# Patient Record
Sex: FEMALE | Race: WHITE | Employment: OTHER | URBAN - METROPOLITAN AREA
[De-identification: names, ages, dates, MRNs, and addresses within clinical notes are randomized per-mention and may not be internally consistent; named-entity substitution may affect disease eponyms.]

---

## 2017-02-15 ENCOUNTER — HOSPITAL ENCOUNTER (OUTPATIENT)
Dept: LAB | Age: 82
Discharge: HOME | End: 2017-02-15
Payer: MEDICARE

## 2017-02-15 ENCOUNTER — OFFICE VISIT (OUTPATIENT)
Dept: FAMILY MEDICINE CLINIC | Age: 82
End: 2017-02-15

## 2017-02-15 VITALS
WEIGHT: 185 LBS | OXYGEN SATURATION: 97 % | HEIGHT: 65 IN | TEMPERATURE: 97.6 F | BODY MASS INDEX: 30.82 KG/M2 | DIASTOLIC BLOOD PRESSURE: 76 MMHG | HEART RATE: 58 BPM | SYSTOLIC BLOOD PRESSURE: 128 MMHG | RESPIRATION RATE: 16 BRPM

## 2017-02-15 DIAGNOSIS — E78.2 MIXED HYPERLIPIDEMIA: ICD-10-CM

## 2017-02-15 DIAGNOSIS — N18.30 CRI (CHRONIC RENAL INSUFFICIENCY), STAGE 3 (MODERATE) (HCC): ICD-10-CM

## 2017-02-15 DIAGNOSIS — Z71.89 ADVANCED CARE PLANNING/COUNSELING DISCUSSION: ICD-10-CM

## 2017-02-15 DIAGNOSIS — Z00.00 WELL ADULT EXAM: Primary | ICD-10-CM

## 2017-02-15 DIAGNOSIS — I10 ESSENTIAL HYPERTENSION, BENIGN: ICD-10-CM

## 2017-02-15 PROCEDURE — 80053 COMPREHEN METABOLIC PANEL: CPT

## 2017-02-15 PROCEDURE — 84443 ASSAY THYROID STIM HORMONE: CPT

## 2017-02-15 PROCEDURE — 80061 LIPID PANEL: CPT

## 2017-02-15 PROCEDURE — 85025 COMPLETE CBC W/AUTO DIFF WBC: CPT

## 2017-02-15 RX ORDER — FOLIC ACID 1 MG/1
TABLET ORAL
Qty: 90 TAB | Refills: 1 | Status: CANCELLED | OUTPATIENT
Start: 2017-02-15

## 2017-02-15 RX ORDER — METOPROLOL TARTRATE 100 MG/1
TABLET ORAL
Qty: 180 TAB | Refills: 3 | Status: SHIPPED | OUTPATIENT
Start: 2017-02-15 | End: 2018-03-27 | Stop reason: SDUPTHER

## 2017-02-15 RX ORDER — FOLIC ACID 1 MG/1
TABLET ORAL
Qty: 90 TAB | Refills: 3 | Status: SHIPPED | OUTPATIENT
Start: 2017-02-15 | End: 2018-03-17 | Stop reason: SDUPTHER

## 2017-02-15 RX ORDER — METOPROLOL TARTRATE 100 MG/1
TABLET ORAL
Qty: 180 TAB | Refills: 2 | Status: CANCELLED | OUTPATIENT
Start: 2017-02-15

## 2017-02-15 RX ORDER — LOSARTAN POTASSIUM AND HYDROCHLOROTHIAZIDE 25; 100 MG/1; MG/1
TABLET ORAL
Qty: 90 TAB | Refills: 3 | Status: SHIPPED | OUTPATIENT
Start: 2017-02-15 | End: 2018-03-26 | Stop reason: SDUPTHER

## 2017-02-15 NOTE — PROGRESS NOTES
1. Have you been to the ER, urgent care clinic since your last visit? Hospitalized since your last visit? No    2. Have you seen or consulted any other health care providers outside of the [address] [place] [address] since your last visit? Include any pap smears or colon screening.  No    Chief Complaint   Patient presents with    Complete Physical     wellness    Labs     fasting

## 2017-02-15 NOTE — PATIENT INSTRUCTIONS
Fax labs when ready to:  Dr. Jurline Shone Dr. Etha Holt Dr. Solmon Diesel,   Fax: [phone]  Dr. Marine Longoria [facility]

## 2017-02-15 NOTE — PROGRESS NOTES
This is a Subsequent Medicare Annual Wellness Visit providing Personalized Prevention Plan Services (PPPS) (Performed 12 months after initial AWV and PPPS )  I have reviewed the patient's medical history in detail and updated the computerized patient record. History     Past Medical History   Diagnosis Date    Allergies     Asymptomatic carotid artery stenosis without infarction 2/22/2011    Depression     Diverticulosis     DJD (degenerative joint disease)     GERD (gastroesophageal reflux disease)     HTN     Hypercholesterolemia     Mammography less than 12 months ago     NIDDM     Proteinuria       Past Surgical History   Procedure Laterality Date    Hx splenectomy      Hx hernia repair      Hx knee replacement      Endoscopy, colon, diagnostic  2008     normal    Hx appendectomy      Hx hysterectomy       Current Outpatient Prescriptions   Medication Sig Dispense Refill    losartan-hydroCHLOROthiazide (HYZAAR) 100-25 mg per tablet TAKE ONE TABLET BY MOUTH DAILY 90 Tab 3    metoprolol tartrate (LOPRESSOR) 100 mg IR tablet TAKE ONE TABLET BY MOUTH TWICE DAILY 217 Tab 3    folic acid (FOLVITE) 1 mg tablet TAKE ONE TABLET BY MOUTH ONCE DAILY 90 Tab 3    lovastatin (MEVACOR) 20 mg tablet Take 2 Tabs by mouth nightly. DUE FOR LABS 180 Tab 0    aspirin delayed-release 325 mg tablet Take  by mouth every six (6) hours as needed for Pain.  glimepiride (AMARYL) 2 mg tablet Take 1 Tab by mouth every morning. (Patient taking differently: Take 2 mg by mouth every morning. 1/2 tab daily) 90 Tab 2    cholecalciferol, vitamin D3, (VITAMIN D3) 2,000 unit Tab Take  by mouth.  linagliptin (TRADJENTA) 5 mg tablet Take 1 Tab by mouth daily. 30 Tab 5    methotrexate (RHEUMATREX) 2.5 mg tablet Take 2.5 mg by mouth Every Thursday.  Take 4 tablets      benzonatate (TESSALON) 200 mg capsule TAKE ONE CAPSULE BY MOUTH THREE TIMES DAILY AS NEEDED FOR COUGH 30 Cap 0    hydrocortisone (HYTONE) 2.5 % topical cream       levocetirizine (XYZAL) 5 mg tablet Take 1 Tab by mouth daily. 30 Tab 5    fluticasone (FLONASE) 50 mcg/actuation nasal spray 2 Sprays by Both Nostrils route daily. 1 Bottle 5    warfarin (COUMADIN) 5 mg tablet Take 5 mg by mouth daily.  Dexlansoprazole 60 mg CpDB Take  by mouth.  apixaban (ELIQUIS) 2.5 mg tablet Take 2.5 mg by mouth two (2) times a day.  cromolyn (OPTICROM) 4 % ophthalmic solution Administer 1 Drop to both eyes four (4) times daily. Use in affected eye(s) 10 mL 0    rivaroxaban (XARELTO) 15 mg tab tablet Take 1 Tab by mouth daily. 30 Tab 3    carbamide peroxide (DEBROX) 6.5 % otic solution Administer 5 drops into each ear as needed. 15 mL 0    diclofenac EC (VOLTAREN) 75 mg EC tablet Take 1 Tab by mouth two (2) times a day. As needed for pain with food 60 Tab 0    diphenoxylate-atropine (LOMOTIL) 2.5-0.025 mg per tablet Take  by mouth four (4) times daily as needed for Diarrhea.  clotrimazole-betamethasone (LOTRISONE) topical cream Apply  to affected area two (2) times a day. 45 g 1    mometasone (ELOCON) 0.1 % topical cream Apply  to affected area daily. 15 g 1    aspirin delayed-release 81 mg tablet Take 81 mg by mouth daily.  senna-docusate (SENNA PLUS) 8.6-50 mg per tablet Take 1 Tab by mouth daily.        Allergies   Allergen Reactions    Sulfa (Sulfonamide Antibiotics) Other (comments)     Family History   Problem Relation Age of Onset    Hypertension Father     Cancer Sister      breast    Diabetes Brother      Social History   Substance Use Topics    Smoking status: Never Smoker    Smokeless tobacco: Never Used    Alcohol use No     Patient Active Problem List   Diagnosis Code    Diverticulosis K57.90    IBS (irritable bowel syndrome) K58.9    Depressive disorder, not elsewhere classified F32.9    Mixed hyperlipidemia E78.2    DJD (degenerative joint disease) M19.90    PUD (peptic ulcer disease) K27.9    Allergic rhinitis due to other allergen J30.89    Proteinuria R80.9    RA (rheumatoid arthritis) ([facility] M06.9    Essential hypertension, benign I10    DM [facility]diabetes mellitus) [facility] E11.9    Asymptomatic carotid artery stenosis without infarction I65.29    CRI (chronic renal insufficiency) N18.9    DVT (deep venous thrombosis) ([facility]) I82.409       Depression Risk Factor Screening:   No flowsheet data found. Alcohol Risk Factor Screening: On any occasion during the past 3 months, have you had more than 3 drinks containing alcohol? No    Do you average more than 7 drinks per week? No      Functional Ability and Level of Safety:     Hearing Loss   mild    Activities of Daily Living   Self-care. Requires assistance with: no ADLs    Fall Risk     Fall Risk Assessment, last 12 mths 2/15/2017   Able to walk? Yes   Fall in past 12 months? No     Abuse Screen   Patient is not abused    Review of Systems   A comprehensive review of systems was negative except for that written in the HPI. Physical Examination     Evaluation of Cognitive Function:  Mood/affect:  happy  Appearance: age appropriate  Family member/caregiver input: none    Visit Vitals    /76    Pulse (!) 58    Temp 97.6 °F (36.4 °C) (Oral)    Resp 16    Ht 5' 5\" (1.651 m)    Wt 185 lb (83.9 kg)    SpO2 97%    BMI 30.79 kg/m2     General appearance: alert, cooperative, no distress, appears stated age  Lungs: clear to auscultation bilaterally  Heart: regular rate and rhythm, S1, S2 normal, no murmur, click, rub or gallop  Extremities: extremities normal, atraumatic, no cyanosis or edema    Patient Care Team:  Danny Oconnell MD as PCP - General    Advice/Referrals/Counseling   Education and counseling provided:  Are appropriate based on today's review and evaluation    Assessment/Plan     Encounter Diagnoses   Name Primary?     Well adult exam Yes    Essential hypertension, benign     Mixed hyperlipidemia     CRI (chronic renal insufficiency), stage 3 (moderate)      Orders Placed This Encounter    CBC WITH AUTOMATED DIFF    METABOLIC PANEL, COMPREHENSIVE    LIPID PANEL    TSH 3RD GENERATION    losartan-hydroCHLOROthiazide (HYZAAR) 100-25 mg per tablet    metoprolol tartrate (LOPRESSOR) 100 mg IR tablet    folic acid (FOLVITE) 1 mg tablet     I have discussed the diagnosis with the patient and the intended plan as seen in the above orders. The patient has received an after-visit summary and questions were answered concerning future plans. Patient conveyed understanding of the plan at the time of the visit.     Dread Barcenas, MSN, ANP  2/15/2017

## 2017-02-15 NOTE — MR AVS SNAPSHOT
Visit Information Date & Time Provider Department Dept. Phone Encounter #  
 2/15/2017 10:00 AM [phone] [id number] Upcoming Health Maintenance Date Due HEMOGLOBIN A1C Q6M 1/22/2016 MEDICARE YEARLY EXAM 7/22/2016 MICROALBUMIN Q1 7/22/2016 LIPID PANEL Q1 7/22/2016 EYE EXAM RETINAL OR DILATED Q1 9/27/2017 FOOT EXAM Q1 2/15/2018 GLAUCOMA SCREENING Q2Y 9/27/2018 DTaP/Tdap/Td series (2 - Td) 10/30/2025 Allergies as of 2/15/2017  Review Complete On: 2/15/2017 By: Jose Guadalupe Lombardo LPN Severity Noted Reaction Type Reactions Sulfa (Sulfonamide Antibiotics)  06/09/2010    Other (comments) Current Immunizations  Reviewed on 2/29/2016 Name Date Influenza High Dose Vaccine PF 9/14/2016 Influenza Vaccine Split 11/8/2012, 11/22/2010 Pneumococcal Conjugate (PCV-13) 7/22/2015 Pneumococcal Vaccine (Unspecified Type) 11/8/2010 Tdap 10/30/2015 Varicella Virus Vaccine Live 9/10/2011 Not reviewed this visit You Were Diagnosed With   
  
 Codes Comments Well adult exam    -  Primary ICD-10-CM: Z00.00 ICD-9-CM: V70.0 Essential hypertension, benign     ICD-10-CM: I10 
ICD-9-CM: 401.1 Mixed hyperlipidemia     ICD-10-CM: E78.2 ICD-9-CM: 272.2 CRI (chronic renal insufficiency), stage 3 (moderate)     ICD-10-CM: N18.3 ICD-9-CM: 793. 3 Vitals BP Pulse Temp Resp Height(growth percentile) Weight(growth percentile) 128/76 (!) 58 97.6 °F (36.4 °C) (Oral) 16 5' 5\" (1.651 m) 185 lb (83.9 kg) SpO2 BMI OB Status Smoking Status 97% 30.79 kg/m2 Postmenopausal Never Smoker Vitals History BMI and BSA Data Body Mass Index Body Surface Area 30.79 kg/m 2 1.96 m 2 Preferred Pharmacy Pharmacy Name Phone [facility], [address] Your Updated Medication List  
  
   
 This list is accurate as of: 2/15/17 10:56 AM.  Always use your most recent med list.  
  
  
  
  
 apixaban 2.5 mg tablet Commonly known as:  Chasity Spatz Take 2.5 mg by mouth two (2) times a day. * aspirin delayed-release 81 mg tablet Take 81 mg by mouth daily. * aspirin delayed-release 325 mg tablet Take  by mouth every six (6) hours as needed for Pain. benzonatate 200 mg capsule Commonly known as:  TESSALON  
TAKE ONE CAPSULE BY MOUTH THREE TIMES DAILY AS NEEDED FOR COUGH  
  
 carbamide peroxide 6.5 % otic solution Commonly known as:  Beat Patricia Administer 5 drops into each ear as needed. clotrimazole-betamethasone topical cream  
Commonly known as:  Tesha Mcneal Apply  to affected area two (2) times a day. cromolyn 4 % ophthalmic solution Commonly known as:  OPTICROM Administer 1 Drop to both eyes four (4) times daily. Use in affected eye(s) Dexlansoprazole 60 mg Cpdb Take  by mouth. diclofenac EC 75 mg EC tablet Commonly known as:  VOLTAREN Take 1 Tab by mouth two (2) times a day. As needed for pain with food diphenoxylate-atropine 2.5-0.025 mg per tablet Commonly known as:  LOMOTIL Take  by mouth four (4) times daily as needed for Diarrhea. fluticasone 50 mcg/actuation nasal spray Commonly known as:  Yolanda Jumper 2 Sprays by Both Nostrils route daily. folic acid 1 mg tablet Commonly known as:  FOLVITE  
TAKE ONE TABLET BY MOUTH ONCE DAILY  
  
 glimepiride 2 mg tablet Commonly known as:  AMARYL Take 1 Tab by mouth every morning. hydrocortisone 2.5 % topical cream  
Commonly known as:  HYTONE  
  
 levocetirizine 5 mg tablet Commonly known as:  Karrie Reagin Take 1 Tab by mouth daily. linagliptin 5 mg tablet Commonly known as:  Veronica Cushing Take 1 Tab by mouth daily. losartan-hydroCHLOROthiazide 100-25 mg per tablet Commonly known as:  HYZAAR  
TAKE ONE TABLET BY MOUTH DAILY lovastatin 20 mg tablet Commonly known as:  MEVACOR Take 2 Tabs by mouth nightly. DUE FOR LABS  
  
 methotrexate 2.5 mg tablet Commonly known as:  Melodyrocio Mitul Take 2.5 mg by mouth Every Thursday. Take 4 tablets  
  
 metoprolol tartrate 100 mg IR tablet Commonly known as:  LOPRESSOR  
TAKE ONE TABLET BY MOUTH TWICE DAILY  
  
 mometasone 0.1 % topical cream  
Commonly known as:  Tony Hof Apply  to affected area daily. rivaroxaban 15 mg Tab tablet Commonly known as:  Malika Clement Take 1 Tab by mouth daily. SENNA PLUS 8.6-50 mg per tablet Generic drug:  senna-docusate Take 1 Tab by mouth daily. VITAMIN D3 2,000 unit Tab Generic drug:  cholecalciferol (vitamin D3) Take  by mouth.  
  
 warfarin 5 mg tablet Commonly known as:  COUMADIN Take 5 mg by mouth daily. * Notice: This list has 2 medication(s) that are the same as other medications prescribed for you. Read the directions carefully, and ask your doctor or other care provider to review them with you. Prescriptions Sent to Pharmacy Refills  
 losartan-hydroCHLOROthiazide (HYZAAR) 100-25 mg per tablet 3 Sig: TAKE ONE TABLET BY MOUTH DAILY Class: Normal  
 [facility] [address] Ph #: [phone]  
 metoprolol tartrate (LOPRESSOR) 100 mg IR tablet 3 Sig: TAKE ONE TABLET BY MOUTH TWICE DAILY Class: Normal  
 Pharmacy: [facility] [address] Ph #: [phone]  
 folic acid (FOLVITE) 1 mg tablet 3 Sig: TAKE ONE TABLET BY MOUTH ONCE DAILY Class: Normal  
 Pharmacy: [facility] [address], [address] Ph #: [phone] We Performed the Following CBC WITH AUTOMATED DIFF [24596 CPT(R)] LIPID PANEL [54185 CPT(R)] METABOLIC PANEL, COMPREHENSIVE [34434 CPT(R)] TSH 3RD GENERATION [89096 CPT(R)] Patient Instructions Fax labs when ready to: 
Dr. Ronak Bowers Dr. Dwaine Padilla,   Fax: [phone] Dr. G[facility] SERVICES! Dear Aki Best: Thank you for requesting a [employer] account. Our records indicate that you have previously registered for a [employer] account but its currently inactive. Please call our [employer] support line at [phone]. Additional Information If you have questions, please visit the Frequently Asked Questions section of the [employer] website at [email or web address] Remember, [employer] is NOT to be used for urgent needs. For medical emergencies, dial 911. Now available from your iPhone and Android! Please provide this summary of care documentation to your next provider. Your primary care clinician is listed as MI PALMER. If you have any questions after today's visit, please call [phone].

## 2017-02-16 LAB
ALBUMIN SERPL-MCNC: 4.3 G/DL (ref 3.5–4.7)
ALBUMIN/GLOB SERPL: 1.9 {RATIO} (ref 1.1–2.5)
ALP SERPL-CCNC: 58 IU/L (ref 39–117)
ALT SERPL-CCNC: 21 IU/L (ref 0–32)
AST SERPL-CCNC: 19 IU/L (ref 0–40)
BASOPHILS # BLD AUTO: 0 X10E3/UL (ref 0–0.2)
BASOPHILS NFR BLD AUTO: 0 %
BILIRUB SERPL-MCNC: 0.4 MG/DL (ref 0–1.2)
BUN SERPL-MCNC: 27 MG/DL (ref 8–27)
BUN/CREAT SERPL: 16 (ref 11–26)
CALCIUM SERPL-MCNC: 10 MG/DL (ref 8.7–10.3)
CHLORIDE SERPL-SCNC: 102 MMOL/L (ref 96–106)
CHOLEST SERPL-MCNC: 183 MG/DL (ref 100–199)
CO2 SERPL-SCNC: 21 MMOL/L (ref 18–29)
CREAT SERPL-MCNC: 1.65 MG/DL (ref 0.57–1)
EOSINOPHIL # BLD AUTO: 0.3 X10E3/UL (ref 0–0.4)
EOSINOPHIL NFR BLD AUTO: 3 %
ERYTHROCYTE [DISTWIDTH] IN BLOOD BY AUTOMATED COUNT: 16.8 % (ref 12.3–15.4)
GLOBULIN SER CALC-MCNC: 2.3 G/DL (ref 1.5–4.5)
GLUCOSE SERPL-MCNC: 182 MG/DL (ref 65–99)
HCT VFR BLD AUTO: 41.1 % (ref 34–46.6)
HDLC SERPL-MCNC: 40 MG/DL
HGB BLD-MCNC: 13.6 G/DL (ref 11.1–15.9)
IMM GRANULOCYTES # BLD: 0 X10E3/UL (ref 0–0.1)
IMM GRANULOCYTES NFR BLD: 0 %
INTERPRETATION, 910389: NORMAL
INTERPRETATION: NORMAL
LDLC SERPL CALC-MCNC: 110 MG/DL (ref 0–99)
LYMPHOCYTES # BLD AUTO: 2.6 X10E3/UL (ref 0.7–3.1)
LYMPHOCYTES NFR BLD AUTO: 32 %
MCH RBC QN AUTO: 30.6 PG (ref 26.6–33)
MCHC RBC AUTO-ENTMCNC: 33.1 G/DL (ref 31.5–35.7)
MCV RBC AUTO: 92 FL (ref 79–97)
MONOCYTES # BLD AUTO: 0.8 X10E3/UL (ref 0.1–0.9)
MONOCYTES NFR BLD AUTO: 10 %
NEUTROPHILS # BLD AUTO: 4.3 X10E3/UL (ref 1.4–7)
NEUTROPHILS NFR BLD AUTO: 55 %
PDF IMAGE, 910387: NORMAL
PLATELET # BLD AUTO: 318 X10E3/UL (ref 150–379)
POTASSIUM SERPL-SCNC: 5 MMOL/L (ref 3.5–5.2)
PROT SERPL-MCNC: 6.6 G/DL (ref 6–8.5)
RBC # BLD AUTO: 4.45 X10E6/UL (ref 3.77–5.28)
SODIUM SERPL-SCNC: 142 MMOL/L (ref 134–144)
TRIGL SERPL-MCNC: 166 MG/DL (ref 0–149)
TSH SERPL DL<=0.005 MIU/L-ACNC: 1.8 UIU/ML (ref 0.45–4.5)
VLDLC SERPL CALC-MCNC: 33 MG/DL (ref 5–40)
WBC # BLD AUTO: 8 X10E3/UL (ref 3.4–10.8)

## 2017-03-22 ENCOUNTER — OFFICE VISIT (OUTPATIENT)
Dept: FAMILY MEDICINE CLINIC | Age: 82
End: 2017-03-22

## 2017-03-22 VITALS
RESPIRATION RATE: 18 BRPM | OXYGEN SATURATION: 94 % | BODY MASS INDEX: 30.57 KG/M2 | TEMPERATURE: 98.1 F | SYSTOLIC BLOOD PRESSURE: 142 MMHG | HEART RATE: 66 BPM | HEIGHT: 65 IN | DIASTOLIC BLOOD PRESSURE: 74 MMHG | WEIGHT: 183.5 LBS

## 2017-03-22 DIAGNOSIS — J01.00 ACUTE MAXILLARY SINUSITIS, RECURRENCE NOT SPECIFIED: Primary | ICD-10-CM

## 2017-03-22 RX ORDER — ONDANSETRON 4 MG/1
4 TABLET, ORALLY DISINTEGRATING ORAL
Qty: 20 TAB | Refills: 0 | Status: SHIPPED | OUTPATIENT
Start: 2017-03-22 | End: 2020-01-06

## 2017-03-22 RX ORDER — CEFDINIR 300 MG/1
300 CAPSULE ORAL 2 TIMES DAILY
Qty: 20 CAP | Refills: 0 | Status: SHIPPED | OUTPATIENT
Start: 2017-03-22 | End: 2017-04-01

## 2017-03-22 NOTE — MR AVS SNAPSHOT
Visit Information Date & Time Provider Department Dept. Phone Encounter #  
 3/22/2017  1:15 PM Yanelis Madden, Mikayla Colon [phone] [id number] Upcoming Health Maintenance Date Due HEMOGLOBIN A1C Q6M 1/22/2016 MICROALBUMIN Q1 7/22/2016 EYE EXAM RETINAL OR DILATED Q1 9/27/2017 FOOT EXAM Q1 2/15/2018 LIPID PANEL Q1 2/15/2018 MEDICARE YEARLY EXAM 2/16/2018 GLAUCOMA SCREENING Q2Y 9/27/2018 DTaP/Tdap/Td series (2 - Td) 10/30/2025 Allergies as of 3/22/2017  Review Complete On: 3/22/2017 By: Lilian England LPN Severity Noted Reaction Type Reactions Sulfa (Sulfonamide Antibiotics)  06/09/2010    Other (comments) Current Immunizations  Reviewed on 2/29/2016 Name Date Influenza High Dose Vaccine PF 9/14/2016 Influenza Vaccine Split 11/8/2012, 11/22/2010 Pneumococcal Conjugate (PCV-13) 7/22/2015 Pneumococcal Vaccine (Unspecified Type) 11/8/2010 Tdap 10/30/2015 Varicella Virus Vaccine Live 9/10/2011 Not reviewed this visit You Were Diagnosed With   
  
 Codes Comments Acute maxillary sinusitis, recurrence not specified    -  Primary ICD-10-CM: J01.00 ICD-9-CM: 461.0 Vitals BP Pulse Temp Resp Height(growth percentile) Weight(growth percentile) 142/74 66 98.1 °F (36.7 °C) (Oral) 18 5' 5\" (1.651 m) 183 lb 8 oz (83.2 kg) SpO2 BMI OB Status Smoking Status 94% 30.54 kg/m2 Postmenopausal Never Smoker Vitals History BMI and BSA Data Body Mass Index Body Surface Area 30.54 kg/m 2 1.95 m 2 Preferred Pharmacy Pharmacy Name Phone [address] Your Updated Medication List  
  
   
This list is accurate as of: 3/22/17  1:32 PM.  Always use your most recent med list.  
  
  
  
  
 apixaban 2.5 mg tablet Commonly known as:  Jodelle Patient Take 2.5 mg by mouth two (2) times a day. * aspirin delayed-release 81 mg tablet Take 81 mg by mouth daily. * aspirin delayed-release 325 mg tablet Take  by mouth every six (6) hours as needed for Pain. benzonatate 200 mg capsule Commonly known as:  TESSALON  
TAKE ONE CAPSULE BY MOUTH THREE TIMES DAILY AS NEEDED FOR COUGH  
  
 carbamide peroxide 6.5 % otic solution Commonly known as:  Hyacinthvalerie Radha Administer 5 drops into each ear as needed. cefdinir 300 mg capsule Commonly known as:  OMNICEF Take 1 Cap by mouth two (2) times a day for 10 days. clotrimazole-betamethasone topical cream  
Commonly known as:  Beverli Seldovia Apply  to affected area two (2) times a day. cromolyn 4 % ophthalmic solution Commonly known as:  OPTICROM Administer 1 Drop to both eyes four (4) times daily. Use in affected eye(s) Dexlansoprazole 60 mg Cpdb Take  by mouth. diclofenac EC 75 mg EC tablet Commonly known as:  VOLTAREN Take 1 Tab by mouth two (2) times a day. As needed for pain with food diphenoxylate-atropine 2.5-0.025 mg per tablet Commonly known as:  LOMOTIL Take  by mouth four (4) times daily as needed for Diarrhea. fluticasone 50 mcg/actuation nasal spray Commonly known as:  Cynthia Hillsdale 2 Sprays by Both Nostrils route daily. folic acid 1 mg tablet Commonly known as:  FOLVITE  
TAKE ONE TABLET BY MOUTH ONCE DAILY  
  
 glimepiride 2 mg tablet Commonly known as:  AMARYL Take 1 Tab by mouth every morning. hydrocortisone 2.5 % topical cream  
Commonly known as:  HYTONE  
  
 levocetirizine 5 mg tablet Commonly known as:  Jessica Grumbles Take 1 Tab by mouth daily. linagliptin 5 mg tablet Commonly known as:  Marisela Sprain Take 1 Tab by mouth daily. losartan-hydroCHLOROthiazide 100-25 mg per tablet Commonly known as:  HYZAAR  
TAKE ONE TABLET BY MOUTH DAILY lovastatin 20 mg tablet Commonly known as:  MEVACOR Take 2 Tabs by mouth nightly. DUE FOR LABS methotrexate 2.5 mg tablet Commonly known as:  Sallie Putt Take 2.5 mg by mouth Every Thursday. Take 4 tablets  
  
 metoprolol tartrate 100 mg IR tablet Commonly known as:  LOPRESSOR  
TAKE ONE TABLET BY MOUTH TWICE DAILY  
  
 mometasone 0.1 % topical cream  
Commonly known as:  Etha Im Apply  to affected area daily. ondansetron 4 mg disintegrating tablet Commonly known as:  ZOFRAN ODT Take 1 Tab by mouth every eight (8) hours as needed for Nausea. rivaroxaban 15 mg Tab tablet Commonly known as:  Warrick Antes Take 1 Tab by mouth daily. SENNA PLUS 8.6-50 mg per tablet Generic drug:  senna-docusate Take 1 Tab by mouth daily. VITAMIN D3 2,000 unit Tab Generic drug:  cholecalciferol (vitamin D3) Take  by mouth.  
  
 warfarin 5 mg tablet Commonly known as:  COUMADIN Take 5 mg by mouth daily. * Notice: This list has 2 medication(s) that are the same as other medications prescribed for you. Read the directions carefully, and ask your doctor or other care provider to review them with you. Prescriptions Sent to Pharmacy Refills  
 cefdinir (OMNICEF) 300 mg capsule 0 Sig: Take 1 Cap by mouth two (2) times a day for 10 days. Class: Normal  
 Pharmacy: [address] Ph #: [phone] Route: Oral  
 ondansetron (ZOFRAN ODT) 4 mg disintegrating tablet 0 Sig: Take 1 Tab by mouth every eight (8) hours as needed for Nausea. Class: Normal  
 Pharmacy: [address] Ph #: [phone] Route: Oral  
  
Introducing [facility] SERVICES! Dear Michi Knox: Thank you for requesting a [employer] account. Our records indicate that you have previously registered for a [employer] account but its currently inactive. Please call our [employer] support line at [phone]. Additional Information If you have questions, please visit the Frequently Asked Questions section of the [email or web address] website at [email or web address] Remember, [employer] is NOT to be used for urgent needs. For medical emergencies, dial 911. Now available from your iPhone and Android! Please provide this summary of care documentation to your next provider. Your primary care clinician is listed as Drake Valdez. If you have any questions after today's visit, please call [phone].

## 2017-03-22 NOTE — PROGRESS NOTES
1. Have you been to the ER, urgent care clinic since your last visit? Hospitalized since your last visit? No    2. Have you seen or consulted any other health care providers outside of the [address] [place] [address] since your last visit? Include any pap smears or colon screening. No    Chief Complaint   Patient presents with    Cold Symptoms     cough, sinus pressure, ear pressure & juicy & nauseated x 1 day     Pt states she has a cough, sinus pressure, ear juicy & pressure & nauseated x 1 day.

## 2017-04-03 ENCOUNTER — OFFICE VISIT (OUTPATIENT)
Dept: FAMILY MEDICINE CLINIC | Age: 82
End: 2017-04-03

## 2017-04-03 ENCOUNTER — HOSPITAL ENCOUNTER (OUTPATIENT)
Dept: GENERAL RADIOLOGY | Age: 82
Discharge: HOME | End: 2017-04-03
Payer: MEDICARE

## 2017-04-03 VITALS
RESPIRATION RATE: 18 BRPM | TEMPERATURE: 98.3 F | HEART RATE: 62 BPM | SYSTOLIC BLOOD PRESSURE: 114 MMHG | DIASTOLIC BLOOD PRESSURE: 64 MMHG | HEIGHT: 65 IN | BODY MASS INDEX: 30.32 KG/M2 | WEIGHT: 182 LBS | OXYGEN SATURATION: 96 %

## 2017-04-03 DIAGNOSIS — J18.9 CAP (COMMUNITY ACQUIRED PNEUMONIA): Primary | ICD-10-CM

## 2017-04-03 DIAGNOSIS — R06.2 WHEEZING: ICD-10-CM

## 2017-04-03 DIAGNOSIS — R05.9 COUGH: ICD-10-CM

## 2017-04-03 DIAGNOSIS — J18.9 CAP (COMMUNITY ACQUIRED PNEUMONIA): ICD-10-CM

## 2017-04-03 DIAGNOSIS — R09.89 CHEST CONGESTION: ICD-10-CM

## 2017-04-03 LAB
FLUAV+FLUBV AG NOSE QL IA.RAPID: NEGATIVE POS/NEG
FLUAV+FLUBV AG NOSE QL IA.RAPID: NEGATIVE POS/NEG
VALID INTERNAL CONTROL?: YES

## 2017-04-03 PROCEDURE — 71020 XR CHEST PA LAT: CPT

## 2017-04-03 RX ORDER — ALBUTEROL SULFATE 0.83 MG/ML
2.5 SOLUTION RESPIRATORY (INHALATION) ONCE
Qty: 1 EACH | Refills: 0
Start: 2017-04-03 | End: 2017-04-03

## 2017-04-03 RX ORDER — ALBUTEROL SULFATE 90 UG/1
1-2 AEROSOL, METERED RESPIRATORY (INHALATION)
Qty: 1 INHALER | Refills: 0 | Status: SHIPPED | OUTPATIENT
Start: 2017-04-03 | End: 2017-06-28 | Stop reason: ALTCHOICE

## 2017-04-03 RX ORDER — PREDNISONE 5 MG/1
TABLET ORAL
Qty: 21 TAB | Refills: 0 | Status: SHIPPED | OUTPATIENT
Start: 2017-04-03 | End: 2017-04-13

## 2017-04-03 RX ORDER — LEVOFLOXACIN 500 MG/1
500 TABLET, FILM COATED ORAL DAILY
Qty: 10 TAB | Refills: 0 | Status: SHIPPED | OUTPATIENT
Start: 2017-04-03 | End: 2017-04-13

## 2017-04-03 NOTE — PROGRESS NOTES
Chief Complaint   Patient presents with    Cough    Ear Pain    Headache     Patient presents during walk ins clinic with sx that have mot improved since lov after completing abx Saturday. 1. Have you been to the ER, urgent care clinic since your last visit? Hospitalized since your last visit? No    2. Have you seen or consulted any other health care providers outside of the [address] [place] [address] since your last visit? Include any pap smears or colon screening. No    Pt saw Niya Keita on 3/22 for sinus congestion and cough. Completed 10 days of omnicef on Saturday. Persistent cough, chest congestion, fatigue low energy. Remote history of pneumonia. Denies any fever or chills. Productive cough but congestion is stuck in chest.   Associated wheezing, sob, and dyspnea. Denies any other concerns at this time. Chief Complaint   Patient presents with    Cough    Ear Pain    Headache     she is a 81y.o. year old female who presents for evalution. Reviewed PmHx, RxHx, FmHx, SocHx, AllgHx and updated and dated in the chart.     Review of Systems - negative except as listed above in the HPI    Objective:     Vitals:    04/03/17 0712   BP: 114/64   Pulse: 62   Resp: 18   Temp: 98.3 °F (36.8 °C)   TempSrc: Oral   SpO2: 96%   Weight: 182 lb (82.6 kg)   Height: 5' 5\" (1.651 m)     Physical Examination: General appearance - alert, well appearing, and in no distress  Eyes - pupils equal and reactive, extraocular eye movements intact  Ears - bilateral TM's and external ear canals normal  Nose - mucosal congestion, mucosal erythema, clear rhinorrhea and sinuses normal and nontender  Mouth - mucous membranes moist, pharynx normal without lesions  Neck - supple, no significant adenopathy  Chest - no tachypnea, retractions or cyanosis, wheezing noted on expiration in bilateral lower lobes with fine crackles that cleared with a productive sounding cough  Heart - normal rate, regular rhythm, normal S1, S2, no murmurs    Assessment/ Plan:   Andrew Friday was seen today for cough, ear pain and headache. Diagnoses and all orders for this visit:    CAP (community acquired pneumonia)  -     XR CHEST PA LAT; Future  -     levoFLOXacin (LEVAQUIN) 500 mg tablet; Take 1 Tab by mouth daily for 10 days. Creatinine clearance 35. Enc to complete chest xray to eval for pneumonia. Start and complete full course of Levaquin. Dwp ADRs/SEs of medication. Push fluids. Rest. Saline nasal spray for nasal congestion. OTC motrin/apap for fevers. RTC if sx persist or worsen. Wheezing  -     albuterol (PROVENTIL HFA, VENTOLIN HFA, PROAIR HFA) 90 mcg/actuation inhaler; Take 1-2 Puffs by inhalation every four (4) hours as needed for Wheezing or Shortness of Breath. -     predniSONE (STERAPRED) 5 mg dose pack; See administration instruction per 5mg dose pack  -     albuterol (PROVENTIL VENTOLIN) 2.5 mg /3 mL (0.083 %) nebulizer solution; 3 mL by Nebulization route once for 1 dose. -     ALBUTEROL, INHAL. UPY.()  -     INHAL RX, AIRWAY OBST/DX SPUTUM INDUCT ([id number])  Breathing treatment given. Start pred taper and PRN albuterol to help open airway and relieve chest congestion. Follow-up Disposition:  Return if symptoms worsen or fail to improve. I have discussed the diagnosis with the patient and the intended plan as seen in the above orders. The patient has received an after-visit summary and questions were answered concerning future plans. Medication Side Effects and Warnings were discussed with patient: yes  Patient Labs were reviewed and or requested: yes  Patient Past Records were reviewed and or requested  yes  Patient / Caregiver Understanding of treatment plan was verbalized during office visit YES    TRE Figueredo    Patient Instructions   I have prescribed you the antibiotic Levaquin. Take this medication as directed and complete the entire course, even if you're feeling better.  If you miss a dose, take it as soon as possible. Make sure you drink stay hydrated while on this medication and drink 7471-7965 mL/day. Common side effects of this medication include nausea and abdominal pain. If you're going to be out in the sun make sure you wear sunscreen as much as 5 days after your last dose to avoid developing a photosensitivity reaction. Notify your provider immediately if tendon pain or swelling occurs or if symptoms do not improve one week after completing the course of this antibiotic. For your sore throat:  Zinc may reduce the severity of cold symptoms and could even shorten your illness. The newest zinc lozenges come formulated with herbs like peppermint and licorice to help soothe your throat and relieve chest congestion. Try Cold-Eeze Cold Remedy Plus Multi Symptom Relief Quick Melts ($12 for 24 lozenges). For your runny nose:  Try moistened with natural saline wipes. They are much more gentle than dry tissues. Try Puffs Fresh Faces nose wipes with Vicks ($5 for 45 wipes)    For your congestion:  Since the common cold is caused by a virus, the best medication is rest. But antihistamine-decongestant combo medications are a good bet for easing your symptoms such as post nasal drip and congestion. Try Claritin D 12 hour ($14 for 10 tablets).

## 2017-04-03 NOTE — PATIENT INSTRUCTIONS
I have prescribed you the antibiotic Levaquin. Take this medication as directed and complete the entire course, even if you're feeling better. If you miss a dose, take it as soon as possible. Make sure you drink stay hydrated while on this medication and drink 0567-2978 mL/day. Common side effects of this medication include nausea and abdominal pain. If you're going to be out in the sun make sure you wear sunscreen as much as 5 days after your last dose to avoid developing a photosensitivity reaction. Notify your provider immediately if tendon pain or swelling occurs or if symptoms do not improve one week after completing the course of this antibiotic. For your sore throat:  Zinc may reduce the severity of cold symptoms and could even shorten your illness. The newest zinc lozenges come formulated with herbs like peppermint and licorice to help soothe your throat and relieve chest congestion. Try Cold-Eeze Cold Remedy Plus Multi Symptom Relief Quick Melts ($12 for 24 lozenges). For your runny nose:  Try moistened with natural saline wipes. They are much more gentle than dry tissues. Try Puffs Fresh Faces nose wipes with Vicks ($5 for 45 wipes)    For your congestion:  Since the common cold is caused by a virus, the best medication is rest. But antihistamine-decongestant combo medications are a good bet for easing your symptoms such as post nasal drip and congestion. Try Claritin D 12 hour ($14 for 10 tablets).

## 2017-04-03 NOTE — PROGRESS NOTES
Chief Complaint   Patient presents with    Cough    Ear Pain    Headache     Patient present during walk in clinic with sx that have mot improved since lov and after completing abx Saturday. 1. Have you been to the ER, urgent care clinic since your last visit? Hospitalized since your last visit? No    2. Have you seen or consulted any other health care providers outside of the [address] [place] [address] since your last visit? Include any pap smears or colon screening.  No

## 2017-04-03 NOTE — MR AVS SNAPSHOT
Visit Information Date & Time Provider Department Dept. Phone Encounter #  
 4/3/2017  7:00 AM Madonna Corona NP [address] [facility] [phone] [id number] Follow-up Instructions Return if symptoms worsen or fail to improve. Upcoming Health Maintenance Date Due HEMOGLOBIN A1C Q6M 1/22/2016 MICROALBUMIN Q1 7/22/2016 EYE EXAM RETINAL OR DILATED Q1 9/27/2017 FOOT EXAM Q1 2/15/2018 LIPID PANEL Q1 2/15/2018 MEDICARE YEARLY EXAM 2/16/2018 GLAUCOMA SCREENING Q2Y 9/27/2018 DTaP/Tdap/Td series (2 - Td) 10/30/2025 Allergies as of 4/3/2017  Review Complete On: 4/3/2017 By: Zeus Kendall LPN Severity Noted Reaction Type Reactions Sulfa (Sulfonamide Antibiotics)  06/09/2010    Other (comments) Current Immunizations  Reviewed on 2/29/2016 Name Date Influenza High Dose Vaccine PF 9/14/2016 Influenza Vaccine Split 11/8/2012, 11/22/2010 Pneumococcal Conjugate (PCV-13) 7/22/2015 Pneumococcal Vaccine (Unspecified Type) 11/8/2010 Tdap 10/30/2015 Varicella Virus Vaccine Live 9/10/2011 Not reviewed this visit You Were Diagnosed With   
  
 Codes Comments CAP (community acquired pneumonia)    -  Primary ICD-10-CM: J18.9 ICD-9-CM: 887 Wheezing     ICD-10-CM: R06.2 ICD-9-CM: 786.07 Vitals BP Pulse Temp Resp Height(growth percentile) Weight(growth percentile) 114/64 (BP 1 Location: Right arm, BP Patient Position: Sitting) 62 98.3 °F (36.8 °C) (Oral) 18 5' 5\" (1.651 m) 182 lb (82.6 kg) SpO2 BMI OB Status Smoking Status 96% 30.29 kg/m2 Postmenopausal Never Smoker Vitals History BMI and BSA Data Body Mass Index Body Surface Area  
 30.29 kg/m 2 1.95 m 2 Preferred Pharmacy Pharmacy Name Phone [address] Your Updated Medication List  
  
   
 This list is accurate as of: 4/3/17  7:30 AM.  Always use your most recent med list.  
  
  
  
  
 * albuterol 90 mcg/actuation inhaler Commonly known as:  PROVENTIL HFA, VENTOLIN HFA, PROAIR HFA Take 1-2 Puffs by inhalation every four (4) hours as needed for Wheezing or Shortness of Breath. * albuterol 2.5 mg /3 mL (0.083 %) nebulizer solution Commonly known as:  PROVENTIL VENTOLIN  
3 mL by Nebulization route once for 1 dose. aspirin delayed-release 325 mg tablet Take  by mouth every six (6) hours as needed for Pain. benzonatate 200 mg capsule Commonly known as:  TESSALON  
TAKE ONE CAPSULE BY MOUTH THREE TIMES DAILY AS NEEDED FOR COUGH  
  
 carbamide peroxide 6.5 % otic solution Commonly known as:  Neo Floro Administer 5 drops into each ear as needed. Dexlansoprazole 60 mg Cpdb Take  by mouth. fluticasone 50 mcg/actuation nasal spray Commonly known as:  Rasheed Settles 2 Sprays by Both Nostrils route daily. folic acid 1 mg tablet Commonly known as:  FOLVITE  
TAKE ONE TABLET BY MOUTH ONCE DAILY  
  
 glimepiride 2 mg tablet Commonly known as:  AMARYL Take 1 Tab by mouth every morning. levocetirizine 5 mg tablet Commonly known as:  Joretta Lock Take 1 Tab by mouth daily. levoFLOXacin 500 mg tablet Commonly known as:  Mullens Duster Take 1 Tab by mouth daily for 10 days. linagliptin 5 mg tablet Commonly known as:  Maurita John Take 1 Tab by mouth daily. losartan-hydroCHLOROthiazide 100-25 mg per tablet Commonly known as:  HYZAAR  
TAKE ONE TABLET BY MOUTH DAILY lovastatin 20 mg tablet Commonly known as:  MEVACOR Take 2 Tabs by mouth nightly. DUE FOR LABS  
  
 methotrexate 2.5 mg tablet Commonly known as:  Jacques Trinchera Take 2.5 mg by mouth Every Thursday. Take 4 tablets  
  
 metoprolol tartrate 100 mg IR tablet Commonly known as:  LOPRESSOR  
TAKE ONE TABLET BY MOUTH TWICE DAILY  
  
 ondansetron 4 mg disintegrating tablet Commonly known as:  ZOFRAN ODT Take 1 Tab by mouth every eight (8) hours as needed for Nausea. predniSONE 5 mg dose pack Commonly known as:  STERAPRED See administration instruction per 5mg dose pack SENNA PLUS 8.6-50 mg per tablet Generic drug:  senna-docusate Take 1 Tab by mouth daily. VITAMIN D3 2,000 unit Tab Generic drug:  cholecalciferol (vitamin D3) Take  by mouth. * Notice: This list has 2 medication(s) that are the same as other medications prescribed for you. Read the directions carefully, and ask your doctor or other care provider to review them with you. Prescriptions Sent to Pharmacy Refills  
 levoFLOXacin (LEVAQUIN) 500 mg tablet 0 Sig: Take 1 Tab by mouth daily for 10 days. Class: Normal  
 Pharmacy: [address] Ph #: [phone] Route: Oral  
 albuterol (PROVENTIL HFA, VENTOLIN HFA, PROAIR HFA) 90 mcg/actuation inhaler 0 Sig: Take 1-2 Puffs by inhalation every four (4) hours as needed for Wheezing or Shortness of Breath. Class: Normal  
 Pharmacy: [address] Ph #: [phone] Route: Inhalation  
 predniSONE (STERAPRED) 5 mg dose pack 0 Sig: See administration instruction per 5mg dose pack Class: Normal  
 Pharmacy: [address] Ph #: [phone] We Performed the Following ALBUTEROL, INHAL. SOL., FDA-APPROVED FINAL, NON-COMPOUND UNIT DOSE, 1 MG [ [facility]] INHAL RX, AIRWAY OBST/DX SPUTUM INDUCT [id number] CPT(R)] Follow-up Instructions Return if symptoms worsen or fail to improve. To-Do List   
 04/03/2017 Imaging:  XR CHEST PA LAT Patient Instructions I have prescribed you the antibiotic Levaquin.  Take this medication as directed and complete the entire course, even if you're feeling better. If you miss a dose, take it as soon as possible. Make sure you drink stay hydrated while on this medication and drink 7193-3288 mL/day. Common side effects of this medication include nausea and abdominal pain. If you're going to be out in the sun make sure you wear sunscreen as much as 5 days after your last dose to avoid developing a photosensitivity reaction. Notify your provider immediately if tendon pain or swelling occurs or if symptoms do not improve one week after completing the course of this antibiotic. For your sore throat: 
Zinc may reduce the severity of cold symptoms and could even shorten your illness. The newest zinc lozenges come formulated with herbs like peppermint and licorice to help soothe your throat and relieve chest congestion. Try Cold-Eeze Cold Remedy Plus Multi Symptom Relief Quick Melts ($12 for 24 lozenges). For your runny nose: 
Try moistened with natural saline wipes. They are much more gentle than dry tissues. Try Puffs Fresh Faces nose wipes with Vicks ($5 for 45 wipes) For your congestion: 
Since the common cold is caused by a virus, the best medication is rest. But antihistamine-decongestant combo medications are a good bet for easing your symptoms such as post nasal drip and congestion. Try Claritin D 12 hour ($14 for 10 tablets). Introducing [facility]! Dear Margaret Vo: Thank you for requesting a [employer] account. Our records indicate that you have previously registered for a [employer] account but its currently inactive. Please call our [employer] support line at [phone]. Additional Information If you have questions, please visit the Frequently Asked Questions section of the [employer] website at [email or web address] Remember, [employer] is NOT to be used for urgent needs. For medical emergencies, dial 911. Now available from your iPhone and Android! Please provide this summary of care documentation to your next provider. Your primary care clinician is listed as Sal Aguayo. If you have any questions after today's visit, please call [phone].

## 2017-04-03 NOTE — PROGRESS NOTES
Please notify pt that xray shows lung changes that are possibly due to emphysema. If she continues to have difficulty breathing we may want to order pulmonary function testing and have her see a pulmonary specialist. There is no evidence of pneumonia. I still recommend she complete the medications as prescribed during OV and follow up if sx persist or worsen.

## 2017-04-13 ENCOUNTER — OFFICE VISIT (OUTPATIENT)
Dept: FAMILY MEDICINE CLINIC | Age: 82
End: 2017-04-13

## 2017-04-13 VITALS — SYSTOLIC BLOOD PRESSURE: 143 MMHG | DIASTOLIC BLOOD PRESSURE: 78 MMHG | HEART RATE: 80 BPM

## 2017-04-13 DIAGNOSIS — E11.9 TYPE 2 DIABETES MELLITUS WITHOUT COMPLICATION, WITHOUT LONG-TERM CURRENT USE OF INSULIN (HCC): ICD-10-CM

## 2017-04-13 DIAGNOSIS — J30.89 ALLERGIC RHINITIS DUE TO OTHER ALLERGEN: ICD-10-CM

## 2017-04-13 DIAGNOSIS — R05.9 COUGH: Primary | ICD-10-CM

## 2017-04-13 DIAGNOSIS — N18.30 CRI (CHRONIC RENAL INSUFFICIENCY), STAGE 3 (MODERATE) (HCC): ICD-10-CM

## 2017-04-13 DIAGNOSIS — I10 ESSENTIAL HYPERTENSION, BENIGN: ICD-10-CM

## 2017-04-13 RX ORDER — CARBINOXAMINE MALEATE 4 MG/1
4 TABLET ORAL 2 TIMES DAILY
Qty: 60 TAB | Refills: 2 | Status: SHIPPED | OUTPATIENT
Start: 2017-04-13 | End: 2017-05-08

## 2017-04-13 NOTE — PROGRESS NOTES
1. Have you been to the ER, urgent care clinic since your last visit? Hospitalized since your last visit? No    2. Have you seen or consulted any other health care providers outside of the [address] [place] [address] since your last visit? Include any pap smears or colon screening. No   Chief Complaint   Patient presents with    Cough    Shortness of Breath    Dizziness    Head Pain    Lethargy     Pt present to the office with cough,SOB, dizziness, headache, Lethargy - started March 21, CXR showed a block airway, been of ANT for 20 days    Treated twice with anibx and steroid and see cxr as well, has recent DVT and just came off coumadin last month    Inc MERRITT and mild BLE edema, no cp, inc fatigue      SOAP NOTE:    Chief Complaint   Patient presents with    Cough    Shortness of Breath    Dizziness    Head Pain    Lethargy     she is a 81y.o. year old female who presents for evalution. Reviewed PmHx, RxHx, FmHx, SocHx, AllgHx and updated and dated in the chart.     Patient Active Problem List    Diagnosis    Type 2 diabetes mellitus without complication, without long-term current use of insulin ([facility]    Advanced care planning/counseling discussion     Patient does have Will and POA, will brind documents for chart when she can find them      DVT (deep venous thrombosis) ([facility]    CRI (chronic renal insufficiency)    Asymptomatic carotid artery stenosis without infarction    Diverticulosis    IBS (irritable bowel syndrome)    Depressive disorder, not elsewhere classified    Mixed hyperlipidemia    DJD (degenerative joint disease)    PUD (peptic ulcer disease)    Allergic rhinitis due to other allergen    Proteinuria    RA (rheumatoid arthritis) ([facility]    Essential hypertension, benign       Review of Systems - negative except as listed above in the HPI    Objective:     Vitals:    04/13/17 1637   BP: 143/78   Pulse: 80     Physical Examination: General appearance - alert, well appearing, and in no distress  Neck - supple, no significant adenopathy, no JVD  Chest - wheezing noted none, rales noted bilat bases, decreased air entry noted bases, rhonchi noted none  Heart - normal rate, regular rhythm, normal S1, S2, no murmurs, rubs, clicks or gallops  Abdomen - soft, nontender, nondistended, no masses or organomegaly  Extremities - trace pitting ble edema    Assessment/ Plan:   Palak Ferguson was seen today for cough, shortness of breath, dizziness, head pain and lethargy. Diagnoses and all orders for this visit:    Cough  -     REFERRAL TO CARDIOLOGY-work up with echo  -     carbinoxamine maleate (PALGIC) 4 mg tab; Take 4 mg by mouth two (2) times a day-add for inc PND  -     CTA CHEST W OR W WO CONT; Future--work up PE  -dont believe this is infection related after two high powered antibx and steroids  -neg CXR for infx  -? If PE from most recent DVT    Essential hypertension, benign  -     REFERRAL TO CARDIOLOGY    Allergic rhinitis due to other allergen  -     carbinoxamine maleate (PALGIC) 4 mg tab; Take 4 mg by mouth two (2) times a day. Type 2 diabetes mellitus without complication, without long-term current use of insulin ([facility])  -  Lab Results   Component Value Date/Time    Hemoglobin A1c 7.1 07/22/2015 10:58 AM     -at goal and is seeing endo and controlled per pt hx    CRI (chronic renal insufficiency), stage 3 (moderate)  -stable  -  Lab Results   Component Value Date/Time    Creatinine (POC) 2.2 02/19/2016 08:35 PM    Creatinine 1.65 02/15/2017 10:55 AM          Follow-up Disposition:  Return if symptoms worsen or fail to improve. I have discussed the diagnosis with the patient and the intended plan as seen in the above orders. The patient understands and agrees with the plan. The patient has received an after-visit summary and questions were answered concerning future plans.      Medication Side Effects and Warnings were discussed with patient  Patient Labs were reviewed and or requested:  Patient Past Records were reviewed and or requested    Meir Morrison M.D. There are no Patient Instructions on file for this visit.

## 2017-04-13 NOTE — MR AVS SNAPSHOT
Visit Information Date & Time Provider Department Dept. Phone Encounter #  
 4/13/2017  1:45 PM Jessica Whiting MD [address] [facility] [phone] [id number] Follow-up Instructions Return if symptoms worsen or fail to improve. Upcoming Health Maintenance Date Due HEMOGLOBIN A1C Q6M 1/22/2016 MICROALBUMIN Q1 7/22/2016 EYE EXAM RETINAL OR DILATED Q1 9/27/2017 FOOT EXAM Q1 2/15/2018 LIPID PANEL Q1 2/15/2018 MEDICARE YEARLY EXAM 2/16/2018 GLAUCOMA SCREENING Q2Y 9/27/2018 DTaP/Tdap/Td series (2 - Td) 10/30/2025 Allergies as of 4/13/2017  Review Complete On: 4/13/2017 By: Jessica Whiting MD  
  
 Severity Noted Reaction Type Reactions Sulfa (Sulfonamide Antibiotics)  06/09/2010    Other (comments) Current Immunizations  Reviewed on 2/29/2016 Name Date Influenza High Dose Vaccine PF 9/14/2016 Influenza Vaccine Split 11/8/2012, 11/22/2010 Pneumococcal Conjugate (PCV-13) 7/22/2015 Pneumococcal Vaccine (Unspecified Type) 11/8/2010 Tdap 10/30/2015 Varicella Virus Vaccine Live 9/10/2011 Not reviewed this visit You Were Diagnosed With   
  
 Codes Comments Cough    -  Primary ICD-10-CM: Y40 ICD-9-CM: 786.2 Essential hypertension, benign     ICD-10-CM: I10 
ICD-9-CM: 401.1 Allergic rhinitis due to other allergen     ICD-10-CM: J30.89 ICD-9-CM: 477.8 Type 2 diabetes mellitus without complication, without long-term current use of insulin (HCC)     ICD-10-CM: E11.9 ICD-9-CM: 250.00   
 CRI (chronic renal insufficiency), stage 3 (moderate)     ICD-10-CM: N18.3 ICD-9-CM: 471. 3 Vitals OB Status Smoking Status Postmenopausal Never Smoker Preferred Pharmacy Pharmacy Name Phone [facility] 1451, [address] Your Updated Medication List  
  
   
 This list is accurate as of: 4/13/17  2:27 PM.  Always use your most recent med list.  
  
  
  
  
 albuterol 90 mcg/actuation inhaler Commonly known as:  PROVENTIL HFA, VENTOLIN HFA, PROAIR HFA Take 1-2 Puffs by inhalation every four (4) hours as needed for Wheezing or Shortness of Breath. aspirin delayed-release 325 mg tablet Take  by mouth every six (6) hours as needed for Pain. carbamide peroxide 6.5 % otic solution Commonly known as:  Mason Kelly Administer 5 drops into each ear as needed. carbinoxamine maleate 4 mg Tab Commonly known as:  Brandy Luiza 157 Take 4 mg by mouth two (2) times a day. Dexlansoprazole 60 mg Cpdb Take  by mouth. fluticasone 50 mcg/actuation nasal spray Commonly known as:  Milagros Jade 2 Sprays by Both Nostrils route daily. folic acid 1 mg tablet Commonly known as:  FOLVITE  
TAKE ONE TABLET BY MOUTH ONCE DAILY  
  
 glimepiride 2 mg tablet Commonly known as:  AMARYL Take 1 Tab by mouth every morning. levocetirizine 5 mg tablet Commonly known as:  Danii Tabares Take 1 Tab by mouth daily. linagliptin 5 mg tablet Commonly known as:  Monisha Greene Take 1 Tab by mouth daily. losartan-hydroCHLOROthiazide 100-25 mg per tablet Commonly known as:  HYZAAR  
TAKE ONE TABLET BY MOUTH DAILY lovastatin 20 mg tablet Commonly known as:  MEVACOR Take 2 Tabs by mouth nightly. DUE FOR LABS  
  
 methotrexate 2.5 mg tablet Commonly known as:  Bibi Ordoñez Take 2.5 mg by mouth Every Thursday. Take 4 tablets  
  
 metoprolol tartrate 100 mg IR tablet Commonly known as:  LOPRESSOR  
TAKE ONE TABLET BY MOUTH TWICE DAILY  
  
 ondansetron 4 mg disintegrating tablet Commonly known as:  ZOFRAN ODT Take 1 Tab by mouth every eight (8) hours as needed for Nausea. SENNA PLUS 8.6-50 mg per tablet Generic drug:  senna-docusate Take 1 Tab by mouth daily. VITAMIN D3 2,000 unit Tab Generic drug:  cholecalciferol (vitamin D3) Take  by mouth. Prescriptions Sent to Pharmacy Refills  
 carbinoxamine maleate (PALGIC) 4 mg tab 2 Sig: Take 4 mg by mouth two (2) times a day. Class: Normal  
 Pharmacy: [facility] [address], [address] #: [phone] Route: Oral  
  
We Performed the Following REFERRAL TO CARDIOLOGY [GVE28 Custom] Follow-up Instructions Return if symptoms worsen or fail to improve. To-Do List   
 04/13/2017 Imaging:  CTA CHEST W OR W WO CONT Referral Information Referral ID Referred By Referred To  
  
 [id number] Lucero PALMER MD   
   [facility] [address] [place] [address], [address] [place] [address] Phone: [phone] Fax: [phone] Visits Status Start Date End Date 1 New Request 4/13/17 4/13/18 If your referral has a status of pending review or denied, additional information will be sent to support the outcome of this decision. Referral ID Referred By Referred To  
 [id number] [place] PALMER Not Available Visits Status Start Date End Date 1 New Request 4/13/17 4/13/18 If your referral has a status of pending review or denied, additional information will be sent to support the outcome of this decision. Introducing [facility] [place] & HEALTH SERVICES! Dear Ml Simpson: Thank you for requesting a Oco account. Our records indicate that you have previously registered for a Oco account but its currently inactive. Please call our Oco support line at [phone]. Additional Information If you have questions, please visit the Frequently Asked Questions section of the Oco website at [email or web address] Remember, Matchpointt is NOT to be used for urgent needs. For medical emergencies, dial 911. Now available from your iPhone and Android! Please provide this summary of care documentation to your next provider. Your primary care clinician is listed as Lebron Fothergill. If you have any questions after today's visit, please call [phone].

## 2017-04-18 ENCOUNTER — HOSPITAL ENCOUNTER (OUTPATIENT)
Dept: CT IMAGING | Age: 82
Discharge: HOME | End: 2017-04-18
Attending: FAMILY MEDICINE
Payer: MEDICARE

## 2017-04-18 ENCOUNTER — DOCUMENTATION ONLY (OUTPATIENT)
Dept: FAMILY MEDICINE CLINIC | Age: 82
End: 2017-04-18

## 2017-04-18 DIAGNOSIS — R05.9 COUGH: ICD-10-CM

## 2017-04-18 DIAGNOSIS — I26.99 OTHER PULMONARY EMBOLISM WITHOUT ACUTE COR PULMONALE, UNSPECIFIED CHRONICITY (HCC): Primary | ICD-10-CM

## 2017-04-18 LAB — CREAT BLD-MCNC: 1.3 MG/DL (ref 0.6–1.3)

## 2017-04-18 PROCEDURE — 82565 ASSAY OF CREATININE: CPT

## 2017-04-18 PROCEDURE — 74011636320 HC RX REV CODE- 636/320: Performed by: RADIOLOGY

## 2017-04-18 PROCEDURE — 71275 CT ANGIOGRAPHY CHEST: CPT

## 2017-04-18 RX ADMIN — IOPAMIDOL 100 ML: 755 INJECTION, SOLUTION INTRAVENOUS at 15:02

## 2017-04-18 NOTE — PROGRESS NOTES
DWP dx of PE, called in [employer] starter kit, pt came is today and in no resp distress and sats were 93%--she would rather not be in the hospital. Refer to her heme onc as well. Will need longterm anticoag. Of note: Will need to pursure MRI of abd based on ? Area on pancreas--will hold for now while we treat PE.     Ronald Archibald

## 2017-04-18 NOTE — PROGRESS NOTES
LM on VM to call office- need to inform pt Selenacourtneycaron called into pharmacy & we have 1 month free coupon card to give her.

## 2017-05-08 ENCOUNTER — OFFICE VISIT (OUTPATIENT)
Dept: CARDIOLOGY CLINIC | Age: 82
End: 2017-05-08

## 2017-05-08 VITALS
WEIGHT: 183.4 LBS | HEIGHT: 65 IN | DIASTOLIC BLOOD PRESSURE: 84 MMHG | HEART RATE: 64 BPM | BODY MASS INDEX: 30.56 KG/M2 | SYSTOLIC BLOOD PRESSURE: 138 MMHG

## 2017-05-08 DIAGNOSIS — E78.2 MIXED HYPERLIPIDEMIA: Primary | ICD-10-CM

## 2017-05-08 DIAGNOSIS — R06.02 SOB (SHORTNESS OF BREATH): ICD-10-CM

## 2017-05-08 RX ORDER — GLIMEPIRIDE 4 MG/1
2 TABLET ORAL
COMMUNITY
End: 2020-09-29

## 2017-05-08 RX ORDER — CETIRIZINE HCL 10 MG
TABLET ORAL
COMMUNITY
End: 2018-05-15

## 2017-05-08 NOTE — PROGRESS NOTES
Visit Vitals    /84    Pulse 64    Ht 5' 5\" (1.651 m)    Wt 183 lb 6.4 oz (83.2 kg)    BMI 30.52 kg/m2     Medication changes for this OV VO per Dr Silvano Hodges

## 2017-05-08 NOTE — MR AVS SNAPSHOT
Visit Information Date & Time Provider Department Dept. Phone Encounter #  
 5/8/2017 10:40 AM Sangita Onela MD [facility] Angela Carrasco [phone] [id number] Follow-up Instructions Return in about 8 weeks (around 7/3/2017). Upcoming Health Maintenance Date Due HEMOGLOBIN A1C Q6M 1/22/2016 MICROALBUMIN Q1 7/22/2016 INFLUENZA AGE 9 TO ADULT 8/1/2017 EYE EXAM RETINAL OR DILATED Q1 9/27/2017 FOOT EXAM Q1 2/15/2018 LIPID PANEL Q1 2/15/2018 MEDICARE YEARLY EXAM 2/16/2018 GLAUCOMA SCREENING Q2Y 9/27/2018 DTaP/Tdap/Td series (2 - Td) 10/30/2025 Allergies as of 5/8/2017  Review Complete On: 5/8/2017 By: Sangita Oneal MD  
  
 Severity Noted Reaction Type Reactions Sulfa (Sulfonamide Antibiotics)  06/09/2010    Other (comments) Current Immunizations  Reviewed on 2/29/2016 Name Date Influenza High Dose Vaccine PF 9/14/2016 Influenza Vaccine Split 11/8/2012, 11/22/2010 Pneumococcal Conjugate (PCV-13) 7/22/2015 Pneumococcal Vaccine (Unspecified Type) 11/8/2010 Tdap 10/30/2015 Varicella Virus Vaccine Live 9/10/2011 Not reviewed this visit You Were Diagnosed With   
  
 Codes Comments Mixed hyperlipidemia    -  Primary ICD-10-CM: J85.3 ICD-9-CM: 272.2 SOB (shortness of breath)     ICD-10-CM: R06.02 
ICD-9-CM: 786.05 Vitals BP Pulse Height(growth percentile) Weight(growth percentile) BMI OB Status 138/84 64 5' 5\" (1.651 m) 183 lb 6.4 oz (83.2 kg) 30.52 kg/m2 Postmenopausal  
 Smoking Status Never Smoker Vitals History BMI and BSA Data Body Mass Index Body Surface Area 30.52 kg/m 2 1.95 m 2 Preferred Pharmacy Pharmacy Name Phone [facility] 0113, [address] Your Updated Medication List  
  
   
This list is accurate as of: 5/8/17 10:59 AM.  Always use your most recent med list.  
  
  
  
  
 albuterol 90 mcg/actuation inhaler Commonly known as:  PROVENTIL HFA, VENTOLIN HFA, PROAIR HFA Take 1-2 Puffs by inhalation every four (4) hours as needed for Wheezing or Shortness of Breath. carbamide peroxide 6.5 % otic solution Commonly known as:  Daniele Arcelia Administer 5 drops into each ear as needed. cetirizine 10 mg tablet Commonly known as:  ZYRTEC Take  by mouth. Dexlansoprazole 60 mg Cpdb Take  by mouth. fluticasone 50 mcg/actuation nasal spray Commonly known as:  Chey Mathew 2 Sprays by Both Nostrils route daily. folic acid 1 mg tablet Commonly known as:  FOLVITE  
TAKE ONE TABLET BY MOUTH ONCE DAILY  
  
 glimepiride 4 mg tablet Commonly known as:  AMARYL Take 2 mg by mouth every morning. linagliptin 5 mg tablet Commonly known as:  Janas Mess Take 1 Tab by mouth daily. losartan-hydroCHLOROthiazide 100-25 mg per tablet Commonly known as:  HYZAAR  
TAKE ONE TABLET BY MOUTH DAILY lovastatin 20 mg tablet Commonly known as:  MEVACOR Take 2 Tabs by mouth nightly. DUE FOR LABS  
  
 methotrexate 2.5 mg tablet Commonly known as:  Elida Alanna Take 2.5 mg by mouth Every Thursday. Take 5 tablets  
  
 metoprolol tartrate 100 mg IR tablet Commonly known as:  LOPRESSOR  
TAKE ONE TABLET BY MOUTH TWICE DAILY  
  
 ondansetron 4 mg disintegrating tablet Commonly known as:  ZOFRAN ODT Take 1 Tab by mouth every eight (8) hours as needed for Nausea. rivaroxaban 15 mg (42)- 20 mg (9) Dspk Commonly known as:  Clarisa Cool Take one 15 mg tablet twice a day with food for the first 21 days. Then, take one 20 mg tablet once a day with food for 9 days. SENNA PLUS 8.6-50 mg per tablet Generic drug:  senna-docusate Take 1 Tab by mouth daily. VITAMIN D3 2,000 unit Tab Generic drug:  cholecalciferol (vitamin D3) Take  by mouth. Follow-up Instructions Return in about 8 weeks (around 7/3/2017). To-Do List   
 05/08/2017 ECHO:  2D ECHO COMPLETE ADULT (TTE) W OR WO CONTR   
  
 05/08/2017 ECG:  STRESS TEST CARDIOLITE   
  
 05/11/2017 9:30 AM  
  Appointment with [facility] 2 at [facility] ([phone]) CONTRAST STUDY: 1. The patient should not eat solid food four hours before the appointment but should be encouraged to drink clear liquids. 2.  If you have to drink oral contrast, please pick it up any weekday prior to your appointment, if you cannot please check in 2 hrs before appt time. 3.  The patient will require IV access for contrast administration. 4.  The patient should not take Ibuprofen (Advil, Motrin, etc.) and Naproxen Sodium (Aleve, etc.)  on the day of the exam. Stopping non-steroidal anti-inflammatory agents (NSAIDs) like Ibuprofen decreases the risk of kidney damage from the x-ray contrast (dye). 5.  Bring any non [facility] facility films/images pertaining to the area of interest with you on the day of appointment. 6.  Bring current lab work if available (within last 90 days CMP) ***If scheduled at [facility], iSTAT is not available, labs will need to be done before appointment*** 7. Check in at registration at least 30 minutes before appt time unless you were instructed to do otherwise. Introducing \[facility] [place] [facility]\"" & HEALTH SERVICES! Dear Srini Dai: Thank you for requesting a [email or web address] account. Our records indicate that you already have an active [email or web address] account. You can access your account anytime at [email or web address] Media Did you know that you can access your hospital and ER discharge instructions at any time in [email or web address]? You can also review all of your test results from your hospital stay or ER visit. Additional Information If you have questions, please visit the Frequently Asked Questions section of the [email or web address] website at [email or web address] Remember, MyChart is NOT to be used for urgent needs. For medical emergencies, dial 911. Now available from your iPhone and Android! Please provide this summary of care documentation to your next provider. Your primary care clinician is listed as Sal Aguayo. If you have any questions after today's visit, please call [phone].

## 2017-05-08 NOTE — PROGRESS NOTES
LAST OFFICE VISIT : Visit date not found        ICD-10-CM ICD-9-CM   1. Mixed hyperlipidemia E78.2 272.2   2. SOB (shortness of breath) R06.02 786.05            Skye Woody is a 81 y.o. female new patient referred for by Dr Daniel Campbell for SOB. Cardiac risk factors: post menopausal.   I have personally obtained the history from the patient. HISTORY OF PRESENTING ILLNESS      She recently had CTA done which showed PE's. She also had DVT approximately 6 months ago in right leg. The patient denies chest pain, orthopnea, PND, LE edema, palpitations, syncope, presyncope or fatigue.        ACTIVE PROBLEM LIST     Patient Active Problem List    Diagnosis Date Noted    Type 2 diabetes mellitus without complication, without long-term current use of insulin ([facility] 04/13/2017    Advanced care planning/counseling discussion 02/15/2017    DVT (deep venous thrombosis) ([facility] 02/29/2016    CRI (chronic renal insufficiency) 10/04/2011    Asymptomatic carotid artery stenosis without infarction 02/22/2011    Diverticulosis 06/09/2010    IBS (irritable bowel syndrome) 06/09/2010    Depressive disorder, not elsewhere classified 06/09/2010    Mixed hyperlipidemia 06/09/2010    DJD (degenerative joint disease) 06/09/2010    PUD (peptic ulcer disease) 06/09/2010    Allergic rhinitis due to other allergen 06/09/2010    Proteinuria 06/09/2010    RA (rheumatoid arthritis) ([facility] 06/09/2010    Essential hypertension, benign 06/09/2010           PAST MEDICAL HISTORY     Past Medical History:   Diagnosis Date    Allergies     Asymptomatic carotid artery stenosis without infarction 2/22/2011    Depression     Diverticulosis     DJD (degenerative joint disease)     GERD (gastroesophageal reflux disease)     HTN     Hypercholesterolemia     Mammography less than 12 months ago     NIDDM     Proteinuria            PAST SURGICAL HISTORY     Past Surgical History:   Procedure Laterality Date    ENDOSCOPY, COLON, DIAGNOSTIC  2008    normal    HX APPENDECTOMY      HX HERNIA REPAIR      HX HYSTERECTOMY      HX KNEE REPLACEMENT      HX SPLENECTOMY            ALLERGIES     Allergies   Allergen Reactions    Sulfa (Sulfonamide Antibiotics) Other (comments)          FAMILY HISTORY     Family History   Problem Relation Age of Onset    Hypertension Father     Cancer Sister      breast    Diabetes Brother     negative for cardiac disease       SOCIAL HISTORY     Social History     Social History    Marital status:      Spouse name: N/A    Number of children: N/A    Years of education: N/A     Social History Main Topics    Smoking status: Never Smoker    Smokeless tobacco: Never Used    Alcohol use No    Drug use: No    Sexual activity: Yes     Partners: Male     Other Topics Concern    None     Social History Narrative         MEDICATIONS     Current Outpatient Prescriptions   Medication Sig    glimepiride (AMARYL) 4 mg tablet Take 2 mg by mouth every morning.  cetirizine (ZYRTEC) 10 mg tablet Take  by mouth.  rivaroxaban (XARELTO) 15 mg (42)- 20 mg (9) DsPk Take one 15 mg tablet twice a day with food for the first 21 days. Then, take one 20 mg tablet once a day with food for 9 days.  albuterol (PROVENTIL HFA, VENTOLIN HFA, PROAIR HFA) 90 mcg/actuation inhaler Take 1-2 Puffs by inhalation every four (4) hours as needed for Wheezing or Shortness of Breath.  ondansetron (ZOFRAN ODT) 4 mg disintegrating tablet Take 1 Tab by mouth every eight (8) hours as needed for Nausea.  losartan-hydroCHLOROthiazide (HYZAAR) 100-25 mg per tablet TAKE ONE TABLET BY MOUTH DAILY    metoprolol tartrate (LOPRESSOR) 100 mg IR tablet TAKE ONE TABLET BY MOUTH TWICE DAILY    folic acid (FOLVITE) 1 mg tablet TAKE ONE TABLET BY MOUTH ONCE DAILY    lovastatin (MEVACOR) 20 mg tablet Take 2 Tabs by mouth nightly. DUE FOR LABS    fluticasone (FLONASE) 50 mcg/actuation nasal spray 2 Sprays by Both Nostrils route daily.     Dexlansoprazole 60 mg CpDB Take  by mouth.  carbamide peroxide (DEBROX) 6.5 % otic solution Administer 5 drops into each ear as needed.  cholecalciferol, vitamin D3, (VITAMIN D3) 2,000 unit Tab Take  by mouth.  linagliptin (TRADJENTA) 5 mg tablet Take 1 Tab by mouth daily.  methotrexate (RHEUMATREX) 2.5 mg tablet Take 2.5 mg by mouth Every Thursday. Take 5 tablets    senna-docusate (SENNA PLUS) 8.6-50 mg per tablet Take 1 Tab by mouth daily. No current facility-administered medications for this visit. I have reviewed the nurses notes, vitals, problem list, allergy list, medical history, family, social history and medications. REVIEW OF SYMPTOMS      General: Pt denies excessive weight gain or loss. Pt is able to conduct ADL's  HEENT: Denies blurred vision, headaches, hearing loss, epistaxis and difficulty swallowing. Respiratory: Denies cough, congestion, shortness of breath, MERRITT, wheezing or stridor. Cardiovascular: Denies precordial pain, palpitations, edema or PND  Gastrointestinal: Denies poor appetite, indigestion, abdominal pain or blood in stool  Genitourinary: Denies hematuria, dysuria, increased urinary frequency  Musculoskeletal: Denies joint pain or swelling from muscles or joints  Neurologic: Denies tremor, paresthesias, headache, or sensory motor disturbance  Psychiatric: Denies confusion, insomnia, depression  Integumentray: Denies rash, itching or ulcers. Hematologic: Denies easy bruising, bleeding     PHYSICAL EXAMINATION      Vitals:    05/08/17 1033   BP: 138/84   Pulse: 64   Weight: 183 lb 6.4 oz (83.2 kg)   Height: 5' 5\" (1.651 m)     General: Well developed, in no acute distress. HEENT: No jaundice, oral mucosa moist, no oral ulcers  Neck: Supple, no stiffness, no lymphadenopathy, supple  Heart:  Normal S1/S2 negative S3 or S4.  Regular, no murmur, gallop or rub, no jugular venous distention  Respiratory: Clear bilaterally x 4, no wheezing or rales  Abdomen:   Soft, non-tender, bowel sounds are active.   Extremities:  No edema, normal cap refill, no cyanosis. Musculoskeletal: No clubbing, no deformities  Neuro: A&Ox3, speech clear, gait stable, cooperative, no focal neurologic deficits  Skin: Skin color is normal. No rashes or lesions. Non diaphoretic, moist.  Vascular: 2+ pulses symmetric in all extremities        EKG: NSR     DIAGNOSTIC DATA     1. Lipids  2/15/17 -, HDL 40, ,        LABORATORY DATA            Lab Results   Component Value Date/Time    WBC 8.0 02/15/2017 10:55 AM    Hemoglobin (POC) 13.3 02/19/2016 08:35 PM    HGB 13.6 02/15/2017 10:55 AM    Hematocrit (POC) 39 02/19/2016 08:35 PM    HCT 41.1 02/15/2017 10:55 AM    PLATELET 319 44/60/3718 10:55 AM    MCV 92 02/15/2017 10:55 AM      Lab Results   Component Value Date/Time    Sodium 142 02/15/2017 10:55 AM    Potassium 5.0 02/15/2017 10:55 AM    Chloride 102 02/15/2017 10:55 AM    CO2 21 02/15/2017 10:55 AM    Anion gap 12 10/14/2010 11:59 AM    Glucose 182 02/15/2017 10:55 AM    BUN 27 02/15/2017 10:55 AM    Creatinine 1.65 02/15/2017 10:55 AM    BUN/Creatinine ratio 16 02/15/2017 10:55 AM    GFR est AA 33 02/15/2017 10:55 AM    GFR est non-AA 29 02/15/2017 10:55 AM    Calcium 10.0 02/15/2017 10:55 AM    Bilirubin, total 0.4 02/15/2017 10:55 AM    AST (SGOT) 19 02/15/2017 10:55 AM    Alk. phosphatase 58 02/15/2017 10:55 AM    Protein, total 6.6 02/15/2017 10:55 AM    Albumin 4.3 02/15/2017 10:55 AM    Globulin 3.1 10/12/2010 07:54 AM    A-G Ratio 1.9 02/15/2017 10:55 AM    ALT (SGPT) 21 02/15/2017 10:55 AM           ASSESSMENT/RECOMMENDATIONS:.      1. 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 Dyslipidemia  -lipids are close to goal     3.  Follow up in 6 months or PRN    Orders Placed This Encounter    glimepiride (AMARYL) 4 mg tablet     Sig: Take 2 mg by mouth every morning.  cetirizine (ZYRTEC) 10 mg tablet     Sig: Take  by mouth. Follow-up Disposition: Not on File      I have discussed the diagnosis with  Marla Parks and the intended plan as seen in the above orders. Questions were answered concerning future plans. I have discussed medication side effects and warnings with the patient as well. Thank you,  Veronica Alston NP for involving me in the care of  [address] [place] [address]. Please do not hesitate to contact me for further questions/concerns. This note was written by clarissa Steiner, as dictated by Syeda Starr MD.      Erminio Letters. MD K[place]en, [address]      [facility], [address] [place] [address]     Dyana [address]      [phone] / [phone] Fax

## 2017-05-11 ENCOUNTER — HOSPITAL ENCOUNTER (OUTPATIENT)
Dept: CT IMAGING | Age: 82
Discharge: HOME | End: 2017-05-11
Attending: INTERNAL MEDICINE
Payer: MEDICARE

## 2017-05-11 DIAGNOSIS — K86.89 PANCREATIC MASS: ICD-10-CM

## 2017-05-11 DIAGNOSIS — D59.19 ANTIBODY-MEDIATED ANEMIA (HCC): ICD-10-CM

## 2017-05-11 DIAGNOSIS — I80.8 PHLEBITIS AND THROMBOPHLEBITIS OF SUPERFICIAL VEINS OF UPPER EXTREMITIES: ICD-10-CM

## 2017-05-11 DIAGNOSIS — I82.401 DEEP VEIN THROMBOSIS OF RIGHT LOWER LIMB (HCC): ICD-10-CM

## 2017-05-11 LAB — CREAT BLD-MCNC: 1.5 MG/DL (ref 0.6–1.3)

## 2017-05-11 PROCEDURE — 74011636320 HC RX REV CODE- 636/320

## 2017-05-11 PROCEDURE — 74178 CT ABD&PLV WO CNTR FLWD CNTR: CPT

## 2017-05-11 PROCEDURE — 82565 ASSAY OF CREATININE: CPT

## 2017-05-11 RX ADMIN — IOPAMIDOL 60 ML: 612 INJECTION, SOLUTION INTRAVENOUS at 08:59

## 2017-06-16 ENCOUNTER — TELEPHONE (OUTPATIENT)
Dept: CARDIOLOGY CLINIC | Age: 82
End: 2017-06-16

## 2017-06-16 NOTE — TELEPHONE ENCOUNTER
Two pt identifiers used. Confirmed apt for 10am on 6/19/17 Nuclear stress test. Informed pt no caffiene after 10pm on 6/18/17, NPO after 8am on 6/18/17. Encouraged pt to wear comfortable clothes. Tests last 4-5 hours. Pt verbalized understanding.

## 2017-06-19 ENCOUNTER — CLINICAL SUPPORT (OUTPATIENT)
Dept: CARDIOLOGY CLINIC | Age: 82
End: 2017-06-19

## 2017-06-19 DIAGNOSIS — R06.02 SOB (SHORTNESS OF BREATH): Primary | ICD-10-CM

## 2017-06-19 DIAGNOSIS — E78.2 MIXED HYPERLIPIDEMIA: ICD-10-CM

## 2017-06-19 NOTE — PROGRESS NOTES
See scanned report. Dr. Tara Martínez ordered study and Dr. Marlene Munguia read study. ID verified per protocol. Test and risks explained. Consent signed after all patient questions answered. Patient developed dyspnea during test. At 2 minutes in recovery, patient without symptoms or complaints voiced.

## 2017-06-23 ENCOUNTER — DOCUMENTATION ONLY (OUTPATIENT)
Dept: CARDIOLOGY CLINIC | Age: 82
End: 2017-06-23

## 2017-06-26 ENCOUNTER — DOCUMENTATION ONLY (OUTPATIENT)
Dept: CARDIOLOGY CLINIC | Age: 82
End: 2017-06-26

## 2017-06-28 ENCOUNTER — OFFICE VISIT (OUTPATIENT)
Dept: FAMILY MEDICINE CLINIC | Age: 82
End: 2017-06-28

## 2017-06-28 VITALS
BODY MASS INDEX: 30.86 KG/M2 | HEART RATE: 47 BPM | WEIGHT: 185.2 LBS | SYSTOLIC BLOOD PRESSURE: 116 MMHG | OXYGEN SATURATION: 89 % | HEIGHT: 65 IN | RESPIRATION RATE: 19 BRPM | TEMPERATURE: 98.7 F | DIASTOLIC BLOOD PRESSURE: 68 MMHG

## 2017-06-28 DIAGNOSIS — R06.09 DOE (DYSPNEA ON EXERTION): ICD-10-CM

## 2017-06-28 DIAGNOSIS — B37.2 YEAST INFECTION OF THE SKIN: Primary | ICD-10-CM

## 2017-06-28 RX ORDER — FLUCONAZOLE 150 MG/1
150 TABLET ORAL DAILY
Qty: 3 TAB | Refills: 0 | Status: SHIPPED | OUTPATIENT
Start: 2017-06-28 | End: 2017-06-30 | Stop reason: ALTCHOICE

## 2017-06-28 RX ORDER — NYSTATIN 100000 [USP'U]/G
POWDER TOPICAL 4 TIMES DAILY
Qty: 60 G | Refills: 2 | Status: SHIPPED | OUTPATIENT
Start: 2017-06-28 | End: 2018-05-15

## 2017-06-28 RX ORDER — CIPROFLOXACIN 500 MG/1
500 TABLET ORAL 2 TIMES DAILY
Qty: 14 TAB | Refills: 0 | Status: SHIPPED | OUTPATIENT
Start: 2017-06-28 | End: 2017-06-30 | Stop reason: ALTCHOICE

## 2017-06-28 NOTE — MR AVS SNAPSHOT
Visit Information Date & Time Provider Department Dept. Phone Encounter #  
 6/28/2017 11:20 AM Radha Gonzalez MD [address] [facility] [phone] [id number] Your Appointments 6/30/2017 10:00 AM  
ESTABLISHED PATIENT with Samson Valencia MD  
[facility] ([facility] SCHEDULING) Appt Note: 8 wk fu appt sll  
 [address] [address] [address] [address]  
[phone]  
  
   
 [address] [address] [address] [address] Upcoming Health Maintenance Date Due HEMOGLOBIN A1C Q6M 1/22/2016 MICROALBUMIN Q1 7/22/2016 INFLUENZA AGE 9 TO ADULT 8/1/2017 EYE EXAM RETINAL OR DILATED Q1 9/27/2017 FOOT EXAM Q1 2/15/2018 LIPID PANEL Q1 2/15/2018 MEDICARE YEARLY EXAM 2/16/2018 GLAUCOMA SCREENING Q2Y 9/27/2018 DTaP/Tdap/Td series (2 - Td) 10/30/2025 Allergies as of 6/28/2017  Review Complete On: 6/28/2017 By: Radha Gonzalez MD  
  
 Severity Noted Reaction Type Reactions Sulfa (Sulfonamide Antibiotics)  06/09/2010    Other (comments) Current Immunizations  Reviewed on 2/29/2016 Name Date Influenza High Dose Vaccine PF 9/14/2016 Influenza Vaccine Split 11/8/2012, 11/22/2010 Pneumococcal Conjugate (PCV-13) 7/22/2015 Pneumococcal Vaccine (Unspecified Type) 11/8/2010 Tdap 10/30/2015 Varicella Virus Vaccine Live 9/10/2011 Not reviewed this visit You Were Diagnosed With   
  
 Codes Comments Yeast infection of the skin    -  Primary ICD-10-CM: B37.2 ICD-9-CM: 112.3 MERRITT (dyspnea on exertion)     ICD-10-CM: R06.09 
ICD-9-CM: 786.09 Vitals BP Pulse Temp Resp Height(growth percentile) Weight(growth percentile) 116/68 (BP 1 Location: Left arm, BP Patient Position: Sitting) (!) 47 98.7 °F (37.1 °C) (Oral) 19 5' 5\" (1.651 m) 185 lb 3.2 oz (84 kg) SpO2 BMI OB Status Smoking Status (!) 89% 30.82 kg/m2 Postmenopausal Never Smoker Vitals History BMI and BSA Data Body Mass Index Body Surface Area  
 30.82 kg/m 2 1.96 m 2 Preferred Pharmacy Pharmacy Name [facility], [address] [place] [address] Your Updated Medication List  
  
   
This list is accurate as of: 6/28/17 11:40 AM.  Always use your most recent med list.  
  
  
  
  
 carbamide peroxide 6.5 % otic solution Commonly known as:  Mela Kipper Administer 5 drops into each ear as needed. cetirizine 10 mg tablet Commonly known as:  ZYRTEC Take  by mouth. ciprofloxacin HCl 500 mg tablet Commonly known as:  CIPRO Take 1 Tab by mouth two (2) times a day for 7 days. Dexlansoprazole 60 mg Cpdb Take  by mouth. fluconazole 150 mg tablet Commonly known as:  DIFLUCAN Take 1 Tab by mouth daily for 3 days. fluticasone 50 mcg/actuation nasal spray Commonly known as:  Rafaela Santos 2 Sprays by Both Nostrils route daily. folic acid 1 mg tablet Commonly known as:  FOLVITE  
TAKE ONE TABLET BY MOUTH ONCE DAILY  
  
 glimepiride 4 mg tablet Commonly known as:  AMARYL Take 2 mg by mouth every morning. linagliptin 5 mg tablet Commonly known as:  Orin Jeremias Take 1 Tab by mouth daily. losartan-hydroCHLOROthiazide 100-25 mg per tablet Commonly known as:  HYZAAR  
TAKE ONE TABLET BY MOUTH DAILY lovastatin 20 mg tablet Commonly known as:  MEVACOR  
TAKE TWO TABLETS BY MOUTH NIGHTLY  
  
 methotrexate 2.5 mg tablet Commonly known as:  Ora Gaona Take 2.5 mg by mouth Every Thursday. Take 5 tablets  
  
 metoprolol tartrate 100 mg IR tablet Commonly known as:  LOPRESSOR  
TAKE ONE TABLET BY MOUTH TWICE DAILY  
  
 nystatin powder Commonly known as:  MYCOSTATIN Apply  to affected area four (4) times daily. ondansetron 4 mg disintegrating tablet Commonly known as:  ZOFRAN ODT Take 1 Tab by mouth every eight (8) hours as needed for Nausea. rivaroxaban 15 mg (42)- 20 mg (9) Dspk Commonly known as:  Fransico Prather Take one 15 mg tablet twice a day with food for the first 21 days. Then, take one 20 mg tablet once a day with food for 9 days. SENNA PLUS 8.6-50 mg per tablet Generic drug:  senna-docusate Take 1 Tab by mouth daily. VITAMIN D3 2,000 unit Tab Generic drug:  cholecalciferol (vitamin D3) Take  by mouth. Prescriptions Sent to Pharmacy Refills  
 nystatin (MYCOSTATIN) powder 2 Sig: Apply  to affected area four (4) times daily. Class: Normal  
 Pharmacy: [address] Ph #: [phone] Route: Topical  
 fluconazole (DIFLUCAN) 150 mg tablet 0 Sig: Take 1 Tab by mouth daily for 3 days. Class: Normal  
 Pharmacy: [address] Ph #: [phone] Route: Oral  
 ciprofloxacin HCl (CIPRO) 500 mg tablet 0 Sig: Take 1 Tab by mouth two (2) times a day for 7 days. Class: Normal  
 Pharmacy: [address] Ph #: [phone] Route: Oral  
  
Introducing [facility]! Dear Raphael Ramirez: Thank you for requesting a [employer] account. Our records indicate that you already have an active [employer] account. You can access your account anytime at [email or web address] Did you know that you can access your hospital and ER discharge instructions at any time in [employer]? You can also review all of your test results from your hospital stay or ER visit. Additional Information If you have questions, please visit the Frequently Asked Questions section of the [employer] website at [email or web address] Remember, [employer] is NOT to be used for urgent needs. For medical emergencies, dial 911. Now available from your iPhone and Android! Please provide this summary of care documentation to your next provider. Your primary care clinician is listed as Indra Almonte. If you have any questions after today's visit, please call [phone].

## 2017-06-28 NOTE — PROGRESS NOTES
Chief Complaint   Patient presents with    Rash    Cough       Patient seen in the office today for a rash in her groin area between her legs and she has c/o of a persistent cough with nasal drainage x's 1 month. Denies pain or SOB.

## 2017-06-28 NOTE — PROGRESS NOTES
Chief Complaint   Patient presents with    Rash    Cough     Patient seen in the office today for a rash in her groin area between her legs and she has c/o of a persistent cough with nasal drainage x's 1 month. Denies pain or SOB. Pt has been using clotrimazole betamethasone cream without benefit. Pt reports some SOB, was recently diagnosed with a PE, is on Xarelto, reports taking medication faithfully. Subjective: (As above and below)     Chief Complaint   Patient presents with    Rash    Cough     she is a 81y.o. year old female who presents for evaluation. Reviewed PmHx, RxHx, FmHx, SocHx, AllgHx and updated in chart. Review of Systems - negative except as listed above    Objective:     Vitals:    06/28/17 1106   BP: 116/68   Pulse: (!) 47   Resp: 19   Temp: 98.7 °F (37.1 °C)   TempSrc: Oral   SpO2: (!) 89%   Weight: 185 lb 3.2 oz (84 kg)   Height: 5' 5\" (1.651 m)     Physical Examination: General appearance - alert, well appearing, and in no distress  Mental status - normal mood, behavior, speech, dress, motor activity, and thought processes  Eyes - pupils equal and reactive, extraocular eye movements intact  Mouth - mucous membranes moist, pharynx normal without lesions  Chest - dry hacking cough, clear chest  Heart - normal rate, regular rhythm, normal S1, S2, no murmurs, rubs, clicks or gallops  Skin - raw erythematous rash in crease of thigh and labia, some open areas, skin peeling, yeast odor    Assessment/ Plan:   1. Yeast infection of the skin  -diflucan and nystatin powder    2. MERRITT (dyspnea on exertion)  -oxygen normalized (99%) with exertion  -cipro for URI     Follow-up Disposition: As needed  I have discussed the diagnosis with the patient and the intended plan as seen in the above orders. The patient has received an after-visit summary and questions were answered concerning future plans.      Medication Side Effects and Warnings were discussed with patient: yes  Patient Labs were reviewed: yes  Patient Past Records were reviewed:  yes    Brenda Asher M.D.

## 2017-06-30 ENCOUNTER — OFFICE VISIT (OUTPATIENT)
Dept: CARDIOLOGY CLINIC | Age: 82
End: 2017-06-30

## 2017-06-30 VITALS
BODY MASS INDEX: 30.89 KG/M2 | WEIGHT: 185.4 LBS | HEIGHT: 65 IN | DIASTOLIC BLOOD PRESSURE: 80 MMHG | HEART RATE: 44 BPM | SYSTOLIC BLOOD PRESSURE: 135 MMHG

## 2017-06-30 DIAGNOSIS — E78.2 MIXED HYPERLIPIDEMIA: Primary | ICD-10-CM

## 2017-06-30 DIAGNOSIS — R00.1 BRADYCARDIA: ICD-10-CM

## 2017-06-30 NOTE — PROGRESS NOTES
LAST OFFICE VISIT : 5/8/2017      No diagnosis found. Karla Greenberg is a 81 y.o. female with dyslipidemia and hypertension referred for 8 week follow up. Cardiac risk factors: post menopausal, dyslipidemia, hypertension. I have personally obtained the history from the patient. HISTORY OF PRESENTING ILLNESS      She is doing well from a cardiac standpoint. She does note symptoms related to seasonal allergies. She denies any bleeding issues with Xarelto. The patient denies chest pain/ shortness of breath, orthopnea, PND, LE edema, palpitations, syncope, presyncope or fatigue.        ACTIVE PROBLEM LIST     Patient Active Problem List    Diagnosis Date Noted    Type 2 diabetes mellitus without complication, without long-term current use of insulin ([facility] 04/13/2017    Advanced care planning/counseling discussion 02/15/2017    DVT (deep venous thrombosis) ([facility] 02/29/2016    CRI (chronic renal insufficiency) 10/04/2011    Asymptomatic carotid artery stenosis without infarction 02/22/2011    Diverticulosis 06/09/2010    IBS (irritable bowel syndrome) 06/09/2010    Depressive disorder, not elsewhere classified 06/09/2010    Mixed hyperlipidemia 06/09/2010    DJD (degenerative joint disease) 06/09/2010    PUD (peptic ulcer disease) 06/09/2010    Allergic rhinitis due to other allergen 06/09/2010    Proteinuria 06/09/2010    RA (r[facility]umatoid arthritis) [facility] 06/09/2010    Essential hypertension, benign 06/09/2010           PAST MEDICAL HISTORY     Past Medical History:   Diagnosis Date    Allergies     Asymptomatic carotid artery stenosis without infarction 2/22/2011    Depression     Diverticulosis     DJD (degenerative joint disease)     GERD (gastroesophageal reflux disease)     HTN     Hypercholesterolemia     Mammography less than 12 months ago     NIDDM     Proteinuria            PAST SURGICAL HISTORY     Past Surgical History:   Procedure Laterality Date    ENDOSCOPY, COLON, DIAGNOSTIC  2008    normal    HX APPENDECTOMY      HX HERNIA REPAIR      HX HYSTERECTOMY      HX KNEE REPLACEMENT      HX SPLENECTOMY            ALLERGIES     Allergies   Allergen Reactions    Sulfa (Sulfonamide Antibiotics) Other (comments)          FAMILY HISTORY     Family History   Problem Relation Age of Onset    Hypertension Father     Cancer Sister      breast    Diabetes Brother     negative for cardiac disease       SOCIAL HISTORY     Social History     Social History    Marital status:      Spouse name: N/A    Number of children: N/A    Years of education: N/A     Social History Main Topics    Smoking status: Never Smoker    Smokeless tobacco: Never Used    Alcohol use No    Drug use: No    Sexual activity: Yes     Partners: Male     Other Topics Concern    None     Social History Narrative         MEDICATIONS     Current Outpatient Prescriptions   Medication Sig    nystatin (MYCOSTATIN) powder Apply  to affected area four (4) times daily.  lovastatin (MEVACOR) 20 mg tablet TAKE TWO TABLETS BY MOUTH NIGHTLY    glimepiride (AMARYL) 4 mg tablet Take 2 mg by mouth every morning.  cetirizine (ZYRTEC) 10 mg tablet Take  by mouth.  rivaroxaban (XARELTO) 15 mg (42)- 20 mg (9) DsPk Take one 15 mg tablet twice a day with food for the first 21 days. Then, take one 20 mg tablet once a day with food for 9 days.  ondansetron (ZOFRAN ODT) 4 mg disintegrating tablet Take 1 Tab by mouth every eight (8) hours as needed for Nausea.  losartan-hydroCHLOROthiazide (HYZAAR) 100-25 mg per tablet TAKE ONE TABLET BY MOUTH DAILY    metoprolol tartrate (LOPRESSOR) 100 mg IR tablet TAKE ONE TABLET BY MOUTH TWICE DAILY    folic acid (FOLVITE) 1 mg tablet TAKE ONE TABLET BY MOUTH ONCE DAILY    fluticasone (FLONASE) 50 mcg/actuation nasal spray 2 Sprays by Both Nostrils route daily.  Dexlansoprazole 60 mg CpDB Take  by mouth.     carbamide peroxide (DEBROX) 6.5 % otic solution Administer 5 drops into each ear as needed.  cholecalciferol, vitamin D3, (VITAMIN D3) 2,000 unit Tab Take  by mouth.  linagliptin (TRADJENTA) 5 mg tablet Take 1 Tab by mouth daily.  methotrexate (RHEUMATREX) 2.5 mg tablet Take 2.5 mg by mouth Every Thursday. Take 5 tablets    senna-docusate (SENNA PLUS) 8.6-50 mg per tablet Take 1 Tab by mouth daily. No current facility-administered medications for this visit. I have reviewed the nurses notes, vitals, problem list, allergy list, medical history, family, social history and medications. REVIEW OF SYMPTOMS      General: Pt denies excessive weight gain or loss. Pt is able to conduct ADL's  HEENT: Denies blurred vision, headaches, hearing loss, epistaxis and difficulty swallowing. Respiratory: Denies cough, congestion, shortness of breath, MERRITT, wheezing or stridor. Cardiovascular: Denies precordial pain, palpitations, edema or PND  Gastrointestinal: Denies poor appetite, indigestion, abdominal pain or blood in stool  Genitourinary: Denies hematuria, dysuria, increased urinary frequency  Musculoskeletal: Denies joint pain or swelling from muscles or joints  Neurologic: Denies tremor, paresthesias, headache, or sensory motor disturbance  Psychiatric: Denies confusion, insomnia, depression  Integumentray: Denies rash, itching or ulcers. Hematologic: Denies easy bruising, bleeding     PHYSICAL EXAMINATION      Vitals:    06/30/17 0956   BP: 135/80   Pulse: (!) 44   Weight: 185 lb 6.4 oz (84.1 kg)   Height: 5' 5\" (1.651 m)     General: Well developed, in no acute distress. HEENT: No jaundice, oral mucosa moist, no oral ulcers  Neck: Supple, no stiffness, no lymphadenopathy, supple  Heart: slow heart rate   Respiratory: Clear bilaterally x 4, no wheezing or rales     Extremities:  No edema, normal cap refill, no cyanosis.   Musculoskeletal: No clubbing, no deformities  Neuro: A&Ox3, speech clear, gait stable, cooperative, no focal neurologic deficits  Skin: Skin color is normal. No rashes or lesions. Non diaphoretic, moist.  Vascular: 2+ pulses symmetric in all extremities       DIAGNOSTIC DATA     1. Lipids  2/15/17 -, HDL 40, ,     2. Echo  6/19/17- EF 65%    3. Lexiscan  6/19/17- No ischemia         LABORATORY DATA            Lab Results   Component Value Date/Time    WBC 8.0 02/15/2017 10:55 AM    Hemoglobin (POC) 13.3 02/19/2016 08:35 PM    HGB 13.6 02/15/2017 10:55 AM    Hematocrit (POC) 39 02/19/2016 08:35 PM    HCT 41.1 02/15/2017 10:55 AM    PLATELET 984 53/63/3459 10:55 AM    MCV 92 02/15/2017 10:55 AM      Lab Results   Component Value Date/Time    Sodium 142 02/15/2017 10:55 AM    Potassium 5.0 02/15/2017 10:55 AM    Chloride 102 02/15/2017 10:55 AM    CO2 21 02/15/2017 10:55 AM    Anion gap 12 10/14/2010 11:59 AM    Glucose 182 02/15/2017 10:55 AM    BUN 27 02/15/2017 10:55 AM    Creatinine 1.65 02/15/2017 10:55 AM    BUN/Creatinine ratio 16 02/15/2017 10:55 AM    GFR est AA 33 02/15/2017 10:55 AM    GFR est non-AA 29 02/15/2017 10:55 AM    Calcium 10.0 02/15/2017 10:55 AM    Bilirubin, total 0.4 02/15/2017 10:55 AM    AST (SGOT) 19 02/15/2017 10:55 AM    Alk. phosphatase 58 02/15/2017 10:55 AM    Protein, total 6.6 02/15/2017 10:55 AM    Albumin 4.3 02/15/2017 10:55 AM    Globulin 3.1 10/12/2010 07:54 AM    A-G Ratio 1.9 02/15/2017 10:55 AM    ALT (SGPT) 21 02/15/2017 10:55 AM           ASSESSMENT/RECOMMENDATIONS:.      1. SOB  -she is not complaining of any SOB today  -heart rate is slightly slow   -I would think that if her pulse was contributing to her SOB she would be experiencing it and she is not  -her last TSH was 1.8 so most likely not realated to hypothyroidism     2. Bradycardia   -remains somewhat bradycardic but she states she is asymptomatic but on the other hand does note she is occasionally dizzy  -reduce metoprolol to 50 mg BID      3.  Dyslipidemia  -lipids followed by Adryan Rausch NP  -LDL close to goal at 110  -continue diet and exercise     4. Hypertension   -BP under good control today   -no adjustments in antihypertensives      5. Follow up in 6 months or PRN    No orders of the defined types were placed in this encounter. Follow-up Disposition: Not on File      I have discussed the diagnosis with  Marla Parks and the intended plan as seen in the above orders. Questions were answered concerning future plans. I have discussed medication side effects and warnings with the patient as well. Thank you,  Osiel Stiles NP for involving me in the care of  [address] [place] [address]. Please do not hesitate to contact me for further questions/concerns. This note was written by clarissa Goff, as dictated by Jose Armando Ferguson MD.      Russ Carrasquillo. MD [facility], [address] [place] [address] Rd[address]      [facility], [address] [place] [address]     [address]      [phone] / [phone] Fax

## 2017-06-30 NOTE — PROGRESS NOTES
Pt complains of lightheadedness     Pt complains of dizziness when standing up quickly    Visit Vitals    /80 (BP 1 Location: Right arm, BP Patient Position: Sitting)    Pulse (!) 44    Ht 5' 5\" (1.651 m)    Wt 185 lb 6.4 oz (84.1 kg)    BMI 30.85 kg/m2

## 2017-07-17 ENCOUNTER — CLINICAL SUPPORT (OUTPATIENT)
Dept: CARDIOLOGY CLINIC | Age: 82
End: 2017-07-17

## 2017-07-17 VITALS
BODY MASS INDEX: 31.12 KG/M2 | OXYGEN SATURATION: 97 % | DIASTOLIC BLOOD PRESSURE: 80 MMHG | RESPIRATION RATE: 16 BRPM | SYSTOLIC BLOOD PRESSURE: 120 MMHG | HEART RATE: 57 BPM | HEIGHT: 65 IN | WEIGHT: 186.8 LBS

## 2017-07-17 DIAGNOSIS — I10 ESSENTIAL HYPERTENSION, BENIGN: Primary | ICD-10-CM

## 2017-07-17 NOTE — PROGRESS NOTES
Visit Vitals    /80 (BP 1 Location: Left arm, BP Patient Position: Sitting)    Pulse 61    Resp 16    Ht 5' 5\" (1.651 m)    Wt 186 lb 12.8 oz (84.7 kg)    SpO2 97%    BMI 31.09 kg/m2     Patient was seen for BP check BP  was 120/80 patient taking Metoprolol 50 mg  BID

## 2017-07-28 ENCOUNTER — CLINICAL SUPPORT (OUTPATIENT)
Dept: CARDIOLOGY CLINIC | Age: 82
End: 2017-07-28

## 2017-07-28 VITALS — HEART RATE: 66 BPM | SYSTOLIC BLOOD PRESSURE: 156 MMHG | DIASTOLIC BLOOD PRESSURE: 86 MMHG

## 2017-07-28 DIAGNOSIS — R00.1 BRADYCARDIA: Primary | ICD-10-CM

## 2017-07-28 DIAGNOSIS — I10 ESSENTIAL HYPERTENSION: ICD-10-CM

## 2017-07-28 RX ORDER — AMLODIPINE BESYLATE 5 MG/1
5 TABLET ORAL DAILY
Qty: 30 TAB | Refills: 4 | Status: SHIPPED | OUTPATIENT
Start: 2017-07-28 | End: 2018-05-15

## 2017-07-28 NOTE — PROGRESS NOTES
BP was 120/80 HR 57 before 7/17 and pt was taking metoprolol 100 mg BID and was cut to 50 mg BID    Then on 7/17/17  BP was 120/80 HR 61 metoprolol cut to 25 mg BID. Pt here for eval of that dose change.     States she DID take her meds this AM.    Home readings:  7/18 123/68  66  7/19  127/67  64   PM    7/20  115/55  66  AM  7/21  118/59  66  PM  7/23  117/64   64  PM  7/25  145/79   66   AM  7/26   141/89  71   PM  7/27   126/73  63   PM  7/28   142/74   65   AM

## 2017-07-28 NOTE — PROGRESS NOTES
She is leaving 8/11/17 for [address] [place] [address] Anibalrocio would like to wait until her return then she will call to set up the BP check. I told her I would still send the script to the pharmacy.

## 2017-07-31 ENCOUNTER — OFFICE VISIT (OUTPATIENT)
Dept: FAMILY MEDICINE CLINIC | Age: 82
End: 2017-07-31

## 2017-07-31 VITALS
WEIGHT: 187.38 LBS | HEART RATE: 60 BPM | SYSTOLIC BLOOD PRESSURE: 120 MMHG | OXYGEN SATURATION: 99 % | TEMPERATURE: 97.6 F | BODY MASS INDEX: 31.22 KG/M2 | HEIGHT: 65 IN | RESPIRATION RATE: 16 BRPM | DIASTOLIC BLOOD PRESSURE: 68 MMHG

## 2017-07-31 DIAGNOSIS — J06.9 UPPER RESPIRATORY TRACT INFECTION, UNSPECIFIED TYPE: ICD-10-CM

## 2017-07-31 DIAGNOSIS — J30.9 ALLERGIC RHINITIS, UNSPECIFIED ALLERGIC RHINITIS TRIGGER, UNSPECIFIED RHINITIS SEASONALITY: Primary | ICD-10-CM

## 2017-07-31 RX ORDER — AZITHROMYCIN 250 MG/1
TABLET, FILM COATED ORAL
Qty: 6 TAB | Refills: 0 | Status: SHIPPED | OUTPATIENT
Start: 2017-07-31 | End: 2017-09-18 | Stop reason: ALTCHOICE

## 2017-07-31 NOTE — PROGRESS NOTES
1. Have you been to the ER, urgent care clinic since your last visit? Hospitalized since your last visit? No    2. Have you seen or consulted any other health care providers outside of the [address] [place] [address] since your last visit? Include any pap smears or colon screening. No    Chief Complaint   Patient presents with    Cough     x 3 wks, sinus drainage     Pt states she has a cough & sinus drainage x 3 wks.

## 2017-07-31 NOTE — PROGRESS NOTES
HPI/ROS  Patient complains of bilateral ear pressure/pain. Symptoms include congestion, headache described as frontal, lightheadedness, low grade fever, post nasal drip, productive cough with  yellow colored sputum, sinus pressure, tooth pain and vertigo. Onset of symptoms was 21 days ago, gradually worsening since that time. Patient is drinking plenty of fluids. .  Past history is significant for no history of pneumonia or bronchitis. Patient is non-smoker. She is on flonase and zyrtec daily as well without improvement. Visit Vitals    /68    Pulse 60    Temp 97.6 °F (36.4 °C) (Oral)    Resp 16    Ht 5' 5\" (1.651 m)    Wt 187 lb 6 oz (85 kg)    SpO2 99%    BMI 31.18 kg/m2     Physical Examination:   GENERAL ASSESSMENT: well developed and well nourished  SKIN: normal color, no lesions  HEAD: normocephalic  EYES: normal eyes  EARS: external auditory canal: clear and tympanic membrane: yellow, bulging  NOSE: normal external appearance and nares patent  MOUTH: yellow exudates of OP  NECK: normal  CHEST: normal air exchange, no rales, no rhonchi, no wheezes, respiratory effort normal with no retractions  HEART: regular rate and rhythm, normal S1/S2, no murmurs  ABDOMEN:  not examined  EXTREMITY: not examined  NEURO: not examined    Diagnoses and all orders for this visit:    1. Allergic rhinitis, unspecified allergic rhinitis trigger, unspecified rhinitis seasonality    2. Upper respiratory tract infection, unspecified type    Other orders  -     azithromycin (ZITHROMAX Z-BALAJI) 250 mg tablet; Take two tablets today then one tablet daily      Reveiwed adr/se of medication  Push fluids, rest, suggested mucinex for congestion and drainage  Recheck 5-7 days if sx not improved. I have discussed the diagnosis with the patient and the intended plan as seen in the above orders. The patient has received an after-visit summary and questions were answered concerning future plans.  Patient conveyed understanding of the plan at the time of the visit.     Jamin Bird, MSN, ANP  7/31/2017

## 2017-07-31 NOTE — MR AVS SNAPSHOT
Visit Information Date & Time Provider Department Dept. Phone Encounter #  
 7/31/2017  7:15 AM Zia Ocasio, [address] [phone] [id number] Upcoming Health Maintenance Date Due HEMOGLOBIN A1C Q6M 1/22/2016 MICROALBUMIN Q1 7/22/2016 INFLUENZA AGE 9 TO ADULT 8/1/2017 EYE EXAM RETINAL OR DILATED Q1 9/27/2017 FOOT EXAM Q1 2/15/2018 LIPID PANEL Q1 2/15/2018 MEDICARE YEARLY EXAM 2/16/2018 GLAUCOMA SCREENING Q2Y 9/27/2018 DTaP/Tdap/Td series (2 - Td) 10/30/2025 Allergies as of 7/31/2017  Review Complete On: 7/31/2017 By: Janice Opitz, LPN Severity Noted Reaction Type Reactions Sulfa (Sulfonamide Antibiotics)  06/09/2010    Other (comments) Current Immunizations  Reviewed on 2/29/2016 Name Date Influenza High Dose Vaccine PF 9/14/2016 Influenza Vaccine Split 11/8/2012, 11/22/2010 Pneumococcal Conjugate (PCV-13) 7/22/2015 Tdap 10/30/2015 Varicella Virus Vaccine Live 9/10/2011 ZZZ-RETIRED (DO NOT USE) Pneumococcal Vaccine (Unspecified Type) 11/8/2010 Not reviewed this visit You Were Diagnosed With   
  
 Codes Comments Allergic rhinitis, unspecified allergic rhinitis trigger, unspecified rhinitis seasonality    -  Primary ICD-10-CM: J30.9 ICD-9-CM: 477.9 Upper respiratory tract infection, unspecified type     ICD-10-CM: J06.9 ICD-9-CM: 465.9 Vitals BP Pulse Temp Resp Height(growth percentile) Weight(growth percentile) 120/68 60 97.6 °F (36.4 °C) (Oral) 16 5' 5\" (1.651 m) 187 lb 6 oz (85 kg) SpO2 BMI OB Status Smoking Status 99% 31.18 kg/m2 Postmenopausal Never Smoker Vitals History BMI and BSA Data Body Mass Index Body Surface Area  
 31.18 kg/m 2 1.97 m 2 Preferred Pharmacy Pharmacy Name Phone [address] Your Updated Medication List  
  
   
 This list is accurate as of: 7/31/17  7:47 AM.  Always use your most recent med list. amLODIPine 5 mg tablet Commonly known as:  Tyrell Lute Take 1 Tab by mouth daily. azithromycin 250 mg tablet Commonly known as:  Lyle Allan Take two tablets today then one tablet daily  
  
 carbamide peroxide 6.5 % otic solution Commonly known as:  Beni Bernard Administer 5 drops into each ear as needed. cetirizine 10 mg tablet Commonly known as:  ZYRTEC Take  by mouth. Dexlansoprazole 60 mg Cpdb Take  by mouth. fluticasone 50 mcg/actuation nasal spray Commonly known as:  Maritza Mealy 2 Sprays by Both Nostrils route daily. folic acid 1 mg tablet Commonly known as:  FOLVITE  
TAKE ONE TABLET BY MOUTH ONCE DAILY  
  
 glimepiride 4 mg tablet Commonly known as:  AMARYL Take 2 mg by mouth every morning. linagliptin 5 mg tablet Commonly known as:  Harrison Clink Take 1 Tab by mouth daily. losartan-hydroCHLOROthiazide 100-25 mg per tablet Commonly known as:  HYZAAR  
TAKE ONE TABLET BY MOUTH DAILY lovastatin 20 mg tablet Commonly known as:  MEVACOR  
TAKE TWO TABLETS BY MOUTH NIGHTLY  
  
 methotrexate 2.5 mg tablet Commonly known as:  Arleen Gallery Take 2.5 mg by mouth Every Thursday. Take 5 tablets  
  
 metoprolol tartrate 100 mg IR tablet Commonly known as:  LOPRESSOR  
TAKE ONE TABLET BY MOUTH TWICE DAILY  
  
 nystatin powder Commonly known as:  MYCOSTATIN Apply  to affected area four (4) times daily. ondansetron 4 mg disintegrating tablet Commonly known as:  ZOFRAN ODT Take 1 Tab by mouth every eight (8) hours as needed for Nausea. rivaroxaban 15 mg (42)- 20 mg (9) Dspk Commonly known as:  Rip Hollow Take one 15 mg tablet twice a day with food for the first 21 days. Then, take one 20 mg tablet once a day with food for 9 days. SENNA PLUS 8.6-50 mg per tablet Generic drug:  senna-docusate Take 1 Tab by mouth daily. VITAMIN D3 2,000 unit Tab Generic drug:  cholecalciferol (vitamin D3) Take  by mouth. Prescriptions Sent to Pharmacy Refills  
 azithromycin (ZITHROMAX Z-BALAJI) 250 mg tablet 0 Sig: Take two tablets today then one tablet daily Class: Normal  
 Pharmacy: [facility] [address], [address] #: [phone] [facility] [place] [facility] SERVICES! Dear Yane Bell: Thank you for requesting a Embrane account. Our records indicate that you already have an active Embrane account. You can access your account anytime at [email or web address] Did you know that you can access your hospital and ER discharge instructions at any time in Embrane? You can also review all of your test results from your hospital stay or ER visit. Additional Information If you have questions, please visit the Frequently Asked Questions section of the Embrane website at [email or web address] Remember, Embrane is NOT to be used for urgent needs. For medical emergencies, dial 911. Now available from your iPhone and Android! Please provide this summary of care documentation to your next provider. Your primary care clinician is listed as Marilia . If you have any questions after today's visit, please call [phone].

## 2017-09-18 ENCOUNTER — OFFICE VISIT (OUTPATIENT)
Dept: FAMILY MEDICINE CLINIC | Age: 82
End: 2017-09-18

## 2017-09-18 VITALS
HEIGHT: 65 IN | RESPIRATION RATE: 16 BRPM | BODY MASS INDEX: 30.99 KG/M2 | OXYGEN SATURATION: 96 % | DIASTOLIC BLOOD PRESSURE: 66 MMHG | SYSTOLIC BLOOD PRESSURE: 122 MMHG | HEART RATE: 56 BPM | TEMPERATURE: 99.7 F | WEIGHT: 186 LBS

## 2017-09-18 DIAGNOSIS — J45.21 RAD (REACTIVE AIRWAY DISEASE), MILD INTERMITTENT, WITH ACUTE EXACERBATION: ICD-10-CM

## 2017-09-18 DIAGNOSIS — J30.9 ALLERGIC RHINITIS, UNSPECIFIED ALLERGIC RHINITIS TRIGGER, UNSPECIFIED RHINITIS SEASONALITY: ICD-10-CM

## 2017-09-18 DIAGNOSIS — J40 BRONCHITIS: Primary | ICD-10-CM

## 2017-09-18 RX ORDER — WARFARIN SODIUM 5 MG/1
TABLET ORAL
COMMUNITY
Start: 2017-08-25 | End: 2018-05-15

## 2017-09-18 RX ORDER — PREDNISONE 10 MG/1
TABLET ORAL
Qty: 1 PACKAGE | Refills: 0 | Status: SHIPPED | OUTPATIENT
Start: 2017-09-18 | End: 2018-03-13 | Stop reason: ALTCHOICE

## 2017-09-18 RX ORDER — CEFDINIR 300 MG/1
300 CAPSULE ORAL 2 TIMES DAILY
Qty: 20 CAP | Refills: 0 | Status: SHIPPED | OUTPATIENT
Start: 2017-09-18 | End: 2017-09-28

## 2017-09-18 RX ORDER — FLUTICASONE PROPIONATE 50 MCG
2 SPRAY, SUSPENSION (ML) NASAL DAILY
Qty: 1 BOTTLE | Refills: 5 | Status: SHIPPED | OUTPATIENT
Start: 2017-09-18 | End: 2018-05-15

## 2017-09-18 NOTE — PROGRESS NOTES
1. Have you been to the ER, urgent care clinic since your last visit? Hospitalized since your last visit? No    2. Have you seen or consulted any other health care providers outside of the [address] [place] [address] since your last visit? Include any pap smears or colon screening.  No     Chief Complaint   Patient presents with    Cold Symptoms     Congestion, coughing, running nose, x 1week    Shortness of Breath     x1 week

## 2017-09-18 NOTE — PROGRESS NOTES
Chief Complaint   Patient presents with    Cold Symptoms     Congestion, coughing, running nose, x 1week    Shortness of Breath     x1 week     she is a 81y.o. year old female who presents for evalution. Pt states for past week has been having a lot of congestion, coughing. Lots of ear pain and pressures, slight dizziness. Slight wheezing and SOB. No fever or chills. Has been using Mucinex and rescue inhaler. Reviewed PmHx, RxHx, FmHx, SocHx, AllgHx and updated and dated in the chart. Review of Systems - negative except as listed above in the HPI    Objective:     Vitals:    09/18/17 0717   BP: 122/66   Pulse: (!) 56   Resp: 16   Temp: 99.7 °F (37.6 °C)   TempSrc: Oral   SpO2: 96%   Weight: 186 lb (84.4 kg)   Height: 5' 5\" (1.651 m)     Physical Examination: General appearance - alert, well appearing, and in no distress  Ears - bilateral TM's and external ear canals normal  Nose - normal nontender sinuses, mucosal congestion and mucosal erythema  Mouth - mucous membranes moist, pharynx normal without lesions and erythematous  Neck - supple, no significant adenopathy  Chest - wheezing noted mild to moderate bilateral bases   Heart - normal rate, regular rhythm, normal S1, S2, no murmurs, rubs, clicks or gallops    Assessment/ Plan:   Diagnoses and all orders for this visit:    1. Bronchitis  -     cefdinir (OMNICEF) 300 mg capsule; Take 1 Cap by mouth two (2) times a day for 10 days. New rx. Discussed and encouraged rest and clear fluids, if congestion is thick should avoid dairy. Recommended hot showers with steam and elevating head of bed. May use Vitamin C or Echinacea in addition to current therapy. 2. RAD (reactive airway disease), mild intermittent, with acute exacerbation  -     predniSONE (STERAPRED DS) 10 mg dose pack; Use as directed on packaging x 6 days  Wait 1-2 days, try antibiotic and rescue inhaler but if no improvement in 24-48 hrs then take steroid.      3. Allergic rhinitis, unspecified allergic rhinitis trigger, unspecified rhinitis seasonality  -     fluticasone (FLONASE) 50 mcg/actuation nasal spray; 2 Sprays by Both Nostrils route daily. Stable, refill provided. Pt voiced understanding regarding plan of care. Follow-up Disposition:  Return if symptoms worsen or fail to improve. I have discussed the diagnosis with the patient and the intended plan as seen in the above orders. The patient has received an after-visit summary and questions were answered concerning future plans.      Medication Side Effects and Warnings were discussed with patient    Jen Galdamez NP Yes, record another set of vital signs

## 2017-09-18 NOTE — MR AVS SNAPSHOT
Visit Information Date & Time Provider Department Dept. Phone Encounter #  
 9/18/2017  7:00 AM Fabiola Barajas NP [address] [facility] [phone] [id number] Follow-up Instructions Return if symptoms worsen or fail to improve. Upcoming Health Maintenance Date Due HEMOGLOBIN A1C Q6M 1/22/2016 MICROALBUMIN Q1 7/22/2016 INFLUENZA AGE 9 TO ADULT 8/1/2017 EYE EXAM RETINAL OR DILATED Q1 9/27/2017 FOOT EXAM Q1 2/15/2018 LIPID PANEL Q1 2/15/2018 MEDICARE YEARLY EXAM 2/16/2018 GLAUCOMA SCREENING Q2Y 9/27/2018 DTaP/Tdap/Td series (2 - Td) 10/30/2025 Allergies as of 9/18/2017  Review Complete On: 9/18/2017 By: Fabiola Barajas NP Severity Noted Reaction Type Reactions Sulfa (Sulfonamide Antibiotics)  06/09/2010    Other (comments) Current Immunizations  Reviewed on 2/29/2016 Name Date Influenza High Dose Vaccine PF 9/14/2016 Influenza Vaccine Split 11/8/2012, 11/22/2010 Pneumococcal Conjugate (PCV-13) 7/22/2015 Tdap 10/30/2015 Varicella Virus Vaccine Live 9/10/2011 ZZZ-RETIRED (DO NOT USE) Pneumococcal Vaccine (Unspecified Type) 11/8/2010 Not reviewed this visit You Were Diagnosed With   
  
 Codes Comments Bronchitis    -  Primary ICD-10-CM: P67 ICD-9-CM: 490   
 RAD (reactive airway disease), mild intermittent, with acute exacerbation     ICD-10-CM: J45.21 ICD-9-CM: 571.34 Allergic rhinitis, unspecified allergic rhinitis trigger, unspecified rhinitis seasonality     ICD-10-CM: J30.9 ICD-9-CM: 477.9 Vitals BP Pulse Temp Resp Height(growth percentile) Weight(growth percentile) 122/66 (!) 56 99.7 °F (37.6 °C) (Oral) 16 5' 5\" (1.651 m) 186 lb (84.4 kg) SpO2 BMI OB Status Smoking Status 96% 30.95 kg/m2 Postmenopausal Never Smoker Vitals History BMI and BSA Data Body Mass Index Body Surface Area 30.95 kg/m 2 1.97 m 2 Preferred Pharmacy Pharmacy Name Phone [facility], [address] [place] [address] Your Updated Medication List  
  
   
This list is accurate as of: 9/18/17  7:32 AM.  Always use your most recent med list. amLODIPine 5 mg tablet Commonly known as:  Hoa Milan Take 1 Tab by mouth daily. carbamide peroxide 6.5 % otic solution Commonly known as:  Malika Magic Administer 5 drops into each ear as needed. cefdinir 300 mg capsule Commonly known as:  OMNICEF Take 1 Cap by mouth two (2) times a day for 10 days. cetirizine 10 mg tablet Commonly known as:  ZYRTEC Take  by mouth. Dexlansoprazole 60 mg Cpdb Take  by mouth. fluticasone 50 mcg/actuation nasal spray Commonly known as:  Deetta Britain 2 Sprays by Both Nostrils route daily. folic acid 1 mg tablet Commonly known as:  FOLVITE  
TAKE ONE TABLET BY MOUTH ONCE DAILY  
  
 glimepiride 4 mg tablet Commonly known as:  AMARYL Take 2 mg by mouth every morning. linagliptin 5 mg tablet Commonly known as:  Liane Bur Take 1 Tab by mouth daily. losartan-hydroCHLOROthiazide 100-25 mg per tablet Commonly known as:  HYZAAR  
TAKE ONE TABLET BY MOUTH DAILY lovastatin 20 mg tablet Commonly known as:  MEVACOR  
TAKE TWO TABLETS BY MOUTH NIGHTLY  
  
 methotrexate 2.5 mg tablet Commonly known as:  Call Plaster Take 2.5 mg by mouth Every Thursday. Take 5 tablets  
  
 metoprolol tartrate 100 mg IR tablet Commonly known as:  LOPRESSOR  
TAKE ONE TABLET BY MOUTH TWICE DAILY  
  
 nystatin powder Commonly known as:  MYCOSTATIN Apply  to affected area four (4) times daily. ondansetron 4 mg disintegrating tablet Commonly known as:  ZOFRAN ODT Take 1 Tab by mouth every eight (8) hours as needed for Nausea. predniSONE 10 mg dose pack Commonly known as:  STERAPRED DS Use as directed on packaging x 6 days SENNA PLUS 8.6-50 mg per tablet Generic drug:  senna-docusate Take 1 Tab by mouth daily. VITAMIN D3 2,000 unit Tab Generic drug:  cholecalciferol (vitamin D3) Take  by mouth.  
  
 warfarin 5 mg tablet Commonly known as:  COUMADIN Prescriptions Sent to Pharmacy Refills  
 cefdinir (OMNICEF) 300 mg capsule 0 Sig: Take 1 Cap by mouth two (2) times a day for 10 days. Class: Normal  
 Pharmacy: [address] Ph #: [phone] Route: Oral  
 predniSONE (STERAPRED DS) 10 mg dose pack 0 Sig: Use as directed on packaging x 6 days Class: Normal  
 Pharmacy: [facility] [address]  Ph #: [phone]  
 fluticasone (FLONASE) 50 mcg/actuation nasal spray 5 Si Sprays by Both Nostrils route daily. Class: Normal  
 Pharmacy: [address] Ph #: [phone] Route: Both Nostrils Follow-up Instructions Return if symptoms worsen or fail to improve. Patient Instructions Bronchitis: Care Instructions Your Care Instructions Bronchitis is inflammation of the bronchial tubes, which carry air to the lungs. The tubes swell and produce mucus, or phlegm. The mucus and inflamed bronchial tubes make you cough. You may have trouble breathing. Most cases of bronchitis are caused by viruses like those that cause colds. Antibiotics usually do not help and they may be harmful. Bronchitis usually develops rapidly and lasts about 2 to 3 weeks in otherwise healthy people. Follow-up care is a key part of your treatment and safety. Be sure to make and go to all appointments, and call your doctor if you are having problems. It's also a good idea to know your test results and keep a list of the medicines you take. How can you care for yourself at home? · Take all medicines exactly as prescribed. Call your doctor if you think you are having a problem with your medicine. · Get some extra rest. 
· Take an over-the-counter pain medicine, such as acetaminophen (Tylenol), ibuprofen (Advil, Motrin), or naproxen (Aleve) to reduce fever and relieve body aches. Read and follow all instructions on the label. · Do not take two or more pain medicines at the same time unless the doctor told you to. Many pain medicines have acetaminophen, which is Tylenol. Too much acetaminophen (Tylenol) can be harmful. · Take an over-the-counter cough medicine that contains dextromethorphan to help quiet a dry, hacking cough so that you can sleep. Avoid cough medicines that have more than one active ingredient. Read and follow all instructions on the label. · Breathe moist air from a humidifier, hot shower, or sink filled with hot water. The heat and moisture will thin mucus so you can cough it out. · Do not smoke. Smoking can make bronchitis worse. If you need help quitting, talk to your doctor about stop-smoking programs and medicines. These can increase your chances of quitting for good. When should you call for help? Call 911 anytime you think you may need emergency care. For example, call if: 
· You have severe trouble breathing. Call your doctor now or seek immediate medical care if: 
· You have new or worse trouble breathing. · You cough up dark brown or bloody mucus (sputum). · You have a new or higher fever. · You have a new rash. Watch closely for changes in your health, and be sure to contact your doctor if: 
· You cough more deeply or more often, especially if you notice more mucus or a change in the color of your mucus. · You are not getting better as expected. Where can you learn more? Go to http://yakov-philip.info/. Enter H333 in the search box to learn more about \"Bronchitis: Care Instructions. \" Current as of: March 25, 2017 Content Version: 11.3 © 5513-6693 [employer]. Care instructions adapted under license by [employer] (which disclaims liability or warranty for this information). If you have questions about a medical condition or this instruction, always ask your healthcare professional. [address] any warranty or liability for your use of this information. Introducing [facility] HEALTH SERVICES! Dear Keyona Saha: Thank you for requesting a Intrinsic-ID account. Our records indicate that you already have an active Intrinsic-ID account. You can access your account anytime at [email or web address] Did you know that you can access your hospital and ER discharge instructions at any time in Intrinsic-ID? You can also review all of your test results from your hospital stay or ER visit. Additional Information If you have questions, please visit the Frequently Asked Questions section of the Intrinsic-ID website at [email or web address] Remember, Intrinsic-ID is NOT to be used for urgent needs. For medical emergencies, dial 911. Now available from your iPhone and Android! Please provide this summary of care documentation to your next provider. Your primary care clinician is listed as Abran Hashimoto. If you have any questions after today's visit, please call [phone].

## 2017-09-18 NOTE — PATIENT INSTRUCTIONS
Bronchitis: Care Instructions  Your Care Instructions    Bronchitis is inflammation of the bronchial tubes, which carry air to the lungs. The tubes swell and produce mucus, or phlegm. The mucus and inflamed bronchial tubes make you cough. You may have trouble breathing. Most cases of bronchitis are caused by viruses like those that cause colds. Antibiotics usually do not help and they may be harmful. Bronchitis usually develops rapidly and lasts about 2 to 3 weeks in otherwise healthy people. Follow-up care is a key part of your treatment and safety. Be sure to make and go to all appointments, and call your doctor if you are having problems. It's also a good idea to know your test results and keep a list of the medicines you take. How can you care for yourself at home? · Take all medicines exactly as prescribed. Call your doctor if you think you are having a problem with your medicine. · Get some extra rest.  · Take an over-the-counter pain medicine, such as acetaminophen (Tylenol), ibuprofen (Advil, Motrin), or naproxen (Aleve) to reduce fever and relieve body aches. Read and follow all instructions on the label. · Do not take two or more pain medicines at the same time unless the doctor told you to. Many pain medicines have acetaminophen, which is Tylenol. Too much acetaminophen (Tylenol) can be harmful. · Take an over-the-counter cough medicine that contains dextromethorphan to help quiet a dry, hacking cough so that you can sleep. Avoid cough medicines that have more than one active ingredient. Read and follow all instructions on the label. · Breathe moist air from a humidifier, hot shower, or sink filled with hot water. The heat and moisture will thin mucus so you can cough it out. · Do not smoke. Smoking can make bronchitis worse. If you need help quitting, talk to your doctor about stop-smoking programs and medicines. These can increase your chances of quitting for good.   When should you call for help? Call 911 anytime you think you may need emergency care. For example, call if:  · You have severe trouble breathing. Call your doctor now or seek immediate medical care if:  · You have new or worse trouble breathing. · You cough up dark brown or bloody mucus (sputum). · You have a new or higher fever. · You have a new rash. Watch closely for changes in your health, and be sure to contact your doctor if:  · You cough more deeply or more often, especially if you notice more mucus or a change in the color of your mucus. · You are not getting better as expected. Where can you learn more? Go to http://yakov-philip.info/. Enter H333 in the search box to learn more about \"Bronchitis: Care Instructions. \"  Current as of: March 25, 2017  Content Version: 11.3  © 5017-0502 [employer]. Care instructions adapted under license by [employer] (which disclaims liability or warranty for this information). If you have questions about a medical condition or this instruction, always ask your healthcare professional. [address] any warranty or liability for your use of this information.

## 2018-01-02 ENCOUNTER — OFFICE VISIT (OUTPATIENT)
Dept: FAMILY MEDICINE CLINIC | Age: 83
End: 2018-01-02

## 2018-01-02 VITALS
OXYGEN SATURATION: 100 % | BODY MASS INDEX: 31.89 KG/M2 | SYSTOLIC BLOOD PRESSURE: 122 MMHG | HEART RATE: 70 BPM | RESPIRATION RATE: 16 BRPM | WEIGHT: 191.38 LBS | TEMPERATURE: 98 F | HEIGHT: 65 IN | DIASTOLIC BLOOD PRESSURE: 68 MMHG

## 2018-01-02 DIAGNOSIS — M62.838 NECK MUSCLE SPASM: Primary | ICD-10-CM

## 2018-01-02 DIAGNOSIS — R19.09 LEFT GROIN MASS: ICD-10-CM

## 2018-01-02 PROBLEM — F33.9 RECURRENT DEPRESSION (HCC): Status: ACTIVE | Noted: 2018-01-02

## 2018-01-02 PROBLEM — E11.21 TYPE 2 DIABETES MELLITUS WITH NEPHROPATHY (HCC): Status: ACTIVE | Noted: 2018-01-02

## 2018-01-02 RX ORDER — HYDROCORTISONE 25 MG/G
CREAM TOPICAL
COMMUNITY
Start: 2017-11-27 | End: 2018-05-17

## 2018-01-02 RX ORDER — CEPHALEXIN 500 MG/1
500 CAPSULE ORAL 3 TIMES DAILY
Qty: 30 CAP | Refills: 0 | Status: SHIPPED | OUTPATIENT
Start: 2018-01-02 | End: 2018-01-12

## 2018-01-02 RX ORDER — BACLOFEN 10 MG/1
10 TABLET ORAL 3 TIMES DAILY
Qty: 30 TAB | Refills: 1 | Status: SHIPPED | OUTPATIENT
Start: 2018-01-02 | End: 2018-05-17

## 2018-01-02 NOTE — MR AVS SNAPSHOT
Visit Information Date & Time Provider Department Dept. Phone Encounter #  
 1/2/2018  2:00 PM Esme Gonzales, [place] [address] [facility] [phone] [id number] Follow-up Instructions Return for well exam after 2/15/18. Upcoming Health Maintenance Date Due HEMOGLOBIN A1C Q6M 1/22/2016 MICROALBUMIN Q1 7/22/2016 Influenza Age 5 to Adult 8/1/2017 FOOT EXAM Q1 2/15/2018 LIPID PANEL Q1 2/15/2018 MEDICARE YEARLY EXAM 2/16/2018 EYE EXAM RETINAL OR DILATED Q1 10/27/2018 GLAUCOMA SCREENING Q2Y 10/27/2019 DTaP/Tdap/Td series (2 - Td) 10/30/2025 Allergies as of 1/2/2018  Review Complete On: 1/2/2018 By: Peter Muñoz LPN Severity Noted Reaction Type Reactions Sulfa (Sulfonamide Antibiotics)  06/09/2010    Other (comments) Current Immunizations  Reviewed on 2/29/2016 Name Date Influenza High Dose Vaccine PF 9/14/2016 Influenza Vaccine Split 11/8/2012, 11/22/2010 Pneumococcal Conjugate (PCV-13) 7/22/2015 Tdap 10/30/2015 Varicella Virus Vaccine Live 9/10/2011 ZZZ-RETIRED (DO NOT USE) Pneumococcal Vaccine (Unspecified Type) 11/8/2010 Not reviewed this visit You Were Diagnosed With   
  
 Codes Comments Neck muscle spasm    -  Primary ICD-10-CM: S79.587 ICD-9-CM: 728.85 Left groin mass     ICD-10-CM: R19.09 
ICD-9-CM: 789.39 Vitals BP Pulse Temp Resp Height(growth percentile) Weight(growth percentile) 122/68 70 98 °F (36.7 °C) (Oral) 16 5' 5\" (1.651 m) 191 lb 6 oz (86.8 kg) SpO2 BMI OB Status Smoking Status 100% 31.85 kg/m2 Postmenopausal Never Smoker Vitals History BMI and BSA Data Body Mass Index Body Surface Area  
 31.85 kg/m 2 2 m 2 Preferred Pharmacy Pharmacy Name Phone [facility] [address] [place] [address], [address] [place] [address] [phone] Your Updated Medication List  
  
   
 This list is accurate as of: 1/2/18  2:23 PM.  Always use your most recent med list. amLODIPine 5 mg tablet Commonly known as:  Chacha Hensen Take 1 Tab by mouth daily. baclofen 10 mg tablet Commonly known as:  LIORESAL Take 1 Tab by mouth three (3) times daily. As needed for spasm  
  
 carbamide peroxide 6.5 % otic solution Commonly known as:  Brenda Newton Administer 5 drops into each ear as needed. cephALEXin 500 mg capsule Commonly known as:  Radha Burton Take 1 Cap by mouth three (3) times daily for 10 days. cetirizine 10 mg tablet Commonly known as:  ZYRTEC Take  by mouth. Dexlansoprazole 60 mg Cpdb Take  by mouth. fluticasone 50 mcg/actuation nasal spray Commonly known as:  Aubery Cables 2 Sprays by Both Nostrils route daily. folic acid 1 mg tablet Commonly known as:  FOLVITE  
TAKE ONE TABLET BY MOUTH ONCE DAILY  
  
 glimepiride 4 mg tablet Commonly known as:  AMARYL Take 2 mg by mouth every morning. hydrocortisone 2.5 % topical cream  
Commonly known as:  HYTONE  
  
 linagliptin 5 mg tablet Commonly known as:  Edilberto Pott Take 1 Tab by mouth daily. losartan-hydroCHLOROthiazide 100-25 mg per tablet Commonly known as:  HYZAAR  
TAKE ONE TABLET BY MOUTH DAILY lovastatin 20 mg tablet Commonly known as:  MEVACOR  
TAKE TWO TABLETS BY MOUTH ONCE DAILY AT NIGHT  
  
 methotrexate 2.5 mg tablet Commonly known as:  Geoffry Cashing Take 2.5 mg by mouth Every Thursday. Take 5 tablets  
  
 metoprolol tartrate 100 mg IR tablet Commonly known as:  LOPRESSOR  
TAKE ONE TABLET BY MOUTH TWICE DAILY MUCINEX PO Take  by mouth. nystatin powder Commonly known as:  MYCOSTATIN Apply  to affected area four (4) times daily. ondansetron 4 mg disintegrating tablet Commonly known as:  ZOFRAN ODT Take 1 Tab by mouth every eight (8) hours as needed for Nausea. predniSONE 10 mg dose pack Commonly known as:  STERAPRED DS Use as directed on packaging x 6 days SENNA PLUS 8.6-50 mg per tablet Generic drug:  senna-docusate Take 1 Tab by mouth daily. VITAMIN D3 2,000 unit Tab Generic drug:  cholecalciferol (vitamin D3) Take  by mouth.  
  
 warfarin 5 mg tablet Commonly known as:  COUMADIN Prescriptions Sent to Pharmacy Refills  
 baclofen (LIORESAL) 10 mg tablet 1 Sig: Take 1 Tab by mouth three (3) times daily. As needed for spasm Class: Normal  
 Pharmacy: [address], [address] [place] [address], [address] Ph #: [phone] Route: Oral  
 cephALEXin (KEFLEX) 500 mg capsule 0 Sig: Take 1 Cap by mouth three (3) times daily for 10 days. Class: Normal  
 Pharmacy: [facility] [id number] [facility], [address] [place] [address], [address] Ph #: [phone] Route: Oral  
  
Follow-up Instructions Return for well exam after 2/15/18. To-Do List   
 01/05/2018 Imaging:  US PELV NON OBS Introducing [facility]! Dear Cami Most: Thank you for requesting a [employer] account. Our records indicate that you already have an active [employer] account. You can access your account anytime at [email or web address] Did you know that you can access your hospital and ER discharge instructions at any time in Cell[employer]? You can also review all of your test results from your hospital stay or ER visit. Additional Information If you have questions, please visit the Frequently Asked Questions section of the [employer] website at [email or web address] Remember, [employer] is NOT to be used for urgent needs. For medical emergencies, dial 911. Now available from your iPhone and Android! Please provide this summary of care documentation to your next provider. Your primary care clinician is listed as Diana Du. If you have any questions after today's visit, please call [phone].

## 2018-01-03 NOTE — PROGRESS NOTES
HISTORY OF PRESENT ILLNESS  Sina Sheridan is a 81 y.o. female. HPI  Chief Complaint   Patient presents with    Neck Pain     off & on x 2 days, has been stressed, does not remember injury  Better today, still has the muscle tightness    Mass     lump in left groin area x 1 mo  No redness or heat, not tender, no recent uti sx  Hard and in the inguinal area     ROS  A comprehensive review of system was obtained and negative except findings in the HPI    Visit Vitals    /68    Pulse 70    Temp 98 °F (36.7 °C) (Oral)    Resp 16    Ht 5' 5\" (1.651 m)    Wt 191 lb 6 oz (86.8 kg)    SpO2 100%    BMI 31.85 kg/m2     Physical Exam   Constitutional: She is oriented to person, place, and time. She appears well-developed and well-nourished. Neck: No JVD present. Cardiovascular: Normal rate, regular rhythm and intact distal pulses. Exam reveals no gallop and no friction rub. No murmur heard. Pulmonary/Chest: Effort normal and breath sounds normal. No respiratory distress. She has no wheezes. Musculoskeletal: She exhibits no edema. Neurological: She is alert and oriented to person, place, and time. Skin: Skin is warm. Left inguinal area with 3cm x 1cm hard mass of the left inguinal area, no redness, heat or tenderness   Nursing note and vitals reviewed. ASSESSMENT and PLAN  Encounter Diagnoses   Name Primary?  Neck muscle spasm Yes    Left groin mass      Orders Placed This Encounter    US PELV NON OBS    hydrocortisone (HYTONE) 2.5 % topical cream    GUAIFENESIN (MUCINEX PO)    baclofen (LIORESAL) 10 mg tablet    cephALEXin (KEFLEX) 500 mg capsule     Will go ahead and do abx incase lymphadenitis  US pelvic ordered to eval area  Given baclofen for spasms of the neck  Dev plan with results    I have discussed the diagnosis with the patient and the intended plan as seen in the above orders.  The patient has received an after-visit summary and questions were answered concerning future plans. Patient conveyed understanding of the plan at the time of the visit.     Tray Lopez, MSN, ANP  1/2/2018

## 2018-01-08 ENCOUNTER — HOSPITAL ENCOUNTER (OUTPATIENT)
Dept: ULTRASOUND IMAGING | Age: 83
Discharge: HOME | End: 2018-01-08
Attending: NURSE PRACTITIONER
Payer: MEDICARE

## 2018-01-08 DIAGNOSIS — R19.09 LEFT GROIN MASS: ICD-10-CM

## 2018-01-08 PROCEDURE — 76882 US LMTD JT/FCL EVL NVASC XTR: CPT

## 2018-01-09 NOTE — PROGRESS NOTES
Hey there, your Ultrasound shows just a fluid pocket or a bruise that is just taking some time to resolve. It is not concerning for cancer or a hernia type thing. I want to re-scan this in 3 months to make sure it is gone.  Luciana [place]

## 2018-03-13 ENCOUNTER — OFFICE VISIT (OUTPATIENT)
Dept: FAMILY MEDICINE CLINIC | Age: 83
End: 2018-03-13

## 2018-03-13 ENCOUNTER — HOSPITAL ENCOUNTER (OUTPATIENT)
Dept: LAB | Age: 83
Discharge: HOME | End: 2018-03-13
Payer: MEDICARE

## 2018-03-13 VITALS
WEIGHT: 191 LBS | HEIGHT: 65 IN | TEMPERATURE: 97.9 F | DIASTOLIC BLOOD PRESSURE: 78 MMHG | OXYGEN SATURATION: 98 % | BODY MASS INDEX: 31.82 KG/M2 | RESPIRATION RATE: 18 BRPM | HEART RATE: 49 BPM | SYSTOLIC BLOOD PRESSURE: 134 MMHG

## 2018-03-13 DIAGNOSIS — Z00.00 WELL ADULT EXAM: Primary | ICD-10-CM

## 2018-03-13 DIAGNOSIS — I10 ESSENTIAL HYPERTENSION, BENIGN: ICD-10-CM

## 2018-03-13 DIAGNOSIS — E11.9 TYPE 2 DIABETES MELLITUS WITHOUT COMPLICATION, WITHOUT LONG-TERM CURRENT USE OF INSULIN (HCC): ICD-10-CM

## 2018-03-13 DIAGNOSIS — E11.21 TYPE 2 DIABETES MELLITUS WITH NEPHROPATHY (HCC): ICD-10-CM

## 2018-03-13 DIAGNOSIS — F33.9 RECURRENT DEPRESSION (HCC): ICD-10-CM

## 2018-03-13 DIAGNOSIS — N18.30 CHRONIC RENAL IMPAIRMENT, STAGE 3 (MODERATE) (HCC): ICD-10-CM

## 2018-03-13 DIAGNOSIS — E78.2 MIXED HYPERLIPIDEMIA: ICD-10-CM

## 2018-03-13 PROCEDURE — 80061 LIPID PANEL: CPT

## 2018-03-13 PROCEDURE — 80053 COMPREHEN METABOLIC PANEL: CPT

## 2018-03-13 PROCEDURE — 84443 ASSAY THYROID STIM HORMONE: CPT

## 2018-03-13 PROCEDURE — 82043 UR ALBUMIN QUANTITATIVE: CPT

## 2018-03-13 PROCEDURE — 85025 COMPLETE CBC W/AUTO DIFF WBC: CPT

## 2018-03-13 PROCEDURE — 83036 HEMOGLOBIN GLYCOSYLATED A1C: CPT

## 2018-03-13 RX ORDER — ATORVASTATIN CALCIUM 40 MG/1
40 TABLET, FILM COATED ORAL
COMMUNITY
End: 2020-11-23 | Stop reason: SDUPTHER

## 2018-03-13 NOTE — PROGRESS NOTES
This is the Subsequent Medicare Annual Wellness Exam, performed 12 months or more after the Initial AWV or the last Subsequent AWV  I have reviewed the patient's medical history in detail and updated the computerized patient record. History     Past Medical History:   Diagnosis Date    Allergies     Asymptomatic carotid artery stenosis without infarction 2/22/2011    Depression     Diverticulosis     DJD (degenerative joint disease)     GERD (gastroesophageal reflux disease)     HTN     Hypercholesterolemia     Mammography less than 12 months ago     NIDDM     Proteinuria       Past Surgical History:   Procedure Laterality Date    ENDOSCOPY, COLON, DIAGNOSTIC  2008    normal    HX APPENDECTOMY      HX HERNIA REPAIR      HX HYSTERECTOMY      HX KNEE REPLACEMENT      HX SPLENECTOMY       Current Outpatient Prescriptions   Medication Sig Dispense Refill    atorvastatin (LIPITOR) 40 mg tablet Take  by mouth daily.  hydrocortisone (HYTONE) 2.5 % topical cream       GUAIFENESIN (MUCINEX PO) Take  by mouth.  baclofen (LIORESAL) 10 mg tablet Take 1 Tab by mouth three (3) times daily. As needed for spasm 30 Tab 1    warfarin (COUMADIN) 5 mg tablet       fluticasone (FLONASE) 50 mcg/actuation nasal spray 2 Sprays by Both Nostrils route daily. 1 Bottle 5    amLODIPine (NORVASC) 5 mg tablet Take 1 Tab by mouth daily. 30 Tab 4    nystatin (MYCOSTATIN) powder Apply  to affected area four (4) times daily. 60 g 2    glimepiride (AMARYL) 4 mg tablet Take 2 mg by mouth every morning.  cetirizine (ZYRTEC) 10 mg tablet Take  by mouth.  ondansetron (ZOFRAN ODT) 4 mg disintegrating tablet Take 1 Tab by mouth every eight (8) hours as needed for Nausea.  20 Tab 0    losartan-hydroCHLOROthiazide (HYZAAR) 100-25 mg per tablet TAKE ONE TABLET BY MOUTH DAILY 90 Tab 3    metoprolol tartrate (LOPRESSOR) 100 mg IR tablet TAKE ONE TABLET BY MOUTH TWICE DAILY (Patient taking differently: Take 25 mg by mouth two (2) times a day.) 192 Tab 3    folic acid (FOLVITE) 1 mg tablet TAKE ONE TABLET BY MOUTH ONCE DAILY 90 Tab 3    Dexlansoprazole 60 mg CpDB Take  by mouth.  carbamide peroxide (DEBROX) 6.5 % otic solution Administer 5 drops into each ear as needed. 15 mL 0    cholecalciferol, vitamin D3, (VITAMIN D3) 2,000 unit Tab Take  by mouth.  linagliptin (TRADJENTA) 5 mg tablet Take 1 Tab by mouth daily. 30 Tab 5    methotrexate (RHEUMATREX) 2.5 mg tablet Take 2.5 mg by mouth Every Thursday. Take 3 tablets      senna-docusate (SENNA PLUS) 8.6-50 mg per tablet Take 1 Tab by mouth daily.       lovastatin (MEVACOR) 20 mg tablet TAKE TWO TABLETS BY MOUTH ONCE DAILY AT NIGHT 180 Tab 0     Allergies   Allergen Reactions    Sulfa (Sulfonamide Antibiotics) Other (comments)     Family History   Problem Relation Age of Onset    Hypertension Father     Cancer Sister      breast    Diabetes Brother      Social History   Substance Use Topics    Smoking status: Never Smoker    Smokeless tobacco: Never Used    Alcohol use No     Patient Active Problem List   Diagnosis Code    Diverticulosis K57.90    IBS (irritable bowel syndrome) K58.9    Depressive disorder, not elsewhere classified F32.9    Mixed hyperlipidemia E78.2    DJD (degenerative joint disease) M19.90    PUD (peptic ulcer disease) K27.9    Allergic rhinitis due to other allergen J30.89    Proteinuria R80.9    RA (rheumatoid arthritis) ([facility]) M06.9    Essential hypertension, benign I10    Asymptomatic carotid artery stenosis without infarction I65.29    CRI (chronic renal insufficiency) N18.9    DVT (deep venous thrombosis) ([facility]) I82.409    Advanced care planning/counseling discussion Z71.89    Type 2 diabetes mellitus without complication, without long-term current use of insulin ([facility]) E11.9    Type 2 diabetes mellitus with nephropathy ([facility]) E11.21    Recurrent depression ([facility]) F33.9       Depression Risk Factor Screening:   No flowsheet data found. Alcohol Risk Factor Screening: You do not drink alcohol or very rarely. Functional Ability and Level of Safety:   Hearing Loss  Hearing is good. Activities of Daily Living  The home contains: no safety equipment. Patient does total self care    Fall Risk  Fall Risk Assessment, last 12 mths 3/13/2018   Able to walk? Yes   Fall in past 12 months? No       Abuse Screen  Patient is not abused    Cognitive Screening   Evaluation of Cognitive Function:  Has your family/caregiver stated any concerns about your memory: no  Normal    Patient Care Team   Patient Care Team:  Mya Medina NP as PCP - General (Family Practice)  Maykel Bowers MD (Hematology and Oncology)    Assessment/Plan   Education and counseling provided:  Are appropriate based on today's review and evaluation    Diagnoses and all orders for this visit:    1. Well adult exam  -     CBC WITH AUTOMATED DIFF  -     LIPID PANEL  -     METABOLIC PANEL, COMPREHENSIVE  -     TSH 3RD GENERATION    2. Type 2 diabetes mellitus without complication, without long-term current use of insulin (HCC)  -     HEMOGLOBIN A1C WITH EAG  -     MICROALBUMIN, UR, RAND W/ MICROALB/CREAT RATIO    3. Type 2 diabetes mellitus with nephropathy (HCC)  -     HEMOGLOBIN A1C WITH EAG  -     MICROALBUMIN, UR, RAND W/ MICROALB/CREAT RATIO    4. Chronic renal impairment, stage 3 (moderate)  -     METABOLIC PANEL, COMPREHENSIVE    5. Essential hypertension, benign  -     CBC WITH AUTOMATED DIFF  -     METABOLIC PANEL, COMPREHENSIVE    6. Mixed hyperlipidemia  -     LIPID PANEL      I have discussed the diagnosis with the patient and the intended plan as seen in the above orders. The patient has received an after-visit summary and questions were answered concerning future plans. Patient conveyed understanding of the plan at the time of the visit.     Mya Medina, MSN, ANP  3/13/2018

## 2018-03-13 NOTE — PROGRESS NOTES
Chief Complaint   Patient presents with    Complete Physical    Labs      States that BG was 142 this am.

## 2018-03-13 NOTE — PATIENT INSTRUCTIONS

## 2018-03-13 NOTE — MR AVS SNAPSHOT
[address] [address] 
[phone] Patient: Bryant Jacques MRN:  [id number] Visit Information Date & Time Provider Department Dept. Phone Encounter #  
 3/13/2018 10:00 AM [phone] [id number] Upcoming Health Maintenance Date Due HEMOGLOBIN A1C Q6M 1/22/2016 MICROALBUMIN Q1 7/22/2016 LIPID PANEL Q1 2/15/2018 MEDICARE YEARLY EXAM 2/16/2018 EYE EXAM RETINAL OR DILATED Q1 10/27/2018 FOOT EXAM Q1 3/13/2019 GLAUCOMA SCREENING Q2Y 10/27/2019 DTaP/Tdap/Td series (2 - Td) 10/30/2025 Allergies as of 3/13/2018  Review Complete On: 3/13/2018 By: Britney Du LPN Severity Noted Reaction Type Reactions Sulfa (Sulfonamide Antibiotics)  06/09/2010    Other (comments) Current Immunizations  Reviewed on 2/29/2016 Name Date Influenza High Dose Vaccine PF 9/14/2016 Influenza Vaccine Split 11/8/2012, 11/22/2010 Pneumococcal Conjugate (PCV-13) 7/22/2015 Tdap 10/30/2015 Varicella Virus Vaccine Live 9/10/2011 ZZZ-RETIRED (DO NOT USE) Pneumococcal Vaccine (Unspecified Type) 11/8/2010 Not reviewed this visit You Were Diagnosed With   
  
 Codes Comments Well adult exam    -  Primary ICD-10-CM: Z00.00 ICD-9-CM: V70.0 Type 2 diabetes mellitus without complication, without long-term current use of insulin (HCC)     ICD-10-CM: E11.9 ICD-9-CM: 250.00 Type 2 diabetes mellitus with nephropathy (HCC)     ICD-10-CM: E11.21 
ICD-9-CM: 250.40, 583.81 Chronic renal impairment, stage 3 (moderate)     ICD-10-CM: N18.3 ICD-9-CM: 075. 3 Essential hypertension, benign     ICD-10-CM: I10 
ICD-9-CM: 401.1 Mixed hyperlipidemia     ICD-10-CM: E78.2 ICD-9-CM: 272.2 Vitals BP Pulse Temp Resp Height(growth percentile) Weight(growth percentile) 134/78 (!) 49 97.9 °F (36.6 °C) 18 5' 5\" (1.651 m) 191 lb (86.6 kg) SpO2 BMI OB Status Smoking Status 98% 31.78 kg/m2 Postmenopausal Never Smoker Vitals History BMI and BSA Data Body Mass Index Body Surface Area 31.78 kg/m 2 1.99 m 2 Preferred Pharmacy Pharmacy Name Phone [facility]ay[facility] Cast[facility], [address] [place] [address], [address] [phone] Your Updated Medication List  
  
   
This list is accurate as of 3/13/18 10:15 AM.  Always use your most recent med list. amLODIPine 5 mg tablet Commonly known as:  Arva Brazen Take 1 Tab by mouth daily. atorvastatin 40 mg tablet Commonly known as:  LIPITOR Take  by mouth daily. baclofen 10 mg tablet Commonly known as:  LIORESAL Take 1 Tab by mouth three (3) times daily. As needed for spasm  
  
 carbamide peroxide 6.5 % otic solution Commonly known as:  Pickens Ervin Administer 5 drops into each ear as needed. cetirizine 10 mg tablet Commonly known as:  ZYRTEC Take  by mouth. Dexlansoprazole 60 mg Cpdb Take  by mouth. fluticasone 50 mcg/actuation nasal spray Commonly known as:  Kevin Oats 2 Sprays by Both Nostrils route daily. folic acid 1 mg tablet Commonly known as:  FOLVITE  
TAKE ONE TABLET BY MOUTH ONCE DAILY  
  
 glimepiride 4 mg tablet Commonly known as:  AMARYL Take 2 mg by mouth every morning. hydrocortisone 2.5 % topical cream  
Commonly known as:  HYTONE  
  
 linagliptin 5 mg tablet Commonly known as:  Kathetrey Alecia Take 1 Tab by mouth daily. losartan-hydroCHLOROthiazide 100-25 mg per tablet Commonly known as:  HYZAAR  
TAKE ONE TABLET BY MOUTH DAILY lovastatin 20 mg tablet Commonly known as:  MEVACOR  
TAKE TWO TABLETS BY MOUTH ONCE DAILY AT NIGHT  
  
 methotrexate 2.5 mg tablet Commonly known as:  Mavis Putney Take 2.5 mg by mouth Every Thursday. Take 3 tablets  
  
 metoprolol tartrate 100 mg IR tablet Commonly known as:  LOPRESSOR  
TAKE ONE TABLET BY MOUTH TWICE DAILY MUCINEX PO Take  by mouth. nystatin powder Commonly known as:  MYCOSTATIN Apply  to affected area four (4) times daily. ondansetron 4 mg disintegrating tablet Commonly known as:  ZOFRAN ODT Take 1 Tab by mouth every eight (8) hours as needed for Nausea. SENNA PLUS 8.6-50 mg per tablet Generic drug:  senna-docusate Take 1 Tab by mouth daily. VITAMIN D3 2,000 unit Tab Generic drug:  cholecalciferol (vitamin D3) Take  by mouth.  
  
 warfarin 5 mg tablet Commonly known as:  COUMADIN We Performed the Following CBC WITH AUTOMATED DIFF [32103 CPT(R)] HEMOGLOBIN A1C WITH EAG [29714 CPT(R)] LIPID PANEL [10097 CPT(R)] METABOLIC PANEL, COMPREHENSIVE [20442 CPT(R)] MICROALBUMIN, UR, RAND W/ MICROALB/CREAT RATIO [id number] CPT(R)] TSH 3RD GENERATION [27376 CPT(R)] Introducing [address]! Dear Cami Titus: Thank you for requesting a [employer] account. Our records indicate that you already have an active [employer] account. You can access your account anytime at [email or web address] Did you know that you can access your hospital and ER discharge instructions at any time in [employer]? You can also review all of your test results from your hospital stay or ER visit. Additional Information If you have questions, please visit the Frequently Asked Questions section of the [employer] website at [email or web address] Remember, HEM[employer] is NOT to be used for urgent needs. For medical emergencies, dial 911. Now available from your iPhone and Android! Please provide this summary of care documentation to your next provider. Your primary care clinician is listed as Diana Du. If you have any questions after today's visit, please call [phone].

## 2018-03-14 LAB
ALBUMIN SERPL-MCNC: 3.9 G/DL (ref 3.5–4.7)
ALBUMIN/CREAT UR: 5.7 MG/G CREAT (ref 0–30)
ALBUMIN/GLOB SERPL: 1.9 {RATIO} (ref 1.2–2.2)
ALP SERPL-CCNC: 43 IU/L (ref 39–117)
ALT SERPL-CCNC: 20 IU/L (ref 0–32)
AST SERPL-CCNC: 23 IU/L (ref 0–40)
BASOPHILS # BLD AUTO: 0 X10E3/UL (ref 0–0.2)
BASOPHILS NFR BLD AUTO: 1 %
BILIRUB SERPL-MCNC: 0.4 MG/DL (ref 0–1.2)
BUN SERPL-MCNC: 19 MG/DL (ref 8–27)
BUN/CREAT SERPL: 14 (ref 12–28)
CALCIUM SERPL-MCNC: 9.6 MG/DL (ref 8.7–10.3)
CHLORIDE SERPL-SCNC: 100 MMOL/L (ref 96–106)
CHOLEST SERPL-MCNC: 150 MG/DL (ref 100–199)
CO2 SERPL-SCNC: 26 MMOL/L (ref 18–29)
CREAT SERPL-MCNC: 1.38 MG/DL (ref 0.57–1)
CREAT UR-MCNC: 178 MG/DL
EOSINOPHIL # BLD AUTO: 0.3 X10E3/UL (ref 0–0.4)
EOSINOPHIL NFR BLD AUTO: 4 %
ERYTHROCYTE [DISTWIDTH] IN BLOOD BY AUTOMATED COUNT: 18 % (ref 12.3–15.4)
EST. AVERAGE GLUCOSE BLD GHB EST-MCNC: 166 MG/DL
GFR SERPLBLD CREATININE-BSD FMLA CKD-EPI: 36 ML/MIN/1.73
GFR SERPLBLD CREATININE-BSD FMLA CKD-EPI: 41 ML/MIN/1.73
GLOBULIN SER CALC-MCNC: 2.1 G/DL (ref 1.5–4.5)
GLUCOSE SERPL-MCNC: 196 MG/DL (ref 65–99)
HBA1C MFR BLD: 7.4 % (ref 4.8–5.6)
HCT VFR BLD AUTO: 37.3 % (ref 34–46.6)
HDLC SERPL-MCNC: 41 MG/DL
HGB BLD-MCNC: 12.2 G/DL (ref 11.1–15.9)
IMM GRANULOCYTES # BLD: 0.1 X10E3/UL (ref 0–0.1)
IMM GRANULOCYTES NFR BLD: 1 %
INTERPRETATION, 910389: NORMAL
INTERPRETATION: NORMAL
LDLC SERPL CALC-MCNC: 76 MG/DL (ref 0–99)
LYMPHOCYTES # BLD AUTO: 2.2 X10E3/UL (ref 0.7–3.1)
LYMPHOCYTES NFR BLD AUTO: 25 %
Lab: NORMAL
MCH RBC QN AUTO: 30.5 PG (ref 26.6–33)
MCHC RBC AUTO-ENTMCNC: 32.7 G/DL (ref 31.5–35.7)
MCV RBC AUTO: 93 FL (ref 79–97)
MICROALBUMIN UR-MCNC: 10.1 UG/ML
MONOCYTES # BLD AUTO: 0.9 X10E3/UL (ref 0.1–0.9)
MONOCYTES NFR BLD AUTO: 11 %
NEUTROPHILS # BLD AUTO: 5 X10E3/UL (ref 1.4–7)
NEUTROPHILS NFR BLD AUTO: 58 %
PDF IMAGE, 910387: NORMAL
PLATELET # BLD AUTO: 279 X10E3/UL (ref 150–379)
POTASSIUM SERPL-SCNC: 5.1 MMOL/L (ref 3.5–5.2)
PROT SERPL-MCNC: 6 G/DL (ref 6–8.5)
RBC # BLD AUTO: 4 X10E6/UL (ref 3.77–5.28)
SODIUM SERPL-SCNC: 139 MMOL/L (ref 134–144)
TRIGL SERPL-MCNC: 165 MG/DL (ref 0–149)
TSH SERPL DL<=0.005 MIU/L-ACNC: 1.29 UIU/ML (ref 0.45–4.5)
VLDLC SERPL CALC-MCNC: 33 MG/DL (ref 5–40)
WBC # BLD AUTO: 8.5 X10E3/UL (ref 3.4–10.8)

## 2018-03-18 RX ORDER — FOLIC ACID 1 MG/1
TABLET ORAL
Qty: 90 TAB | Refills: 3 | Status: SHIPPED | OUTPATIENT
Start: 2018-03-18 | End: 2019-03-15 | Stop reason: SDUPTHER

## 2018-03-19 NOTE — PROGRESS NOTES
Hey there, your labs overall are stable. Your cholesterol is stable and even your kidney functions have improved. Your sugar is slightly higher so make sure you are watching your diet for carbs and sweets. Recheck fasting in 3 months.  Ina Jimenez

## 2018-03-26 RX ORDER — LOSARTAN POTASSIUM AND HYDROCHLOROTHIAZIDE 25; 100 MG/1; MG/1
TABLET ORAL
Qty: 90 TAB | Refills: 2 | Status: SHIPPED | OUTPATIENT
Start: 2018-03-26 | End: 2018-03-27 | Stop reason: SDUPTHER

## 2018-03-27 ENCOUNTER — OFFICE VISIT (OUTPATIENT)
Dept: FAMILY MEDICINE CLINIC | Age: 83
End: 2018-03-27

## 2018-03-27 VITALS
RESPIRATION RATE: 16 BRPM | HEIGHT: 65 IN | OXYGEN SATURATION: 99 % | DIASTOLIC BLOOD PRESSURE: 62 MMHG | SYSTOLIC BLOOD PRESSURE: 110 MMHG | TEMPERATURE: 98.6 F | WEIGHT: 192.6 LBS | BODY MASS INDEX: 32.09 KG/M2 | HEART RATE: 62 BPM

## 2018-03-27 DIAGNOSIS — I10 ESSENTIAL HYPERTENSION, BENIGN: ICD-10-CM

## 2018-03-27 DIAGNOSIS — D17.1 LIPOMA OF TORSO: Primary | ICD-10-CM

## 2018-03-27 RX ORDER — METOPROLOL TARTRATE 25 MG/1
25 TABLET, FILM COATED ORAL 2 TIMES DAILY
Qty: 180 TAB | Refills: 3 | Status: SHIPPED | OUTPATIENT
Start: 2018-03-27 | End: 2018-05-15

## 2018-03-27 RX ORDER — LOSARTAN POTASSIUM AND HYDROCHLOROTHIAZIDE 25; 100 MG/1; MG/1
TABLET ORAL
Qty: 90 TAB | Refills: 3 | Status: SHIPPED | OUTPATIENT
Start: 2018-03-27 | End: 2018-05-17

## 2018-03-27 NOTE — PROGRESS NOTES
HISTORY OF PRESENT ILLNESS  Henrietta Mohan is a 81 y.o. female. HPI  Was here for lipoma 3 mo ago  US imaging was negative  Area is palpable and getting bigger  Wants removed    Needs refills of her meds for htn, forgot to ask last visit  Has already seen Neph for follow up on abn labs    ROS  A comprehensive review of system was obtained and negative except findings in the HPI    Visit Vitals    /62 (BP 1 Location: Left arm, BP Patient Position: Sitting)    Pulse 62    Temp 98.6 °F (37 °C) (Oral)    Resp 16    Ht 5' 5\" (1.651 m)    Wt 192 lb 9.6 oz (87.4 kg)    SpO2 99%    BMI 32.05 kg/m2     Physical Exam   Constitutional: She is oriented to person, place, and time. She appears well-developed and well-nourished. Neck: No JVD present. Cardiovascular: Normal rate, regular rhythm and intact distal pulses. Exam reveals no gallop and no friction rub. No murmur heard. Pulmonary/Chest: Effort normal and breath sounds normal. No respiratory distress. She has no wheezes. Musculoskeletal: She exhibits no edema. Neurological: She is alert and oriented to person, place, and time. Skin: Skin is warm. Nursing note and vitals reviewed. ASSESSMENT and PLAN  Encounter Diagnoses   Name Primary?  Lipoma of torso Yes    Essential hypertension, benign      Orders Placed This Encounter   [facility] General Surgery ref [facility]    metoprolol tartrate (LOPRESSOR) 25 mg tablet    losartan-hydroCHLOROthiazide (HYZAAR) 100-25 mg per tablet     Referral given to Dr. Sean Lauren for eval and removal  Refills updated  Has appt in May for labs  I have discussed the diagnosis with the patient and the intended plan as seen in the above orders. The patient has received an after-visit summary and questions were answered concerning future plans. Patient conveyed understanding of the plan at the time of the visit.     Asa Thomas, MSN, ANP  3/27/2018

## 2018-03-27 NOTE — MR AVS SNAPSHOT
[address] [address] 
[phone] Patient: Sheree Goldman MRN:  [id number] Visit Information Date & Time Provider Department Dept. Phone Encounter #  
 3/27/2018  2:15 PM [facility], [address] [phone] [id number] Upcoming Health Maintenance Date Due HEMOGLOBIN A1C Q6M 9/13/2018 EYE EXAM RETINAL OR DILATED Q1 10/27/2018 FOOT EXAM Q1 3/13/2019 MICROALBUMIN Q1 3/13/2019 LIPID PANEL Q1 3/13/2019 MEDICARE YEARLY EXAM 3/14/2019 GLAUCOMA SCREENING Q2Y 10/27/2019 DTaP/Tdap/Td series (2 - Td) 10/30/2025 Allergies as of 3/27/2018  Review Complete On: 3/27/2018 By: Yovana Delcid Severity Noted Reaction Type Reactions Sulfa (Sulfonamide Antibiotics)  06/09/2010    Other (comments) Current Immunizations  Reviewed on 2/29/2016 Name Date Influenza High Dose Vaccine PF 9/14/2016 Influenza Vaccine Split 11/8/2012, 11/22/2010 Pneumococcal Conjugate (PCV-13) 7/22/2015 Tdap 10/30/2015 Varicella Virus Vaccine Live 9/10/2011 ZZZ-RETIRED (DO NOT USE) Pneumococcal Vaccine (Unspecified Type) 11/8/2010 Not reviewed this visit You Were Diagnosed With   
  
 Codes Comments Lipoma of torso    -  Primary ICD-10-CM: D17.1 ICD-9-CM: 214.1 Essential hypertension, benign     ICD-10-CM: I10 
ICD-9-CM: 401.1 Vitals BP Pulse Temp Resp Height(growth percentile) Weight(growth percentile) 110/62 (BP 1 Location: Left arm, BP Patient Position: Sitting) 62 98.6 °F (37 °C) (Oral) 16 5' 5\" (1.651 m) 192 lb 9.6 oz (87.4 kg) SpO2 BMI OB Status Smoking Status 99% 32.05 kg/m2 Postmenopausal Never Smoker Vitals History BMI and BSA Data Body Mass Index Body Surface Area 32.05 kg/m 2 2 m 2 Preferred Pharmacy Pharmacy Name Phone Gianni Barba [id number] [facility], [address] [place] [address], [address] [phone] Your Updated Medication List  
  
   
This list is accurate as of 3/27/18  2:40 PM.  Always use your most recent med list. amLODIPine 5 mg tablet Commonly known as:  Dilma Callander Take 1 Tab by mouth daily. atorvastatin 40 mg tablet Commonly known as:  LIPITOR Take  by mouth daily. baclofen 10 mg tablet Commonly known as:  LIORESAL Take 1 Tab by mouth three (3) times daily. As needed for spasm  
  
 carbamide peroxide 6.5 % otic solution Commonly known as:  Pixie Bride Administer 5 drops into each ear as needed. cetirizine 10 mg tablet Commonly known as:  ZYRTEC Take  by mouth. Dexlansoprazole 60 mg Cpdb Take  by mouth. fluticasone 50 mcg/actuation nasal spray Commonly known as:  Keyana Kan 2 Sprays by Both Nostrils route daily. folic acid 1 mg tablet Commonly known as:  FOLVITE  
TAKE ONE TABLET BY MOUTH ONCE DAILY. *GENERIC FOR FOLVITE  
  
 glimepiride 4 mg tablet Commonly known as:  AMARYL Take 2 mg by mouth every morning. hydrocortisone 2.5 % topical cream  
Commonly known as:  HYTONE  
  
 linagliptin 5 mg tablet Commonly known as:  Gleda Duffel Take 1 Tab by mouth daily. losartan-hydroCHLOROthiazide 100-25 mg per tablet Commonly known as:  HYZAAR  
TAKE ONE TABLET BY MOUTH DAILY lovastatin 20 mg tablet Commonly known as:  MEVACOR  
TAKE TWO TABLETS BY MOUTH ONCE DAILY AT NIGHT  
  
 methotrexate 2.5 mg tablet Commonly known as:  Young [place] Take 2.5 mg by mouth Every Thursday. Take 3 tablets  
  
 metoprolol tartrate 25 mg tablet Commonly known as:  LOPRESSOR Take 1 Tab by mouth two (2) times a day. MUCINEX PO Take  by mouth. nystatin powder Commonly known as:  MYCOSTATIN Apply  to affected area four (4) times daily. ondansetron 4 mg disintegrating tablet Commonly known as:  ZOFRAN ODT  
 Take 1 Tab by mouth every eight (8) hours as needed for Nausea. SENNA PLUS 8.6-50 mg per tablet Generic drug:  senna-docusate Take 1 Tab by mouth daily. VITAMIN D3 2,000 unit Tab Generic drug:  cholecalciferol (vitamin D3) Take  by mouth.  
  
 warfarin 5 mg tablet Commonly known as:  COUMADIN Prescriptions Sent to Pharmacy Refills  
 metoprolol tartrate (LOPRESSOR) 25 mg tablet 3 Sig: Take 1 Tab by mouth two (2) times a day. Class: Normal  
 Pharmacy: [address], [address] [place] [address] Ph #: [phone] Route: Oral  
 losartan-hydroCHLOROthiazide (HYZAAR) 100-25 mg per tablet 3 Sig: TAKE ONE TABLET BY MOUTH DAILY Class: Normal  
 Pharmacy: [address], [address] [place] [address] Ph #: [phone] We Performed the Following REFERRAL TO GENERAL SURGERY [REF27 Custom] Referral Information Referral ID Referred By Referred To  
  
 [id number] [address] Parisa Gray MD   
   [address] [place] [address] Phone: [phone] Fax: [phone] Visits Status Start Date End Date 1 New Request 3/27/18 3/27/19 If your referral has a status of pending review or denied, additional information will be sent to support the outcome of this decision. Introducing [facility]! Dear Eb Gomes: Thank you for requesting a [employer] account. Our records indicate that you have previously registered for a [employer] account but its currently inactive. Please call our [employer] support line at [phone]. Additional Information If you have questions, please visit the Frequently Asked Questions section of the [employer] website at [email or web address] Remember, [employer] is NOT to be used for urgent needs. For medical emergencies, dial 911. Now available from your iPhone and Android! Please provide this summary of care documentation to your next provider. Your primary care clinician is listed as Jazmine Alicea. If you have any questions after today's visit, please call [phone].

## 2018-03-27 NOTE — PROGRESS NOTES
Chief Complaint   Patient presents with    Diabetes     3 month follow up     1. Have you been to the ER, urgent care clinic since your last visit? Hospitalized since your last visit? No    2. Have you seen or consulted any other health care providers outside of the [address] [place] [address] since your last visit? Include any pap smears or colon screening.  No

## 2018-04-30 ENCOUNTER — OFFICE VISIT (OUTPATIENT)
Dept: SURGERY | Age: 83
End: 2018-04-30

## 2018-04-30 VITALS
DIASTOLIC BLOOD PRESSURE: 75 MMHG | HEART RATE: 63 BPM | RESPIRATION RATE: 14 BRPM | TEMPERATURE: 97.6 F | OXYGEN SATURATION: 96 % | BODY MASS INDEX: 31.65 KG/M2 | SYSTOLIC BLOOD PRESSURE: 147 MMHG | HEIGHT: 65 IN | WEIGHT: 190 LBS

## 2018-04-30 DIAGNOSIS — S30.1XXA HEMATOMA OF GROIN, INITIAL ENCOUNTER: Primary | ICD-10-CM

## 2018-04-30 NOTE — PROGRESS NOTES
Surgery History and Physical    Subjective:      Radha Wagner is a 81 y.o. white female who presents for evaluation of a lump in her groin. Mrs. Parks noticed a bulge in her left groin about 3 months ago after exercising. She was seen by her PCP and had an US which revealed an atypical hematoma. She did not get a f/u study. The lump has slowly increased in size and is causing a stinging sensation which is relieved by rubbing Vaseline on it. She denies any direct trauma or puncture. She denies any leg swelling or abdominal or back pain. She is on Coumadin for b/l PE. The Coumadin was recently held because it was too high. Past Medical History:   Diagnosis Date    Allergies     Asymptomatic carotid artery stenosis without infarction 2/22/2011    Depression     Diverticulosis     DJD (degenerative joint disease)     DVT (deep venous thrombosis) ([facility] 2016    DVT RLE.  GERD (gastroesophageal reflux disease)     HTN     Hypercholesterolemia     Mammography less than 12 months ago     NIDDM     Obesity (BMI 30.0-34. 9)     PE (pulmonary thromboembolism) ([facility] 2017    B/L.  Proteinuria      Past Surgical History:   Procedure Laterality Date    ENDOSCOPY, COLON, DIAGNOSTIC  2008    normal    HX APPENDECTOMY      HX HERNIA REPAIR      HX HYSTERECTOMY      HX KNEE REPLACEMENT      HX SPLENECTOMY        Family History   Problem Relation Age of Onset    Hypertension Father     Cancer Sister      breast    Diabetes Brother      Social History   Substance Use Topics    Smoking status: Never Smoker    Smokeless tobacco: Never Used    Alcohol use No      Prior to Admission medications    Medication Sig Start Date End Date Taking? Authorizing Provider   metoprolol tartrate (LOPRESSOR) 25 mg tablet Take 1 Tab by mouth two (2) times a day.  3/27/18  Yes Blondie Both, NP   losartan-hydroCHLOROthiazide (HYZAAR) 100-25 mg per tablet TAKE ONE TABLET BY MOUTH DAILY 3/27/18  Yes Camille SILVEIRA Reena Persaud NP   folic acid (FOLVITE) 1 mg tablet TAKE ONE TABLET BY MOUTH ONCE DAILY. *GENERIC FOR FOLVITE 3/18/18  Yes Josie Rodriguez NP   atorvastatin (LIPITOR) 40 mg tablet Take  by mouth daily. Yes Historical Provider   hydrocortisone (HYTONE) 2.5 % topical cream  11/27/17  Yes Historical Provider   GUAIFENESIN (MUCINEX PO) Take  by mouth. Yes Historical Provider   baclofen (LIORESAL) 10 mg tablet Take 1 Tab by mouth three (3) times daily. As needed for spasm 1/2/18  Yes Josie Rodriguez NP   lovastatin (MEVACOR) 20 mg tablet TAKE TWO TABLETS BY MOUTH ONCE DAILY AT NIGHT 11/20/17  Yes Lisa Koenig NP   fluticasone (FLONASE) 50 mcg/actuation nasal spray 2 Sprays by Both Nostrils route daily. 9/18/17  Yes Lisa Koenig NP   amLODIPine (NORVASC) 5 mg tablet Take 1 Tab by mouth daily. 7/28/17  Yes Gracie De La Rosa MD   nystatin (MYCOSTATIN) powder Apply  to affected area four (4) times daily. 6/28/17  Yes Severo Hopkins Risser, MD   glimepiride (AMARYL) 4 mg tablet Take 2 mg by mouth every morning. Yes Historical Provider   cetirizine (ZYRTEC) 10 mg tablet Take  by mouth. Yes Historical Provider   ondansetron (ZOFRAN ODT) 4 mg disintegrating tablet Take 1 Tab by mouth every eight (8) hours as needed for Nausea. 3/22/17  Yes Josie Rodriguez NP   Dexlansoprazole 60 mg CpDB Take  by mouth. Yes Historical Provider   carbamide peroxide (DEBROX) 6.5 % otic solution Administer 5 drops into each ear as needed. 12/29/14  Yes Lisa Koenig NP   cholecalciferol, vitamin D3, (VITAMIN D3) 2,000 unit Tab Take  by mouth. Yes Historical Provider   linagliptin (TRADJENTA) 5 mg tablet Take 1 Tab by mouth daily. 2/9/12  Yes Thomas Mckeon MD   methotrexate (RHEUMATREX) 2.5 mg tablet Take 2.5 mg by mouth Every Thursday. Take 3 tablets   Yes Historical Provider   senna-docusate (SENNA PLUS) 8.6-50 mg per tablet Take 1 Tab by mouth daily.    Yes Historical Provider   warfarin (COUMADIN) 5 mg tablet  8/25/17 Historical Provider      Allergies   Allergen Reactions    Sulfa (Sulfonamide Antibiotics) Other (comments)       Review of Systems:  A comprehensive review of systems was negative except for that written in the History of Present Illness. Objective:      Physical Exam:  GENERAL: alert, cooperative, no distress, appears stated age, EYE: negative findings: anicteric sclera, LYMPHATIC: Cervical, supraclavicular nodes normal. , THROAT & NECK: neck supple and symmetrical.  The thyroid is grossly normal., LUNG: clear to auscultation bilaterally, HEART: regular rate and rhythm, ABDOMEN: Soft, NT, ND., GROIN:  Along the left groin, there is an approximately 12 x 5 cm firm, non-pulsatile, NT, mass with ecchymosis. There is no fluctuance, erythema, or drainage., EXTREMITIES:  no edema, SKIN: Normal., NEUROLOGIC: negative, PSYCHIATRIC: non focal    Assessment:     Hematoma of the left groin. Plan:     Mrs. Parks appears to have enlargement of her hematoma. I will order a CT of her abdomen and pelvis. I will call her with the results. She will need to f/u with her PCP to determine what she needs to do if she will need continued anticoagulation. She does not need any immediate surgical intervention at this time. If she develops any severe symptoms, then she will need to go to the ER. She can f/u with me prn.     Signed By: Giuliano Elizabeth MD     April 30, 2018

## 2018-04-30 NOTE — PROGRESS NOTES
1. Have you been to the ER, urgent care clinic since your last visit? Hospitalized since your last visit?no    2. Have you seen or consulted any other health care providers outside of the [facility] since your last visit? Include any pap smears or colon screening.  no

## 2018-05-01 ENCOUNTER — HOSPITAL ENCOUNTER (OUTPATIENT)
Dept: CT IMAGING | Age: 83
Discharge: HOME | End: 2018-05-01
Attending: SURGERY
Payer: MEDICARE

## 2018-05-01 ENCOUNTER — DOCUMENTATION ONLY (OUTPATIENT)
Dept: SURGERY | Age: 83
End: 2018-05-01

## 2018-05-01 DIAGNOSIS — S30.1XXA HEMATOMA OF GROIN, INITIAL ENCOUNTER: ICD-10-CM

## 2018-05-01 PROCEDURE — 74176 CT ABD & PELVIS W/O CONTRAST: CPT

## 2018-05-01 NOTE — PROGRESS NOTES
5/1/18 - 238 PM    I spoke to Mrs. Parks and discussed the CT findings with her. She has been instructed to call her PCP immediately to be seen and re-evaluated this week regarding her continued anticoagulation. No surgical intervention is needed at this time. If she has any severe or worsening symptoms, then she will need to go to the ER and have her Coumadin held.

## 2018-05-14 ENCOUNTER — HOSPITAL ENCOUNTER (OUTPATIENT)
Dept: LAB | Age: 83
Discharge: HOME | End: 2018-05-14
Payer: MEDICARE

## 2018-05-14 ENCOUNTER — OFFICE VISIT (OUTPATIENT)
Dept: FAMILY MEDICINE CLINIC | Age: 83
End: 2018-05-14

## 2018-05-14 VITALS
TEMPERATURE: 97.8 F | DIASTOLIC BLOOD PRESSURE: 75 MMHG | OXYGEN SATURATION: 95 % | SYSTOLIC BLOOD PRESSURE: 127 MMHG | HEART RATE: 62 BPM | WEIGHT: 187 LBS | HEIGHT: 65 IN | RESPIRATION RATE: 20 BRPM | BODY MASS INDEX: 31.16 KG/M2

## 2018-05-14 DIAGNOSIS — S30.1XXA HEMATOMA OF GROIN, INITIAL ENCOUNTER: ICD-10-CM

## 2018-05-14 DIAGNOSIS — I10 ESSENTIAL HYPERTENSION, BENIGN: ICD-10-CM

## 2018-05-14 DIAGNOSIS — R53.83 FATIGUE, UNSPECIFIED TYPE: Primary | ICD-10-CM

## 2018-05-14 PROCEDURE — 80053 COMPREHEN METABOLIC PANEL: CPT

## 2018-05-14 PROCEDURE — 85025 COMPLETE CBC W/AUTO DIFF WBC: CPT

## 2018-05-14 PROCEDURE — 84443 ASSAY THYROID STIM HORMONE: CPT

## 2018-05-14 NOTE — PROGRESS NOTES
Patient here for weaknass and lethargic x 2 weeks.  states she sleeps 75% of time. She has generalized weakness. Patient has a left abd hematoma that Dr. Cinthya Bridges is following up with that. Dr. Cinthya Bridges wants her off coumadin to drain. Dr. Odalis Patino, follow up last week. Recommended she see PCP, Saw Dr. Ashok Butcher, rheumatologist, recommended pcp, Dr. Maria L Anderson, kidney , follow up , see pcp. Patient off coumadin since last Thursday , about 10 days. Patient had a oriana filter placed last Monday. 1. Have you been to the ER, urgent care clinic since your last visit? Hospitalized since your last visit? No    2. Have you seen or consulted any other health care providers outside of the [facility] since your last visit? Include any pap smears or colon screening. No       Chief Complaint   Patient presents with    Lethargy     weaknass and lethargic x 2 weeks.  states she sleeps 75% of time     She is a 81 y.o. female who presents for evalution. Reviewed PmHx, RxHx, FmHx, SocHx, AllgHx and updated and dated in the chart. Patient Active Problem List    Diagnosis    Hematoma of groin     Left.       Type 2 diabetes mellitus with nephropathy (HCC)    Recurrent depression ([address]    Type 2 diabetes mellitus without complication, without long-term current use of insulin ([address]    Advanced care planning/counseling discussion     Patient does have Will and POA, will brind documents for chart when she can find them      DVT (deep venous thrombosis) ([address]    CRI (chronic renal insufficiency)    Asymptomatic carotid artery stenosis without infarction    Diverticulosis    IBS (irritable bowel syndrome)    Depressive disorder, not elsewhere classified    Mixed hyperlipidemia    DJD (degenerative joint disease)    PUD (peptic ulcer disease)    Allergic rhinitis due to other allergen    Proteinuria    RA (rheumatoid arthritis) ([address]    Essential hypertension, benign       Review of Systems - negative except as listed above in the HPI    Objective:     Vitals:    05/14/18 1125   BP: 127/75   Pulse: 62   Resp: 20   Temp: 97.8 °F (36.6 °C)   SpO2: 95%   Weight: 187 lb (84.8 kg)   Height: 5' 5\" (1.651 m)     Physical Examination: General appearance - alert, well appearing, and in no distress  Chest - clear to auscultation, no wheezes, rales or rhonchi, symmetric air entry  Heart - normal rate, regular rhythm, normal S1, S2, no murmurs, rubs, clicks or gallops  Abdomen - mass llq  Skin - normal coloration and turgor, no rashes, no suspicious skin lesions noted    Assessment/ Plan:   Diagnoses and all orders for this visit:    1. Fatigue, unspecified type  -     CBC WITH AUTOMATED DIFF  -     TSH 3RD GENERATION  -     METABOLIC PANEL, COMPREHENSIVE  -hold beta blocker to see if better    2. Hematoma of groin, initial encounter  -     CBC WITH AUTOMATED DIFF  -see CT  Report  -off coumadin and has filter in place    3. Essential hypertension, benign  -     METABOLIC PANEL, COMPREHENSIVE  -=low bp with standing  -hold beta blocker     Follow-up Disposition:  Return in about 2 weeks (around 5/28/2018). I have discussed the diagnosis with the patient and the intended plan as seen in the above orders. The patient understands and agrees with the plan. The patient has received an after-visit summary and questions were answered concerning future plans. Medication Side Effects and Warnings were discussed with patient  Patient Labs were reviewed and or requested:  Patient Past Records were reviewed and or requested    Merlinda Means, M.D. There are no Patient Instructions on file for this visit.

## 2018-05-14 NOTE — MR AVS SNAPSHOT
[address] [address] 
[phone] Patient: Rubina Corona MRN:  [id number] Visit Information Date & Time Provider Department Dept. Phone Encounter #  
 5/14/2018 10:45 AM Frank Barclay MD [address] [facility] [phone] [id number] Follow-up Instructions Return in about 2 weeks (around 5/28/2018). Upcoming Health Maintenance Date Due Influenza Age 5 to Adult 8/1/2018 HEMOGLOBIN A1C Q6M 9/13/2018 EYE EXAM RETINAL OR DILATED Q1 10/27/2018 FOOT EXAM Q1 3/13/2019 MICROALBUMIN Q1 3/13/2019 LIPID PANEL Q1 3/13/2019 MEDICARE YEARLY EXAM 3/14/2019 GLAUCOMA SCREENING Q2Y 10/27/2019 DTaP/Tdap/Td series (2 - Td) 10/30/2025 Allergies as of 5/14/2018  Review Complete On: 5/14/2018 By: Frank Barclay MD  
  
 Severity Noted Reaction Type Reactions Sulfa (Sulfonamide Antibiotics)  06/09/2010    Other (comments) Current Immunizations  Reviewed on 2/29/2016 Name Date Influenza High Dose Vaccine PF 9/14/2016 Influenza Vaccine Split 11/8/2012, 11/22/2010 Pneumococcal Conjugate (PCV-13) 7/22/2015 Tdap 10/30/2015 Varicella Virus Vaccine Live 9/10/2011 ZZZ-RETIRED (DO NOT USE) Pneumococcal Vaccine (Unspecified Type) 11/8/2010 Not reviewed this visit You Were Diagnosed With   
  
 Codes Comments Fatigue, unspecified type    -  Primary ICD-10-CM: R53.83 ICD-9-CM: 780.79 Hematoma of groin, initial encounter     ICD-10-CM: S30. Luis Cushing ICD-9-CM: 922.2 Essential hypertension, benign     ICD-10-CM: I10 
ICD-9-CM: 401.1 Vitals BP Pulse Temp Resp Height(growth percentile) Weight(growth percentile) 127/75 62 97.8 °F (36.6 °C) 20 5' 5\" (1.651 m) 187 lb (84.8 kg) SpO2 BMI OB Status Smoking Status 95% 31.12 kg/m2 Postmenopausal Never Smoker Vitals History BMI and BSA Data Body Mass Index Body Surface Area 31.12 kg/m 2 1.97 m 2 Preferred Pharmacy Pharmacy Name Phone [facility] [address] , [address] [facility], [address] [phone] Your Updated Medication List  
  
   
This list is accurate as of 5/14/18 11:48 AM.  Always use your most recent med list. amLODIPine 5 mg tablet Commonly known as:  Wendy Modesta Take 1 Tab by mouth daily. atorvastatin 40 mg tablet Commonly known as:  LIPITOR Take  by mouth daily. baclofen 10 mg tablet Commonly known as:  LIORESAL Take 1 Tab by mouth three (3) times daily. As needed for spasm  
  
 carbamide peroxide 6.5 % otic solution Commonly known as:  Vincenzo Barakat Administer 5 drops into each ear as needed. cetirizine 10 mg tablet Commonly known as:  ZYRTEC Take  by mouth. Dexlansoprazole 60 mg Cpdb Take  by mouth. fluticasone 50 mcg/actuation nasal spray Commonly known as:  Marine Knack 2 Sprays by Both Nostrils route daily. folic acid 1 mg tablet Commonly known as:  FOLVITE  
TAKE ONE TABLET BY MOUTH ONCE DAILY. *GENERIC FOR FOLVITE  
  
 glimepiride 4 mg tablet Commonly known as:  AMARYL Take 2 mg by mouth every morning. hydrocortisone 2.5 % topical cream  
Commonly known as:  HYTONE  
  
 linagliptin 5 mg tablet Commonly known as:  Ettie Feil Take 1 Tab by mouth daily. losartan-hydroCHLOROthiazide 100-25 mg per tablet Commonly known as:  HYZAAR  
TAKE ONE TABLET BY MOUTH DAILY  
  
 methotrexate 2.5 mg tablet Commonly known as:  Thelbert Dux Take 2.5 mg by mouth Every Thursday. Take 3 tablets  
  
 metoprolol tartrate 25 mg tablet Commonly known as:  LOPRESSOR Take 1 Tab by mouth two (2) times a day. MUCINEX PO Take  by mouth. nystatin powder Commonly known as:  MYCOSTATIN Apply  to affected area four (4) times daily. ondansetron 4 mg disintegrating tablet Commonly known as:  ZOFRAN ODT  
 Take 1 Tab by mouth every eight (8) hours as needed for Nausea. SENNA PLUS 8.6-50 mg per tablet Generic drug:  senna-docusate Take 1 Tab by mouth daily. warfarin 5 mg tablet Commonly known as:  COUMADIN We Performed the Following CBC WITH AUTOMATED DIFF [74019 CPT(R)] METABOLIC PANEL, COMPREHENSIVE [60465 CPT(R)] TSH 3RD GENERATION [54087 CPT(R)] Follow-up Instructions Return in about 2 weeks (around 5/28/2018). Introducing [facility]! Dear Sis De La Torre: Thank you for requesting a [employer] account. Our records indicate that you already have an active [employer] account. You can access your account anytime at [email or web address] Did you know that you can access your hospital and ER discharge instructions at any time in [employer]? You can also review all of your test results from your hospital stay or ER visit. Additional Information If you have questions, please visit the Frequently Asked Questions section of the [employer] website at [email or web address] Remember, [employer] is NOT to be used for urgent needs. For medical emergencies, dial 911. Now available from your iPhone and Android! Please provide this summary of care documentation to your next provider. Your primary care clinician is listed as MI PALMER. If you have any questions after today's visit, please call [phone].

## 2018-05-15 ENCOUNTER — HOSPITAL ENCOUNTER (INPATIENT)
Age: 83
LOS: 2 days | Discharge: HOME HEALTH | DRG: 641 | End: 2018-05-17
Attending: EMERGENCY MEDICINE | Admitting: FAMILY MEDICINE
Payer: MEDICARE

## 2018-05-15 ENCOUNTER — TELEPHONE (OUTPATIENT)
Dept: FAMILY MEDICINE CLINIC | Age: 83
End: 2018-05-15

## 2018-05-15 ENCOUNTER — APPOINTMENT (OUTPATIENT)
Dept: ULTRASOUND IMAGING | Age: 83
DRG: 641 | End: 2018-05-15
Attending: NURSE PRACTITIONER
Payer: MEDICARE

## 2018-05-15 ENCOUNTER — DOCUMENTATION ONLY (OUTPATIENT)
Dept: INTERNAL MEDICINE CLINIC | Age: 83
End: 2018-05-15

## 2018-05-15 ENCOUNTER — APPOINTMENT (OUTPATIENT)
Dept: CT IMAGING | Age: 83
DRG: 641 | End: 2018-05-15
Attending: NURSE PRACTITIONER
Payer: MEDICARE

## 2018-05-15 DIAGNOSIS — R91.8 MASS OF LUNG PARENCHYMA: ICD-10-CM

## 2018-05-15 DIAGNOSIS — E83.52 HYPERCALCEMIA: Primary | ICD-10-CM

## 2018-05-15 DIAGNOSIS — R53.83 FATIGUE, UNSPECIFIED TYPE: ICD-10-CM

## 2018-05-15 DIAGNOSIS — N17.9 AKI (ACUTE KIDNEY INJURY) (HCC): ICD-10-CM

## 2018-05-15 PROBLEM — E78.00 PURE HYPERCHOLESTEROLEMIA: Status: ACTIVE | Noted: 2018-05-15

## 2018-05-15 PROBLEM — I51.89 DIASTOLIC DYSFUNCTION: Status: ACTIVE | Noted: 2018-05-15

## 2018-05-15 LAB
25(OH)D3 SERPL-MCNC: 51.4 NG/ML (ref 30–100)
ALBUMIN SERPL-MCNC: 4 G/DL (ref 3.5–4.7)
ALBUMIN/GLOB SERPL: 1.7 {RATIO} (ref 1.2–2.2)
ALP SERPL-CCNC: 68 IU/L (ref 39–117)
ALT SERPL-CCNC: 14 IU/L (ref 0–32)
ANION GAP SERPL CALC-SCNC: 11 MMOL/L (ref 5–15)
ANION GAP SERPL CALC-SCNC: 6 MMOL/L (ref 5–15)
ANION GAP SERPL CALC-SCNC: 9 MMOL/L (ref 5–15)
APPEARANCE UR: CLEAR
AST SERPL-CCNC: 19 IU/L (ref 0–40)
ATRIAL RATE: 69 BPM
BACTERIA URNS QL MICRO: NEGATIVE /HPF
BASOPHILS # BLD AUTO: 0.1 X10E3/UL (ref 0–0.2)
BASOPHILS # BLD: 0.1 K/UL (ref 0–0.1)
BASOPHILS NFR BLD AUTO: 0 %
BASOPHILS NFR BLD: 1 % (ref 0–1)
BILIRUB SERPL-MCNC: 0.5 MG/DL (ref 0–1.2)
BILIRUB UR QL: NEGATIVE
BUN SERPL-MCNC: 25 MG/DL (ref 6–20)
BUN SERPL-MCNC: 26 MG/DL (ref 8–27)
BUN SERPL-MCNC: 28 MG/DL (ref 6–20)
BUN SERPL-MCNC: 29 MG/DL (ref 6–20)
BUN/CREAT SERPL: 13 (ref 12–20)
BUN/CREAT SERPL: 14 (ref 12–20)
BUN/CREAT SERPL: 14 (ref 12–20)
BUN/CREAT SERPL: 17 (ref 12–28)
CA-I BLD-SCNC: 1.5 MMOL/L (ref 1.13–1.32)
CALCIUM SERPL-MCNC: 12.7 MG/DL (ref 8.5–10.1)
CALCIUM SERPL-MCNC: 13 MG/DL (ref 8.5–10.1)
CALCIUM SERPL-MCNC: 13.4 MG/DL (ref 8.5–10.1)
CALCIUM SERPL-MCNC: 14 MG/DL (ref 8.5–10.1)
CALCIUM SERPL-MCNC: 14.5 MG/DL (ref 8.7–10.3)
CALCULATED P AXIS, ECG09: 46 DEGREES
CALCULATED R AXIS, ECG10: -22 DEGREES
CALCULATED T AXIS, ECG11: 17 DEGREES
CHLORIDE SERPL-SCNC: 101 MMOL/L (ref 96–106)
CHLORIDE SERPL-SCNC: 101 MMOL/L (ref 97–108)
CHLORIDE SERPL-SCNC: 106 MMOL/L (ref 97–108)
CHLORIDE SERPL-SCNC: 108 MMOL/L (ref 97–108)
CO2 SERPL-SCNC: 24 MMOL/L (ref 18–29)
CO2 SERPL-SCNC: 25 MMOL/L (ref 21–32)
CO2 SERPL-SCNC: 25 MMOL/L (ref 21–32)
CO2 SERPL-SCNC: 26 MMOL/L (ref 21–32)
COLOR UR: ABNORMAL
CREAT SERPL-MCNC: 1.57 MG/DL (ref 0.57–1)
CREAT SERPL-MCNC: 1.92 MG/DL (ref 0.55–1.02)
CREAT SERPL-MCNC: 2.03 MG/DL (ref 0.55–1.02)
CREAT SERPL-MCNC: 2.1 MG/DL (ref 0.55–1.02)
DIAGNOSIS, 93000: NORMAL
DIFFERENTIAL METHOD BLD: ABNORMAL
EOSINOPHIL # BLD AUTO: 0.2 X10E3/UL (ref 0–0.4)
EOSINOPHIL # BLD: 0.2 K/UL (ref 0–0.4)
EOSINOPHIL NFR BLD AUTO: 2 %
EOSINOPHIL NFR BLD: 1 % (ref 0–7)
EPITH CASTS URNS QL MICRO: ABNORMAL /LPF
ERYTHROCYTE [DISTWIDTH] IN BLOOD BY AUTOMATED COUNT: 15.8 % (ref 11.5–14.5)
ERYTHROCYTE [DISTWIDTH] IN BLOOD BY AUTOMATED COUNT: 17 % (ref 12.3–15.4)
GFR SERPLBLD CREATININE-BSD FMLA CKD-EPI: 30 ML/MIN/1.73
GFR SERPLBLD CREATININE-BSD FMLA CKD-EPI: 35 ML/MIN/1.73
GLOBULIN SER CALC-MCNC: 2.4 G/DL (ref 1.5–4.5)
GLUCOSE BLD STRIP.AUTO-MCNC: 161 MG/DL (ref 65–100)
GLUCOSE BLD STRIP.AUTO-MCNC: 232 MG/DL (ref 65–100)
GLUCOSE SERPL-MCNC: 135 MG/DL (ref 65–99)
GLUCOSE SERPL-MCNC: 151 MG/DL (ref 65–100)
GLUCOSE SERPL-MCNC: 218 MG/DL (ref 65–100)
GLUCOSE SERPL-MCNC: 249 MG/DL (ref 65–100)
GLUCOSE UR STRIP.AUTO-MCNC: 500 MG/DL
HCT VFR BLD AUTO: 36.8 % (ref 35–47)
HCT VFR BLD AUTO: 38.4 % (ref 34–46.6)
HGB BLD-MCNC: 12.1 G/DL (ref 11.5–16)
HGB BLD-MCNC: 12.4 G/DL (ref 11.1–15.9)
HGB UR QL STRIP: NEGATIVE
HYALINE CASTS URNS QL MICRO: ABNORMAL /LPF (ref 0–5)
IMM GRANULOCYTES # BLD: 0.1 K/UL (ref 0–0.04)
IMM GRANULOCYTES # BLD: 0.1 X10E3/UL (ref 0–0.1)
IMM GRANULOCYTES NFR BLD AUTO: 1 % (ref 0–0.5)
IMM GRANULOCYTES NFR BLD: 1 %
INR PPP: 1.1 (ref 0.9–1.1)
INTERPRETATION: NORMAL
KETONES UR QL STRIP.AUTO: NEGATIVE MG/DL
LEUKOCYTE ESTERASE UR QL STRIP.AUTO: ABNORMAL
LYMPHOCYTES # BLD AUTO: 2.5 X10E3/UL (ref 0.7–3.1)
LYMPHOCYTES # BLD: 3 K/UL (ref 0.8–3.5)
LYMPHOCYTES NFR BLD AUTO: 19 %
LYMPHOCYTES NFR BLD: 20 % (ref 12–49)
MAGNESIUM SERPL-MCNC: 1 MG/DL (ref 1.6–2.4)
MAGNESIUM SERPL-MCNC: 1 MG/DL (ref 1.6–2.4)
MAGNESIUM SERPL-MCNC: 1.8 MG/DL (ref 1.6–2.4)
MCH RBC QN AUTO: 30.1 PG (ref 26.6–33)
MCH RBC QN AUTO: 30.8 PG (ref 26–34)
MCHC RBC AUTO-ENTMCNC: 32.3 G/DL (ref 31.5–35.7)
MCHC RBC AUTO-ENTMCNC: 32.9 G/DL (ref 30–36.5)
MCV RBC AUTO: 93 FL (ref 79–97)
MCV RBC AUTO: 93.6 FL (ref 80–99)
MONOCYTES # BLD AUTO: 1.5 X10E3/UL (ref 0.1–0.9)
MONOCYTES # BLD: 1.9 K/UL (ref 0–1)
MONOCYTES NFR BLD AUTO: 11 %
MONOCYTES NFR BLD: 12 % (ref 5–13)
NEUTROPHILS # BLD AUTO: 8.8 X10E3/UL (ref 1.4–7)
NEUTROPHILS NFR BLD AUTO: 67 %
NEUTS SEG # BLD: 9.9 K/UL (ref 1.8–8)
NEUTS SEG NFR BLD: 65 % (ref 32–75)
NITRITE UR QL STRIP.AUTO: NEGATIVE
NRBC # BLD: 0 K/UL (ref 0–0.01)
NRBC BLD-RTO: 0 PER 100 WBC
P-R INTERVAL, ECG05: 176 MS
PERIPHERAL SMEAR,PSM: NORMAL
PH UR STRIP: 6 [PH] (ref 5–8)
PHOSPHATE SERPL-MCNC: 3.6 MG/DL (ref 2.6–4.7)
PLATELET # BLD AUTO: 265 K/UL (ref 150–400)
PLATELET # BLD AUTO: 276 X10E3/UL (ref 150–379)
PMV BLD AUTO: 11.1 FL (ref 8.9–12.9)
POTASSIUM SERPL-SCNC: 3.4 MMOL/L (ref 3.5–5.1)
POTASSIUM SERPL-SCNC: 3.8 MMOL/L (ref 3.5–5.1)
POTASSIUM SERPL-SCNC: 4 MMOL/L (ref 3.5–5.1)
POTASSIUM SERPL-SCNC: 4.7 MMOL/L (ref 3.5–5.2)
PROT SERPL-MCNC: 6.4 G/DL (ref 6–8.5)
PROT UR STRIP-MCNC: NEGATIVE MG/DL
PROTHROMBIN TIME: 11.4 SEC (ref 9–11.1)
PTH-INTACT SERPL-MCNC: 13.7 PG/ML (ref 18.4–88)
Q-T INTERVAL, ECG07: 398 MS
QRS DURATION, ECG06: 100 MS
QTC CALCULATION (BEZET), ECG08: 426 MS
RBC # BLD AUTO: 3.93 M/UL (ref 3.8–5.2)
RBC # BLD AUTO: 4.12 X10E6/UL (ref 3.77–5.28)
RBC #/AREA URNS HPF: ABNORMAL /HPF (ref 0–5)
SERVICE CMNT-IMP: ABNORMAL
SERVICE CMNT-IMP: ABNORMAL
SODIUM SERPL-SCNC: 137 MMOL/L (ref 136–145)
SODIUM SERPL-SCNC: 140 MMOL/L (ref 136–145)
SODIUM SERPL-SCNC: 140 MMOL/L (ref 136–145)
SODIUM SERPL-SCNC: 143 MMOL/L (ref 134–144)
SP GR UR REFRACTOMETRY: 1.01 (ref 1–1.03)
TSH SERPL DL<=0.005 MIU/L-ACNC: 2.54 UIU/ML (ref 0.45–4.5)
UROBILINOGEN UR QL STRIP.AUTO: 0.2 EU/DL (ref 0.2–1)
VENTRICULAR RATE, ECG03: 69 BPM
WBC # BLD AUTO: 13.1 X10E3/UL (ref 3.4–10.8)
WBC # BLD AUTO: 15.2 K/UL (ref 3.6–11)
WBC URNS QL MICRO: ABNORMAL /HPF (ref 0–4)

## 2018-05-15 PROCEDURE — 83735 ASSAY OF MAGNESIUM: CPT | Performed by: FAMILY MEDICINE

## 2018-05-15 PROCEDURE — 74011250636 HC RX REV CODE- 250/636: Performed by: NURSE PRACTITIONER

## 2018-05-15 PROCEDURE — C9113 INJ PANTOPRAZOLE SODIUM, VIA: HCPCS | Performed by: FAMILY MEDICINE

## 2018-05-15 PROCEDURE — 86902 BLOOD TYPE ANTIGEN DONOR EA: CPT | Performed by: FAMILY MEDICINE

## 2018-05-15 PROCEDURE — 83970 ASSAY OF PARATHORMONE: CPT | Performed by: FAMILY MEDICINE

## 2018-05-15 PROCEDURE — 82397 CHEMILUMINESCENT ASSAY: CPT | Performed by: FAMILY MEDICINE

## 2018-05-15 PROCEDURE — 65660000000 HC RM CCU STEPDOWN

## 2018-05-15 PROCEDURE — 84155 ASSAY OF PROTEIN SERUM: CPT

## 2018-05-15 PROCEDURE — 86880 COOMBS TEST DIRECT: CPT | Performed by: FAMILY MEDICINE

## 2018-05-15 PROCEDURE — 82652 VIT D 1 25-DIHYDROXY: CPT | Performed by: FAMILY MEDICINE

## 2018-05-15 PROCEDURE — 74011250637 HC RX REV CODE- 250/637: Performed by: FAMILY MEDICINE

## 2018-05-15 PROCEDURE — 80048 BASIC METABOLIC PNL TOTAL CA: CPT | Performed by: FAMILY MEDICINE

## 2018-05-15 PROCEDURE — 86870 RBC ANTIBODY IDENTIFICATION: CPT | Performed by: FAMILY MEDICINE

## 2018-05-15 PROCEDURE — 86920 COMPATIBILITY TEST SPIN: CPT | Performed by: FAMILY MEDICINE

## 2018-05-15 PROCEDURE — 76882 US LMTD JT/FCL EVL NVASC XTR: CPT

## 2018-05-15 PROCEDURE — 82962 GLUCOSE BLOOD TEST: CPT

## 2018-05-15 PROCEDURE — 82164 ANGIOTENSIN I ENZYME TEST: CPT

## 2018-05-15 PROCEDURE — 36415 COLL VENOUS BLD VENIPUNCTURE: CPT | Performed by: FAMILY MEDICINE

## 2018-05-15 PROCEDURE — 74011250636 HC RX REV CODE- 250/636: Performed by: FAMILY MEDICINE

## 2018-05-15 PROCEDURE — 86905 BLOOD TYPING RBC ANTIGENS: CPT | Performed by: FAMILY MEDICINE

## 2018-05-15 PROCEDURE — 86860 RBC ANTIBODY ELUTION: CPT | Performed by: FAMILY MEDICINE

## 2018-05-15 PROCEDURE — 80048 BASIC METABOLIC PNL TOTAL CA: CPT | Performed by: NURSE PRACTITIONER

## 2018-05-15 PROCEDURE — 86922 COMPATIBILITY TEST ANTIGLOB: CPT | Performed by: FAMILY MEDICINE

## 2018-05-15 PROCEDURE — 85610 PROTHROMBIN TIME: CPT | Performed by: FAMILY MEDICINE

## 2018-05-15 PROCEDURE — 82330 ASSAY OF CALCIUM: CPT | Performed by: FAMILY MEDICINE

## 2018-05-15 PROCEDURE — 84100 ASSAY OF PHOSPHORUS: CPT

## 2018-05-15 PROCEDURE — 99284 EMERGENCY DEPT VISIT MOD MDM: CPT

## 2018-05-15 PROCEDURE — 85025 COMPLETE CBC W/AUTO DIFF WBC: CPT | Performed by: NURSE PRACTITIONER

## 2018-05-15 PROCEDURE — 81001 URINALYSIS AUTO W/SCOPE: CPT | Performed by: FAMILY MEDICINE

## 2018-05-15 PROCEDURE — 83735 ASSAY OF MAGNESIUM: CPT

## 2018-05-15 PROCEDURE — 82306 VITAMIN D 25 HYDROXY: CPT | Performed by: FAMILY MEDICINE

## 2018-05-15 PROCEDURE — 86921 COMPATIBILITY TEST INCUBATE: CPT | Performed by: FAMILY MEDICINE

## 2018-05-15 PROCEDURE — 96360 HYDRATION IV INFUSION INIT: CPT

## 2018-05-15 PROCEDURE — 86335 IMMUNFIX E-PHORSIS/URINE/CSF: CPT | Performed by: FAMILY MEDICINE

## 2018-05-15 PROCEDURE — 71250 CT THORAX DX C-: CPT

## 2018-05-15 PROCEDURE — 86901 BLOOD TYPING SEROLOGIC RH(D): CPT | Performed by: FAMILY MEDICINE

## 2018-05-15 PROCEDURE — 74011636637 HC RX REV CODE- 636/637: Performed by: FAMILY MEDICINE

## 2018-05-15 PROCEDURE — 94762 N-INVAS EAR/PLS OXIMTRY CONT: CPT

## 2018-05-15 PROCEDURE — 74011000250 HC RX REV CODE- 250: Performed by: FAMILY MEDICINE

## 2018-05-15 PROCEDURE — 93005 ELECTROCARDIOGRAM TRACING: CPT

## 2018-05-15 RX ORDER — HYDRALAZINE HYDROCHLORIDE 20 MG/ML
5 INJECTION INTRAMUSCULAR; INTRAVENOUS ONCE
Status: COMPLETED | OUTPATIENT
Start: 2018-05-15 | End: 2018-05-15

## 2018-05-15 RX ORDER — FLUTICASONE PROPIONATE 50 MCG
1 SPRAY, SUSPENSION (ML) NASAL
COMMUNITY
End: 2019-11-11 | Stop reason: SDUPTHER

## 2018-05-15 RX ORDER — SODIUM CHLORIDE 9 MG/ML
150 INJECTION, SOLUTION INTRAVENOUS CONTINUOUS
Status: DISCONTINUED | OUTPATIENT
Start: 2018-05-15 | End: 2018-05-17 | Stop reason: HOSPADM

## 2018-05-15 RX ORDER — AMLODIPINE BESYLATE 5 MG/1
5 TABLET ORAL DAILY
Status: DISCONTINUED | OUTPATIENT
Start: 2018-05-16 | End: 2018-05-15

## 2018-05-15 RX ORDER — DEXTROSE 50 % IN WATER (D50W) INTRAVENOUS SYRINGE
12.5-25 AS NEEDED
Status: DISCONTINUED | OUTPATIENT
Start: 2018-05-15 | End: 2018-05-17 | Stop reason: HOSPADM

## 2018-05-15 RX ORDER — SODIUM CHLORIDE 0.9 % (FLUSH) 0.9 %
5-10 SYRINGE (ML) INJECTION AS NEEDED
Status: DISCONTINUED | OUTPATIENT
Start: 2018-05-15 | End: 2018-05-17 | Stop reason: HOSPADM

## 2018-05-15 RX ORDER — SODIUM CHLORIDE 0.9 % (FLUSH) 0.9 %
5-10 SYRINGE (ML) INJECTION EVERY 8 HOURS
Status: DISCONTINUED | OUTPATIENT
Start: 2018-05-15 | End: 2018-05-17 | Stop reason: HOSPADM

## 2018-05-15 RX ORDER — LORATADINE 10 MG/1
10 TABLET ORAL
COMMUNITY
End: 2019-08-28

## 2018-05-15 RX ORDER — ESOMEPRAZOLE MAGNESIUM 20 MG
20 CAPSULE,DELAYED RELEASE (ENTERIC COATED) ORAL DAILY
COMMUNITY

## 2018-05-15 RX ORDER — MAGNESIUM SULFATE 1 G/100ML
1 INJECTION INTRAVENOUS ONCE
Status: COMPLETED | OUTPATIENT
Start: 2018-05-15 | End: 2018-05-15

## 2018-05-15 RX ORDER — MAGNESIUM SULFATE 100 %
4 CRYSTALS MISCELLANEOUS AS NEEDED
Status: DISCONTINUED | OUTPATIENT
Start: 2018-05-15 | End: 2018-05-17 | Stop reason: HOSPADM

## 2018-05-15 RX ORDER — HEPARIN SODIUM 5000 [USP'U]/ML
5000 INJECTION, SOLUTION INTRAVENOUS; SUBCUTANEOUS EVERY 8 HOURS
Status: DISCONTINUED | OUTPATIENT
Start: 2018-05-15 | End: 2018-05-17 | Stop reason: HOSPADM

## 2018-05-15 RX ORDER — NYSTATIN 100000 [USP'U]/G
POWDER TOPICAL
COMMUNITY
End: 2018-05-17

## 2018-05-15 RX ORDER — INSULIN LISPRO 100 [IU]/ML
INJECTION, SOLUTION INTRAVENOUS; SUBCUTANEOUS EVERY 6 HOURS
Status: DISCONTINUED | OUTPATIENT
Start: 2018-05-15 | End: 2018-05-17 | Stop reason: HOSPADM

## 2018-05-15 RX ORDER — ATORVASTATIN CALCIUM 20 MG/1
40 TABLET, FILM COATED ORAL DAILY
Status: DISCONTINUED | OUTPATIENT
Start: 2018-05-15 | End: 2018-05-17 | Stop reason: HOSPADM

## 2018-05-15 RX ADMIN — SODIUM CHLORIDE 1000 ML: 900 INJECTION, SOLUTION INTRAVENOUS at 11:55

## 2018-05-15 RX ADMIN — SODIUM CHLORIDE 40 MG: 9 INJECTION INTRAMUSCULAR; INTRAVENOUS; SUBCUTANEOUS at 11:51

## 2018-05-15 RX ADMIN — MAGNESIUM SULFATE HEPTAHYDRATE 1 G: 1 INJECTION, SOLUTION INTRAVENOUS at 14:03

## 2018-05-15 RX ADMIN — HEPARIN SODIUM 5000 UNITS: 5000 INJECTION, SOLUTION INTRAVENOUS; SUBCUTANEOUS at 14:49

## 2018-05-15 RX ADMIN — SODIUM CHLORIDE 1000 ML: 900 INJECTION, SOLUTION INTRAVENOUS at 10:06

## 2018-05-15 RX ADMIN — SODIUM CHLORIDE 150 ML/HR: 900 INJECTION, SOLUTION INTRAVENOUS at 15:27

## 2018-05-15 RX ADMIN — MAGNESIUM SULFATE HEPTAHYDRATE 1 G: 1 INJECTION, SOLUTION INTRAVENOUS at 15:18

## 2018-05-15 RX ADMIN — ATORVASTATIN CALCIUM 40 MG: 20 TABLET, FILM COATED ORAL at 14:49

## 2018-05-15 RX ADMIN — Medication 10 ML: at 21:41

## 2018-05-15 RX ADMIN — SODIUM CHLORIDE 150 ML/HR: 900 INJECTION, SOLUTION INTRAVENOUS at 11:23

## 2018-05-15 RX ADMIN — HYDRALAZINE HYDROCHLORIDE 5 MG: 20 INJECTION INTRAMUSCULAR; INTRAVENOUS at 14:03

## 2018-05-15 RX ADMIN — Medication 10 ML: at 14:03

## 2018-05-15 RX ADMIN — INSULIN LISPRO 2 UNITS: 100 INJECTION, SOLUTION INTRAVENOUS; SUBCUTANEOUS at 11:52

## 2018-05-15 RX ADMIN — HEPARIN SODIUM 5000 UNITS: 5000 INJECTION, SOLUTION INTRAVENOUS; SUBCUTANEOUS at 21:33

## 2018-05-15 NOTE — ED PROVIDER NOTES
HPI Comments: Sasha Orosco is a 81 y.o. female who presents ambulatory to the ED with  c/o \"my doctor told me to come to the emergency room because I had a high Calcium level. \" Patient states she feels \"perfectly fine, except very tired. \" Patient states she has been feeling very fatigued for the past month, even to the point of not wanting to eat anything. Patient states her  \"makes her eat\" but she is just so tired. Patient states she has a \"large lump in her left groin\" that has been worked up by her PCP and found to be a hematoma. Patient states she has also been on Coumadin in the past for a DVT but has recently had a oriana filter to \"get off the Coumadin. \" Patient denies any history of cancer, masses or myeloma, denies any chest pain, shortness of breath or dizziness. States she has chronic kidney problems and sees Dr. Montse Rizvi. Patient has no further complaints at this time. PCP: Suzanne Powell MD    PMHx significant for: Past Medical History:  No date: Allergies  2/22/2011: Asymptomatic carotid artery stenosis without i*  No date: Depression  No date: Diverticulosis  No date: DJD (degenerative joint disease)  2016: DVT (deep venous thrombosis) ([facility])      Comment: DVT RLE. No date: GERD (gastroesophageal reflux disease)  No date: HTN  No date: Hypercholesterolemia  No date: Mammography less than 12 months ago  No date: NIDDM  No date: Obesity (BMI 30.0-34.9)  2017: PE (pulmonary thromboembolism) ([facility])      Comment: B/L. No date: Proteinuria    PSHx significant for: Past Surgical History:  2008: ENDOSCOPY, COLON, DIAGNOSTIC      Comment: normal  No date: HX APPENDECTOMY      Comment: With JAYME. No date: HX HERNIA REPAIR      Comment: ? Umbilical hernia repair. No date: HX HYSTERECTOMY  No date: HX KNEE REPLACEMENT Left  No date: HX KNEE REPLACEMENT Right  No date: HX SPLENECTOMY      Comment: Lap splenectomy.     Social Hx: Tobacco: none EtOH: none Illicit drug use: none    There are no further complaints or symptoms at this time. The history is provided by the patient and the spouse. Past Medical History:   Diagnosis Date    Allergies     Asymptomatic carotid artery stenosis without infarction 2/22/2011    Depression     Diverticulosis     DJD (degenerative joint disease)     DVT (deep venous thrombosis) ([facility] [address] 2016    DVT RLE.  GERD (gastroesophageal reflux disease)     HTN     Hypercholesterolemia     Mammography less than 12 months ago     NIDDM     Obesity (BMI 30.0-34. 9)     PE (pulmonary thromboembolism) ([facility] [address] 2017    B/L.  Proteinuria        Past Surgical History:   Procedure Laterality Date    ENDOSCOPY, COLON, DIAGNOSTIC  2008    normal    HX APPENDECTOMY      With JAYME.  HX HERNIA REPAIR      ? Umbilical hernia repair.  HX HYSTERECTOMY      HX KNEE REPLACEMENT Left     HX KNEE REPLACEMENT Right     HX SPLENECTOMY      Lap splenectomy. Family History:   Problem Relation Age of Onset    Hypertension Father     Cancer Sister      breast    Diabetes Brother        Social History     Social History    Marital status:      Spouse name: N/A    Number of children: N/A    Years of education: N/A     Occupational History    Not on file. Social History Main Topics    Smoking status: Never Smoker    Smokeless tobacco: Never Used    Alcohol use No    Drug use: No    Sexual activity: Yes     Partners: Male     Other Topics Concern    Not on file     Social History Narrative         ALLERGIES: Sulfa (sulfonamide antibiotics)    Review of Systems   Constitutional: Positive for appetite change (decreased) and fatigue. Negative for chills, diaphoresis and fever. HENT: Negative for congestion, ear discharge, ear pain, sinus pain, sinus pressure, sore throat and trouble swallowing. Eyes: Negative for photophobia, pain, redness and visual disturbance. Respiratory: Negative for chest tightness, shortness of breath and wheezing. Cardiovascular: Negative for chest pain and palpitations. Gastrointestinal: Negative for abdominal distention, abdominal pain, nausea and vomiting. Endocrine: Negative. Genitourinary: Negative for difficulty urinating, flank pain, frequency and urgency. Musculoskeletal: Negative for back pain, neck pain and neck stiffness. Skin: Positive for color change (left groin with bruising and swelling noted. ). Negative for pallor, rash and wound. Allergic/Immunologic: Negative. Neurological: Negative for dizziness, speech difficulty, weakness and headaches. Hematological: Does not bruise/bleed easily. Psychiatric/Behavioral: Negative for behavioral problems. The patient is not nervous/anxious. Vitals:    05/15/18 0841   BP: (!) 195/97   Pulse: 93   Resp: 16   Temp: 97.9 °F (36.6 °C)   SpO2: 98%   Weight: 84.8 kg (187 lb)   Height: 5' 1\" (1.549 m)            Physical Exam   Constitutional: She is oriented to person, place, and time. She appears well-developed and well-nourished. No distress. HENT:   Head: Normocephalic and atraumatic. Right Ear: External ear normal.   Left Ear: External ear normal.   Nose: Nose normal.   Mouth/Throat: Oropharynx is clear and moist.   Eyes: Conjunctivae and EOM are normal. Pupils are equal, round, and reactive to light. Right eye exhibits no discharge. Left eye exhibits no discharge. Neck: Normal range of motion. Neck supple. No JVD present. No tracheal deviation present. Cardiovascular: Normal rate, regular rhythm, normal heart sounds and intact distal pulses. Exam reveals no gallop. No murmur heard. Pulmonary/Chest: Effort normal and breath sounds normal. No respiratory distress. She has no wheezes. She has no rales. She exhibits no tenderness. Abdominal: Soft. Bowel sounds are normal. She exhibits no distension. There is no tenderness. There is no rebound and no guarding.    Genitourinary:   Genitourinary Comments: Negative     Musculoskeletal: Normal range of motion. She exhibits no edema or tenderness. Neurological: She is alert and oriented to person, place, and time. Skin: Skin is warm and dry. No rash noted. No erythema. No pallor. Left groin with bruising and swelling. Positive palpable pulses   Psychiatric: She has a normal mood and affect. Her behavior is normal. Judgment and thought content normal.   Nursing note and vitals reviewed. MDM  Number of Diagnoses or Management Options  Fatigue, unspecified type: new and requires workup  Hypercalcemia: new and requires workup  Diagnosis management comments: Plan:  Admit to hospital for further evaluation and treatment. Amount and/or Complexity of Data Reviewed  Clinical lab tests: ordered and reviewed  Tests in the radiology section of CPT®: ordered and reviewed          ED Course   0904: Initial assessment of patient as documented. 80: Spoke with family practice who will admit for hypercalcemia and fatigue for further evaluation. Procedures    ED EKG interpretation:  Rhythm: normal sinus rhythm; and regular . Rate (approx.): 69; Axis: normal; ST/T wave: normal; No acute STEMI.   Note written by Oseas Puentes, as dictated by Vj Newton MD 10:15 AM

## 2018-05-15 NOTE — PROGRESS NOTES
[facility]: MED RECONCILIATION    Comments/Recommendations:   Patient is awake and alert and provides a medication list which is not fully up to date. Pharmacist reviewed prescription refill history with Rx Query. Verifies allergies  The patient obtains linagliptin as samples from her provider  Her blood glucose is monitored at home and was 95 this morning which is a normal value for her  Warfarin was stopped two weeks ago for a hematoma  Metoprolol tartrate was stopped some time ago by the nephrologist.    Medications added:     · Losartan  · esomeprazole    Medications removed:    · Amlodipine  · Guaifenesin  · Metoprolol  · Warfarin  · Cetirizine  · Deslansoprazole    Medications adjusted:     Fluticasone changed to as needed   Hydrocortisone change to as needed   Nystatin changed to as needed    Allergies: Sulfa (sulfonamide antibiotics)    Prior to Admission Medications:     Prior to Admission Medications   Prescriptions Last Dose Informant Patient Reported? Taking?   atorvastatin (LIPITOR) 40 mg tablet 2018 at Unknown time Self Yes Yes   Sig: Take 40 mg by mouth nightly. baclofen (LIORESAL) 10 mg tablet  Self No Yes   Sig: Take 1 Tab by mouth three (3) times daily. As needed for spasm   carbamide peroxide (DEBROX) 6.5 % otic solution  Self No Yes   Sig: Administer 5 drops into each ear as needed. esomeprazole (NEXIUM) 20 mg capsule 5/15/2018 at Unknown time Self Yes Yes   Sig: Take 20 mg by mouth daily. fluticasone (FLONASE ALLERGY RELIEF) 50 mcg/actuation nasal spray  Self Yes Yes   [place] by Both Nostrils route daily as needed for Rhinitis. folic acid (FOLVITE) 1 mg tablet 5/15/2018 at Unknown time Self No Yes   Sig: TAKE ONE TABLET BY MOUTH ONCE DAILY. *GENERIC FOR FOLVITE   glimepiride (AMARYL) 4 mg tablet 5/15/2018 at Unknown time Self Yes Yes   Sig: Take 2 mg by mouth Daily (before breakfast).    hydrocortisone (HYTONE) 2.5 % topical cream  Self Yes Yes   Sig: Apply  to affected area two (2) times daily as needed (skin irritation). linagliptin (TRADJENTA) 5 mg tablet 5/14/2018 at Unknown time  Yes Yes   Sig: Take 5 mg by mouth daily (with dinner). loratadine (CLARITIN) 10 mg tablet 5/14/2018 at Unknown time Self Yes Yes   Sig: Take 10 mg by mouth nightly. losartan-hydroCHLOROthiazide (HYZAAR) 100-25 mg per tablet 5/15/2018 at am Self No Yes   Sig: TAKE ONE TABLET BY MOUTH DAILY   methotrexate (RHEUMATREX) 2.5 mg tablet 5/10/2018 Self Yes Yes   Sig: Take 7.5 mg by mouth Every Thursday. Patient takes three tablets which is 7.5 mg every Thursday   nystatin (MYCOSTATIN) powder  Self Yes Yes   Sig: Apply  to affected area four (4) times daily as needed (skin irritation). ondansetron (ZOFRAN ODT) 4 mg disintegrating tablet  Self No Yes   Sig: Take 1 Tab by mouth every eight (8) hours as needed for Nausea. senna-docusate (SENNA PLUS) 8.6-50 mg per tablet 5/15/2018 at Unknown time Self Yes Yes   Sig: Take 1 Tab by mouth daily.       Facility-Administered Medications: None     Thank you,    Tali Deng, PharmD, BCPS

## 2018-05-15 NOTE — PROGRESS NOTES
1315: [facility] at [facility]. Notified of high bp. Orders to follow. Questions about diet. Verbal orders for patient to have renal diet low calcium. Orders placed. 1420: Patient Ca 13.4. Notified Dr. Cecilia Miller. Orders to recheck in 8 hours. TRANSFER - OUT REPORT:    Verbal report given to Cr RN(name) on Kimmy Vargas  being transferred to [facility](unit) for routine progression of care       Report consisted of patients Situation, Background, Assessment and   Recommendations(SBAR). Information from the following report(s) SBAR, Kardex, Intake/Output, MAR, Recent Results and Cardiac Rhythm NSR was reviewed with the receiving nurse. Lines:   Peripheral IV 05/15/18 Left Antecubital (Active)   Site Assessment Clean, dry, & intact 5/15/2018 12:42 PM   Phlebitis Assessment 0 5/15/2018 12:42 PM   Infiltration Assessment 0 5/15/2018 12:42 PM   Dressing Status Clean, dry, & intact 5/15/2018 12:42 PM   Dressing Type Transparent 5/15/2018 12:42 PM   Hub Color/Line Status Pink; Infusing 5/15/2018 12:42 PM   Alcohol Cap Used Yes 5/15/2018 12:42 PM        Opportunity for questions and clarification was provided.       Patient transported with:   Monitor  Registered Nurse  Tech

## 2018-05-15 NOTE — PROGRESS NOTES
3rd attempt to call patient. LVM to return call asap or go to ER for critical lab results. Upon looking through encounters,Patient adm to [facility] , telemetry, for hypercalcemia.

## 2018-05-15 NOTE — CONSULTS
[facility] [place] [address]  [address] [facility], [address] [facility] [address] [facility]  Drake Corazon: [phone]  F: [phone]      Reason for Visit:   Josefina Becerra is a 81 y.o. female who is seen in consultation at the request of Dr. Rowena Holloway  for evaluation of new lung mass in the setting of hypercalcemia and prior PE 1 yr ago. Eura Amador History of Present Illness:     Ms Jose Hanson presented to the ED on 5/15/2018 after being directed there for elevated calcium of 14.5. ED  Labs Ca 13.4 Mg 1.0  Therefore she was admitted for further eval and management. Ms Jose Hanson reports noticed area to left groin in Jan; had ultrasound and was told it was a fatty tissue or blood clot. In April area started growing; was referred to a surgeon/ Dr Darlyn Noel; recommended stopping coumadin and IVC filter was placed on 5/07/2018. Hx of previous clot 3-4 yrs ago; in left groin area and in both lungs. Was started on coumadin and remained on it until 2 weeks ago. 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 Unsure of cause. 1998 had clot in rt shoulder was hospitalized; started on heparin and then treated with coumadin for about 3 years. Denies any travel. Recent surgery or hormone replacement associated with dx of clots. Follows with Dr Diandra Treviño with [facility] on a regular basis; was seen last week.  at beside. Past Medical History:   Diagnosis Date    Allergies     Asymptomatic carotid artery stenosis without infarction 2/22/2011    Depression     Diverticulosis     DJD (degenerative joint disease)     DVT (deep venous thrombosis) ([facility] [address] 2016    DVT RLE.  GERD (gastroesophageal reflux disease)     HTN     Hypercholesterolemia     Mammography less than 12 months ago     NIDDM     Obesity (BMI 30.0-34. 9)     PE (pulmonary thromboembolism) ([facility] [address] 2017    B/L.     Proteinuria       Past Surgical History:   Procedure Laterality Date  ENDOSCOPY, COLON, DIAGNOSTIC  2008    normal    HX APPENDECTOMY      With JAYME.  HX HERNIA REPAIR      ? Umbilical hernia repair.  HX HYSTERECTOMY      HX KNEE REPLACEMENT Left     HX KNEE REPLACEMENT Right     HX SPLENECTOMY      Lap splenectomy. Social History   Substance Use Topics    Smoking status: Never Smoker    Smokeless tobacco: Never Used    Alcohol use No      Family History   Problem Relation Age of Onset    Hypertension Father     Cancer Sister      breast    Diabetes Brother      Current Facility-Administered Medications   Medication Dose Route Frequency    sodium chloride (NS) flush 5-10 mL  5-10 mL IntraVENous Q8H    sodium chloride (NS) flush 5-10 mL  5-10 mL IntraVENous PRN    heparin (porcine) injection 5,000 Units  5,000 Units SubCUTAneous Q8H    0.9% sodium chloride infusion  150 mL/hr IntraVENous CONTINUOUS    insulin lispro (HUMALOG) injection   SubCUTAneous Q6H    glucose chewable tablet 16 g  4 Tab Oral PRN    dextrose (D50W) injection syrg 12.5-25 g  12.5-25 g IntraVENous PRN    glucagon (GLUCAGEN) injection 1 mg  1 mg IntraMUSCular PRN    atorvastatin (LIPITOR) tablet 40 mg  40 mg Oral DAILY    pantoprazole (PROTONIX) 40 mg in sodium chloride 0.9% 10 mL injection  40 mg IntraVENous DAILY      Allergies   Allergen Reactions    Sulfa (Sulfonamide Antibiotics) Other (comments)        Review of Systems: A complete review of systems was obtained, negative except as described above. Physical Exam:     Visit Vitals    /72 (BP 1 Location: Right arm, BP Patient Position: At rest)    Pulse 61    Temp 97.7 °F (36.5 °C)    Resp 18    Ht 5' 1\" (1.549 m)    Wt 187 lb (84.8 kg)    SpO2 98%    BMI 35.33 kg/m2     ECOG PS: 1  General: No distress  Eyes: PERRLA, anicteric sclerae  HENT: Atraumatic with normal appearance of ears and nose; OP clear  Neck: Supple; no thyromegaly   Lymphatic: large left groin mass noted;  No cervical, supraclavicular, or axillary adenopathy  Respiratory: CTAB, normal respiratory effort  CV: Normal rate, regular rhythm, no murmurs, no peripheral edema  GI: Soft, nontender, nondistended, no masses, no hepatomegaly, no splenomegaly  MS:  Digits without clubbing or cyanosis. Skin: No rashes, ecchymoses, or petechiae. Normal temperature, turgor, and texture. Neuro/Psych: Alert, oriented, appropriate affect, normal judgment/insight      Results:     Lab Results   Component Value Date/Time    WBC 15.2 (H) 05/15/2018 09:03 AM    HGB 12.1 05/15/2018 09:03 AM    HCT 36.8 05/15/2018 09:03 AM    PLATELET 293 15/12/5683 09:03 AM    MCV 93.6 05/15/2018 09:03 AM    ABS. NEUTROPHILS 9.9 (H) 05/15/2018 09:03 AM    Hemoglobin (POC) 13.3 02/19/2016 08:35 PM    Hematocrit (POC) 39 02/19/2016 08:35 PM     Lab Results   Component Value Date/Time    Sodium 140 05/15/2018 03:58 PM    Potassium 3.4 (L) 05/15/2018 03:58 PM    Chloride 106 05/15/2018 03:58 PM    CO2 25 05/15/2018 03:58 PM    Glucose 218 (H) 05/15/2018 03:58 PM    BUN 28 (H) 05/15/2018 03:58 PM    Creatinine 2.03 (H) 05/15/2018 03:58 PM    GFR est AA 28 (L) 05/15/2018 03:58 PM    GFR est non-AA 23 (L) 05/15/2018 03:58 PM    Calcium 13.0 (H) 05/15/2018 03:58 PM    Sodium (POC) 138 02/19/2016 08:35 PM    Potassium (POC) 4.1 02/19/2016 08:35 PM    Chloride (POC) 101 02/19/2016 08:35 PM    Glucose (POC) 161 (H) 05/15/2018 05:50 PM    BUN (POC) 38 (H) 02/19/2016 08:35 PM    Creatinine (POC) 1.5 (H) 05/11/2017 08:53 AM    Calcium, ionized (POC) 1.39 (H) 02/19/2016 08:35 PM     Lab Results   Component Value Date/Time    Bilirubin, total 0.5 05/14/2018 11:53 AM    ALT (SGPT) 14 05/14/2018 11:53 AM    AST (SGOT) 19 05/14/2018 11:53 AM    Alk.  phosphatase 68 05/14/2018 11:53 AM    Protein, total 6.4 05/14/2018 11:53 AM    Albumin 4.0 05/14/2018 11:53 AM    Globulin 3.1 10/12/2010 07:54 AM     Lab Results   Component Value Date/Time    TSH 2.540 05/14/2018 11:53 AM     Lab Results   Component Value Date/Time    INR 1.1 05/15/2018 11:23 AM     5/15/2018 US EXT NONVAS  IMPRESSION: Heterogeneous soft tissue abnormality is noted in the left inguinal  region. As there was a similar appearing lesion on the prior ultrasound  1/8/2018, clinical correlation is recommended with regards to any resolution of  the previously noted abnormality as well as correlation with the acuteness of  the new palpable abnormality. Mass lesion cannot be excluded on the basis of  sonographic imaging.    5/01/2018 CT ABS PELV WO CONT  impression: Prior splenectomy. Large left groin lesion likely hematoma. No  intra-abdominal components. Incidentals as above. 5/15/2018 CT CHEST WO CONT  IMPRESSION:  Parenchymal abnormality in the left apex has increased in size, concerning for  neoplasm. This can be further evaluated with PET/CT. Otherwise no acute  abnormality is identified. Assessment and Recommendations:   1. Left Lung lesion  Noted on CT scan; concern for neoplasm:   Recommend outpatient PET/CT for further evaluate    2. Hypercalcemia  Nephrology following  PTH low; not hyperparathyroidism  PTHrP pending  IVFs  Consider bisphosphonate, pending nephrology workup      3. Left groin mass  Unclear etiology: likely hematoma per CT imaging on 5/01/2018  Evaluated  by surgery as out patient   Coumadin stopped and IVC filter placed 5/07/2018      4. Hypomagnesemia  Receiving repletion      5. EDER  Nephrology following    Will obtain records from [facility]; recommend follow up with Dr Tej Fontanez     Patient seen in conjunction with Stu Stanford NP.       Signed By: Karine Rivero MD

## 2018-05-15 NOTE — MED STUDENT NOTES
*ATTENTION:  This note has been created by a medical student for educational purposes only. Please do not refer to the content of this note for clinical decision-making, billing, or other purposes. Please see attending physicians note to obtain clinical information on this patient. *       [address] [facility] [place]    Office [phone]  Fax [phone]       Admission H&P     Name: Scott Corcoran MRN: [id number]  Sex: Female   YOB: 1935  Age: 81 y.o. PCP: Faiza Mcfadden MD     Source of Information: patient, medical records    Chief complaint: \"I was told my calcium was high and I needed to come in\"     History of Present Illness  Scott Corcoran is a 81 y.o. female with known chronic renal insufficiency, L groin hematoma, RA, T2DM, b/l PE (off Coumadin, IVC filter placed 5/7/18), and PUD who presents to the ER complaining of lethargy, confusion, and malaise for the past two weeks. Patient was seen by Dr. Verito Barry yesterday for fatigue and received a call this morning alerting her to a calcium level of 14.5 and was told to go to the emergency department. Patient reports that she has felt more confused which she describes as having difficulty remembering phone numbers. She denies disorientation, memory loss, or getting lost. She has been sleeping more than normal for the past 2 weeks. She denies any recent falls, but often feels \"off balance\" when walking around. In addition she has noticed weakness during this time. She has noticed some mild constipation recently and takes Senna at home as needed, however it is not much changed from her baseline. She denies any change in her chronic back pain. During the past 4 weeks she has had decreased appetite with associated 10 lb weight loss. Patient has a L. groin hematoma that she first noticed in January and increased in size in April.  She has been evaluated by surgery and per their recommendations stopped her Coumadin 11 days ago. She has no other concerns at this time. Past Medical History:   Diagnosis Date    Allergies     Asymptomatic carotid artery stenosis without infarction 2/22/2011    Depression     Diverticulosis     DJD (degenerative joint disease)     DVT (deep venous thrombosis) ([facility] [address] 2016    DVT RLE.  GERD (gastroesophageal reflux disease)     HTN     Hypercholesterolemia     Mammography less than 12 months ago     NIDDM     Obesity (BMI 30.0-34. 9)     PE (pulmonary thromboembolism) ([facility] [address] 2017    B/L.  Proteinuria       Patient Vitals for the past 12 hrs:   Temp Pulse Resp BP SpO2   05/15/18 0841 97.9 °F (36.6 °C) 93 16 (!) 195/97 98 %     In the ER, vital signs were remarkable for BP of 195/97. Labs were remarkable for WBC 15.2, Ca 14.0, Cr 2.10. Bedside ultrasound showed soft tissue mass consistent with hematoma. EKG showed NSR and no acute changes. Pt was treated with NS bolus. Home Medications   Prior to Admission medications    Medication Sig Start Date End Date Taking? Authorizing Provider   metoprolol tartrate (LOPRESSOR) 25 mg tablet Take 1 Tab by mouth two (2) times a day. 3/27/18   Elvin Paget, NP   losartan-hydroCHLOROthiazide (HYZAAR) 100-25 mg per tablet TAKE ONE TABLET BY MOUTH DAILY 3/27/18   Elvin Paget, NP   folic acid (FOLVITE) 1 mg tablet TAKE ONE TABLET BY MOUTH ONCE DAILY. [facility] FOR FOLVITE 3/18/18   Elvin Paget, NP   atorvastatin (LIPITOR) 40 mg tablet Take  by mouth daily. Historical Provider   hydrocortisone (HYTONE) 2.5 % topical cream  11/27/17   Historical Provider   GUAIFENESIN (MUCINEX PO) Take  by mouth. Historical Provider   baclofen (LIORESAL) 10 mg tablet Take 1 Tab by mouth three (3) times daily. As needed for spasm 1/2/18   Elvin Paget, NP   warfarin (COUMADIN) 5 mg tablet  8/25/17   Historical Provider   fluticasone (FLONASE) 50 mcg/actuation nasal spray 2 Sprays by Both Nostrils route daily.  9/18/17   Mya Zelaya NP amLODIPine (NORVASC) 5 mg tablet Take 1 Tab by mouth daily. 7/28/17   Betsy Dorsey MD   nystatin (MYCOSTATIN) powder Apply  to affected area four (4) times daily. 6/28/17   Azael Alexander MD   glimepiride (AMARYL) 4 mg tablet Take 2 mg by mouth every morning. Historical Provider   cetirizine (ZYRTEC) 10 mg tablet Take  by mouth. Historical Provider   ondansetron (ZOFRAN ODT) 4 mg disintegrating tablet Take 1 Tab by mouth every eight (8) hours as needed for Nausea. 3/22/17   Nikolai Grover NP   Dexlansoprazole 60 mg CpDB Take  by mouth. Historical Provider   carbamide peroxide (DEBROX) 6.5 % otic solution Administer 5 drops into each ear as needed. 12/29/14   Jannette Simmons NP   linagliptin (TRADJENTA) 5 mg tablet Take 1 Tab by mouth daily. 2/9/12   Anderson Tobias MD   methotrexate (RHEUMATREX) 2.5 mg tablet Take 2.5 mg by mouth Every Thursday. Take 3 tablets    Historical Provider   senna-docusate (SENNA PLUS) 8.6-50 mg per tablet Take 1 Tab by mouth daily. Historical Provider   - Patient stopped taking Coumadin and Metoprolol. Switched from Zyrtec to Claritin. Rarely takes her Baclofen, Mucinex, and Zofran. Allergies  Allergies   Allergen Reactions    Sulfa (Sulfonamide Antibiotics) Other (comments)       Past Medical History:   Diagnosis Date    Allergies     Asymptomatic carotid artery stenosis without infarction 2/22/2011    Depression     Diverticulosis     DJD (degenerative joint disease)     DVT (deep venous thrombosis) ([facility] [address] 2016    DVT RLE.  GERD (gastroesophageal reflux disease)     HTN     Hypercholesterolemia     Mammography less than 12 months ago     NIDDM     Obesity (BMI 30.0-34. 9)     PE (pulmonary thromboembolism) ([facility] [address] 2017    B/L.  Proteinuria      Previous Hospitalization(s)  None in past 2 years     Past Surgical History:   Procedure Laterality Date    ENDOSCOPY, COLON, DIAGNOSTIC  2008    normal    HX APPENDECTOMY      With JAYME.     HX HERNIA REPAIR      ? Umbilical hernia repair.  HX HYSTERECTOMY      HX KNEE REPLACEMENT Left     HX KNEE REPLACEMENT Right     HX SPLENECTOMY      Lap splenectomy. Family History   Problem Relation Age of Onset    Hypertension Father     Cancer Sister      breast    Diabetes Brother        Social History  Social History     Social History    Marital status:      Spouse name: N/A    Number of children: N/A    Years of education: N/A     Occupational History    Not on file. Social History Main Topics    Smoking status: Never Smoker    Smokeless tobacco: Never Used    Alcohol use No    Drug use: No    Sexual activity: Yes     Partners: Male     Other Topics Concern    Not on file     Social History Narrative       Alcohol history: Not at all  Smoking history: Non-smoker  Illicit drug history: Not at all    Living arrangement: patient lives with their spouse. Ambulates: Independently     Review of Systems  Review of Systems   Constitutional: Positive for appetite change, fatigue and unexpected weight change. Negative for chills and fever. HENT: Negative. Eyes: Negative. Respiratory: Positive for cough. Negative for chest tightness and shortness of breath. Cardiovascular: Negative for chest pain and palpitations. Gastrointestinal: Positive for constipation. Negative for abdominal pain, anal bleeding, blood in stool, nausea and vomiting. Genitourinary: Negative for difficulty urinating, dysuria and hematuria. Neurological: Positive for weakness. Negative for dizziness and headaches. Hematological: Does not bruise/bleed easily. Psychiatric/Behavioral: Positive for confusion. Negative for agitation and hallucinations. The patient is not nervous/anxious. Physical Exam  Objective:  General Appearance:  Comfortable, well-appearing and in no acute distress. Vital signs: (most recent): Blood pressure 157/58, pulse 93, temperature 97.9 °F (36.6 °C), resp.  rate 16, height 5' 1\" (1.549 m), weight 187 lb (84.8 kg), SpO2 100 %. No fever. Lungs:  Normal respiratory rate and normal effort. She is not in respiratory distress. Breath sounds clear to auscultation. No wheezes, rales or rhonchi. Heart: Normal rate. Regular rhythm. S1 normal and S2 normal.  Positive for murmur. No gallop or friction rub. Extremities: There is no local swelling or dependent edema. Neurological: Patient is alert and oriented to person, place and time. Skin:  Warm and dry. Abdomen: Abdomen is distended. Bowel sounds are normal.   There is no abdominal tenderness.   (L groin hematoma, with ecchymosis. Nontender, nonfluctuant). Pulses: Distal pulses are intact. O2 Device: Room air     Laboratory Data  Recent Results (from the past 8 hour(s))   METABOLIC PANEL, BASIC    Collection Time: 05/15/18  9:03 AM   Result Value Ref Range    Sodium 137 136 - 145 mmol/L    Potassium 4.0 3.5 - 5.1 mmol/L    Chloride 101 97 - 108 mmol/L    CO2 25 21 - 32 mmol/L    Anion gap 11 5 - 15 mmol/L    Glucose 249 (H) 65 - 100 mg/dL    BUN 29 (H) 6 - 20 MG/DL    Creatinine 2.10 (H) 0.55 - 1.02 MG/DL    BUN/Creatinine ratio 14 12 - 20      GFR est AA 27 (L) >60 ml/min/1.73m2    GFR est non-AA 23 (L) >60 ml/min/1.73m2    Calcium 14.0 (HH) 8.5 - 10.1 MG/DL   CBC WITH AUTOMATED DIFF    Collection Time: 05/15/18  9:03 AM   Result Value Ref Range    WBC 15.2 (H) 3.6 - 11.0 K/uL    RBC 3.93 3.80 - 5.20 M/uL    HGB 12.1 11.5 - 16.0 g/dL    HCT 36.8 35.0 - 47.0 %    MCV 93.6 80.0 - 99.0 FL    MCH 30.8 26.0 - 34.0 PG    MCHC 32.9 30.0 - 36.5 g/dL    RDW 15.8 (H) 11.5 - 14.5 %    PLATELET 531 573 - 695 K/uL    MPV 11.1 8.9 - 12.9 FL    NRBC 0.0 0  WBC    ABSOLUTE NRBC 0.00 0.00 - 0.01 K/uL    NEUTROPHILS 65 32 - 75 %    LYMPHOCYTES 20 12 - 49 %    MONOCYTES 12 5 - 13 %    EOSINOPHILS 1 0 - 7 %    BASOPHILS 1 0 - 1 %    IMMATURE GRANULOCYTES 1 (H) 0.0 - 0.5 %    ABS.  NEUTROPHILS 9.9 (H) 1.8 - 8.0 K/UL    ABS. LYMPHOCYTES 3.0 0.8 - 3.5 K/UL    ABS. MONOCYTES 1.9 (H) 0.0 - 1.0 K/UL    ABS. EOSINOPHILS 0.2 0.0 - 0.4 K/UL    ABS. BASOPHILS 0.1 0.0 - 0.1 K/UL    ABS. IMM. GRANS. 0.1 (H) 0.00 - 0.04 K/UL    DF AUTOMATED     EKG, 12 LEAD, INITIAL    Collection Time: 05/15/18 10:13 AM   Result Value Ref Range    Ventricular Rate 69 BPM    Atrial Rate 69 BPM    P-R Interval 176 ms    QRS Duration 100 ms    Q-T Interval 398 ms    QTC Calculation (Bezet) 426 ms    Calculated P Axis 46 degrees    Calculated R Axis -22 degrees    Calculated T Axis 17 degrees    Diagnosis       Normal sinus rhythm  Normal ECG  When compared with ECG of 16-JUL-2007 10:25,  QRS duration has increased  T wave amplitude has decreased in Anterolateral leads         Imaging  CXR Results  (Last 48 hours)    None        CT Results  (Last 48 hours)    None        Bedside ultrasound: soft tissue mass consistent with hematoma     EKG: normal EKG, normal sinus rhythm, QRS duration has increased   T wave amplitude has decreased in Anterolateral leads       Assessment and Plan     Ashley Duke is a 81 y.o. female  With Hx DM2, RA, HTN, HLD, GERD who is admitted for incidental hypercalcemia and EDER. Hypercalcemia - POA Ca 14.0 ( 14.5 on 5/14) unclear etiology at this time, patient has history of kidney disease and presents with EDER (POA Cr 2.10) which could be contributing to calcium retention. No history of malignancy, however parenchymal lung mass noted in CT chest. Takes Hydrochlorithiazide which could contribute to elevated serum calcium. Patient has history of AIHA and RA, multiple autoimmune diseases increases her risk of potential sarcoidosis.   - Trend Calcium with CMP q8h  - Checking PTH intact, PTHrP, ionized calcium, SPEP/UPEP, peripheral smear, urinalysis, PT/INR, ACE enzyme  - 1 L NS bolus, Start IVF with NS @ 150 mL/hr  - Consider starting calcitonin +/- bisphosphonate  - Nephrology consulted, appreciate the recs  - Heme/onc consulted, appreciate the recs    EDER - patient has history of chronic kidney insufficiency, prior Cr 1.38 (3/13/18); POA Cr 2.10. This could be due to hypercalcemia or could be contributing to hypercalcemia.   - Continue IVF   - Trend CR with daily labs  - Avoid nephrotoxic agents  - Nephrology consulted as mentioned above     Left Groin Mass - first noticed 1/18, US was consistent with hematoma. Increased in size and had CT Abd/Pelv c/w hematoma, surgery did not recommend any intervention (4/30/10)  - US in the ED consistent with hematoma  - Continue to monitor    Grade I Diastolic Dysfunction - EF 65%  - Monitor  - Mg/Phos    Hypertension - BP on admission 195/97.   - Held home medications at this time. - Will continue to monitor at this time and readjust as BP's trend. Diabetes Mellitus T2  - Last HgA1c 7.4 (3/13/18). - Discontinued home medications of glimepiride and linagliptin. - Insulin Sliding Scale normal sensitivity with AC&HS glucose checks, Hypoglycemia protocols ordered. Rheumatoid Arthritis - stable  - Hold home methotrexate     Hyperlipidemia - stable, , , HDL 41, LDL 76 ( 3/13/18)  - Continue home Lipitor    GERD/PUD   - hold home medication  - IV Protonix 40 mg daily     FEN/GI - NPO. NS at 150 mL/hr. Activity - Ambulate with assistance  DVT prophylaxis - Sub Q Heparin  GI prophylaxis - Protonix  Fall prophylaxis - Fall precautions ordered. Disposition - Admit to Telemetry. Plan to d/c to TBD. Consulting PT/OT  Code Status - Full, discussed with patient / caregivers. Next of [address] and [address] () [phone] (H), [phone] (C)    Patient Ellen Houston will be discussed Dr. Giancarlo Carr.     10:52 AM, 05/15/18  Florin Lo,   Family Medicine        For Billing    Chief Complaint   Patient presents with    Abnormal Lab Results       Hospital Problems  Date Reviewed: 5/14/2018          Codes Class Noted POA    Hypercalcemia ICD-10-CM: E83.52  ICD-9-CM: 275.42  5/15/2018 Unknown

## 2018-05-15 NOTE — ROUTINE PROCESS
Bedside and Verbal shift change report given to Sita Currie (oncoming nurse) by Saturnino Youngblood RN (offgoing nurse). Report included the following information SBAR, Kardex, Intake/Output, MAR, Accordion and Recent Results. 2146- Upon ambulating to the bathroom pt states she feels increased weakness in bilateral extremities and dizziness, denies pain at this time. Notified MD Zavala regarding patients status, no new order received at this time. Bedside and Verbal shift change report given to Saturnino Youngblood RN (oncoming nurse) by Montse Simons RN (offgoing nurse). Report included the following information SBAR, Kardex, Intake/Output, MAR, Accordion and Recent Results.

## 2018-05-15 NOTE — PROGRESS NOTES
[address] RESIDENCY PROGRAM  PROGRESS NOTE     5/16/2018  PCP: Ava Evans MD     Assessment/Plan:     Dulce Awad is a 81 y.o. female with hx of DM2, RA, Grade 1 Diastolic Dysfunction, HTN, HLD, GERD who is admitted for severe hypercalcemia, EDER.      24 Hour Events: NAEO    Severe Hypercalcemia in the setting of EDER and New Lung Mass, asx - At [facility] patient on HCTZ. No prior h of malignancy. Unknown etiology. POA Ca 14, CR 2.1 (BL 1.38). Received IVF in ED. CT Chest shows parenchymal abnormality in L apex has increased in size concerning for neoplasm. Ionized Ca 1.5 (H). PTH, intact 13.7 (L). Vitamin D, 25 51.4 (N). Labwork thus far concerning for hypercalcemia of malignancy. This morning Ca 12.3, Cr 1.94.   - Nephro following, state hypercalcemia non parathyroid mediated  - NS 150ml/hr, careful watch of fluid hydration with hx of G1DD  - Follow up Vit D125, PTH-rp, ACE, protein electrophoresis, UPEP  - Heme/ Onc consulted for new lung mass, hx of recurrent blood clots, hypercalcemia - recommend follow up outpt with PET/ CT  - Pulm consult for new lung mass, hypercalcemia likely 2/2 malignancy, appreciate reccs  - Renal diet with low Ca  - BMP q8h     L Groin Hematoma - CT Abd shows large L groin lesion likely hematoma with no intra abdominal components. 4/30 POA US shows heterogenous soft tissue abnormality in L inguinal region. Mass lesion cannot be excluded.     Grade I Diastolic Dysfunction - Echo 6/19/17 EF 65% with grade 1 diastolic dsfxn. - Continue to monitor lung exam with IV hydration  - Mg/Phos    Normocytic Normochromic Anemia - BL Hg 12-13. POA 12.1 dropped to 9.8 overnight likely 2/2 dilution.   - Iron profile, ferritin, retic count, LDH, B12, Folate  - Repeat H/H at 2PM     Hx of Autoimmune Hemolytic Anemia s/p Splenectomy     Hypertensive Urgency, resolved - At BL on Hyzaar.  Resolved with IV Hydralazine 5mg x1.  - Hold Hyzaar  - Norvasc 5mg daily  - Hydralazine PRN  - Continue to monitor BP and adjust regimen     Diabetes Mellitus - HgA1c 7.4 (3/13/18)  - SSI, high sensitivity     HLD - , , LDL 76, HDL 41 (3/13/18)  - Lipitor 40mg daily     Rheumatoid Arthritis  - Hold home Methotrexate     GERD/ hx of PUD  - Hold home Dexlansoprazole  - IV Protonix 40mg daily     Electrolyte Abnormalities  - Hypomagnesemia - Mg 1.3, IVIP Mag 2g     FEN/GI - Renal diet with low calcium, NS 150ml/hr  Activity - Ambulate with asssistance  DVT prophylaxis - Heparin  GI prophylaxis -  Protonix  Fall Precautions - Ordered  Disposition - Dispo pending  CODE STATUS:  FULL CODE     I appreciate the opportunity to participate in the care of this patient,  Josefa Kendall DO  Family Medicine Resident     Pt will be discussed with Dr. Ignacio Hanson ([facility] attending physician)    Subjective:   Pt was seen and examined at bedside. Has no complaints this morning. Denies abd pain. Alert and talkative. States she didn't sleep well overnight but contributes it to frequent blood draws, beeping. Objective:   Physical examination  Visit Vitals    /66 (BP 1 Location: Right arm, BP Patient Position: At rest)    Pulse 67    Temp 98.6 °F (37 °C)    Resp 18    Ht 5' 1\" (1.549 m)    Wt 187 lb (84.8 kg)    SpO2 96%    BMI 35.33 kg/m2      Temp (24hrs), Av.9 °F (36.6 °C), Min:97.4 °F (36.3 °C), Max:98.6 °F (37 °C)         O2 Device: Room air      Intake/Output Summary (Last 24 hours) at 18 0644  Last data filed at 18 0400   Gross per 24 hour   Intake             2440 ml   Output             1700 ml   Net              740 ml     Last shift:    05/15 1901 -  0700  In: -   Out: 800 [Urine:800]  Last 3 shifts:    701 - 05/15 1900  In: 4807 [P.O.:240;  I.V.:2200]  Out: 900 [Urine:900]    General:   Alert, cooperative, no acute distress   Lungs:   Clear to auscultation bilaterally    Heart:   Regular rhythm, systolic murmur    Abdomen:    Soft, non-tender, large L inguinal mass with ecchymosis   No masses or organomegaly    Extremities:  No edema or DVT signs   Pulses:  Symmetric all extremities   Skin:  Warm and dry    No rashes or lesions   Neurologic:  Oriented   No focal deficits     Data Review:   Reviewed WBC 12.2, Hg 9.8, 3.2, Cr 1.94, Mg 1.3, Phos 3  Telemetry     Recent Labs      05/16/18   0215  05/15/18   0903  05/14/18   1153   WBC  12.2*  15.2*  13.1*   HGB  9.8*  12.1  12.4   HCT  30.1*  36.8  38.4   PLT  220  265  276     Recent Labs      05/16/18   0215  05/15/18   2135  05/15/18   1723  05/15/18   1558  05/15/18   1343  05/15/18   1123   05/14/18   1153   NA  139  140   --   140   --    --    < >  143   K  3.2*  3.8   --   3.4*   --    --    < >  4.7   CL  106  108   --   106   --    --    < >  101   CO2  26  26   --   25   --    --    < >  24   GLU  111*  151*   --   218*   --    --    < >  135*   BUN  26*  25*   --   28*   --    --    < >  26   CREA  1.94*  1.92*   --   2.03*   --    --    < >  1.57*   CA  12.3*  12.7*   --   13.0*   --   13.4*   < >  14.5*   MG  1.3*   --   1.8   --   1.0*  1.0*   < >   --    PHOS  3.0   --    --    --    --   3.6   --    --    ALB   --    --    --    --    --    --    --   4.0   TBILI   --    --    --    --    --    --    --   0.5   SGOT   --    --    --    --    --    --    --   19   ALT   --    --    --    --    --    --    --   14   INR   --    --    --    --    --   1.1   --    --     < > = values in this interval not displayed.      Medications reviewed  Current Facility-Administered Medications   Medication Dose Route Frequency    potassium chloride (KLOR-CON) packet 40 mEq  40 mEq Oral Q4H    magnesium sulfate 2 g/50 ml IVPB (premix or compounded)  2 g IntraVENous ONCE    sodium chloride (NS) flush 5-10 mL  5-10 mL IntraVENous Q8H    sodium chloride (NS) flush 5-10 mL  5-10 mL IntraVENous PRN    heparin (porcine) injection 5,000 Units  5,000 Units SubCUTAneous Q8H    0.9% sodium chloride infusion  150 mL/hr IntraVENous CONTINUOUS    insulin lispro (HUMALOG) injection   SubCUTAneous Q6H    glucose chewable tablet 16 g  4 Tab Oral PRN    dextrose (D50W) injection syrg 12.5-25 g  12.5-25 g IntraVENous PRN    glucagon (GLUCAGEN) injection 1 mg  1 mg IntraMUSCular PRN    atorvastatin (LIPITOR) tablet 40 mg  40 mg Oral DAILY    pantoprazole (PROTONIX) 40 mg in sodium chloride 0.9% 10 mL injection  40 mg IntraVENous DAILY     Signed:   Theresa Montenegro DO   Resident, Family Medicine      Attending note: Attending note to follow. ..

## 2018-05-15 NOTE — CONSULTS
[address] [facility]  YOB: 1935     Assessment & Plan:   1. EDER  · Cr up from 1.5 to 2.1 mg  · DD: Hypercalcemia causing vasocontriction, HTN ATN  · IVF  · Treat Hypercalcemia  · TSH is ok  2. CKD 3  · : cr 1.6 May 10,2018  · DM,HTN nephrosclerosis  3. Hypercalcemia : non parathyroid mediated   · PTH supressed  · Dc  HCTZ  · Await PTHRP,Vit D and Immunofixation  · IVF   · Pamidronate and or Calcitonin if not better  4. HypoMg  · Replete,wahtch Ca  · Mg interfere with PTH secretion   · Mg might be low due to Urine loss:? PPI vs thiazide. 5. HTN  · Urgency  · Resume home meds except ARB/Thiazide  6. DM  7. S/P IVC                   Subjective:   CHIEF COMPLAIN:arf  HPI:    Juju West is a 81 y.o. female with PMH of CAD, divertulosis, depression, HLD, PUD, RA, HTN, CKD, DVT, and DM2 who presents to the ER with Hypercalcemia. Ca was normal in past, was high 1 week ago at 12 mg. Now > 14 mg%. She is on HCTZ. Shanda Tums. CT showed spot on lung. Non smoker. PTH was suppressed out pt. WT loss+  No NSAIDs  NO new meds  No abx. No edema  Patient denies abd pain but of note she has a L groin mass that has been enlarging since April. Was diagnosed with blood clot in L groin in Jan and placed on Warfarin however mass continued to enlarge in April. Had US that showed likely hematoma but recommended repeat eval. Was taken off Warfarin 2 weeks ago and had IVC filter placed 5/7. She has chronic constipation,  Denies hx of malignancy except skin ca.      In the ER, vital signs were remarkable for htn 199/97. Labs were remarkable for WBC 15.2, glucose 249, Cr 2.1 (1.38 BL), GFR 23, Ca 14. Pt was treated with 1L NS bolus. US/ CT Abd pending and EKG shows NSR. Review of Systems  A comprehensive review of systems was negative except for that written in the HPI.     Past Medical History:   Diagnosis Date    Allergies     Asymptomatic carotid artery stenosis without infarction 2/22/2011    Depression     Diverticulosis     DJD (degenerative joint disease)     [facility]    DVT RLE.  GERD (gastroesophageal reflux disease)     HTN     Hypercholesterolemia     Mammography less than 12 months ago     NIDDM     Obesity (BMI 30.0-34. 9)     PE (pulmonary thromboembolism) ([facility] 2017    B/L.  Proteinuria       Past Surgical History:   Procedure Laterality Date    ENDOSCOPY, COLON, DIAGNOSTIC  2008    normal    HX APPENDECTOMY      With JAYME.  HX HERNIA REPAIR      ? Umbilical hernia repair.  HX HYSTERECTOMY      HX KNEE REPLACEMENT Left     HX KNEE REPLACEMENT Right     HX SPLENECTOMY      Lap splenectomy. Social History     Social History    Marital status:      Spouse name: N/A    Number of children: N/A    Years of education: N/A     Occupational History    Not on file. Social History Main Topics    Smoking status: Never Smoker    Smokeless tobacco: Never Used    Alcohol use No    Drug use: No    Sexual activity: Yes     Partners: Male     Other Topics Concern    Not on file     Social History Narrative      Family History   Problem Relation Age of Onset    Hypertension Father     Cancer Sister      breast    Diabetes Brother       Prior to Admission medications    Medication Sig Start Date End Date Taking? Authorizing Provider   metoprolol tartrate (LOPRESSOR) 25 mg tablet Take 1 Tab by mouth two (2) times a day. 3/27/18   Julieta Polk NP   losartan-hydroCHLOROthiazide (HYZAAR) 100-25 mg per tablet TAKE ONE TABLET BY MOUTH DAILY 3/27/18   Julieta Polk NP   folic acid (FOLVITE) 1 mg tablet TAKE ONE TABLET BY MOUTH ONCE DAILY. [facility] FOR FOLVITE 3/18/18   Julieta Polk NP   atorvastatin (LIPITOR) 40 mg tablet Take  by mouth daily.     Historical Provider   hydrocortisone (HYTONE) 2.5 % topical cream  11/27/17   Historical Provider   GUAIFENESIN (MUCINEX PO) Take  by mouth. Historical Provider   baclofen (LIORESAL) 10 mg tablet Take 1 Tab by mouth three (3) times daily. As needed for spasm 18   Nikolai Grover NP   warfarin (COUMADIN) 5 mg tablet  17   Historical Provider   fluticasone (FLONASE) 50 mcg/actuation nasal spray 2 Sprays by Both Nostrils route daily. 17   Jannette Simmons NP   amLODIPine (NORVASC) 5 mg tablet Take 1 Tab by mouth daily. 17   Betsy Dorsey MD   nystatin (MYCOSTATIN) powder Apply  to affected area four (4) times daily. 17   Azael Alexander MD   glimepiride (AMARYL) 4 mg tablet Take 2 mg by mouth every morning. Historical Provider   cetirizine (ZYRTEC) 10 mg tablet Take  by mouth. Historical Provider   ondansetron (ZOFRAN ODT) 4 mg disintegrating tablet Take 1 Tab by mouth every eight (8) hours as needed for Nausea. 3/22/17   Nikolai Grover NP   Dexlansoprazole 60 mg CpDB Take  by mouth. Historical Provider   carbamide peroxide (DEBROX) 6.5 % otic solution Administer 5 drops into each ear as needed. 14   Jannette Simmons NP   linagliptin (TRADJENTA) 5 mg tablet Take 1 Tab by mouth daily. 12   Anderson Tobias MD   methotrexate (RHEUMATREX) 2.5 mg tablet Take 2.5 mg by mouth Every Thursday. Take 3 tablets    Historical Provider   senna-docusate (SENNA PLUS) 8.6-50 mg per tablet Take 1 Tab by mouth daily. Historical Provider     Allergies   Allergen Reactions    Sulfa (Sulfonamide Antibiotics) Other (comments)       Objective:     Vitals:  Blood pressure 192/72, pulse 64, temperature 97.4 °F (36.3 °C), resp. rate 16, height 5' 1\" (1.549 m), weight 84.8 kg (187 lb), SpO2 100 %.   Temp (24hrs), Av.7 °F (36.5 °C), Min:97.4 °F (36.3 °C), Max:97.9 °F (36.6 °C)      Intake and Output:  05/15 0701 - 05/15 1900  In: 1000 [I.V.:1000]  Out: 200 [Urine:200]       Physical Exam:                Patient is intubated:  no    Physical Examination:   GENERAL ASSESSMENT: anxious  SKIN: dry skin  HEAD: normocephalic  EYES: normal eyes  NECK: normal  CHEST: normal air exchange, no rales, no rhonchi, no wheezes, respiratory effort normal with no retractions  HEART: tachycardia  ABDOMEN: soft, non-distended, no masses  :Ram: no  EXTREMITY: no joint swelling  NEURO: gross motor exam normal by observation      ECG/rhythm[de-identified] Rev:yes  Xray/CT/US/MRI REV:yes  Data Review   Recent Results (from the past 72 hour(s))   CBC WITH AUTOMATED DIFF    Collection Time: 05/14/18 11:53 AM   Result Value Ref Range    WBC 13.1 (H) 3.4 - 10.8 x10E3/uL    RBC 4.12 3.77 - 5.28 x10E6/uL    HGB 12.4 11.1 - 15.9 g/dL    HCT 38.4 34.0 - 46.6 %    MCV 93 79 - 97 fL    MCH 30.1 26.6 - 33.0 pg    MCHC 32.3 31.5 - 35.7 g/dL    RDW 17.0 (H) 12.3 - 15.4 %    PLATELET 555 144 - 233 x10E3/uL    NEUTROPHILS 67 Not Estab. %    Lymphocytes 19 Not Estab. %    MONOCYTES 11 Not Estab. %    EOSINOPHILS 2 Not Estab. %    BASOPHILS 0 Not Estab. %    ABS. NEUTROPHILS 8.8 (H) 1.4 - 7.0 x10E3/uL    Abs Lymphocytes 2.5 0.7 - 3.1 x10E3/uL    ABS. MONOCYTES 1.5 (H) 0.1 - 0.9 x10E3/uL    ABS. EOSINOPHILS 0.2 0.0 - 0.4 x10E3/uL    ABS. BASOPHILS 0.1 0.0 - 0.2 x10E3/uL    IMMATURE GRANULOCYTES 1 Not Estab. %    ABS. IMM.  GRANS. 0.1 0.0 - 0.1 x10E3/uL   TSH 3RD GENERATION    Collection Time: 05/14/18 11:53 AM   Result Value Ref Range    TSH 2.540 0.450 - 5.269 uIU/mL   METABOLIC PANEL, COMPREHENSIVE    Collection Time: 05/14/18 11:53 AM   Result Value Ref Range    Glucose 135 (H) 65 - 99 mg/dL    BUN 26 8 - 27 mg/dL    Creatinine 1.57 (H) 0.57 - 1.00 mg/dL    GFR est non-AA 30 (L) >59 mL/min/1.73    GFR est AA 35 (L) >59 mL/min/1.73    BUN/Creatinine ratio 17 12 - 28    Sodium 143 134 - 144 mmol/L    Potassium 4.7 3.5 - 5.2 mmol/L    Chloride 101 96 - 106 mmol/L    CO2 24 18 - 29 mmol/L    Calcium 14.5 (>) 8.7 - 10.3 mg/dL    Protein, total 6.4 6.0 - 8.5 g/dL    Albumin 4.0 3.5 - 4.7 g/dL    GLOBULIN, TOTAL 2.4 1.5 - 4.5 g/dL    A-G Ratio 1.7 1.2 - 2.2 Bilirubin, total 0.5 0.0 - 1.2 mg/dL    Alk. phosphatase 68 39 - 117 IU/L    AST (SGOT) 19 0 - 40 IU/L    ALT (SGPT) 14 0 - 32 IU/L   CKD REPORT    Collection Time: 05/14/18 11:53 AM   Result Value Ref Range    Interpretation Note    METABOLIC PANEL, BASIC    Collection Time: 05/15/18  9:03 AM   Result Value Ref Range    Sodium 137 136 - 145 mmol/L    Potassium 4.0 3.5 - 5.1 mmol/L    Chloride 101 97 - 108 mmol/L    CO2 25 21 - 32 mmol/L    Anion gap 11 5 - 15 mmol/L    Glucose 249 (H) 65 - 100 mg/dL    BUN 29 (H) 6 - 20 MG/DL    Creatinine 2.10 (H) 0.55 - 1.02 MG/DL    BUN/Creatinine ratio 14 12 - 20      GFR est AA 27 (L) >60 ml/min/1.73m2    GFR est non-AA 23 (L) >60 ml/min/1.73m2    Calcium 14.0 (HH) 8.5 - 10.1 MG/DL   CBC WITH AUTOMATED DIFF    Collection Time: 05/15/18  9:03 AM   Result Value Ref Range    WBC 15.2 (H) 3.6 - 11.0 K/uL    RBC 3.93 3.80 - 5.20 M/uL    HGB 12.1 11.5 - 16.0 g/dL    HCT 36.8 35.0 - 47.0 %    MCV 93.6 80.0 - 99.0 FL    MCH 30.8 26.0 - 34.0 PG    MCHC 32.9 30.0 - 36.5 g/dL    RDW 15.8 (H) 11.5 - 14.5 %    PLATELET 426 679 - 601 K/uL    MPV 11.1 8.9 - 12.9 FL    NRBC 0.0 0  WBC    ABSOLUTE NRBC 0.00 0.00 - 0.01 K/uL    NEUTROPHILS 65 32 - 75 %    LYMPHOCYTES 20 12 - 49 %    MONOCYTES 12 5 - 13 %    EOSINOPHILS 1 0 - 7 %    BASOPHILS 1 0 - 1 %    IMMATURE GRANULOCYTES 1 (H) 0.0 - 0.5 %    ABS. NEUTROPHILS 9.9 (H) 1.8 - 8.0 K/UL    ABS. LYMPHOCYTES 3.0 0.8 - 3.5 K/UL    ABS. MONOCYTES 1.9 (H) 0.0 - 1.0 K/UL    ABS. EOSINOPHILS 0.2 0.0 - 0.4 K/UL    ABS. BASOPHILS 0.1 0.0 - 0.1 K/UL    ABS. IMM.  GRANS. 0.1 (H) 0.00 - 0.04 K/UL    DF AUTOMATED     EKG, 12 LEAD, INITIAL    Collection Time: 05/15/18 10:13 AM   Result Value Ref Range    Ventricular Rate 69 BPM    Atrial Rate 69 BPM    P-R Interval 176 ms    QRS Duration 100 ms    Q-T Interval 398 ms    QTC Calculation (Bezet) 426 ms    Calculated P Axis 46 degrees    Calculated R Axis -22 degrees    Calculated T Axis 17 degrees Diagnosis       Normal sinus rhythm  Normal ECG  When compared with ECG of 16-JUL-2007 10:25,  QRS duration has increased  T wave amplitude has decreased in Anterolateral leads     MAGNESIUM    Collection Time: 05/15/18 11:23 AM   Result Value Ref Range    Magnesium 1.0 (L) 1.6 - 2.4 mg/dL   PHOSPHORUS    Collection Time: 05/15/18 11:23 AM   Result Value Ref Range    Phosphorus 3.6 2.6 - 4.7 MG/DL   PROTHROMBIN TIME + INR    Collection Time: 05/15/18 11:23 AM   Result Value Ref Range    INR 1.1 0.9 - 1.1      Prothrombin time 11.4 (H) 9.0 - 11.1 sec   GLUCOSE, POC    Collection Time: 05/15/18 11:38 AM   Result Value Ref Range    Glucose (POC) 232 (H) 65 - 100 mg/dL    Performed by Nilton Caldera W/MICROSCOPIC    Collection Time: 05/15/18 12:11 PM   Result Value Ref Range    Color YELLOW/STRAW      Appearance CLEAR CLEAR      Specific gravity 1.013 1.003 - 1.030      pH (UA) 6.0 5.0 - 8.0      Protein NEGATIVE  NEG mg/dL    Glucose 500 (A) NEG mg/dL    Ketone NEGATIVE  NEG mg/dL    Bilirubin NEGATIVE  NEG      Blood NEGATIVE  NEG      Urobilinogen 0.2 0.2 - 1.0 EU/dL    Nitrites NEGATIVE  NEG      Leukocyte Esterase TRACE (A) NEG      WBC 5-10 0 - 4 /hpf    RBC 0-5 0 - 5 /hpf    Epithelial cells FEW FEW /lpf    Bacteria NEGATIVE  NEG /hpf    Hyaline cast 0-2 0 - 5 /lpf       Discussed with:    Patient  Thank you so much to allow us to participate in this patient's care. We will follow.  : Tushar Dunn MD  5/15/2018      [facility]:  [email or web address]  [facility] office:  [address] [place] [address], [address] [facility] [address]  [place], [address] [facility]  Phone: [phone]  Fax :     [phone]    [facility] [place] office:  [address] [facility], [address]  Phone - [phone]  Fax - [phone]

## 2018-05-15 NOTE — PROGRESS NOTES
Bedside and Verbal shift change report given to Nithya Dallas (oncoming nurse) by Brandon Bo RN (offgoing nurse). Report included the following information SBAR, Kardex, ED Summary, Procedure Summary, Intake/Output, MAR, Recent Results and Med Rec Status.

## 2018-05-15 NOTE — TELEPHONE ENCOUNTER
----- Message from Fer Lopez MD sent at 5/15/2018  8:01 AM EDT -----  Call pt--have her seen today for very high Calcium level  ----- Message -----     From: Faina Adams Lab Results In     Sent: 5/15/2018   7:40 AM       To: Fer Lopez MD

## 2018-05-15 NOTE — H&P
[address] [facility]   Admission H&P    Date of admission: 5/15/2018    Patient name: Ruddy Mackay  MRN: [id number]  YOB: 1935  Age: 81 y.o. Primary care provider:  Samara Davis MD     Source of Information: patient, medical records    Chief complaint:  Abnormal Lab Result    History of Present Illness  Ruddy Mackay is a 81 y.o. female with PMH of CAD, divertulosis, depression, HLD, PUD, RA, HTN, CKD, DVT, and DM2 who presents to the ER complaining of abnormal lab results. Patient states she went to see her primary care provider yesterday and they called her back and told her to go to ED because Ca was high. Patient denies abd pain but of note she has a L groin mass that has been enlarging since April. Was diagnosed with blood clot in L groin in Jan and placed on Warfarin however mass continued to enlarge in April. Had US that showed likely hematoma but recommended repeat eval. Was taken off Warfarin 2 weeks ago and had IVC filter placed 5/7. Endorses some confusion the last two weeks remembering where things are but no AMS or hallucinations. Endorses constipation, although this is at baseline. States she has been extremely fatigued the last two weeks and sleeping most of the day. Endorses 10 lb weight loss in last month that she contributes to decreased appetite. Denies hx of malignancy. In the ER, vital signs were remarkable for htn 199/97. Labs were remarkable for WBC 15.2, glucose 249, Cr 2.1 (1.38 BL), GFR 23, Ca 14. Pt was treated with 1L NS bolus. US/ CT Abd pending and EKG shows NSR. Home Medications   Prior to Admission medications    Medication Sig Start Date End Date Taking? Authorizing Provider   metoprolol tartrate (LOPRESSOR) 25 mg tablet Take 1 Tab by mouth two (2) times a day.  3/27/18   Kirsten Simmonds, NP   losartan-hydroCHLOROthiazide (HYZAAR) 100-25 mg per tablet TAKE ONE TABLET BY MOUTH DAILY 3/27/18   Kirsten Simmonds, NP folic acid (FOLVITE) 1 mg tablet TAKE ONE TABLET BY MOUTH ONCE DAILY. [facility] FOR FOLVITE 3/18/18   Adalberto Mcknight NP   atorvastatin (LIPITOR) 40 mg tablet Take  by mouth daily. Historical Provider   hydrocortisone (HYTONE) 2.5 % topical cream  11/27/17   Historical Provider   GUAIFENESIN (MUCINEX PO) Take  by mouth. Historical Provider   baclofen (LIORESAL) 10 mg tablet Take 1 Tab by mouth three (3) times daily. As needed for spasm 1/2/18   Adalberto Mcknight NP   warfarin (COUMADIN) 5 mg tablet  8/25/17   Historical Provider   fluticasone (FLONASE) 50 mcg/actuation nasal spray 2 Sprays by Both Nostrils route daily. 9/18/17   Selina Eldridge NP   amLODIPine (NORVASC) 5 mg tablet Take 1 Tab by mouth daily. 7/28/17   Jacinta Welch MD   nystatin (MYCOSTATIN) powder Apply  to affected area four (4) times daily. 6/28/17   Carly Alexander MD   glimepiride (AMARYL) 4 mg tablet Take 2 mg by mouth every morning. Historical Provider   cetirizine (ZYRTEC) 10 mg tablet Take  by mouth. Historical Provider   ondansetron (ZOFRAN ODT) 4 mg disintegrating tablet Take 1 Tab by mouth every eight (8) hours as needed for Nausea. 3/22/17   Adalberto Mcknight NP   Dexlansoprazole 60 mg CpDB Take  by mouth. Historical Provider   carbamide peroxide (DEBROX) 6.5 % otic solution Administer 5 drops into each ear as needed. 12/29/14   Selina Eldridge NP   linagliptin (TRADJENTA) 5 mg tablet Take 1 Tab by mouth daily. 2/9/12   Ava Evans MD   methotrexate (RHEUMATREX) 2.5 mg tablet Take 2.5 mg by mouth Every Thursday. Take 3 tablets    Historical Provider   senna-docusate (SENNA PLUS) 8.6-50 mg per tablet Take 1 Tab by mouth daily.     Historical Provider     Allergies   Allergies   Allergen Reactions    Sulfa (Sulfonamide Antibiotics) Other (comments)     Past Medical History:   Diagnosis Date    Allergies     Asymptomatic carotid artery stenosis without infarction 2/22/2011    Depression     Diverticulosis     DJD (degenerative joint disease)     [facility]    DVT RLE.  GERD (gastroesophageal reflux disease)     HTN     Hypercholesterolemia     Mammography less than 12 months ago     NIDDM     Obesity (BMI 30.0-34. 9)     PE (pulmonary thromboembolism) ([facility] 2017    B/L.  Proteinuria      Past Surgical History:   Procedure Laterality Date    ENDOSCOPY, COLON, DIAGNOSTIC  2008    normal    HX APPENDECTOMY      With JAYME.  HX HERNIA REPAIR      ? Umbilical hernia repair.  HX HYSTERECTOMY      HX KNEE REPLACEMENT Left     HX KNEE REPLACEMENT Right     HX SPLENECTOMY      Lap splenectomy. Family History   Problem Relation Age of Onset    Hypertension Father     Cancer Sister      breast    Diabetes Brother      Social History   Patient resides with . Ambulates independently. Alcohol history - social drinker but has not been drinking recently, no hex of heavy alcohol use  Smoking history - never  Drug history - never    History   Smoking Status    Never Smoker   Smokeless Tobacco    Never Used     Code status  X  Full code     DNR/DNI     Partial    Code status discussed with the patient/caregivers. , Carola Barba [phone], C [phone]    Review of Systems  POSITIVE IN BOLD  Gen: Denies fever, chills, sick contacts, anorexia, somnolence  Heme: Denies easy bleeding, bruising  Endocrine: Denies significant weight loss, gain  Cardio: Denies chest pain, palpitations  Lungs: Denies shortness of breath, cough  GI: Denies abdominal pain, melena, n/v, d/c  : Denies dysuria, hematuria  MSK: Denies cramping, weakness  Neuro: Denies vision changes, numbness  Psych: Denies depressive sx, anxiety or trouble sleeping    Physical Exam  Visit Vitals    /68    Pulse 93    Temp 97.9 °F (36.6 °C)    Resp 16    Ht 5' 1\" (1.549 m)    Wt 187 lb (84.8 kg)    SpO2 100%    BMI 35.33 kg/m2        General: No acute distress. Alert. Cooperative. Head: Normocephalic. Atraumatic. Eyes:  Conjunctiva pink. Sclera white. Throat: Mucosa pink. Moist mucous membranes. Neck: Supple. Normal ROM. No stiffness. Respiratory: CTAB. No w/r/r/c.   Cardiovascular: RRR. Normal S1,S2. Systolic murmur present on L sternal border at 2nd ICS. Pulses 2+ throughout. GI: + bowel sounds. Nontender. No rebound tenderness or guarding. Distended abdomen. Large L groin mass with ecchymosis, firm, tender to palpation. Extremities: No edema. No palpable cord. No tenderness. Musculoskeletal: Full ROM in all extremities. Neuro: No focal deficits, alert and oriented x4. Skin: Clear. No rashes. No ulcers. Laboratory Data  Recent Results (from the past 24 hour(s))   METABOLIC PANEL, BASIC    Collection Time: 05/15/18  9:03 AM   Result Value Ref Range    Sodium 137 136 - 145 mmol/L    Potassium 4.0 3.5 - 5.1 mmol/L    Chloride 101 97 - 108 mmol/L    CO2 25 21 - 32 mmol/L    Anion gap 11 5 - 15 mmol/L    Glucose 249 (H) 65 - 100 mg/dL    BUN 29 (H) 6 - 20 MG/DL    Creatinine 2.10 (H) 0.55 - 1.02 MG/DL    BUN/Creatinine ratio 14 12 - 20      GFR est AA 27 (L) >60 ml/min/1.73m2    GFR est non-AA 23 (L) >60 ml/min/1.73m2    Calcium 14.0 (HH) 8.5 - 10.1 MG/DL   CBC WITH AUTOMATED DIFF    Collection Time: 05/15/18  9:03 AM   Result Value Ref Range    WBC 15.2 (H) 3.6 - 11.0 K/uL    RBC 3.93 3.80 - 5.20 M/uL    HGB 12.1 11.5 - 16.0 g/dL    HCT 36.8 35.0 - 47.0 %    MCV 93.6 80.0 - 99.0 FL    MCH 30.8 26.0 - 34.0 PG    MCHC 32.9 30.0 - 36.5 g/dL    RDW 15.8 (H) 11.5 - 14.5 %    PLATELET 994 080 - 415 K/uL    MPV 11.1 8.9 - 12.9 FL    NRBC 0.0 0  WBC    ABSOLUTE NRBC 0.00 0.00 - 0.01 K/uL    NEUTROPHILS 65 32 - 75 %    LYMPHOCYTES 20 12 - 49 %    MONOCYTES 12 5 - 13 %    EOSINOPHILS 1 0 - 7 %    BASOPHILS 1 0 - 1 %    IMMATURE GRANULOCYTES 1 (H) 0.0 - 0.5 %    ABS. NEUTROPHILS 9.9 (H) 1.8 - 8.0 K/UL    ABS. LYMPHOCYTES 3.0 0.8 - 3.5 K/UL    ABS. MONOCYTES 1.9 (H) 0.0 - 1.0 K/UL    ABS. EOSINOPHILS 0.2 0.0 - 0.4 K/UL    ABS. BASOPHILS 0.1 0.0 - 0.1 K/UL    ABS. IMM. GRANS. 0.1 (H) 0.00 - 0.04 K/UL    DF AUTOMATED     EKG, 12 LEAD, INITIAL    Collection Time: 05/15/18 10:13 AM   Result Value Ref Range    Ventricular Rate 69 BPM    Atrial Rate 69 BPM    P-R Interval 176 ms    QRS Duration 100 ms    Q-T Interval 398 ms    QTC Calculation (Bezet) 426 ms    Calculated P Axis 46 degrees    Calculated R Axis -22 degrees    Calculated T Axis 17 degrees    Diagnosis       Normal sinus rhythm  Normal ECG  When compared with ECG of 16-JUL-2007 10:25,  QRS duration has increased  T wave amplitude has decreased in Anterolateral leads     MAGNESIUM    Collection Time: 05/15/18 11:23 AM   Result Value Ref Range    Magnesium 1.0 (L) 1.6 - 2.4 mg/dL   PHOSPHORUS    Collection Time: 05/15/18 11:23 AM   Result Value Ref Range    Phosphorus 3.6 2.6 - 4.7 MG/DL   GLUCOSE, POC    Collection Time: 05/15/18 11:38 AM   Result Value Ref Range    Glucose (POC) 232 (H) 65 - 100 mg/dL    Performed by Lang Khan      EKG: NSR, normal EKG    Assessment and Plan   Vicenta Castle is a 81 y.o. female with hx of DM2, RA, Grade 1 Diastolic Dysfunction, HTN, HLD, GERD who is admitted for severe hypercalcemia. Severe Hypercalcemia in the setting of EDER and New Lung Mass, asx - At baseline on HCTZ and CCB. Patient has no hx of malignancy. Unknown etiology. POA Cr 2.1 (BL 1.38). Some constipation and confusion that does not seem far from baseline. Received 1L NS bolus in ED.  CT Chest shows parenchymal abnormality in L apex has increased in size concerning for neoplasm.   - Consult Nephro stat for recommendations on if patient needs Calcitonin  - 1L NS bolus, NS 150ml/hr  - Ionized Ca, , PTrP, Vit D 1,25 and Vit D 25, UA, SPEP, UPEP, peripheral smear  - Consult Heme/ Onc, appreciate reccs  - CMP q8h    L Groin Hematoma - CT Abd shows large L groin lesion likely hematoma with no intra abdominal components. 4/30 POA US shows heterogenous soft tissue abnormality in L inguinal region. Mass lesion cannot be excluded. Grade I Diastolic Dysfunction - Echo 6/19/17 EF 65% with grade 1 diastolic dsfxn. - Watch   - Mg/Phos    Hx of Autoimmune Hemolytic Anemia s/p Splenectomy    HTN - BP currently 157/58 in ED.   - Hold Norvasc  - Hold Hyzaar  - Continue to monitor BP and adjust regimen    Diabetes Mellitus - HgA1c 7.4 (3/13/18)  - SSI, high sensitivity    HLD - , , LDL 76, HDL 41 (3/13/18)  - Lipitor 40mg daily    Rheumatoid Arthritis  - Hold home Methotrexate    GERD/ hx of PUD  - Hold home Dexlansoprazole  - IV Protonix 40mg daily     FEN/GI - NPO, NS 150ml/hr  Activity - Ambulate with asssistance  DVT prophylaxis - Heparin  GI prophylaxis -  Protonix  Fall Precautions - Ordered  Disposition - Dispo pending  CODE STATUS:  FULL CODE       Patient discussed with Dr. Rosita Osler, [address] Problems  Date Reviewed: 5/14/2018          Codes Class Noted POA    Hypercalcemia ICD-10-CM: E83.52  ICD-9-CM: 275.42  5/15/2018 Unknown        Mass of lung parenchyma ICD-10-CM: R91.8  ICD-9-CM: 793.19  5/15/2018 Unknown        Pure hypercholesterolemia ICD-10-CM: E78.00  ICD-9-CM: 272.0  5/15/2018 Unknown        Diastolic dysfunction DLI-00-FP: I51.9  ICD-9-CM: 429.9  5/15/2018 Unknown        EDER (acute kidney injury) ([facility] ICD-10-CM: N17.9  ICD-9-CM: 584.9  5/15/2018 Unknown        Hematoma of groin ICD-10-CM: S30. 1XXA  ICD-9-CM: 922.2  4/30/2018 Yes    Overview Signed 4/30/2018 10:20 AM by Jono Mayo MD     Left.              Type 2 diabetes mellitus without complication, without long-term current use of insulin ([facility] ICD-10-CM: E11.9  ICD-9-CM: 250.00  4/13/2017 Yes        RA (rheumatoid arthritis) ([facility] ICD-10-CM: M06.9  ICD-9-CM: 714.0  6/9/2010 Yes        Essential hypertension ICD-10-CM: I10  ICD-9-CM: 401.9 6/9/2010 Yes

## 2018-05-15 NOTE — IP AVS SNAPSHOT
Donnell Cole 
 
 
 [address] 70 [facility] 
[phone] Patient: Ashley Duke MRN: [id number] NZP:2/5/1898 About your hospitalization You were admitted on:  May 15, 2018 You last received care in the:  [facility] 3 [address] [facility] [address] You were discharged on:  May 17, 2018 Why you were hospitalized Your primary diagnosis was:  Not on File Your diagnoses also included:  Hypercalcemia, Essential Hypertension, Ra (Rheumatoid Arthritis) (Hcc), Hematoma Of Groin, Mass Of Lung Parenchyma, Type 2 Diabetes Mellitus Without Complication, Without Long-Term Current Use Of Insulin (Hcc), Pure Hypercholesterolemia, Diastolic Dysfunction, Attila (Acute Kidney Injury) (Hcc) Follow-up Information Follow up With Details Comments Contact Info Kaushal Awad MD On 5/21/2018 Hospital Follow Up 2:10PM N [address] [place] [address] [address] [id number] [phone] Ely Gardiner MD Schedule an appointment as soon as possible for a visit in 1 week Nurse Coordinator will call to schedule appt in next week. [address] [facility] [address] 350 [facility] 
[phone] [address] [facility] [address] [facility] [address] 
[phone] [employer]  Rolling Walker  [address] [facility] [address] [facility] [address] 
[phone] Ely Gardiner MD Schedule an appointment as soon as possible for a visit in 1 week will need PET scan as out patient. 411 [facility] 100 350 [facility] 
[phone] Your Scheduled Appointments Monday May 21, 2018  2:10 PM EDT  
ESTABLISHED PATIENT with Kaushal Awad MD  
[address] [place] [facility] [address] [place] [address]) N [address] [place] [address] [address] [address]  
[phone] Wednesday May 30, 2018 10:20 AM EDT  
ESTABLISHED PATIENT with Kaushal Awad MD  
[address] [facility] [address] [place] [address]) N [address] [place] [address] [address] [address]  
[phone] Discharge Orders None A check dennis indicates which time of day the medication should be taken. My Medications START taking these medications Instructions Each Dose to Equal  
 Morning Noon Evening Bedtime  
 amLODIPine 10 mg tablet Commonly known as:  Christina Lake Start taking on:  5/18/2018 Your last dose was:  9 am 5/27 Notes to Patient:  New BP medication Take 1 Tab by mouth daily. 10 mg CONTINUE taking these medications Instructions Each Dose to Equal  
 Morning Noon Evening Bedtime  
 atorvastatin 40 mg tablet Commonly known as:  LIPITOR Your last dose was:  9 am 5/27 Your next dose is:  Resume at home 5/18 Take 40 mg by mouth nightly. 40 mg  
    
   
   
   
  
  
 FLONASE ALLERGY RELIEF 50 mcg/actuation nasal spray Generic drug:  fluticasone Your last dose was:  Not given while in hospital - resume at discharge 1 [place] by Both Nostrils route daily as needed for Rhinitis. 1 Spray  
    
   
   
   
  
 folic acid 1 mg tablet Commonly known as:  Google Your last dose was:  Not given while in hospital - resume at discharge TAKE ONE TABLET BY MOUTH ONCE DAILY. *GENERIC FOR FOLVITE  
     
  
   
   
   
  
 glimepiride 4 mg tablet Commonly known as:  AMARYL Your last dose was:  Not given while in hospital - resume at discharge Take 2 mg by mouth Daily (before breakfast). 2 mg  
    
  
   
   
   
  
 linagliptin 5 mg tablet Commonly known as:  Julieta Helm Your last dose was:  Not given while in hospital - resume at discharge Take 5 mg by mouth daily (with dinner). 5 mg  
    
   
   
  
   
  
 loratadine 10 mg tablet Commonly known as:  Shana Ch Your last dose was:  Not given while in hospital - resume at discharge Take 10 mg by mouth nightly. 10 mg  
    
   
   
   
  
  
 methotrexate 2.5 mg tablet Commonly known as:  Amrit Garcia Your last dose was:  Not given while in hospital - resume at discharge Take 7.5 mg by mouth Every Thursday. Patient takes three tablets which is 7.5 mg every Thursday 7.5 mg NexIUM 20 mg capsule Generic drug:  esomeprazole Your last dose was:  9 am  (substituted with Protonix) Take 20 mg by mouth daily. 20 mg  
    
  
   
   
   
  
 ondansetron 4 mg disintegrating tablet Commonly known as:  ZOFRAN ODT Your last dose was:  Not given while in hospital - resume at discharge Take 1 Tab by mouth every eight (8) hours as needed for Nausea. 4 mg SENNA PLUS 8.6-50 mg per tablet Generic drug:  senna-docusate Your last dose was:  Not given while in hospital - resume at discharge Take 1 Tab by mouth daily. 1 Tab STOP taking these medications   
 baclofen 10 mg tablet Commonly known as:  LIORESAL  
   
  
 carbamide peroxide 6.5 % otic solution Commonly known as:  DEBROX  
   
  
 hydrocortisone 2.5 % topical cream  
Commonly known as:  HYTONE  
   
  
 losartan-hydroCHLOROthiazide 100-25 mg per tablet Commonly known as:  HYZAAR  
   
  
 nystatin powder Commonly known as:  MYCOSTATIN Where to Get Your Medications Information on where to get these meds will be given to you by the nurse or doctor. ! Ask your nurse or doctor about these medications  
  amLODIPine 10 mg tablet Discharge Instructions HOME DISCHARGE INSTRUCTIONS Kailey Freeman / [id number] : 1935 Admission date: 5/15/2018 Discharge date: 2018 Please bring this form with you to show your care provider at your follow-up appointment. Primary care provider:  Anibal Rojas MD 
 
Discharging provider:  Elio Workman DO  - Family Medicine Resident Dr. Xi Rondon MD - Attending, Family Medicine You have been admitted to the hospital with the following diagnoses: 
 
ACUTE DIAGNOSES: 
Hypercalcemia Angeles Cerda . . . . . . . . . . . . . . . . . . . . . . . . . . . . . . . . . . . . . . . . . . . . . . . . . . . . . . . . . . . . . . . . . . . . . . Angeles Cerda FOLLOW-UP CARE RECOMMENDATIONS: 
 
Appointments Follow-up Information Follow up With Details Comments Contact Info Fer Lopez MD On 5/21/2018 Hospital Follow Up 2:10PM [address] [address] [phone] Jordan Mena MD Schedule an appointment as soon as possible for a visit in 1 week Nurse Coordinator will call to schedule appt in next week. [address] [facility] 57 
[phone] Med Changes - Stop taking your Hyzaar - it could cause your calcium to be higher 
- Start taking Norvasc 10mg, 1 tab daily - You can continue taking your Methotrexate but please let your Oncologist know you are still taking this medicine so they can decide if you should be on it. - It is very important for you to follow up closely with your blood doctor (hematology) and Dr. Arianna Kaiser. Keep these appointments. Follow-up tests needed: Repeat BMP, PET/CT outpatient Pending test results: At the time of your discharge the following test results are still pending: PTHrP, ACE, SPEP, UPEP. Please make sure you review these results with your outpatient follow-up provider(s). Specific symptoms to watch for: chest pain, shortness of breath, fever, chills, nausea, vomiting, diarrhea, change in mentation, falling, weakness, bleeding. DIET/what to eat:  Renal Diet with diabetic consistent carb and low Ca ACTIVITY:  Per [facility] PT/OT Wound care: None Equipment needed:  Si Mehdi What to do if new or unexpected symptoms occur? If you experience any of the above symptoms (or should other concerns or questions arise after discharge) please call your primary care physician. Return to the emergency room if you cannot get hold of your doctor. · It is very important that you keep your follow-up appointment(s). · Please bring discharge papers, medication list (and/or medication bottles) to your follow-up appointments for review by your outpatient provider(s). · Please check the list of medications and be sure it includes every medication (even non-prescription medications) that your provider wants you to take. · It is important that you take the medication exactly as they are prescribed. · Keep your medication in the bottles provided by the pharmacist and keep a list of the medication names, dosages, and times to be taken in your wallet. · Do not take other medications without consulting your doctor. · If you have any questions about your medications or other instructions, please talk to your nurse or care provider before you leave the hospital.  
 
Information obtained by:  
 
I understand that if any problems occur once I am at home I am to contact my physician. These instructions were explained to me and I had the opportunity to ask questions. I understand and acknowledge receipt of the instructions indicated above. Physician's or R.N.'s Signature                                                                  Date/Time Patient or Representative Signature                                                          Date/Time 
 
 
 
 
 
 
 
 
 
 
 
ACO Transitions of Care Introducing [employer] [address] Jessica Krishnamurthy offers a voluntary care coordination program to provide high quality service and care to [facility] fee-for-service beneficiaries. Cash Dennis was designed to help you enhance your health and well-being through the following services: ? Transitions of Care  support for individuals who are transitioning from one care setting to another (example: Hospital to home). ? Chronic and Complex Care Coordination  support for individuals and caregivers of those with serious or chronic illnesses or with more than one chronic (ongoing) condition and those who take a number of different medications. If you meet specific medical criteria, a [address] [place] [address] Rd may call you directly to coordinate your care with your primary care physician and your other care providers. For questions about the [facility] programs, please, contact your physicians office. For general questions or additional information about Accountable Care Organizations: 
Please visit www.medicare.gov/acos. html or call 1-800-MEDICARE (6-788.223.6178) TTY users should call 2-995.800.9808. Introducing [facility]! Dear Lauralyn Bamberger: Thank you for requesting a [employer] account. Our records indicate that you already have an active [employer] account. You can access your account anytime at [email or web address] Did you know that you can access your hospital and ER discharge instructions at any time in [employer]? You can also review all of your test results from your hospital stay or ER visit. Additional Information If you have questions, please visit the Frequently Asked Questions section of the [employer] website at [email or web address] Remember, [employer] is NOT to be used for urgent needs. For medical emergencies, dial 911. Now available from your iPhone and Android! Introducing Jamar Jaeger As a [employer] patient, I wanted to make you aware of our electronic visit tool called Jamar Jaeger. [employer] 24/7 allows you to connect within minutes with a medical provider 24 hours a day, seven days a week via a mobile device or tablet or logging into a secure website from your computer. You can access [employer] from anywhere in the [place]. A virtual visit might be right for you when you have a simple condition and feel like you just dont want to get out of bed, or cant get away from work for an appointment, when your regular [employer] provider is not available (evenings, weekends or holidays), or when youre out of town and need minor care. Electronic visits cost only $49 and if the [employer] 24/7 provider determines a prescription is needed to treat your condition, one can be electronically transmitted to a nearby pharmacy*. Please take a moment to enroll today if you have not already done so. The enrollment process is free and takes just a few minutes. To enroll, please download the [employer] 24/7 darin to your tablet or phone, or visit [email or web address] to enroll on your computer. And, as an [address] [place] [address] patient with a [employer] account, the results of your visits will be scanned into your electronic medical record and your primary care provider will be able to view the scanned results. We urge you to continue to see your regular [employer] provider for your ongoing medical care. And while your primary care provider may not be the one available when you seek a i-dispo.combabsfin virtual visit, the peace of mind you get from getting a real diagnosis real time can be priceless. For more information on [employer], view our Frequently Asked Questions (FAQs) at [email or web address]. Sincerely, 
 
Flores Gatica MD 
Chief Medical Officer Sonia Krishnamurthy *:  certain medications cannot be prescribed via [employer] Unresulted Labs-Please follow up with your PCP about these lab tests Order Current Status ANGIOTENSIN CONVERTING ENZYME In process PROTEIN ELECTROPHORESIS In process PROTEIN ELECTROPHORESIS + JUANITO, UR, 24HR In process PTH-RELATED PEPTIDE In process Providers Seen During Your Hospitalization Provider Specialty Primary office phone Sharon Nolen MD Emergency Medicine [phone] Matt Robles MD Family Practice [phone] Your Primary Care Physician (PCP) Primary Care Physician Office Phone Office Fax [address] [place] [phone] You are allergic to the following Allergen Reactions Sulfa (Sulfonamide Antibiotics) Other (comments) Recent Documentation Height Weight BMI OB Status Smoking Status 1.549 m 87.5 kg 36.45 kg/m2 Postmenopausal Never Smoker Emergency Contacts Name Discharge Info Relation Home Work Mobile Alberto Parks DISCHARGE CAREGIVER [3] Spouse [3] [phone] Patient Belongings The following personal items are in your possession at time of discharge: 
  Dental Appliances: None  Visual Aid: Glasses, At bedside      Home Medications: None   Jewelry: None  Clothing: Undergarments, Pants, Shirt    Other Valuables: None  Personal Items Sent to Safe: n 
 
  
  
Discharge Instructions Attachments/References MEFS - AMLODIPINE (HYPERTENIPINE-2.5, NORVASC) - (BY MOUTH) (ENGLISH) Patient Handouts Amlodipine (Hypertenipine-2.5, Norvasc) - (By mouth) Why this medicine is used:  
Treats high blood pressure and angina (chest pain). Contact a nurse or doctor right away if you have: · Blistering, peeling, red skin rash · Chest pain that may spread to your arms, jaw, back, or neck · Trouble breathing · Swelling in your hands, ankles, legs · Unusual sweating, faintness Common side effects: · Tiredness, drowsiness · Headache © 2017 [address] Information is for End User's use only and may not be sold, redistributed or otherwise used for commercial purposes. Please provide this summary of care documentation to your next provider. Signatures-by signing, you are acknowledging that this After Visit Summary has been reviewed with you and you have received a copy. Patient Signature:  ____________________________________________________________ Date:  ____________________________________________________________  
  
Nita Faes Provider Signature:  ____________________________________________________________ Date:  ____________________________________________________________

## 2018-05-15 NOTE — PROGRESS NOTES
Received call from lab [facility]  Patients calcium level critical level of 14.5  Patient instructed to go to ER for recheck

## 2018-05-15 NOTE — ED TRIAGE NOTES
Pt states her kidney provider noted her Ca was elevated and advised to come to ED. Pt does not need dialysis states she just has to drink plenty of fluids.

## 2018-05-15 NOTE — PROGRESS NOTES
LVM to patient that she needs to call me back today and schedule an appointment with Dr. Rodri Bell for today to discuss lab

## 2018-05-15 NOTE — IP AVS SNAPSHOT
[address] [facility] 
 
 
 [address] [facility] 70 [facility] 
[phone] Patient: Vicenta Castle MRN: [id number] YRS:[birth year] A check dennis indicates which time of day the medication should be taken. My Medications START taking these medications Instructions Each Dose to Equal  
 Morning Noon Evening Bedtime  
 amLODIPine 10 mg tablet Commonly known as:  Gerardo Castro Start taking on:  5/18/2018 Your last dose was:  9 am 5/27 Notes to Patient:  New BP medication Take 1 Tab by mouth daily. 10 mg CONTINUE taking these medications Instructions Each Dose to Equal  
 Morning Noon Evening Bedtime  
 atorvastatin 40 mg tablet Commonly known as:  LIPITOR Your last dose was:  9 am 5/27 Your next dose is:  Resume at home 5/18 Take 40 mg by mouth nightly. 40 mg  
    
   
   
   
  
  
 FLONASE ALLERGY RELIEF 50 mcg/actuation nasal spray Generic drug:  fluticasone Your last dose was:  Not given while in hospital - resume at discharge 1 [place] by Both Nostrils route daily as needed for Rhinitis. 1 Spray  
    
   
   
   
  
 folic acid 1 mg tablet Commonly known as:  Google Your last dose was:  Not given while in hospital - resume at discharge TAKE ONE TABLET BY MOUTH ONCE DAILY. *GENERIC FOR FOLVITE  
     
  
   
   
   
  
 glimepiride 4 mg tablet Commonly known as:  AMARYL Your last dose was:  Not given while in hospital - resume at discharge Take 2 mg by mouth Daily (before breakfast). 2 mg  
    
  
   
   
   
  
 linagliptin 5 mg tablet Commonly known as:  Alfred Henry Your last dose was:  Not given while in hospital - resume at discharge Take 5 mg by mouth daily (with dinner). 5 mg  
    
   
   
  
   
  
 loratadine 10 mg tablet Commonly known as:  Dorita Fleischer Your last dose was:  Not given while in hospital - resume at discharge Take 10 mg by mouth nightly. 10 mg  
    
   
   
   
  
  
 methotrexate 2.5 mg tablet Commonly known as:  [facility] Your last dose was:  Not given while in hospital - resume at discharge Take 7.5 mg by mouth Every Thursday. Patient takes three tablets which is 7.5 mg every Thursday 7.5 mg NexIUM 20 mg capsule Generic drug:  esomeprazole Your last dose was:  9 am 5/17 (substituted with Protonix) Take 20 mg by mouth daily. 20 mg  
    
  
   
   
   
  
 ondansetron 4 mg disintegrating tablet Commonly known as:  ZOFRAN ODT Your last dose was:  Not given while in hospital - resume at discharge Take 1 Tab by mouth every eight (8) hours as needed for Nausea. 4 mg SENNA PLUS 8.6-50 mg per tablet Generic drug:  senna-docusate Your last dose was:  Not given while in hospital - resume at discharge Take 1 Tab by mouth daily. 1 Tab STOP taking these medications   
 baclofen 10 mg tablet Commonly known as:  LIORESAL  
   
  
 carbamide peroxide 6.5 % otic solution Commonly known as:  DEBROX  
   
  
 hydrocortisone 2.5 % topical cream  
Commonly known as:  HYTONE  
   
  
 losartan-hydroCHLOROthiazide 100-25 mg per tablet Commonly known as:  HYZAAR  
   
  
 nystatin powder Commonly known as:  MYCOSTATIN Where to Get Your Medications Information on where to get these meds will be given to you by the nurse or doctor. ! Ask your nurse or doctor about these medications  
  amLODIPine 10 mg tablet

## 2018-05-16 ENCOUNTER — PATIENT OUTREACH (OUTPATIENT)
Dept: FAMILY MEDICINE CLINIC | Age: 83
End: 2018-05-16

## 2018-05-16 LAB
1,25(OH)2D3 SERPL-MCNC: 75.5 PG/ML (ref 19.9–79.3)
ANION GAP SERPL CALC-SCNC: 7 MMOL/L (ref 5–15)
ANION GAP SERPL CALC-SCNC: 7 MMOL/L (ref 5–15)
ANION GAP SERPL CALC-SCNC: 9 MMOL/L (ref 5–15)
BASOPHILS # BLD: 0.1 K/UL (ref 0–0.1)
BASOPHILS NFR BLD: 0 % (ref 0–1)
BUN SERPL-MCNC: 23 MG/DL (ref 6–20)
BUN SERPL-MCNC: 23 MG/DL (ref 6–20)
BUN SERPL-MCNC: 26 MG/DL (ref 6–20)
BUN/CREAT SERPL: 13 (ref 12–20)
CALCIUM SERPL-MCNC: 12.3 MG/DL (ref 8.5–10.1)
CALCIUM SERPL-MCNC: 12.4 MG/DL (ref 8.5–10.1)
CALCIUM SERPL-MCNC: 12.5 MG/DL (ref 8.5–10.1)
CHLORIDE SERPL-SCNC: 106 MMOL/L (ref 97–108)
CHLORIDE SERPL-SCNC: 109 MMOL/L (ref 97–108)
CHLORIDE SERPL-SCNC: 109 MMOL/L (ref 97–108)
CO2 SERPL-SCNC: 23 MMOL/L (ref 21–32)
CO2 SERPL-SCNC: 24 MMOL/L (ref 21–32)
CO2 SERPL-SCNC: 26 MMOL/L (ref 21–32)
CREAT SERPL-MCNC: 1.82 MG/DL (ref 0.55–1.02)
CREAT SERPL-MCNC: 1.84 MG/DL (ref 0.55–1.02)
CREAT SERPL-MCNC: 1.94 MG/DL (ref 0.55–1.02)
DIFFERENTIAL METHOD BLD: ABNORMAL
EOSINOPHIL # BLD: 0.2 K/UL (ref 0–0.4)
EOSINOPHIL NFR BLD: 2 % (ref 0–7)
ERYTHROCYTE [DISTWIDTH] IN BLOOD BY AUTOMATED COUNT: 15.7 % (ref 11.5–14.5)
FERRITIN SERPL-MCNC: 62 NG/ML (ref 8–252)
FOLATE SERPL-MCNC: 30.7 NG/ML (ref 5–21)
GLUCOSE BLD STRIP.AUTO-MCNC: 113 MG/DL (ref 65–100)
GLUCOSE BLD STRIP.AUTO-MCNC: 133 MG/DL (ref 65–100)
GLUCOSE BLD STRIP.AUTO-MCNC: 138 MG/DL (ref 65–100)
GLUCOSE BLD STRIP.AUTO-MCNC: 96 MG/DL (ref 65–100)
GLUCOSE SERPL-MCNC: 106 MG/DL (ref 65–100)
GLUCOSE SERPL-MCNC: 111 MG/DL (ref 65–100)
GLUCOSE SERPL-MCNC: 114 MG/DL (ref 65–100)
HCT VFR BLD AUTO: 30.1 % (ref 35–47)
HCT VFR BLD AUTO: 32.4 % (ref 35–47)
HGB BLD-MCNC: 10.7 G/DL (ref 11.5–16)
HGB BLD-MCNC: 9.8 G/DL (ref 11.5–16)
IMM GRANULOCYTES # BLD: 0.1 K/UL (ref 0–0.04)
IMM GRANULOCYTES NFR BLD AUTO: 1 % (ref 0–0.5)
IRON SATN MFR SERPL: 18 % (ref 20–50)
IRON SERPL-MCNC: 54 UG/DL (ref 35–150)
LDH SERPL L TO P-CCNC: 234 U/L (ref 81–246)
LYMPHOCYTES # BLD: 2.8 K/UL (ref 0.8–3.5)
LYMPHOCYTES NFR BLD: 23 % (ref 12–49)
MAGNESIUM SERPL-MCNC: 1.3 MG/DL (ref 1.6–2.4)
MCH RBC QN AUTO: 30.2 PG (ref 26–34)
MCHC RBC AUTO-ENTMCNC: 32.6 G/DL (ref 30–36.5)
MCV RBC AUTO: 92.6 FL (ref 80–99)
MONOCYTES # BLD: 2 K/UL (ref 0–1)
MONOCYTES NFR BLD: 16 % (ref 5–13)
NEUTS SEG # BLD: 7.1 K/UL (ref 1.8–8)
NEUTS SEG NFR BLD: 58 % (ref 32–75)
NRBC # BLD: 0 K/UL (ref 0–0.01)
NRBC BLD-RTO: 0 PER 100 WBC
PHOSPHATE SERPL-MCNC: 3 MG/DL (ref 2.6–4.7)
PLATELET # BLD AUTO: 220 K/UL (ref 150–400)
PMV BLD AUTO: 11.1 FL (ref 8.9–12.9)
POTASSIUM SERPL-SCNC: 3.2 MMOL/L (ref 3.5–5.1)
POTASSIUM SERPL-SCNC: 4 MMOL/L (ref 3.5–5.1)
POTASSIUM SERPL-SCNC: 4.2 MMOL/L (ref 3.5–5.1)
RBC # BLD AUTO: 3.25 M/UL (ref 3.8–5.2)
RETICS # AUTO: 0.06 M/UL (ref 0.02–0.08)
RETICS/RBC NFR AUTO: 1.6 % (ref 0.7–2.1)
SERVICE CMNT-IMP: ABNORMAL
SERVICE CMNT-IMP: NORMAL
SODIUM SERPL-SCNC: 139 MMOL/L (ref 136–145)
SODIUM SERPL-SCNC: 140 MMOL/L (ref 136–145)
SODIUM SERPL-SCNC: 141 MMOL/L (ref 136–145)
TIBC SERPL-MCNC: 292 UG/DL (ref 250–450)
VIT B12 SERPL-MCNC: 370 PG/ML (ref 193–986)
WBC # BLD AUTO: 12.2 K/UL (ref 3.6–11)

## 2018-05-16 PROCEDURE — 86870 RBC ANTIBODY IDENTIFICATION: CPT

## 2018-05-16 PROCEDURE — 65660000000 HC RM CCU STEPDOWN

## 2018-05-16 PROCEDURE — 86978 RBC PRETREATMENT SERUM: CPT

## 2018-05-16 PROCEDURE — 85025 COMPLETE CBC W/AUTO DIFF WBC: CPT | Performed by: FAMILY MEDICINE

## 2018-05-16 PROCEDURE — 82962 GLUCOSE BLOOD TEST: CPT

## 2018-05-16 PROCEDURE — 84100 ASSAY OF PHOSPHORUS: CPT | Performed by: FAMILY MEDICINE

## 2018-05-16 PROCEDURE — 86860 RBC ANTIBODY ELUTION: CPT

## 2018-05-16 PROCEDURE — 74011250636 HC RX REV CODE- 250/636: Performed by: FAMILY MEDICINE

## 2018-05-16 PROCEDURE — 86970 RBC PRETX INCUBATJ W/CHEMICL: CPT

## 2018-05-16 PROCEDURE — 86905 BLOOD TYPING RBC ANTIGENS: CPT

## 2018-05-16 PROCEDURE — 82746 ASSAY OF FOLIC ACID SERUM: CPT | Performed by: FAMILY MEDICINE

## 2018-05-16 PROCEDURE — 82728 ASSAY OF FERRITIN: CPT | Performed by: FAMILY MEDICINE

## 2018-05-16 PROCEDURE — 83615 LACTATE (LD) (LDH) ENZYME: CPT | Performed by: FAMILY MEDICINE

## 2018-05-16 PROCEDURE — 83550 IRON BINDING TEST: CPT | Performed by: FAMILY MEDICINE

## 2018-05-16 PROCEDURE — 36415 COLL VENOUS BLD VENIPUNCTURE: CPT | Performed by: FAMILY MEDICINE

## 2018-05-16 PROCEDURE — 97161 PT EVAL LOW COMPLEX 20 MIN: CPT

## 2018-05-16 PROCEDURE — 86904 BLOOD TYPING PATIENT SERUM: CPT

## 2018-05-16 PROCEDURE — 85045 AUTOMATED RETICULOCYTE COUNT: CPT | Performed by: FAMILY MEDICINE

## 2018-05-16 PROCEDURE — 74011000250 HC RX REV CODE- 250: Performed by: FAMILY MEDICINE

## 2018-05-16 PROCEDURE — 86880 COOMBS TEST DIRECT: CPT

## 2018-05-16 PROCEDURE — 82607 VITAMIN B-12: CPT | Performed by: FAMILY MEDICINE

## 2018-05-16 PROCEDURE — 86850 RBC ANTIBODY SCREEN: CPT

## 2018-05-16 PROCEDURE — 97116 GAIT TRAINING THERAPY: CPT

## 2018-05-16 PROCEDURE — 74011250637 HC RX REV CODE- 250/637: Performed by: FAMILY MEDICINE

## 2018-05-16 PROCEDURE — C9113 INJ PANTOPRAZOLE SODIUM, VIA: HCPCS | Performed by: FAMILY MEDICINE

## 2018-05-16 PROCEDURE — 83735 ASSAY OF MAGNESIUM: CPT | Performed by: FAMILY MEDICINE

## 2018-05-16 PROCEDURE — 80048 BASIC METABOLIC PNL TOTAL CA: CPT | Performed by: FAMILY MEDICINE

## 2018-05-16 PROCEDURE — 85018 HEMOGLOBIN: CPT | Performed by: FAMILY MEDICINE

## 2018-05-16 PROCEDURE — 86906 BLD TYPING SEROLOGIC RH PHNT: CPT

## 2018-05-16 RX ORDER — HYDRALAZINE HYDROCHLORIDE 20 MG/ML
10 INJECTION INTRAMUSCULAR; INTRAVENOUS ONCE
Status: COMPLETED | OUTPATIENT
Start: 2018-05-16 | End: 2018-05-16

## 2018-05-16 RX ORDER — MAGNESIUM SULFATE HEPTAHYDRATE 40 MG/ML
2 INJECTION, SOLUTION INTRAVENOUS ONCE
Status: COMPLETED | OUTPATIENT
Start: 2018-05-16 | End: 2018-05-16

## 2018-05-16 RX ORDER — HYDRALAZINE HYDROCHLORIDE 20 MG/ML
20 INJECTION INTRAMUSCULAR; INTRAVENOUS ONCE
Status: DISCONTINUED | OUTPATIENT
Start: 2018-05-16 | End: 2018-05-16

## 2018-05-16 RX ORDER — POTASSIUM CHLORIDE 1.5 G/1.77G
40 POWDER, FOR SOLUTION ORAL EVERY 4 HOURS
Status: COMPLETED | OUTPATIENT
Start: 2018-05-16 | End: 2018-05-16

## 2018-05-16 RX ORDER — AMLODIPINE BESYLATE 5 MG/1
10 TABLET ORAL DAILY
Status: DISCONTINUED | OUTPATIENT
Start: 2018-05-17 | End: 2018-05-17 | Stop reason: HOSPADM

## 2018-05-16 RX ORDER — AMLODIPINE BESYLATE 5 MG/1
5 TABLET ORAL DAILY
Status: DISCONTINUED | OUTPATIENT
Start: 2018-05-16 | End: 2018-05-16

## 2018-05-16 RX ORDER — ACETAMINOPHEN 500 MG
500 TABLET ORAL
Status: DISCONTINUED | OUTPATIENT
Start: 2018-05-16 | End: 2018-05-17 | Stop reason: HOSPADM

## 2018-05-16 RX ORDER — DOCUSATE SODIUM 100 MG/1
100 CAPSULE, LIQUID FILLED ORAL 2 TIMES DAILY
Status: DISCONTINUED | OUTPATIENT
Start: 2018-05-16 | End: 2018-05-17 | Stop reason: HOSPADM

## 2018-05-16 RX ADMIN — SODIUM CHLORIDE 150 ML/HR: 900 INJECTION, SOLUTION INTRAVENOUS at 00:54

## 2018-05-16 RX ADMIN — HEPARIN SODIUM 5000 UNITS: 5000 INJECTION, SOLUTION INTRAVENOUS; SUBCUTANEOUS at 23:23

## 2018-05-16 RX ADMIN — SODIUM CHLORIDE 40 MG: 9 INJECTION INTRAMUSCULAR; INTRAVENOUS; SUBCUTANEOUS at 08:23

## 2018-05-16 RX ADMIN — Medication 10 ML: at 06:27

## 2018-05-16 RX ADMIN — AMLODIPINE BESYLATE 5 MG: 5 TABLET ORAL at 09:07

## 2018-05-16 RX ADMIN — ACETAMINOPHEN 500 MG: 500 TABLET ORAL at 14:26

## 2018-05-16 RX ADMIN — SODIUM CHLORIDE 150 ML/HR: 900 INJECTION, SOLUTION INTRAVENOUS at 16:57

## 2018-05-16 RX ADMIN — DOCUSATE SODIUM 100 MG: 100 CAPSULE, LIQUID FILLED ORAL at 18:04

## 2018-05-16 RX ADMIN — ATORVASTATIN CALCIUM 40 MG: 20 TABLET, FILM COATED ORAL at 08:23

## 2018-05-16 RX ADMIN — MAGNESIUM SULFATE HEPTAHYDRATE 2 G: 40 INJECTION, SOLUTION INTRAVENOUS at 08:23

## 2018-05-16 RX ADMIN — POTASSIUM CHLORIDE 40 MEQ: 1.5 POWDER, FOR SOLUTION ORAL at 08:23

## 2018-05-16 RX ADMIN — HYDRALAZINE HYDROCHLORIDE 10 MG: 20 INJECTION INTRAMUSCULAR; INTRAVENOUS at 09:08

## 2018-05-16 RX ADMIN — HEPARIN SODIUM 5000 UNITS: 5000 INJECTION, SOLUTION INTRAVENOUS; SUBCUTANEOUS at 06:28

## 2018-05-16 RX ADMIN — Medication 10 ML: at 21:39

## 2018-05-16 RX ADMIN — HEPARIN SODIUM 5000 UNITS: 5000 INJECTION, SOLUTION INTRAVENOUS; SUBCUTANEOUS at 14:26

## 2018-05-16 RX ADMIN — POTASSIUM CHLORIDE 40 MEQ: 1.5 POWDER, FOR SOLUTION ORAL at 11:24

## 2018-05-16 NOTE — PROGRESS NOTES
Problem: Mobility Impaired (Adult and Pediatric)  Goal: *Acute Goals and Plan of Care (Insert Text)  Physical Therapy Goals  Initiated 5/16/2018  1. Patient will move from supine to sit and sit to supine , scoot up and down and roll side to side in bed with modified independence within 7 day(s). 2.  Patient will transfer from bed to chair and chair to bed with modified independence using the least restrictive device within 7 day(s). 3.  Patient will perform sit <> stand with modified independence within 7 day(s). 4.  Patient will ambulate with modified independence for 150 feet with the least restrictive device within 7 day(s). 5.  Patient will ascend/descend 1 curb with modified independence within 7 day(s). physical Therapy EVALUATION  Patient: Nancy Krishnamurthy (63 y.o. female)  Date: 5/16/2018  Primary Diagnosis: Hypercalcemia        Precautions:  Bed Alarm, Fall    ASSESSMENT :  Based on the objective data described below, the patient presents with general debility and poor tolerance for activity. At baseline she ambulates without assistive device and completes all ADLs independently. Patient with decreased dynamic balance today and required RW for safe amb with CG. Patient fatigued quickly and dyspnea with activity on room air. Patient should benefit from daily PT to build tolerance for activity. Patient believes she has a rollator that she loaned out to a friend. She states she will call the friend to see if she can have her rollator returned. Patient should benefit from PT daily to improve endurance and independence. Post activity RA 90% dyspnea 120 bpm    Patient will benefit from skilled intervention to address the above impairments.   Patients rehabilitation potential is considered to be Good  Factors which may influence rehabilitation potential include:   [x]         None noted  []         Mental ability/status  []         Medical condition  []         Home/family situation and support systems  []         Safety awareness  []         Pain tolerance/management  []         Other:      PLAN :  Recommendations and Planned Interventions:  [x]           Bed Mobility Training             []    Neuromuscular Re-Education  [x]           Transfer Training                   []    Orthotic/Prosthetic Training  [x]           Gait Training                         []    Modalities  [x]           Therapeutic Exercises           []    Edema Management/Control  [x]           Therapeutic Activities            [x]    Patient and Family Training/Education  []           Other (comment):    Frequency/Duration: Patient will be followed by physical therapy  5 times a week to address goals. Discharge Recommendations: Possibly Home Health but  To Be Determined based on progress  Further Equipment Recommendations for Discharge: TBD -rollator or rolling walker     SUBJECTIVE:   Patient stated I just feel so tired.     OBJECTIVE DATA SUMMARY:   HISTORY:    Past Medical History:   Diagnosis Date    Allergies     Asymptomatic carotid artery stenosis without infarction 2/22/2011    Depression     Diverticulosis     DJD (degenerative joint disease)     [facility] 2016    DVT RLE.  GERD (gastroesophageal reflux disease)     HTN     Hypercholesterolemia     Mammography less than 12 months ago     NIDDM     Obesity (BMI 30.0-34. 9)     PE (pulmonary thromboembolism) ([facility] 2017    B/L.  Proteinuria      Past Surgical History:   Procedure Laterality Date    ENDOSCOPY, COLON, DIAGNOSTIC  2008    normal    HX APPENDECTOMY      With JAYME.  HX HERNIA REPAIR      ? Umbilical hernia repair.  HX HYSTERECTOMY      HX KNEE REPLACEMENT Left     HX KNEE REPLACEMENT Right     HX SPLENECTOMY      Lap splenectomy.      Prior Level of Function/Home Situation: completely independent without a device  Personal factors and/or comorbidities impacting plan of care:     Home Situation  Home Environment: Private residence  # Steps to Enter: 1  One/Two Story Residence: One story  Living Alone: No  Support Systems: Family member(s)  Patient Expects to be Discharged to[de-identified] Private residence  Current DME Used/Available at Home:  (suspects she has a rollator but needs to check)    EXAMINATION/PRESENTATION/DECISION MAKING:   Critical Behavior: alert and oriented x 3, follows commands  Hearing: Auditory  Auditory Impairment: None    Range Of Motion:  AROM: Generally decreased, functional  PROM: Generally decreased, functional  Strength:    Strength: Generally decreased, functional  Tone & Sensation:   Tone: Normal  Sensation:  (NT)    Coordination:  Coordination: Generally decreased, functional       Functional Mobility:  Bed Mobility:  Rolling: Modified independent  Supine to Sit: Minimum assistance  Sit to Supine: Minimum assistance  Scooting: Additional time  Transfers:  Sit to Stand: Contact guard assistance  Stand to Sit: Contact guard assistance  Stand Pivot Transfers: Contact guard assistance     Bed to Chair: Contact guard assistance              Balance:   Sitting: Intact; Without support  Standing: Impaired  Standing - Static: Constant support;Good  Standing - Dynamic : Fair  Ambulation/Gait Training:  Distance (ft): 100 Feet (ft)  Assistive Device: Gait belt;Walker, rolling  Ambulation - Level of Assistance: Minimal assistance  Gait Abnormalities: Decreased step clearance  Base of Support: Narrowed  Speed/Kayla: Slow  Stairs - Level of Assistance:  (NT)    Functional Measure:  Barthel Index:    Bathin  Bladder: 10  Bowels: 10  Groomin  Dressin  Feedin  Mobility: 10  Stairs: 5  Toilet Use: 5  Transfer (Bed to Chair and Back): 10  Total: 65       Barthel and G-code impairment scale:  Percentage of impairment CH  0% CI  1-19% CJ  20-39% CK  40-59% CL  60-79% CM  80-99% CN  100%   Barthel Score 0-100 100 99-80 79-60 59-40 20-39 1-19   0   Barthel Score 0-20 20 17-19 13-16 9-12 5-8 1-4 0      The Barthel ADL Index: Guidelines  1. The index should be used as a record of what a patient does, not as a record of what a patient could do. 2. The main aim is to establish degree of independence from any help, physical or verbal, however minor and for whatever reason. 3. The need for supervision renders the patient not independent. 4. A patient's performance should be established using the best available evidence. Asking the patient, friends/relatives and nurses are the usual sources, but direct observation and common sense are also important. However direct testing is not needed. 5. Usually the patient's performance over the preceding 24-48 hours is important, but occasionally longer periods will be relevant. 6. Middle categories imply that the patient supplies over 50 per cent of the effort. 7. Use of aids to be independent is allowed. Tamy Martinez., Barthel, D.W. (6182). Functional evaluation: the Barthel Index. [facility] (14)2. EUGENIE Santana, Tangela Moreno., Simi Santa., [place], [address] [place] [address] (1999). Measuring the change indisability after inpatient rehabilitation; comparison of the responsiveness of the Barthel Index and Functional Carter Measure. Journal of Neurology, Neurosurgery, and Psychiatry, 66(4), 529-841. Everardo Ritter, N.J.A, JOSEE Lobato, & Mynor Pedro MMoiseA. (2004.) Assessment of post-stroke quality of life in cost-effectiveness studies: The usefulness of the Barthel Index and the EuroQoL-5D. Quality of Life Research, 13, 239-57       G codes: In compliance with CMSs Claims Based Outcome Reporting, the following G-code set was chosen for this patient based on their primary functional limitation being treated: The outcome measure chosen to determine the severity of the functional limitation was the Barthel Index with a score of 65/100 which was correlated with the impairment scale.     ? Mobility - Walking and Moving Around:     - CURRENT STATUS: CJ - 20%-39% impaired, limited or restricted    - GOAL STATUS: CI - 1%-19% impaired, limited or restricted    - D/C STATUS:  ---------------To be determined---------------      Physical Therapy Evaluation Charge Determination   History Examination Presentation Decision-Making   MEDIUM  Complexity : 1-2 comorbidities / personal factors will impact the outcome/ POC  LOW Complexity : 1-2 Standardized tests and measures addressing body structure, function, activity limitation and / or participation in recreation  LOW Complexity : Stable, uncomplicated  Other outcome measures Barthel Index  LOW       Based on the above components, the patient evaluation is determined to be of the following complexity level: LOW     Pain:  Pain Scale 1: Numeric (0 - 10)  Pain Intensity 1: 0              Activity Tolerance:   Limited   Please refer to the flowsheet for vital signs taken during this treatment. After treatment:   []         Patient left in no apparent distress sitting up in chair  []         Patient left in no apparent distress in bed  []         Call bell left within reach  []         Nursing notified  []         Caregiver present  []         Bed alarm activated    COMMUNICATION/EDUCATION:   The patients plan of care was discussed with: Registered Nurse. [x]         Fall prevention education was provided and the patient/caregiver indicated understanding. [x]         Patient/family have participated as able in goal setting and plan of care. []         Patient/family agree to work toward stated goals and plan of care. []         Patient understands intent and goals of therapy, but is neutral about his/her participation. []         Patient is unable to participate in goal setting and plan of care.     Thank you for this referral.  Sangita Zuluaga, PT   Time Calculation: 20 mins

## 2018-05-16 NOTE — PROGRESS NOTES
Physical Therapy - Orders received and acknowledged and patient having IV line placed, ultrasound guided placement.  PT will follow this pm.

## 2018-05-16 NOTE — CONSULTS
Name: Bienvenido Castellon: [address]   : 1935 Admit Date: 5/15/2018   Phone: [phone]  Room: [facility]/01   PCP: Chana Simmons MD  MRN: [id number]   Date: 2018  Code: Full Code          Chart and notes reviewed. Data reviewed. I review the patient's current medications in the medical record at each encounter. I have evaluated and examined the patient. HPI:    9:17 AM       History was obtained from patient. I was asked by Cookie Wade DO to see Chaka Mittal in consultation for a chief complaint of abnormal CT and hypercalcemia. Mrs. Riley is a very pleasant 81year year old female sent to the ED by per PCP due to elevated Ca on recent lab work. Patient reports for the last 2 weeks, she has felt fatigued, weak, and has poor appetite. Thinks she has lost about 10 lbs. Denies fever, chills, cough, sweats, or CP. Denies abd pain, N/V/D. Denies LE pain/swelling. She is a never smoker or drinker and denies second hand smoke exposure or known occupational exposure. States she has lived on the same street in [place] her entire life. Traveled to [facility] August last year on a river cruise. Diagnosed with REL DVT in 2016 and bilateral LE in 2017. Was placed on Coumadin, but that has been held for the past two weeks by Dr. Manuel Grace of surgery due to left abd/groin hematoma that he was planning to drain. Has a oriana filter placed a week and a half ago. Reports up tap date health maintenance. Was told her last mammogram and colonoscopy were normal and she did not need to have additional screenings due to her age. Chest CTA personally visualized and compared to prior images from 2017. There is parenchymal abnormality in the left apex which has increased in size, now measuring 12 mm, previously measuring 9 mm. Calcified granuloma in the right middle lobe is noted. No hilar or mediastinal adenopathy noted.       WBC 12.2   Hgb 9.8  K 3.2  Mag 1.3  Ca 12.3 (was 14.5 on 5/14)  PTH 13.7  TSH 2.540    Past Medical History:   Diagnosis Date    Allergies     Asymptomatic carotid artery stenosis without infarction 2/22/2011    Depression     Diverticulosis     DJD (degenerative joint disease)     DVT (deep venous thrombosis) ([facility] 2016    DVT RLE.  GERD (gastroesophageal reflux disease)     HTN     Hypercholesterolemia     Mammography less than 12 months ago     NIDDM     Obesity (BMI 30.0-34. 9)     PE (pulmonary thromboembolism) ([facility] 2017    B/L.  Proteinuria        Past Surgical History:   Procedure Laterality Date    ENDOSCOPY, COLON, DIAGNOSTIC  2008    normal    HX APPENDECTOMY      With JAYME.  HX HERNIA REPAIR      ? Umbilical hernia repair.  HX HYSTERECTOMY      HX KNEE REPLACEMENT Left     HX KNEE REPLACEMENT Right     HX SPLENECTOMY      Lap splenectomy.        Family History   Problem Relation Age of Onset    Hypertension Father     Cancer Sister      breast    Diabetes Brother        Social History   Substance Use Topics    Smoking status: Never Smoker    Smokeless tobacco: Never Used    Alcohol use No       Allergies   Allergen Reactions    Sulfa (Sulfonamide Antibiotics) Other (comments)       Current Facility-Administered Medications   Medication Dose Route Frequency    potassium chloride (KLOR-CON) packet 40 mEq  40 mEq Oral Q4H    magnesium sulfate 2 g/50 ml IVPB (premix or compounded)  2 g IntraVENous ONCE    amLODIPine (NORVASC) tablet 5 mg  5 mg Oral DAILY    sodium chloride (NS) flush 5-10 mL  5-10 mL IntraVENous Q8H    sodium chloride (NS) flush 5-10 mL  5-10 mL IntraVENous PRN    heparin (porcine) injection 5,000 Units  5,000 Units SubCUTAneous Q8H    0.9% sodium chloride infusion  150 mL/hr IntraVENous CONTINUOUS    insulin lispro (HUMALOG) injection   SubCUTAneous Q6H    glucose chewable tablet 16 g  4 Tab Oral PRN    dextrose (D50W) injection syrg 12.5-25 g  12.5-25 g IntraVENous PRN  glucagon (GLUCAGEN) injection 1 mg  1 mg IntraMUSCular PRN    atorvastatin (LIPITOR) tablet 40 mg  40 mg Oral DAILY    pantoprazole (PROTONIX) 40 mg in sodium chloride 0.9% 10 mL injection  40 mg IntraVENous DAILY         REVIEW OF SYSTEMS   12 point ROS negative except as stated in the HPI. Physical Exam:   Visit Vitals    /78    Pulse 80    Temp 98 °F (36.7 °C)    Resp 20    Ht 5' 1\" (1.549 m)    Wt 87.5 kg (192 lb 14.4 oz)    SpO2 98%    BMI 36.45 kg/m2       General:  Alert, cooperative, no distress, appears stated age. Head:  Normocephalic, without obvious abnormality, atraumatic. Eyes:  Conjunctivae/corneas clear. Nose: Nares normal. Septum midline. Mucosa normal.    Throat: Lips, mucosa, and tongue normal.    Neck: Supple, symmetrical, trachea midline, no adenopathy. Lungs:   Clear to auscultation bilaterally. Chest wall:  No tenderness or deformity. Heart:  Regular rate and rhythm, S1, S2 normal, no murmur, click, rub or gallop. Abdomen:   Soft, non-tender. Bowel sounds normal. No masses,  No organomegaly. Extremities: Extremities normal, atraumatic, no cyanosis or edema. Pulses: 2+ and symmetric all extremities. Skin: Skin color, texture, turgor normal. No rashes or lesions   Lymph nodes: Cervical, supraclavicular nodes normal.  Large left groin mass noted.    Neurologic: Grossly nonfocal       Lab Results   Component Value Date/Time    Sodium 139 05/16/2018 02:15 AM    Potassium 3.2 (L) 05/16/2018 02:15 AM    Chloride 106 05/16/2018 02:15 AM    CO2 26 05/16/2018 02:15 AM    BUN 26 (H) 05/16/2018 02:15 AM    Creatinine 1.94 (H) 05/16/2018 02:15 AM    Glucose 111 (H) 05/16/2018 02:15 AM    Calcium 12.3 (H) 05/16/2018 02:15 AM    Magnesium 1.3 (L) 05/16/2018 02:15 AM    Phosphorus 3.0 05/16/2018 02:15 AM       Lab Results   Component Value Date/Time    WBC 12.2 (H) 05/16/2018 02:15 AM    HGB 9.8 (L) 05/16/2018 02:15 AM    PLATELET 297 87/99/5516 02:15 AM    MCV 92.6 05/16/2018 02:15 AM       Lab Results   Component Value Date/Time    INR 1.1 05/15/2018 11:23 AM    AST (SGOT) 19 05/14/2018 11:53 AM    Alk. phosphatase 68 05/14/2018 11:53 AM    Protein, total 6.4 05/14/2018 11:53 AM    Albumin 4.0 05/14/2018 11:53 AM    Globulin 3.1 10/12/2010 07:54 AM       No results found for: IRON, FE, TIBC, IBCT, PSAT, FERR    Lab Results   Component Value Date/Time    TSH 2.540 05/14/2018 11:53 AM        No results found for: PH, PHI, PCO2, PCO2I, PO2, PO2I, HCO3, HCO3I, FIO2, FIO2I    No results found for: CPK, RCK1, RCK2, RCK3, RCK4, CKNDX, CKND1, TROPT, TROIQ, BNPP, BNP     No results found for: CULT    No results found for: TOXA1, RPR, HBCM, HBSAG, HAAB, HCAB1, HAAT, G6PD, CRYAC, HIVGT, HIVR, HIV1, HIV12, HIVPC, HIVRPI    No results found for: VANCT, CPK    Lab Results   Component Value Date/Time    Color YELLOW/STRAW 05/15/2018 12:11 PM    Appearance CLEAR 05/15/2018 12:11 PM    pH (UA) 6.0 05/15/2018 12:11 PM    Protein NEGATIVE  05/15/2018 12:11 PM    Glucose 500 (A) 05/15/2018 12:11 PM    Ketone NEGATIVE  05/15/2018 12:11 PM    Bilirubin NEGATIVE  05/15/2018 12:11 PM    Blood NEGATIVE  05/15/2018 12:11 PM    Urobilinogen 0.2 05/15/2018 12:11 PM    Nitrites NEGATIVE  05/15/2018 12:11 PM    Leukocyte Esterase TRACE (A) 05/15/2018 12:11 PM    WBC 5-10 05/15/2018 12:11 PM    RBC 0-5 05/15/2018 12:11 PM    Bacteria NEGATIVE  05/15/2018 12:11 PM       IMPRESSION  · Abnormal chest CT: parenchymal abnormality in the left apex which has increased in size, concerning for neoplasm.   · Hypercalcemia  · HTN  · Left groin mass: presumed hematoma on CT imaging and being evaluated outpatient by Surgery - Dr. Nikunj Miranda  · EDER/CKD  · Hx of DVT/PE: coumadin held two weeks ago per surgery in preparation for drainage of hematoma; s/p IVC filter on 5/7/18  · Hx of autoimmune hemolytic anemia s/p splenectomy    PLAN  · Agree with Hematology recs for outpatient PET/CT  · Renal following  · Continue IVFs  · Mag and K repleted  · BP control per primary team/renal with Norvasc  · DVT prophylaxis: sub q heparin/IVC filter  · GI prophylaxis: Protonix (Nexium at baseline)      Thank you for allowing us to participate in the care of this patient.      Pamela Lozano

## 2018-05-16 NOTE — PROGRESS NOTES
[address]  [phone]  Fax [phone]    Patient Name: Ashley Duke  YOB: 1935    Notified by [address] [place] [address] of patient's admission for hyperkalemia. Will continue to monitor for discharge plans.

## 2018-05-16 NOTE — PROGRESS NOTES
Bedside and Verbal shift change report given to Shakira (oncoming nurse) by Nona Eid (offgoing nurse). Report included the following information SBAR, Kardex, Intake/Output, MAR, Recent Results and Cardiac Rhythm nsr.    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  Dr Mary Frank notified, order placed, phlebotomy notified

## 2018-05-16 NOTE — MED STUDENT NOTES
*ATTENTION:  This note has been created by a medical student for educational purposes only. Please do not refer to the content of this note for clinical decision-making, billing, or other purposes. Please see attending physicians note to obtain clinical information on this patient. *           [facility]   Daily Progress Note    Date: 5/16/2018    Assessment/Plan:   Sasha Orosco is a 81 y.o. female  With Hx DM2, RA, HTN, HLD, GERD who is admitted for incidental hypercalcemia and EDER.     Hypercalcemia in the setting of growing pulmonary lesion - POA Ca 14.0 ( 14.5 on 5/14) unclear etiology at this time, patient has history of kidney disease and presents with EDER (POA Cr 2.10) which could be contributing to calcium retention. No history of malignancy, however parenchymal lung mass noted in CT chest. Takes Hydrochlorithiazide which could contribute to elevated serum calcium. Patient has history of AIHA and RA, multiple autoimmune diseases increases her risk of potential sarcoidosis. - Trend Calcium with CMP q8h; 14--> 12.3  - PTHrP,  SPEP/UPEP,  ACE enzyme pending  - PTH suppressed, not parathyroid in origin  - Continue  IVF with NS @ 150 mL/hr  - Holding HCTZ at this time  - Outpatient PET/CT for further eval per heme/onc  - Will consult Pulm, appreciate the recs     EDER - patient has history of chronic kidney insufficiency, prior Cr 1.38 (3/13/18); POA Cr 2.10. This could be due to hypercalcemia or could be contributing to hypercalcemia.   - 2.10--> 1.94  - Continue IVF   - Trend Cr with daily labs  - Avoid nephrotoxic agents  - Nephrology consulted, appreciate the recs     Left Groin Mass - first noticed 1/18, US was consistent with hematoma.  Increased in size and had CT Abd/Pelv c/w hematoma, surgery did not recommend any intervention (4/30/10)  - US in the ED consistent with hematoma  - Continue to monitor    Normocytic, Normochromic anemia   - Iron studies pending  - Hgb 12.2--> 9.8, likely some component of dilution     Grade I Diastolic Dysfunction - EF 65%  - Monitor  - Mg/Phos, replete prn     Hypertension - BP on admission 195/97. This morning 146/66  - Held home medications   - Consider adding Norvasc 5mg for BP control   - Continue to monitor.     Diabetes Mellitus T2  - Last HgA1c 7.4 (3/13/18). - Discontinued home medications of glimepiride and linagliptin. - Insulin Sliding Scale normal sensitivity with AC&HS glucose checks,  - Hypoglycemia protocols ordered.     Rheumatoid Arthritis - stable  - Hold home methotrexate      Hyperlipidemia - stable, , , HDL 41, LDL 76 ( 3/13/18)  - Continue home Lipitor     GERD/PUD   - hold home medication  - IV Protonix 40 mg daily      FEN/GI - Renal diet. NS at 150 mL/hr. Activity - Ambulate with assistance  DVT prophylaxis - Sub Q Heparin  GI prophylaxis - Protonix  Fall prophylaxis - Fall precautions ordered. Disposition - Admit to Telemetry. Plan to d/c to TBD. Consulting PT/OT  Code Status - Full, discussed with patient / caregivers. Next of Kin Name and [address] () [phone] (H), [phone] (C)    CODE STATUS:  FULL    Patient discussed with Dr. Lisa Danielson,   Family [facility]         CC: \"I feel okay this morning\"    Subjective  One episode of dizziness and weakness overnight when attempting to use the bathroom overnight. Patient reports feeling well this morning, has no complaints. Denies chills, headaches, chest pain, shortness of breath, palpitations, abdominal pain, nausea and vomiting, and LE edema. Tolerating renal diet. 0 bowel movements in the last 3 days.        Inpatient Medications  Current Facility-Administered Medications   Medication Dose Route Frequency    sodium chloride (NS) flush 5-10 mL  5-10 mL IntraVENous Q8H    sodium chloride (NS) flush 5-10 mL  5-10 mL IntraVENous PRN    heparin (porcine) injection 5,000 Units  5,000 Units SubCUTAneous Q8H    0.9% sodium chloride infusion  150 mL/hr IntraVENous CONTINUOUS    insulin lispro (HUMALOG) injection   SubCUTAneous Q6H    glucose chewable tablet 16 g  4 Tab Oral PRN    dextrose (D50W) injection syrg 12.5-25 g  12.5-25 g IntraVENous PRN    glucagon (GLUCAGEN) injection 1 mg  1 mg IntraMUSCular PRN    atorvastatin (LIPITOR) tablet 40 mg  40 mg Oral DAILY    pantoprazole (PROTONIX) 40 mg in sodium chloride 0.9% 10 mL injection  40 mg IntraVENous DAILY         Allergies  Allergies   Allergen Reactions    Sulfa (Sulfonamide Antibiotics) Other (comments)         Objective  Vitals:  Patient Vitals for the past 8 hrs:   Temp Pulse Resp BP SpO2   05/16/18 0009 98.6 °F (37 °C) 67 18 146/66 96 %   05/15/18 2304 - 67 - - -         I/O:    Intake/Output Summary (Last 24 hours) at 05/16/18 0532  Last data filed at 05/16/18 0400   Gross per 24 hour   Intake             2440 ml   Output             1700 ml   Net              740 ml     Last shift:    05/15 1901 - 05/16 0700  In: -   Out: 800 [Urine:800]  Last 3 shifts:    05/14 0701 - 05/15 1900  In: 3439 [P.O.:240; I.V.:2200]  Out: 900 [Urine:900]    Physical Exam:  General: No acute distress. Alert. Cooperative. Head: Normocephalic. Atraumatic. Respiratory: Clear to auscultation bilaterally. No crackles, wheezes, or rhonchi. No increased work of breathing. Cardiovascular: RRR. Normal S1,S2. Holosystolic murmur best appreciated at 2nd left intercostal space. No gallops or rubs. Pulses 2+ throughout. GI: + bowel sounds. Nontender. No rebound tenderness or guarding. Nondistended   Extremities: No edema. No tenderness.      Laboratory Data  Recent Results (from the past 12 hour(s))   GLUCOSE, POC    Collection Time: 05/15/18  5:50 PM   Result Value Ref Range    Glucose (POC) 161 (H) 65 - 100 mg/dL    Performed by Paulo Jovel, BASIC    Collection Time: 05/15/18  9:35 PM   Result Value Ref Range    Sodium 140 136 - 145 mmol/L    Potassium 3.8 3.5 - 5.1 mmol/L    Chloride 108 97 - 108 mmol/L    CO2 26 21 - 32 mmol/L    Anion gap 6 5 - 15 mmol/L    Glucose 151 (H) 65 - 100 mg/dL    BUN 25 (H) 6 - 20 MG/DL    Creatinine 1.92 (H) 0.55 - 1.02 MG/DL    BUN/Creatinine ratio 13 12 - 20      GFR est AA 30 (L) >60 ml/min/1.73m2    GFR est non-AA 25 (L) >60 ml/min/1.73m2    Calcium 12.7 (H) 8.5 - 10.1 MG/DL   GLUCOSE, POC    Collection Time: 05/16/18 12:08 AM   Result Value Ref Range    Glucose (POC) 133 (H) 65 - 100 mg/dL    Performed by Homa Arango (PCT)    CBC WITH AUTOMATED DIFF    Collection Time: 05/16/18  2:15 AM   Result Value Ref Range    WBC 12.2 (H) 3.6 - 11.0 K/uL    RBC 3.25 (L) 3.80 - 5.20 M/uL    HGB 9.8 (L) 11.5 - 16.0 g/dL    HCT 30.1 (L) 35.0 - 47.0 %    MCV 92.6 80.0 - 99.0 FL    MCH 30.2 26.0 - 34.0 PG    MCHC 32.6 30.0 - 36.5 g/dL    RDW 15.7 (H) 11.5 - 14.5 %    PLATELET 717 479 - 494 K/uL    MPV 11.1 8.9 - 12.9 FL    NRBC 0.0 0  WBC    ABSOLUTE NRBC 0.00 0.00 - 0.01 K/uL    NEUTROPHILS 58 32 - 75 %    LYMPHOCYTES 23 12 - 49 %    MONOCYTES 16 (H) 5 - 13 %    EOSINOPHILS 2 0 - 7 %    BASOPHILS 0 0 - 1 %    IMMATURE GRANULOCYTES 1 (H) 0.0 - 0.5 %    ABS. NEUTROPHILS 7.1 1.8 - 8.0 K/UL    ABS. LYMPHOCYTES 2.8 0.8 - 3.5 K/UL    ABS. MONOCYTES 2.0 (H) 0.0 - 1.0 K/UL    ABS. EOSINOPHILS 0.2 0.0 - 0.4 K/UL    ABS. BASOPHILS 0.1 0.0 - 0.1 K/UL    ABS. IMM.  GRANS. 0.1 (H) 0.00 - 0.04 K/UL    DF AUTOMATED     METABOLIC PANEL, BASIC    Collection Time: 05/16/18  2:15 AM   Result Value Ref Range    Sodium 139 136 - 145 mmol/L    Potassium 3.2 (L) 3.5 - 5.1 mmol/L    Chloride 106 97 - 108 mmol/L    CO2 26 21 - 32 mmol/L    Anion gap 7 5 - 15 mmol/L    Glucose 111 (H) 65 - 100 mg/dL    BUN 26 (H) 6 - 20 MG/DL    Creatinine 1.94 (H) 0.55 - 1.02 MG/DL    BUN/Creatinine ratio 13 12 - 20      GFR est AA 30 (L) >60 ml/min/1.73m2    GFR est non-AA 25 (L) >60 ml/min/1.73m2    Calcium 12.3 (H) 8.5 - 10.1 MG/DL       Imaging  CT Chest: parenchymal abnormality in Left apex that has increased in size, concerning for neoplasm      Hospital Problems:  Hospital Problems  Date Reviewed: 5/14/2018          Codes Class Noted POA    Hypercalcemia ICD-10-CM: E83.52  ICD-9-CM: 275.42  5/15/2018 Unknown        Mass of lung parenchyma ICD-10-CM: R91.8  ICD-9-CM: 793.19  5/15/2018 Unknown        Pure hypercholesterolemia ICD-10-CM: E78.00  ICD-9-CM: 272.0  5/15/2018 Unknown        Diastolic dysfunction FWK-00-QR: I51.9  ICD-9-CM: 429.9  5/15/2018 Unknown        EDER (acute kidney injury) ([facility] ICD-10-CM: N17.9  ICD-9-CM: 584.9  5/15/2018 Unknown        Hematoma of groin ICD-10-CM: S30. 1XXA  ICD-9-CM: 922.2  4/30/2018 Yes    Overview Signed 4/30/2018 10:20 AM by Mahi Salomon MD     Left.              Type 2 diabetes mellitus without complication, without long-term current use of insulin ([facility] ICD-10-CM: E11.9  ICD-9-CM: 250.00  4/13/2017 Yes        RA (rheumatoid arthritis) ([facility] ICD-10-CM: M06.9  ICD-9-CM: 714.0  6/9/2010 Yes        Essential hypertension ICD-10-CM: I10  ICD-9-CM: 401.9  6/9/2010 Yes

## 2018-05-16 NOTE — PROGRESS NOTES
[facility] RESIDENCY PROGRAM  PROGRESS NOTE     5/17/2018  PCP: Anibal Rojas MD     Assessment/Plan:     Alfonso Burkitt a 81 y. o. female with hx of DM2, RA, Grade 1 Diastolic Dysfunction, HTN, HLD, GERD who is admitted for severe hypercalcemia, EDER.      24 Hour Events: NAEO     Severe Hypercalcemia in the setting of EDER and New Lung Mass, asx - At [facility] patient on HCTZ. No prior h of malignancy. Unknown etiology. POA Ca 14, CR 2.1 (BL 1.38). Received IVF in ED. CT Chest shows parenchymal abnormality in L apex has increased in size concerning for neoplasm. Ionized Ca 1.5 (H). PTH, intact 13.7 (L). Vitamin D, 25 51.4 (N), Vitamin D125 75.5 (N), . Labwork thus far concerning for hypercalcemia of malignancy. This morning Ca 11.7, Cr 1.76.  - Nephro following, state hypercalcemia non parathyroid mediated  - NS 150ml/hr, careful watch of fluid hydration with hx of G1DD  - Follow up PTH-rp, ACE, protein electrophoresis, UPEP  - Heme/ Onc consulted for new lung mass, hx of recurrent blood clots, hypercalcemia - recommend follow up outpt with PET/ CT  - Pulm consulted - recommend outpatient follow up with Heme/Onc  - Renal diet with low Ca  - BMP q8h      L Groin Hematoma - CT Abd shows large L groin lesion likely hematoma with no intra abdominal components. 4/30 POA US shows heterogenous soft tissue abnormality in L inguinal region. Mass lesion cannot be excluded.      Grade I Diastolic Dysfunction - Echo 6/19/17 EF 65% with grade 1 diastolic dsfxn. - Continue to monitor lung exam with IV hydration  - Mg/Phos     Normocytic Normochromic Anemia - BL Hg 12-13. POA 12.1 dropped to 9.8 then up to 10.7. Iron 54 (N), TIBC 292 (N), Iron sat 18 (L), B12 370 (N), Folate 30.7 (H),  (N), Ferritin 62 (N), Retic 1.6 (N).     Hx of Autoimmune Hemolytic Anemia s/p Splenectomy      Hypertensive Urgency, resolved - At BL on Hyzaar.  Resolved with IV Hydralazine 5mg x1.  - Hold Hyzaar, can continue ARB once Cr back to BL  - Norvasc 10mg daily  - Hydralazine PRN  - Continue to monitor BP and adjust regimen      Diabetes Mellitus - HgA1c 7.4 (3/13/18)  - SSI, high sensitivity      HLD - , , LDL 76, HDL 41 (3/13/18)  - Lipitor 40mg daily      Rheumatoid Arthritis  - Hold home Methotrexate      GERD/ hx of PUD  - Hold home Dexlansoprazole  - IV Protonix 40mg daily      Electrolyte Abnormalities  - Hypokalemia - K 3.7, Klor COn 40mEq x1  - Hypomagnesemia - Mg 1.6, Mag IVIP 1g  - Hypophosphatemia - Phos 2.4, K Neutra Phos 1 packet      FEN/GI - Renal diet with consistent carb with low calcium, NS 150ml/hr  Activity - Ambulate with asssistance  DVT prophylaxis - Heparin  GI prophylaxis -  Protonix  Fall Precautions - Ordered  Disposition - Dispo pending  CODE STATUS:  FULL CODE     I appreciate the opportunity to participate in the care of this patient,  Josefa Kendall DO  Family Medicine Resident     Pt will be discussed with Dr. Ignacio Hanson MD(FMS attending physician)      Subjective:   Pt was seen and examined at bedside. No complaints this morning. Denies abd pain. States she feels like she will have a BM soon.      Objective:   Physical examination  Visit Vitals    /68 (BP 1 Location: Left arm, BP Patient Position: At rest)    Pulse 86    Temp 98.3 °F (36.8 °C)    Resp 18    Ht 5' 1\" (1.549 m)    Wt 192 lb 14.4 oz (87.5 kg)    SpO2 97%    BMI 36.45 kg/m2      Temp (24hrs), Av.3 °F (36.8 °C), Min:98 °F (36.7 °C), Max:98.6 °F (37 °C)         O2 Device: Room air      Intake/Output Summary (Last 24 hours) at 18 0736  Last data filed at 18 2328   Gross per 24 hour   Intake           8472.5 ml   Output             2080 ml   Net           6392.5 ml     Last shift:       Last 3 shifts:    05/15 1901 -  0700  In: 8472.5 [P.O.:2160; I.V.:6312.5]  Out: 2880 [Urine:2880]    General:   Alert, cooperative, no acute distress   Lungs:   Clear to auscultation bilaterally    Heart:   Regular rhythm, no murmur   Abdomen:    Soft, non-tender, large L inguinal mass   No masses or organomegaly    Extremities:  No edema or DVT signs   Pulses:  Symmetric all extremities   Skin:  Warm and dry    No rashes or lesions   Neurologic:  Oriented   No focal deficits     Data Review:   Reviewed Hg 9.8, K 3.7, Cr 1.6, Ca 11.7, Mg 1.6, Phos 2.4  Telemetry Sinus, 76    Recent Labs      05/17/18   0112 05/16/18   0947  05/16/18   0215  05/15/18   0903   WBC  12.2*   --   12.2*  15.2*   HGB  9.8*  10.7*  9.8*  12.1   HCT  30.0*  32.4*  30.1*  36.8   PLT  234   --   220  265     Recent Labs      05/17/18   0112 05/16/18   1719  05/16/18   0947  05/16/18   0215   05/15/18   1723   05/15/18   1123   05/14/18   1153   NA  139  140  141  139   < >   --    < >   --    < >  143   K  3.7  4.2  4.0  3.2*   < >   --    < >   --    < >  4.7   CL  109*  109*  109*  106   < >   --    < >   --    < >  101   CO2  20*  24  23  26   < >   --    < >   --    < >  24   GLU  80  106*  114*  111*   < >   --    < >   --    < >  135*   BUN  24*  23*  23*  26*   < >   --    < >   --    < >  26   CREA  1.76*  1.84*  1.82*  1.94*   < >   --    < >   --    < >  1.57*   CA  11.7*  12.4*  12.5*  12.3*   < >   --    < >  13.4*   < >  14.5*   MG  1.6   --    --   1.3*   --   1.8   < >  1.0*   --    --    PHOS  2.4*   --    --   3.0   --    --    --   3.6   --    --    ALB   --    --    --    --    --    --    --    --    --   4.0   TBILI   --    --    --    --    --    --    --    --    --   0.5   SGOT   --    --    --    --    --    --    --    --    --   19   ALT   --    --    --    --    --    --    --    --    --   14   INR   --    --    --    --    --    --    --   1.1   --    --     < > = values in this interval not displayed.      Medications reviewed  Current Facility-Administered Medications   Medication Dose Route Frequency    potassium, sodium phosphates (NEUTRA-PHOS) packet 1 Packet  1 Packet Oral QID    magnesium sulfate 1 g/100 ml IVPB (premix or compounded)  1 g IntraVENous ONCE    docusate sodium (COLACE) capsule 100 mg  100 mg Oral BID    acetaminophen (TYLENOL) tablet 500 mg  500 mg Oral Q6H PRN    amLODIPine (NORVASC) tablet 10 mg  10 mg Oral DAILY    sodium chloride (NS) flush 5-10 mL  5-10 mL IntraVENous Q8H    sodium chloride (NS) flush 5-10 mL  5-10 mL IntraVENous PRN    heparin (porcine) injection 5,000 Units  5,000 Units SubCUTAneous Q8H    0.9% sodium chloride infusion  150 mL/hr IntraVENous CONTINUOUS    insulin lispro (HUMALOG) injection   SubCUTAneous Q6H    glucose chewable tablet 16 g  4 Tab Oral PRN    dextrose (D50W) injection syrg 12.5-25 g  12.5-25 g IntraVENous PRN    glucagon (GLUCAGEN) injection 1 mg  1 mg IntraMUSCular PRN    atorvastatin (LIPITOR) tablet 40 mg  40 mg Oral DAILY    pantoprazole (PROTONIX) 40 mg in sodium chloride 0.9% 10 mL injection  40 mg IntraVENous DAILY     Signed:   Burgess Mary DO   Resident, Family Medicine      Attending note: Attending note to follow. ..

## 2018-05-16 NOTE — PROGRESS NOTES
I have tried a few times to speak with the patient and do her assessment. I will follow up with her at a later time.  Thanks DOLORES Smith

## 2018-05-16 NOTE — PROGRESS NOTES
Bedside and Verbal shift change report given to [facility] RN (oncoming nurse) by Ana Lerma (offgoing nurse). Report included the following information SBAR, Kardex, ED Summary, Intake/Output, MAR, Recent Results and Med Rec Status. 0845: Dr Fer Tavarez notified of blood pressure. No orders received, he states they will put something in for her. 1219: This RN attempts x2 to establish IV access, unsuccessful. Shift resource RN goes to attempt and is unsucessful. ICU nurse called and states will come to bedside to attempt 2 large bore IV sites. Zhen Zuñiga, DO aware. 1242: ICU RN comes to bedside and gains access of 20g IV Right Antecubital, she is unable to gain 2 points of access despite having the ultrasound guidance at bedside. 1249: Dr Naomy Og notified of IV status. 1327: Returned to room to dispense requested tylenol for a headache, pt is found to be sleeping with  at bedside. Not dispensed at this time. 1435: Dr Ana Olivas notified of pt complaints of eye soreness. She has been placing cold washcloths on it with no comfort. Patient states she usually takes an allergy pill nightly, she thinks it is Allavert. No new orders received.

## 2018-05-16 NOTE — PROGRESS NOTES
Reason for Admission:   Hypercalcemia               RRAT Score:     41             Resources/supports as identified by patient/family:   is supportive and pt has extended family that are close by. Top Challenges facing patient (as identified by patient/family and CM): Finances/Medication cost?   Has prescription coverage under her insurance plan she gets her prescriptions filed at [facility] on [address] or [address] [facility]?                Support system or lack thereof? Living arrangements? Lives withe her  Yane Reyna cell is [phone]           Self-care/ADLs/Cognition? Independent with her ADLs          Current Advanced Directive/Advance Care Plan:                            Plan for utilizing home health:  Has had home health before in the past.                         Likelihood of readmission: low/green                  Transition of Care Plan:    Pt's PCP is Dr. Ayaz Rainey. [facility] notified of hospital admission. I will follow up with therapy to see what recommendations are for the patient. No other issues or concerns at this time. Thanks Ahsan Majano MSW   Care Management Interventions  PCP Verified by CM:  Yes  [place] Signup: No  Discharge Durable Medical Equipment: No  Physical Therapy Consult: Yes  Occupational Therapy Consult: Yes  Speech Therapy Consult: No  Current Support Network: Lives with Spouse  Confirm Follow Up Transport: Family  Plan discussed with Pt/Family/Caregiver: Yes  Freedom of Choice Offered: Yes  Discharge Location  Discharge Placement: Home

## 2018-05-16 NOTE — PROGRESS NOTES
5/16/2018 12:09 PM    Ca bumped to 12.5 from 12.3. Went down to evaluate patient. Currently has no IV access and no IVF running. Spoke with RN who said line blew. Patient was receiving IVF with Mg earlier at 50ml/hr. Denies abdominal pain. Family at bedside.      A/P 80 YO F admitted for hypercalcemia, asx with bump in Ca from this morning to 12.5.  - Spoke with RN, currently working on gaining IV access, discussed getting 2 lines so we always have access for IVF  - Will continue previous NS rate 150ml/hr once IV access obtained  - Recheck BMP at [address] [facility]  - Discussed current plan with family at bedside including  and grandson, concerns/ questions addressed    Jennifer Green, DO  Family Med Resident, PGY1

## 2018-05-16 NOTE — PROGRESS NOTES
Occupational Therapy Note:    Orders acknowledged and chart reviewed. Attempted to see pt for therapy session, but pt having ultrasound guided IV place and available. Will continue to follow and attempt today as able. Thank you.     Jagdish Nicole OTR/L

## 2018-05-16 NOTE — PROGRESS NOTES
[facility] [place] [facility] [place] [address]  [address] [facility], [facility] [facility] Bench: [phone]  F: [phone]      Reason for Visit:   Ashley Duke is a 81 y.o. female who is seen in consultation at the request of Dr. Carolina Norwood  for evaluation of new lung mass in the setting of hypercalcemia and prior PE 1 yr ago. Delmis Granados History of Present Illness:     Ms Rohit Tidwell presented to the ED on 5/15/2018 after being directed there for elevated calcium of 14.5. ED  Labs Ca 13.4 Mg 1.0  Therefore she was admitted for further eval and management. Ms Rohit Tidwell reports noticed area to left groin in Jan; had ultrasound and was told it was a fatty tissue or blood clot. In April area started growing; was referred to a surgeon/ Dr Catrachita Hardy; recommended stopping coumadin and IVC filter was placed on 5/07/2018. Hx of previous clot 3-4 yrs ago; in left groin area and in both lungs. Was started on coumadin and remained on it until 2 weeks ago. 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 Unsure of cause. 1998 had clot in rt shoulder was hospitalized; started on heparin and then treated with coumadin for about 3 years. Denies any travel. Recent surgery or hormone replacement associated with dx of clots. Follows with Dr Jhony Shannon with [facility] on a regular basis; was seen last week.  at beside. Interval History:   Denies pain. No complaints at present. Would like to continue following with Dr Jhony Shannon.  and grandson at bedside. Past Medical History:   Diagnosis Date    Allergies     Asymptomatic carotid artery stenosis without infarction 2/22/2011    Depression     Diverticulosis     DJD (degenerative joint disease)     DVT (deep venous thrombosis) ([facility] 2016    DVT RLE.     GERD (gastroesophageal reflux disease)     HTN     Hypercholesterolemia     Mammography less than 12 months ago     NIDDM     Obesity (BMI 30.0-34. 9)     PE (pulmonary thromboembolism) ([address] 2017    B/L.  Proteinuria       Past Surgical History:   Procedure Laterality Date    ENDOSCOPY, COLON, DIAGNOSTIC  2008    normal    HX APPENDECTOMY      With JAYME.  HX HERNIA REPAIR      ? Umbilical hernia repair.  HX HYSTERECTOMY      HX KNEE REPLACEMENT Left     HX KNEE REPLACEMENT Right     HX SPLENECTOMY      Lap splenectomy. Social History   Substance Use Topics    Smoking status: Never Smoker    Smokeless tobacco: Never Used    Alcohol use No      Family History   Problem Relation Age of Onset    Hypertension Father     Cancer Sister      breast    Diabetes Brother      Current Facility-Administered Medications   Medication Dose Route Frequency    amLODIPine (NORVASC) tablet 5 mg  5 mg Oral DAILY    docusate sodium (COLACE) capsule 100 mg  100 mg Oral BID    sodium chloride (NS) flush 5-10 mL  5-10 mL IntraVENous Q8H    sodium chloride (NS) flush 5-10 mL  5-10 mL IntraVENous PRN    heparin (porcine) injection 5,000 Units  5,000 Units SubCUTAneous Q8H    0.9% sodium chloride infusion  150 mL/hr IntraVENous CONTINUOUS    insulin lispro (HUMALOG) injection   SubCUTAneous Q6H    glucose chewable tablet 16 g  4 Tab Oral PRN    dextrose (D50W) injection syrg 12.5-25 g  12.5-25 g IntraVENous PRN    glucagon (GLUCAGEN) injection 1 mg  1 mg IntraMUSCular PRN    atorvastatin (LIPITOR) tablet 40 mg  40 mg Oral DAILY    pantoprazole (PROTONIX) 40 mg in sodium chloride 0.9% 10 mL injection  40 mg IntraVENous DAILY      Allergies   Allergen Reactions    Sulfa (Sulfonamide Antibiotics) Other (comments)        Review of Systems: A complete review of systems was obtained, negative except as described above.       Physical Exam:     Visit Vitals    /62 (BP 1 Location: Right arm)    Pulse 84    Temp 98 °F (36.7 °C)    Resp 20    Ht 5' 1\" (1.549 m)    Wt 87.5 kg (192 lb 14.4 oz)    SpO2 98%    BMI 36.45 kg/m2 ECOG PS: 1  General: No distress  Eyes: PERRLA, anicteric sclerae  HENT: Atraumatic with normal appearance of ears and nose; OP clear  Neck: Supple; no thyromegaly   Lymphatic: large left groin mass noted; Respiratory: CTAB, normal respiratory effort  CV: Normal rate, regular rhythm, no murmurs, no peripheral edema  GI: Soft, nontender, nondistended, no masses, no hepatomegaly, no splenomegaly  MS:  Digits without clubbing or cyanosis. Skin: No rashes, ecchymoses, or petechiae. Normal temperature, turgor, and texture. Neuro/Psych: Alert, oriented, appropriate affect, normal judgment/insight      Results:     Lab Results   Component Value Date/Time    WBC 12.2 (H) 05/16/2018 02:15 AM    HGB 10.7 (L) 05/16/2018 09:47 AM    HCT 32.4 (L) 05/16/2018 09:47 AM    PLATELET 251 33/69/3943 02:15 AM    MCV 92.6 05/16/2018 02:15 AM    ABS. NEUTROPHILS 7.1 05/16/2018 02:15 AM    Hemoglobin (POC) 13.3 02/19/2016 08:35 PM    Hematocrit (POC) 39 02/19/2016 08:35 PM     Lab Results   Component Value Date/Time    Sodium 141 05/16/2018 09:47 AM    Potassium 4.0 05/16/2018 09:47 AM    Chloride 109 (H) 05/16/2018 09:47 AM    CO2 23 05/16/2018 09:47 AM    Glucose 114 (H) 05/16/2018 09:47 AM    BUN 23 (H) 05/16/2018 09:47 AM    Creatinine 1.82 (H) 05/16/2018 09:47 AM    GFR est AA 32 (L) 05/16/2018 09:47 AM    GFR est non-AA 27 (L) 05/16/2018 09:47 AM    Calcium 12.5 (H) 05/16/2018 09:47 AM    Sodium (POC) 138 02/19/2016 08:35 PM    Potassium (POC) 4.1 02/19/2016 08:35 PM    Chloride (POC) 101 02/19/2016 08:35 PM    Glucose (POC) 113 (H) 05/16/2018 12:15 PM    BUN (POC) 38 (H) 02/19/2016 08:35 PM    Creatinine (POC) 1.5 (H) 05/11/2017 08:53 AM    Calcium, ionized (POC) 1.39 (H) 02/19/2016 08:35 PM     Lab Results   Component Value Date/Time    Bilirubin, total 0.5 05/14/2018 11:53 AM    ALT (SGPT) 14 05/14/2018 11:53 AM    AST (SGOT) 19 05/14/2018 11:53 AM    Alk.  phosphatase 68 05/14/2018 11:53 AM    Protein, total 6.4 05/14/2018 11:53 AM    Albumin 4.0 05/14/2018 11:53 AM    Globulin 3.1 10/12/2010 07:54 AM     Lab Results   Component Value Date/Time    Reticulocyte count 1.6 05/16/2018 09:47 AM     05/16/2018 09:47 AM    TSH 2.540 05/14/2018 11:53 AM     Lab Results   Component Value Date/Time    INR 1.1 05/15/2018 11:23 AM     5/15/2018 US EXT NONVAS  IMPRESSION: Heterogeneous soft tissue abnormality is noted in the left inguinal  region. As there was a similar appearing lesion on the prior ultrasound  1/8/2018, clinical correlation is recommended with regards to any resolution of  the previously noted abnormality as well as correlation with the acuteness of  the new palpable abnormality. Mass lesion cannot be excluded on the basis of  sonographic imaging.    5/01/2018 CT ABS PELV WO CONT  impression: Prior splenectomy. Large left groin lesion likely hematoma. No  intra-abdominal components. Incidentals as above. 5/15/2018 CT CHEST WO CONT  IMPRESSION:  Parenchymal abnormality in the left apex has increased in size, concerning for  neoplasm. This can be further evaluated with PET/CT. Otherwise no acute  abnormality is identified. Assessment and Recommendations:   1. Left Lung lesion  Noted on CT scan; concern for neoplasm:   Recommend outpatient PET/CT for further evaluate; will be obtained by Dr Yandy Mak as out patient. 2. Hypercalcemia  Slight improvement this am  Nephrology following  PTH low; not hyperparathyroidism  PTHrP pending  IVFs  Consider bisphosphonate, pending nephrology workup      3. Left groin mass  Unclear etiology: likely hematoma per CT imaging on 5/01/2018  Evaluated  by surgery as out patient   Coumadin stopped and IVC filter placed 5/07/2018      4. Hypomagnesemia  Receiving repletion      5. EDER  Nephrology following      Records reviewed from I: follows with Dr Yandy Mak; last seen on 5/10/2018; Follows for h/o AHA and recurrent DVT and PE. First clot 1998 s/p splenectomy.  Also s/p DVT to RLE 2/2016; CTA 2017 showed bilateral multiple PE, pancreatic mass possible in tail, hepatic granulomas. Spoke with Dr Saira Ji and communicated with Dr Julissa Mckinney via text regarding Ms Parks's current admission and new findings in the lung and hypercalcemia with recommendation for PET scan. Dr Julissa Mckinney will see as out patient upon discharge and obtain imaging.      Plan reviewed with Dr Emilee Sotelo By: Rakesh Mondragon NP

## 2018-05-16 NOTE — PROGRESS NOTES
[address] [facility]  YOB: 1935          Assessment & Plan:   1. EDER  · Cr up from 1.5 to 2.1 mg, better at 1.9 mg with correction of Ca and IVF  · DD: Hypercalcemia causing vasocontriction, HTN ATN  · Cont IVF and Treat Hypercalcemia  · TSH is ok  2. CKD 3  · : cr 1.6 May 10,2018  · DM,HTN nephrosclerosis  3. Hypercalcemia : non parathyroid mediated   · PTH supressed  · Off HCTZ  · Await PTHRP,Vit D and Immunofixation  · IVF   · As calcium is better by 2mg% hold on adding Pamidronate or Calcitonin: add only  if not better  4. HypoMg/K  · Replete, repeat  · Mg interfere with PTH secretion   · Mg might be low due to Urine loss:? PPI vs thiazide. 5. HTN  · Urgency  · Resumed home meds except ARB/Thiazide  · Increase Amlodpine, once cr better, resume ARB  6. DM  7. S/P IVC       Subjective:   CC:arf  HPI: Patient seen   EDER is severe, cr better, making urine  HTN : Not better, required hydralazine.   Ca is better  Feels better  Poor sleep  ROS:neg for 12 sys  Current Facility-Administered Medications   Medication Dose Route Frequency    potassium chloride (KLOR-CON) packet 40 mEq  40 mEq Oral Q4H    amLODIPine (NORVASC) tablet 5 mg  5 mg Oral DAILY    sodium chloride (NS) flush 5-10 mL  5-10 mL IntraVENous Q8H    sodium chloride (NS) flush 5-10 mL  5-10 mL IntraVENous PRN    heparin (porcine) injection 5,000 Units  5,000 Units SubCUTAneous Q8H    0.9% sodium chloride infusion  150 mL/hr IntraVENous CONTINUOUS    insulin lispro (HUMALOG) injection   SubCUTAneous Q6H    glucose chewable tablet 16 g  4 Tab Oral PRN    dextrose (D50W) injection syrg 12.5-25 g  12.5-25 g IntraVENous PRN    glucagon (GLUCAGEN) injection 1 mg  1 mg IntraMUSCular PRN    atorvastatin (LIPITOR) tablet 40 mg  40 mg Oral DAILY    pantoprazole (PROTONIX) 40 mg in sodium chloride 0.9% 10 mL injection  40 mg IntraVENous DAILY          Objective: Vitals:  Blood pressure 143/62, pulse 84, temperature 98 °F (36.7 °C), resp. rate 20, height 5' 1\" (1.549 m), weight 87.5 kg (192 lb 14.4 oz), SpO2 98 %. Temp (24hrs), Av.9 °F (36.6 °C), Min:97.4 °F (36.3 °C), Max:98.6 °F (37 °C)      Intake and Output:  701 - 1900  In: 530 [P.O.:480; I.V.:50]  Out: -   1901 -  07  In: 2733 [P.O.:240; I.V.:2200]  Out: 1700 [Urine:1700]    Physical Exam:                Patient is intubated:  no    Physical Examination:   GENERAL ASSESSMENT: NAD  HEENT:Nontraumatic   CHEST: CTA  HEART: S1S2  ABDOMEN: Soft,NT,  :Ram: n  EXTREMITY: EDEMA n  NEURO:Grossly non focal          ECG/rhythm:    Data Review      No results for input(s): TNIPOC in the last 72 hours. No lab exists for component: ITNL   No results for input(s): CPK, CKMB, TROIQ in the last 72 hours. Recent Labs      18   0215  05/15/18   2135  05/15/18   1723  05/15/18   1558  05/15/18   1343  05/15/18   1123   05/15/18   0903  18   1153   NA  139  140   --   140   --    --    --   137  143   K  3.2*  3.8   --   3.4*   --    --    --   4.0  4.7   CL  106  108   --   106   --    --    --   101  101   CO2  26  26   --   25   --    --    --   25  24   BUN  26*  25*   --   28*   --    --    --   29*  26   CREA  1.94*  1.92*   --   2.03*   --    --    --   2.10*  1.57*   GLU  111*  151*   --   218*   --    --    --   249*  135*   PHOS  3.0   --    --    --    --   3.6   --    --    --    MG  1.3*   --   1.8   --   1.0*  1.0*   < >   --    --    CA  12.3*  12.7*   --   13.0*   --   13.4*   --   14.0*  14.5*   ALB   --    --    --    --    --    --    --    --   4.0   WBC  12.2*   --    --    --    --    --    --   15.2*  13.1*   HGB  9.8*   --    --    --    --    --    --   12.1  12.4   HCT  30.1*   --    --    --    --    --    --   36.8  38.4   PLT  220   --    --    --    --    --    --   265  276    < > = values in this interval not displayed.       Recent Labs      05/15/18 1123   INR  1.1   PTP  11.4*     Needs: urine analysis, urine sodium, protein and creatinine  Lab Results   Component Value Date/Time    Creatinine, urine 178.0 03/13/2018 10:15 AM         Discussed with:  Family, Staff, Colleague, Nursing    : Eben Lock MD  5/16/2018        [facility]:  [email or web address]  [place] office:  [address] [place] [address], [address] [place] [address],[address]  [place], [address] [place] [address]  Phone: [phone]  Fax :     [phone]    [place] office:  [address] [facility], [address]  Phone - [phone]  Fax - [phone]

## 2018-05-17 ENCOUNTER — HOME HEALTH ADMISSION (OUTPATIENT)
Dept: HOME HEALTH SERVICES | Facility: HOME HEALTH | Age: 83
End: 2018-05-17
Payer: MEDICARE

## 2018-05-17 VITALS
HEIGHT: 61 IN | BODY MASS INDEX: 36.42 KG/M2 | HEART RATE: 79 BPM | TEMPERATURE: 97.9 F | DIASTOLIC BLOOD PRESSURE: 64 MMHG | WEIGHT: 192.9 LBS | RESPIRATION RATE: 16 BRPM | SYSTOLIC BLOOD PRESSURE: 148 MMHG | OXYGEN SATURATION: 99 %

## 2018-05-17 LAB
ACE SERPL-CCNC: 44 U/L (ref 14–82)
ALBUMIN SERPL ELPH-MCNC: 3.1 G/DL (ref 2.9–4.4)
ALBUMIN/GLOB SERPL: 1.2 {RATIO} (ref 0.7–1.7)
ALPHA1 GLOB SERPL ELPH-MCNC: 0.2 G/DL (ref 0–0.4)
ALPHA2 GLOB SERPL ELPH-MCNC: 0.9 G/DL (ref 0.4–1)
ANION GAP SERPL CALC-SCNC: 10 MMOL/L (ref 5–15)
ANION GAP SERPL CALC-SCNC: 8 MMOL/L (ref 5–15)
B-GLOBULIN SERPL ELPH-MCNC: 0.9 G/DL (ref 0.7–1.3)
BASOPHILS # BLD: 0.1 K/UL (ref 0–0.1)
BASOPHILS NFR BLD: 1 % (ref 0–1)
BUN SERPL-MCNC: 21 MG/DL (ref 6–20)
BUN SERPL-MCNC: 24 MG/DL (ref 6–20)
BUN/CREAT SERPL: 12 (ref 12–20)
BUN/CREAT SERPL: 14 (ref 12–20)
CALCIUM SERPL-MCNC: 11.6 MG/DL (ref 8.5–10.1)
CALCIUM SERPL-MCNC: 11.7 MG/DL (ref 8.5–10.1)
CHLORIDE SERPL-SCNC: 108 MMOL/L (ref 97–108)
CHLORIDE SERPL-SCNC: 109 MMOL/L (ref 97–108)
CO2 SERPL-SCNC: 20 MMOL/L (ref 21–32)
CO2 SERPL-SCNC: 21 MMOL/L (ref 21–32)
CREAT SERPL-MCNC: 1.76 MG/DL (ref 0.55–1.02)
CREAT SERPL-MCNC: 1.8 MG/DL (ref 0.55–1.02)
DIFFERENTIAL METHOD BLD: ABNORMAL
EOSINOPHIL # BLD: 0.4 K/UL (ref 0–0.4)
EOSINOPHIL NFR BLD: 3 % (ref 0–7)
ERYTHROCYTE [DISTWIDTH] IN BLOOD BY AUTOMATED COUNT: 16 % (ref 11.5–14.5)
GAMMA GLOB SERPL ELPH-MCNC: 0.6 G/DL (ref 0.4–1.8)
GLOBULIN SER CALC-MCNC: 2.6 G/DL (ref 2.2–3.9)
GLUCOSE BLD STRIP.AUTO-MCNC: 109 MG/DL (ref 65–100)
GLUCOSE BLD STRIP.AUTO-MCNC: 90 MG/DL (ref 65–100)
GLUCOSE BLD STRIP.AUTO-MCNC: 96 MG/DL (ref 65–100)
GLUCOSE SERPL-MCNC: 151 MG/DL (ref 65–100)
GLUCOSE SERPL-MCNC: 80 MG/DL (ref 65–100)
HCT VFR BLD AUTO: 30 % (ref 35–47)
HGB BLD-MCNC: 9.8 G/DL (ref 11.5–16)
IMM GRANULOCYTES # BLD: 0.1 K/UL (ref 0–0.04)
IMM GRANULOCYTES NFR BLD AUTO: 1 % (ref 0–0.5)
LYMPHOCYTES # BLD: 3.2 K/UL (ref 0.8–3.5)
LYMPHOCYTES NFR BLD: 26 % (ref 12–49)
M PROTEIN SERPL ELPH-MCNC: ABNORMAL G/DL
MAGNESIUM SERPL-MCNC: 1.6 MG/DL (ref 1.6–2.4)
MCH RBC QN AUTO: 30.6 PG (ref 26–34)
MCHC RBC AUTO-ENTMCNC: 32.7 G/DL (ref 30–36.5)
MCV RBC AUTO: 93.8 FL (ref 80–99)
MONOCYTES # BLD: 1.9 K/UL (ref 0–1)
MONOCYTES NFR BLD: 16 % (ref 5–13)
NEUTS SEG # BLD: 6.5 K/UL (ref 1.8–8)
NEUTS SEG NFR BLD: 53 % (ref 32–75)
NRBC # BLD: 0 K/UL (ref 0–0.01)
NRBC BLD-RTO: 0 PER 100 WBC
PHOSPHATE SERPL-MCNC: 2.4 MG/DL (ref 2.6–4.7)
PLATELET # BLD AUTO: 234 K/UL (ref 150–400)
PMV BLD AUTO: 11.7 FL (ref 8.9–12.9)
POTASSIUM SERPL-SCNC: 3.7 MMOL/L (ref 3.5–5.1)
POTASSIUM SERPL-SCNC: 4.3 MMOL/L (ref 3.5–5.1)
PROT SERPL-MCNC: 5.7 G/DL (ref 6–8.5)
RBC # BLD AUTO: 3.2 M/UL (ref 3.8–5.2)
SERVICE CMNT-IMP: ABNORMAL
SERVICE CMNT-IMP: NORMAL
SERVICE CMNT-IMP: NORMAL
SODIUM SERPL-SCNC: 137 MMOL/L (ref 136–145)
SODIUM SERPL-SCNC: 139 MMOL/L (ref 136–145)
WBC # BLD AUTO: 12.2 K/UL (ref 3.6–11)

## 2018-05-17 PROCEDURE — 97165 OT EVAL LOW COMPLEX 30 MIN: CPT

## 2018-05-17 PROCEDURE — 85025 COMPLETE CBC W/AUTO DIFF WBC: CPT | Performed by: FAMILY MEDICINE

## 2018-05-17 PROCEDURE — 84100 ASSAY OF PHOSPHORUS: CPT | Performed by: FAMILY MEDICINE

## 2018-05-17 PROCEDURE — C9113 INJ PANTOPRAZOLE SODIUM, VIA: HCPCS | Performed by: FAMILY MEDICINE

## 2018-05-17 PROCEDURE — 97535 SELF CARE MNGMENT TRAINING: CPT

## 2018-05-17 PROCEDURE — 82962 GLUCOSE BLOOD TEST: CPT

## 2018-05-17 PROCEDURE — 74011250636 HC RX REV CODE- 250/636: Performed by: FAMILY MEDICINE

## 2018-05-17 PROCEDURE — 80048 BASIC METABOLIC PNL TOTAL CA: CPT | Performed by: FAMILY MEDICINE

## 2018-05-17 PROCEDURE — 36415 COLL VENOUS BLD VENIPUNCTURE: CPT | Performed by: FAMILY MEDICINE

## 2018-05-17 PROCEDURE — 74011250637 HC RX REV CODE- 250/637: Performed by: FAMILY MEDICINE

## 2018-05-17 PROCEDURE — 83735 ASSAY OF MAGNESIUM: CPT | Performed by: FAMILY MEDICINE

## 2018-05-17 RX ORDER — MAGNESIUM SULFATE 1 G/100ML
1 INJECTION INTRAVENOUS ONCE
Status: COMPLETED | OUTPATIENT
Start: 2018-05-17 | End: 2018-05-17

## 2018-05-17 RX ORDER — SODIUM,POTASSIUM PHOSPHATES 280-250MG
1 POWDER IN PACKET (EA) ORAL 4 TIMES DAILY
Status: DISCONTINUED | OUTPATIENT
Start: 2018-05-17 | End: 2018-05-17 | Stop reason: HOSPADM

## 2018-05-17 RX ORDER — AMLODIPINE BESYLATE 10 MG/1
10 TABLET ORAL DAILY
Qty: 30 TAB | Refills: 0 | Status: SHIPPED | OUTPATIENT
Start: 2018-05-18 | End: 2018-06-07 | Stop reason: SDUPTHER

## 2018-05-17 RX ORDER — POTASSIUM CHLORIDE 1.5 G/1.77G
40 POWDER, FOR SOLUTION ORAL
Status: COMPLETED | OUTPATIENT
Start: 2018-05-17 | End: 2018-05-17

## 2018-05-17 RX ADMIN — HEPARIN SODIUM 5000 UNITS: 5000 INJECTION, SOLUTION INTRAVENOUS; SUBCUTANEOUS at 07:00

## 2018-05-17 RX ADMIN — DOCUSATE SODIUM 100 MG: 100 CAPSULE, LIQUID FILLED ORAL at 08:55

## 2018-05-17 RX ADMIN — SODIUM CHLORIDE 150 ML/HR: 900 INJECTION, SOLUTION INTRAVENOUS at 00:01

## 2018-05-17 RX ADMIN — Medication 10 ML: at 07:01

## 2018-05-17 RX ADMIN — SODIUM CHLORIDE 150 ML/HR: 900 INJECTION, SOLUTION INTRAVENOUS at 06:53

## 2018-05-17 RX ADMIN — ATORVASTATIN CALCIUM 40 MG: 20 TABLET, FILM COATED ORAL at 08:55

## 2018-05-17 RX ADMIN — AMLODIPINE BESYLATE 10 MG: 5 TABLET ORAL at 08:55

## 2018-05-17 RX ADMIN — POTASSIUM & SODIUM PHOSPHATES POWDER PACK 280-160-250 MG 1 PACKET: 280-160-250 PACK at 08:56

## 2018-05-17 RX ADMIN — POTASSIUM CHLORIDE 40 MEQ: 1.5 POWDER, FOR SOLUTION ORAL at 07:00

## 2018-05-17 RX ADMIN — SODIUM CHLORIDE 40 MG: 9 INJECTION INTRAMUSCULAR; INTRAVENOUS; SUBCUTANEOUS at 08:56

## 2018-05-17 RX ADMIN — MAGNESIUM SULFATE HEPTAHYDRATE 1 G: 1 INJECTION, SOLUTION INTRAVENOUS at 07:00

## 2018-05-17 NOTE — MED STUDENT NOTES
*ATTENTION:  This note has been created by a medical student for educational purposes only. Please do not refer to the content of this note for clinical decision-making, billing, or other purposes. Please see attending physicians note to obtain clinical information on this patient. *           [facility] RESIDENCY PROGRAM   Daily Progress Note    Date: 5/17/2018    Assessment/Plan:   Nancy Krishnamurthy is a 81 y.o. female  With Hx DM2, RA, HTN, HLD, GERD who is admitted for incidental hypercalcemia and EDER.     Hypercalcemia in the setting of growing pulmonary lesion - POA Ca 14.0 ( 14.5 on 5/14) unclear etiology at this time. No history of malignancy, however parenchymal lung mass noted in CT chest. Takes Hydrochlorithiazide which could contribute to elevated serum calcium.   - Trend Calcium with CMP q8h; 14--> 11.7  - PTHrP,  SPEP/UPEP,  ACE enzyme pending  - PTH suppressed, not parathyroid in origin  - Continue  IVF with NS @ 150 mL/hr  - Holding HCTZ at this time  - Outpatient PET/CT for further eval per heme/onc and pulm  - Follow up with Nephro regarding Calcium goal for discharge  - Consider trial of Lasix prior to discharge      EDER - patient has history of chronic kidney insufficiency, prior Cr 1.38 (3/13/18); POA Cr 2.10. This could be due to hypercalcemia or could be contributing to hypercalcemia.   - 2.10--> 1.94--> 1.76  - Continue IVF   - Trend Cr with daily labs  - Avoid nephrotoxic agents     Left Groin Mass - first noticed 1/18, US was consistent with hematoma.  Increased in size and had CT Abd/Pelv c/w hematoma, surgery did not recommend any intervention (4/30/10)  - US in the ED consistent with hematoma  - Continue to monitor    Normocytic, Normochromic anemia   - Iron 54, TIBC 292, % Sat 18, Ferritin 62, Folate 30.7 (H), B12 370  - Hgb 12.2--> 9.8, likely some component of dilution     Grade I Diastolic Dysfunction - EF 65%  - Monitor  - Mg/Phos, replete prn     Hypertension - BP on admission 195/97. This morning 146/66  - Held home medications   - Continue Norvasc 10 mg po daily   - Continue to monitor.     Diabetes Mellitus T2  - Last HgA1c 7.4 (3/13/18). - Discontinued home medications of glimepiride and linagliptin. - Insulin Sliding Scale normal sensitivity with AC&HS glucose checks,  - Hypoglycemia protocols ordered. Constipation  - Continue Colace as needed     Rheumatoid Arthritis - stable  - Hold home methotrexate      Hyperlipidemia - stable, , , HDL 41, LDL 76 ( 3/13/18)  - Continue home Lipitor     GERD/PUD   - hold home medication  - IV Protonix 40 mg daily      FEN/GI - Renal diet. NS at 150 mL/hr. Activity - Ambulate with assistance  DVT prophylaxis - Sub Q Heparin  GI prophylaxis - Protonix  Fall prophylaxis - Fall precautions ordered. Disposition - Admit to Telemetry. Plan to d/c to TBD. Consulting PT/OT  Code Status - Full, discussed with patient / caregivers. Next of [address] and [address] () [phone] (H), [phone] (C)    CODE STATUS:  FULL    Patient discussed with Dr. Bernie Willingham,   Family Medicine          CC: \"I feel good\"    Subjective  No acute events overnight. Patient reports feeling well this morning, has no complaints. She states she feels ready to go home. Denies chills, headaches, chest pain, shortness of breath, palpitations, abdominal pain, nausea and vomiting, and LE edema. Tolerating renal diet. 0 bowel movements in the last 4 days.        Inpatient Medications  Current Facility-Administered Medications   Medication Dose Route Frequency    potassium chloride (KLOR-CON) packet 40 mEq  40 mEq Oral NOW    potassium, sodium phosphates (NEUTRA-PHOS) packet 1 Packet  1 Packet Oral QID    magnesium sulfate 1 g/100 ml IVPB (premix or compounded)  1 g IntraVENous ONCE    docusate sodium (COLACE) capsule 100 mg  100 mg Oral BID    acetaminophen (TYLENOL) tablet 500 mg  500 mg Oral Q6H PRN    amLODIPine (NORVASC) tablet 10 mg  10 mg Oral DAILY    sodium chloride (NS) flush 5-10 mL  5-10 mL IntraVENous Q8H    sodium chloride (NS) flush 5-10 mL  5-10 mL IntraVENous PRN    heparin (porcine) injection 5,000 Units  5,000 Units SubCUTAneous Q8H    0.9% sodium chloride infusion  150 mL/hr IntraVENous CONTINUOUS    insulin lispro (HUMALOG) injection   SubCUTAneous Q6H    glucose chewable tablet 16 g  4 Tab Oral PRN    dextrose (D50W) injection syrg 12.5-25 g  12.5-25 g IntraVENous PRN    glucagon (GLUCAGEN) injection 1 mg  1 mg IntraMUSCular PRN    atorvastatin (LIPITOR) tablet 40 mg  40 mg Oral DAILY    pantoprazole (PROTONIX) 40 mg in sodium chloride 0.9% 10 mL injection  40 mg IntraVENous DAILY         Allergies  Allergies   Allergen Reactions    Sulfa (Sulfonamide Antibiotics) Other (comments)         Objective  Vitals:  Patient Vitals for the past 8 hrs:   Temp Pulse Resp BP SpO2   05/17/18 0537 98.6 °F (37 °C) 87 18 153/70 97 %   05/17/18 0044 98.1 °F (36.7 °C) 75 18 143/60 93 %   05/16/18 2300 - 75 - - -         I/O:    Intake/Output Summary (Last 24 hours) at 05/17/18 0625  Last data filed at 05/16/18 2328   Gross per 24 hour   Intake           8472.5 ml   Output             2080 ml   Net           6392.5 ml     Last shift:    05/16 1901 - 05/17 0700  In: 240 [P.O.:240]  Out: 580 [Urine:580]  Last 3 shifts:    05/15 0701 - 05/16 1900  In: 37298.5 [P.O.:2160; I.V.:8512.5]  Out: 3200 [Urine:3200]    Physical Exam:  General: No acute distress. Alert. Cooperative. Head: Normocephalic. Atraumatic. Respiratory: Clear to auscultation bilaterally. No crackles, wheezes, or rhonchi. No increased work of breathing. Cardiovascular: RRR. Normal S1,S2. Holosystolic murmur best appreciated at 2nd left intercostal space. No gallops or rubs. Pulses 2+ throughout. GI: + bowel sounds. Nontender. No rebound tenderness or guarding. Nondistended   Extremities: No edema. No tenderness.      Laboratory Data  Recent Results (from the past 12 hour(s))   GLUCOSE, POC    Collection Time: 05/17/18 12:13 AM   Result Value Ref Range    Glucose (POC) 96 65 - 100 mg/dL    Performed by IMELDA BLAND [facility]    CBC WITH AUTOMATED DIFF    Collection Time: 05/17/18  1:12 AM   Result Value Ref Range    WBC 12.2 (H) 3.6 - 11.0 K/uL    RBC 3.20 (L) 3.80 - 5.20 M/uL    HGB 9.8 (L) 11.5 - 16.0 g/dL    HCT 30.0 (L) 35.0 - 47.0 %    MCV 93.8 80.0 - 99.0 FL    MCH 30.6 26.0 - 34.0 PG    MCHC 32.7 30.0 - 36.5 g/dL    RDW 16.0 (H) 11.5 - 14.5 %    PLATELET 475 426 - 745 K/uL    MPV 11.7 8.9 - 12.9 FL    NRBC 0.0 0  WBC    ABSOLUTE NRBC 0.00 0.00 - 0.01 K/uL    NEUTROPHILS 53 32 - 75 %    LYMPHOCYTES 26 12 - 49 %    MONOCYTES 16 (H) 5 - 13 %    EOSINOPHILS 3 0 - 7 %    BASOPHILS 1 0 - 1 %    IMMATURE GRANULOCYTES 1 (H) 0.0 - 0.5 %    ABS. NEUTROPHILS 6.5 1.8 - 8.0 K/UL    ABS. LYMPHOCYTES 3.2 0.8 - 3.5 K/UL    ABS. MONOCYTES 1.9 (H) 0.0 - 1.0 K/UL    ABS. EOSINOPHILS 0.4 0.0 - 0.4 K/UL    ABS. BASOPHILS 0.1 0.0 - 0.1 K/UL    ABS. IMM.  GRANS. 0.1 (H) 0.00 - 0.04 K/UL    DF AUTOMATED     MAGNESIUM    Collection Time: 05/17/18  1:12 AM   Result Value Ref Range    Magnesium 1.6 1.6 - 2.4 mg/dL   PHOSPHORUS    Collection Time: 05/17/18  1:12 AM   Result Value Ref Range    Phosphorus 2.4 (L) 2.6 - 4.7 MG/DL   METABOLIC PANEL, BASIC    Collection Time: 05/17/18  1:12 AM   Result Value Ref Range    Sodium 139 136 - 145 mmol/L    Potassium 3.7 3.5 - 5.1 mmol/L    Chloride 109 (H) 97 - 108 mmol/L    CO2 20 (L) 21 - 32 mmol/L    Anion gap 10 5 - 15 mmol/L    Glucose 80 65 - 100 mg/dL    BUN 24 (H) 6 - 20 MG/DL    Creatinine 1.76 (H) 0.55 - 1.02 MG/DL    BUN/Creatinine ratio 14 12 - 20      GFR est AA 34 (L) >60 ml/min/1.73m2    GFR est non-AA 28 (L) >60 ml/min/1.73m2    Calcium 11.7 (H) 8.5 - 10.1 MG/DL       Imaging  CT Chest: parenchymal abnormality in Left apex that has increased in size, concerning for neoplasm      Hospital Problems:  Hospital Problems  Date Reviewed: 5/14/2018          Codes Class Noted POA    Hypercalcemia ICD-10-CM: E83.52  ICD-9-CM: 275.42  5/15/2018 Unknown        Mass of lung parenchyma ICD-10-CM: R91.8  ICD-9-CM: 793.19  5/15/2018 Unknown        Pure hypercholesterolemia ICD-10-CM: E78.00  ICD-9-CM: 272.0  5/15/2018 Unknown        Diastolic dysfunction DCM-81-KF: I51.9  ICD-9-CM: 429.9  5/15/2018 Unknown        EDER (acute kidney injury) ([facility] ICD-10-CM: N17.9  ICD-9-CM: 584.9  5/15/2018 Unknown        Hematoma of groin ICD-10-CM: S30. 1XXA  ICD-9-CM: 922.2  4/30/2018 Yes    Overview Signed 4/30/2018 10:20 AM by Octavio Albarran., MD     Left.              Type 2 diabetes mellitus without complication, without long-term current use of insulin ([facility] ICD-10-CM: E11.9  ICD-9-CM: 250.00  4/13/2017 Yes        RA (rheumatoid arthritis) ([facility] ICD-10-CM: M06.9  ICD-9-CM: 714.0  6/9/2010 Yes        Essential hypertension ICD-10-CM: I10  ICD-9-CM: 401.9  6/9/2010 Yes

## 2018-05-17 NOTE — PROGRESS NOTES
Occupational Therapy EVALUATION/discharge  Patient: Macey Carrillo (18 y.o. female)  Date: 5/17/2018  Primary Diagnosis: Hypercalcemia        Precautions:  Bed Alarm, Fall    ASSESSMENT:   Based on the objective data described below, the patient presents at her baseline in regards to functional abilities following admission on 5/15 for hypercalcemia. Patient lives with her  and managed all care independently PTA. Patient received and remained on room air with SpO2 remaining >90% on room air with activity. Education provided regarding energy conservation, pacing, and pursed lip breathing techniques. Patient was received up and required supervision for all OOB transfers using rolling walker. Patient was independent/mod I level for all ADLs completed today. Patient educated on simple exercises to completed as often as tolerated and encouraged to be OOB to chair at minimum 3x/day to maintain independence while at the hospital.  Patient is expected to discharge home today. Further skilled acute occupational therapy is not indicated at this time. Discharge Recommendations: None  Further Equipment Recommendations for Discharge: none       SUBJECTIVE:   Patient agreeable to OT evaluation. OBJECTIVE DATA SUMMARY:   HISTORY:   Past Medical History:   Diagnosis Date    Allergies     Asymptomatic carotid artery stenosis without infarction 2/22/2011    Depression     Diverticulosis     DJD (degenerative joint disease)     DVT (deep venous thrombosis) ([facility] 2016    DVT RLE.  GERD (gastroesophageal reflux disease)     HTN     Hypercholesterolemia     Mammography less than 12 months ago     NIDDM     Obesity (BMI 30.0-34. 9)     PE (pulmonary thromboembolism) ([facility] 2017    B/L.  Proteinuria      Past Surgical History:   Procedure Laterality Date    ENDOSCOPY, COLON, DIAGNOSTIC  2008    normal    HX APPENDECTOMY      With JAYME.  HX HERNIA REPAIR      ? Umbilical hernia repair.     HX HYSTERECTOMY      HX KNEE REPLACEMENT Left     HX KNEE REPLACEMENT Right     HX SPLENECTOMY      Lap splenectomy. Prior Level of Function/Environment/Context: Patient lives with her . See assessment section for PLOF information reported by pt. Home Situation  Home Environment: Private residence  # Steps to Enter: 1  One/Two Story Residence: One story  Living Alone: No  Support Systems: Spouse/Significant Other/Partner  Patient Expects to be Discharged to[de-identified] Private residence  Current DME Used/Available at Home: Walker, rolling  Tub or Shower Type: Shower  [x]  Right hand dominant   []  Left hand dominant    EXAMINATION OF PERFORMANCE DEFICITS:  Cognitive/Behavioral Status:  Neurologic State: Alert  Orientation Level: Oriented X4  Cognition: Appropriate decision making; Appropriate for age attention/concentration; Appropriate safety awareness  Perception: Appears intact  Perseveration: No perseveration noted  Safety/Judgement: Awareness of environment    Skin: Intact in the uppers    Edema: None noted in the uppers    Hearing: Auditory  Auditory Impairment: None    Vision/Perceptual:    Tracking: Able to track stimulus in all quadrants w/o difficulty    Diplopia: No    Acuity: Within Defined Limits       Range of Motion:  WDL in the uppers     Strength:  WDL in the uppers    Coordination:  Fine Motor Skills-Upper: Left Intact; Right Intact    Gross Motor Skills-Upper: Left Intact; Right Intact    Tone & Sensation:  Tone: Normal  Sensation: intact    Balance:  Sitting: Intact  Standing: Intact; With support    Functional Mobility and Transfers for ADLs:  Bed Mobility:  Rolling:  (pt was received up and remained up)  Scooting: Modified independent    Transfers:  Sit to Stand: Supervision  Stand to Sit: Supervision  Bed to Chair: Supervision  Toilet Transfer : Supervision    ADL Assessment:  Feeding: Independent    Oral Facial Hygiene/Grooming: Independent    Bathing: Modified independent    Upper Body Dressing: Modified independent    Lower Body Dressing: Modified independent    Toileting: Modified independent    Cognitive Retraining  Safety/Judgement: Awareness of environment    Therapeutic Exercise:  Patient educated on the benefits of exercise and encouraged to complete frequently throughout the day as tolerated. Instruction provided for the following exercises: shoulder shrugs, shoulder flexion/extension, chest press/back row, elbow flexion/extension, wrist flexion/extension, finger flexion/extension. Patient tolerated 1 sets x 5 reps to demonstrate understanding and agreeable to complete at minimum 3x/day. Functional Measure:  Barthel Index:    Bathin  Bladder: 10  Bowels: 10  Groomin  Dressing: 10  Feeding: 10  Mobility: 10  Stairs: 5  Toilet Use: 10  Transfer (Bed to Chair and Back): 10  Total: 85       Barthel and G-code impairment scale:  Percentage of impairment CH  0% CI  1-19% CJ  20-39% CK  40-59% CL  60-79% CM  80-99% CN  100%   Barthel Score 0-100 100 99-80 79-60 59-40 20-39 1-19   0   Barthel Score 0-20 20 17-19 13-16 9-12 5-8 1-4 0      The Barthel ADL Index: Guidelines  1. The index should be used as a record of what a patient does, not as a record of what a patient could do. 2. The main aim is to establish degree of independence from any help, physical or verbal, however minor and for whatever reason. 3. The need for supervision renders the patient not independent. 4. A patient's performance should be established using the best available evidence. Asking the patient, friends/relatives and nurses are the usual sources, but direct observation and common sense are also important. However direct testing is not needed. 5. Usually the patient's performance over the preceding 24-48 hours is important, but occasionally longer periods will be relevant. 6. Middle categories imply that the patient supplies over 50 per cent of the effort.   7. Use of aids to be independent is allowed. Marzella Mis., Barthel, D.W. (3012). Functional evaluation: the Barthel Index. [facility] (14)2. EUGENIE Jean-Baptiste, Gabriel Brunson.Amador., Maxime Melendez, [address] (1999). Measuring the change indisability after inpatient rehabilitation; comparison of the responsiveness of the Barthel Index and Functional Jenkins Measure. Journal of Neurology, Neurosurgery, and Psychiatry, 66(4), 236-821. CHRISTINA Masters, JOSEE Lobato, & Aram Hernández M.A. (2004.) Assessment of post-stroke quality of life in cost-effectiveness studies: The usefulness of the Barthel Index and the EuroQoL-5D. Quality of Life Research, 13, 119-53       G codes: In compliance with CMSs Claims Based Outcome Reporting, the following G-code set was chosen for this patient based on their primary functional limitation being treated: The outcome measure chosen to determine the severity of the functional limitation was the Barthel Index with a score of 85/100 which was correlated with the impairment scale. ? Self Care:     - CURRENT STATUS: CI - 1%-19% impaired, limited or restricted    - GOAL STATUS: CI - 1%-19% impaired, limited or restricted    - D/C STATUS:  CI - 1%-19% impaired, limited or restricted     Occupational Therapy Evaluation Charge Determination   History Examination Decision-Making   LOW Complexity : Brief history review  LOW Complexity : 1-3 performance deficits relating to physical, cognitive , or psychosocial skils that result in activity limitations and / or participation restrictions  LOW Complexity : No comorbidities that affect functional and no verbal or physical assistance needed to complete eval tasks       Based on the above components, the patient evaluation is determined to be of the following complexity level: LOW   Pain:  Pain Scale 1: Numeric (0 - 10)  Pain Intensity 1: 0              Activity Tolerance:   Patient tolerated eval well.     Please refer to the flowsheet for vital signs taken during this treatment. After treatment:   [x]  Patient left in no apparent distress sitting up in chair  []  Patient left in no apparent distress in bed  [x]  Call bell left within reach  [x]  Nursing notified  []  Caregiver present  []  Bed alarm activated    COMMUNICATION/EDUCATION:   Communication/Collaboration:  [x]      Home safety education was provided and the patient/caregiver indicated understanding. [x]      Patient/family have participated as able and agree with findings and recommendations. []      Patient is unable to participate in plan of care at this time. Findings and recommendations were discussed with: Physical Therapist, Registered Nurse and patient.     Dara Bryson OTR/L  Time Calculation: 22 mins

## 2018-05-17 NOTE — DISCHARGE SUMMARY
[address] [facility]                                                                                DISCHARGE SUMMARY     Patient: Conrado Gr  MRN: [id number]  YOB: 1935  Age: 81 y.o. Date of admission:  5/15/2018  Date of discharge:  5/17/2018  Primary care provider:  Amarjit Grimm MD   Admitting provider:  Cameron Cintron MD    Discharging provider(s): Omar Cid, [facility] Resident  Dr. Sole Koroma MD -  Family Medicine Attending      Consultations:  · IP CONSULT TO NEPHROLOGY  · IP CONSULT TO HEMATOLOGY  · IP CONSULT TO PULMONOLOGY    Procedures:  · None    Chief Complaint:   · Abnormal lab results    Discharge destination: Home with [facility] PT  The patient is stable for discharge. Admission diagnosis:  · Hypercalcemia    HPI:   As per Dr. Juan Jose Stevenson,     Patient presented to ED for abnormal lab results. Was called regarding lab results from day prior, Ca was 14.5 in office. Was told to go to ED. Denies abd pain, altered mental status, back pain. Endorses constipation at baseline and fatigure x2 weeks with 10lb weight loss in last month she contribute to anorexia. Denies hx of malignancy. Of note patient has large L groin mass that has been enlarging since April diagnosed as hematoma on US. Was taken off Warfarin 2 weeks prior by PCP. Denies hx of malignancy, followed by Dr. Christian Rodriguez outpatient (Heme/ Onc).    In the ER, vital signs were remarkable for htn 199/97. Labs were remarkable for WBC 15.2, glucose 249, Cr 2.1 (1.38 BL), GFR 23, Ca 14. Pt was treated with 1L NS bolus. US/ CT Abd pending and EKG shows NSR. Final discharge diagnoses and brief hospital course:   Severe Hypercalcemia in the setting of EDER and New Lung Mass, asx - At baseline on HCTZ and CCB. Patient has no hx of malignancy. Unknown etiology. POA Cr 2.1 (BL 1.38). Some constipation and confusion that does not seem far from baseline. Received 1L NS bolus in ED.  CT Chest shows parenchymal abnormality in L apex has increased in size concerning for neoplasm. Heme/ Onc and Pulm were consulted who recommended outpt PET/CT. Nephro was consulted who stated to hold off on Calcitonin/ Bisphosphanates as her Ca was improving with IVF. Ionized Ca 1.5 (H). PTH, intact 13.7 (L).  Vitamin D, 25 51.4 (N), Vitamin D125 75.5 (N), . Labwork thus far concerning for hypercalcemia of malignancy. On day of discharge Cr 1.76, Ca 11.7. Nephro stated patient was stable for discharge with close follow up and repeat BMP on Monday. Relayed hospitalization to outside Heme/ Onc Dr. Jorge Alberto Banda who will call patient back with appt information for next week. - Follow up with Dr. Jorge Alberto Banda next week  - Follow up with Dr. Britney Ordonez Monday at 2:15 PM, repeat BMP at this time  - Drink plenty of fluids  - Stop Hyzaar     L Groin Hematoma - CT Abd shows large L groin lesion likely hematoma with no intra abdominal components. 4/30 POA US shows heterogenous soft tissue abnormality in L inguinal region. Mass lesion cannot be excluded.     Grade I Diastolic Dysfunction - Echo 6/19/17 EF 65% with grade 1 diastolic dsfxn.     Hx of Autoimmune Hemolytic Anemia s/p Splenectomy     HTN - BP currently 157/58 in ED. Held home Hyzaar in light of EDER. Started Norvasc 10mg daily.  - Norvasc 10mg daily      Diabetes Mellitus - HgA1c 7.4 (3/13/18). Started SSI with high sensitivity scale, hypoglycemia protocols. - Continue home Glimepride and Tradjenta      HLD - , , LDL 76, HDL 41 (3/13/18) Continued home Lipitor.      Rheumatoid Arthritis - Held home Methotrexate  - Continue Methotrexate on discharge but follow up with Oncologist to see if this medication can continue to be taken     GERD/ hx of PUD - Held home Dexlansoprazole and started on Protonix.    - Continue Dexlansoprazole  Follow-up Cares:   · Pending LABS at the time of Discharge: SPEP, UPEP, ACE, PTHrP  · Labs Need to Repeat by PCP: BMP    Follow-up Information Follow up With Details Comments Contact Info    Ronna Joseph MD On 5/21/2018 [facility] 2:10PM [address]  [phone]      Jhoan Butcher MD Schedule an appointment as soon as possible for a visit in 1 week Nurse Coordinator will call to schedule appt in next week. [address] [facility]  [address] [facility] [place] [place]  [phone]      [place] [id number] [place] [address]  [address] [facility]  [phone]    Jhoan Butcher MD Schedule an appointment as soon as possible for a visit in 1 week will need PET scan as out patient. [address]  [address]          Physical Examination at Discharge:     Visit Vitals    /64 (BP 1 Location: Left arm, BP Patient Position: At rest)    Pulse 79    Temp 97.9 °F (36.6 °C)    Resp 16    Ht 5' 1\" (1.549 m)    Wt 192 lb 14.4 oz (87.5 kg)    SpO2 99%    BMI 36.45 kg/m2       GEN: Patient is alert and oriented x3. NAD  HEENT:  Thyroid without masses  Heart: RRR, S1 S2 noted. No M/R/G. Lungs: clear to auscultation and percussion throughout both lung fields  CV:normal  Abdomen L inguinal hematoma  Extremeties:no clubbing, cyanosis, edema  Neuro alert, oriented x 3 and no focal deficits  Skin:  warm       Pertinent Imaging Studies:     Ct Chest Wo Cont    Result Date: 5/15/2018  INDICATION: Hypercalcemia with potential for mass lesion COMPARISON: 4/18/2017 CONTRAST: None. TECHNIQUE:  5 mm axial images were obtained through the chest. Coronal and sagittal reconstructions were generated. CT dose reduction was achieved through use of a standardized protocol tailored for this examination and automatic exposure control for dose modulation. The absence of intravenous contrast reduces the sensitivity for evaluation of the mediastinum and upper abdominal organs.  FINDINGS: THYROID: Multinodular appearance of the thyroid gland is unchanged. MEDIASTINUM: No mass or lymphadenopathy. TAPAN: No mass or lymphadenopathy. THORACIC AORTA: No aneurysm. MAIN PULMONARY ARTERY: Normal in caliber. TRACHEA/BRONCHI: Patent. ESOPHAGUS: No wall thickening or dilatation. HEART: Normal in size. PLEURA: No effusion or pneumothorax. LUNGS: Parenchymal abnormality in the left apex is increased in size, now measuring 12 mm, previously measuring 9 mm. Calcified granuloma in the right middle lobe is noted. No acute abnormality is identified. INCIDENTALLY IMAGED UPPER ABDOMEN: The patient is status post splenectomy. Splenules are noted in the left upper quadrant. Soft tissue abnormality at the tail of the pancreas is again noted and may be related to a splenule. BONES: Degenerative changes are seen in the thoracic spine. IMPRESSION: Parenchymal abnormality in the left apex has increased in size, concerning for neoplasm. This can be further evaluated with PET/CT. Otherwise no acute abnormality is identified. Ct Abd Pelv Wo Cont    Result Date: 5/1/2018  EXAM:  CT ABD PELV WO CONT INDICATION: Slowly enlarging hematoma of the left groin. COMPARISON: May 11, 2017 CONTRAST:  None. TECHNIQUE: Thin axial images were obtained through the abdomen and pelvis. Coronal and sagittal reconstructions were generated. Oral contrast was not administered. CT dose reduction was achieved through use of a standardized protocol tailored for this examination and automatic exposure control for dose modulation. The absence of intravenous contrast material reduces the sensitivity for evaluation of the solid parenchymal organs of the abdomen. FINDINGS: Lung bases show no acute findings. Prior splenectomy. The liver is normal in size. The gallbladder is not distended. Adrenals appear unremarkable. Pancreas is unchanged with no significant findings. The kidneys are unobstructed.  Calcification and tortuosity of the abdominal aorta without a focal aneurysm. There is a large dense lesion in the a left groin area measuring 71 x 73 mm subcutaneous soft tissue just anterior and medial to the great vessel likely hematoma. Some surrounding soft tissue swelling. There is no intra-abdominal components to it. The bladder is midline and unfilled. Patient had prior hysterectomy and appendectomy. There is sigmoid diverticulosis. There is no free air or free fluid. No bowel wall thickening or obstruction. O     impression: Prior splenectomy. Large left groin lesion likely hematoma. No intra-abdominal components. Incidentals as above. Us Ext [address]    Result Date: 5/15/2018  Clinical history: Palpable abnormality left groin Comparison is made to prior ultrasound 1/8/2018. Real-time sonographic imaging of the left inguinal region demonstrates an 8.6 x 7.7 x 7.9 cm heterogeneous soft tissue abnormality. IMPRESSION: Heterogeneous soft tissue abnormality is noted in the left inguinal region. As there was a similar appearing lesion on the prior ultrasound 1/8/2018, clinical correlation is recommended with regards to any resolution of the previously noted abnormality as well as correlation with the acuteness of the new palpable abnormality. Mass lesion cannot be excluded on the basis of sonographic imaging.    ---------------------------------    Discharge Medications:      Current Discharge Medication List      START taking these medications    Details   amLODIPine (NORVASC) 10 mg tablet Take 1 Tab by mouth daily. Qty: 30 Tab, Refills: 0         CONTINUE these medications which have NOT CHANGED    Details   loratadine (CLARITIN) 10 mg tablet Take 10 mg by mouth nightly. esomeprazole (NEXIUM) 20 mg capsule Take 20 mg by mouth daily. fluticasone (FLONASE ALLERGY RELIEF) 50 mcg/actuation nasal spray 1 [place] by Both Nostrils route daily as needed for Rhinitis. linagliptin (TRADJENTA) 5 mg tablet Take 5 mg by mouth daily (with dinner).       folic acid (FOLVITE) 1 mg tablet TAKE ONE TABLET BY MOUTH ONCE DAILY. *GENERIC FOR FOLVITE  Qty: 90 Tab, Refills: 3      atorvastatin (LIPITOR) 40 mg tablet Take 40 mg by mouth nightly. glimepiride (AMARYL) 4 mg tablet Take 2 mg by mouth Daily (before breakfast). ondansetron (ZOFRAN ODT) 4 mg disintegrating tablet Take 1 Tab by mouth every eight (8) hours as needed for Nausea. Qty: 20 Tab, Refills: 0      methotrexate (RHEUMATREX) 2.5 mg tablet Take 7.5 mg by mouth Every Thursday. Patient takes three tablets which is 7.5 mg every Thursday      senna-docusate (SENNA PLUS) 8.6-50 mg per tablet Take 1 Tab by mouth daily. STOP taking these medications       nystatin (MYCOSTATIN) powder Comments:   Reason for Stopping:         losartan-hydroCHLOROthiazide (HYZAAR) 100-25 mg per tablet Comments:   Reason for Stopping:         hydrocortisone (HYTONE) 2.5 % topical cream Comments:   Reason for Stopping:         baclofen (LIORESAL) 10 mg tablet Comments:   Reason for Stopping:         carbamide peroxide (DEBROX) 6.5 % otic solution Comments:   Reason for Stopping:               Chronic Diagnoses:    Problem List as of 5/17/2018  Date Reviewed: 5/14/2018          Codes Class Noted - Resolved    Hypercalcemia ICD-10-CM: E83.52  ICD-9-CM: 275.42  5/15/2018 - Present        Mass of lung parenchyma ICD-10-CM: R91.8  ICD-9-CM: 793.19  5/15/2018 - Present        Pure hypercholesterolemia ICD-10-CM: E78.00  ICD-9-CM: 272.0  5/15/2018 - Present        Diastolic dysfunction XYT-91-RS: I51.9  ICD-9-CM: 429.9  5/15/2018 - Present        EDER (acute kidney injury) ([facility] ICD-10-CM: N17.9  ICD-9-CM: 584.9  5/15/2018 - Present        Hematoma of groin ICD-10-CM: S30. 1XXA  ICD-9-CM: 922.2  4/30/2018 - Present    Overview Signed 4/30/2018 10:20 AM by Ro Melo MD     Left.              Type 2 diabetes mellitus with nephropathy ([facility] ICD-10-CM: E11.21  ICD-9-CM: 250.40, 583.81  1/2/2018 - Present        Recurrent depression ([facility] ICD-10-CM: F33.9  ICD-9-CM: 296.30  1/2/2018 - Present        Type 2 diabetes mellitus without complication, without long-term current use of insulin ([facility] ICD-10-CM: E11.9  ICD-9-CM: 250.00  4/13/2017 - Present        Advanced care planning/counseling discussion ICD-10-CM: Z71.89  ICD-9-CM: V65.49  2/15/2017 - Present    Overview Signed 2/15/2017 10:58 AM by Wai Santiago NP     Patient does have Will and POA, will brind documents for chart when she can find them             DVT (deep venous thrombosis) ([facility] ICD-10-CM: I82.409  ICD-9-CM: 453.40  2/29/2016 - Present        CRI (chronic renal insufficiency) ICD-10-CM: N18.9  ICD-9-CM: 585.9  10/4/2011 - Present        Asymptomatic carotid artery stenosis without infarction (Chronic) ICD-10-CM: J79.76  ICD-9-CM: 433.10  2/22/2011 - Present        Diverticulosis ICD-10-CM: K57.90  ICD-9-CM: 562.10  6/9/2010 - Present        IBS (irritable bowel syndrome) ICD-10-CM: K58.9  ICD-9-CM: 564.1  6/9/2010 - Present        Depressive disorder, not elsewhere classified ICD-10-CM: F32.9  ICD-9-CM: 103  6/9/2010 - Present        Mixed hyperlipidemia ICD-10-CM: E78.2  ICD-9-CM: 272.2  6/9/2010 - Present        DJD (degenerative joint disease) ICD-10-CM: M19.90  ICD-9-CM: 715.90  6/9/2010 - Present        PUD (peptic ulcer disease) ICD-10-CM: K27.9  ICD-9-CM: 533.90  6/9/2010 - Present        Allergic rhinitis due to other allergen ICD-10-CM: J30.89  ICD-9-CM: 477.8  6/9/2010 - Present        Proteinuria ICD-10-CM: R80.9  ICD-9-CM: 791.0  6/9/2010 - Present        RA (rheumatoid arthritis) ([facility] ICD-10-CM: M06.9  ICD-9-CM: 714.0  6/9/2010 - Present        Essential hypertension ICD-10-CM: I10  ICD-9-CM: 401.9  6/9/2010 - Present        RESOLVED: DM (diabetes mellitus) ([facility] ICD-10-CM: E11.9  ICD-9-CM: 250.00  6/9/2010 - 4/13/2017            Signed:      Zhen Zuñiga DO   Family Medicine Resident      5/17/2018   1:01 PM     Cc:  Frank Barclay MD Encounter Diagnoses     ICD-10-CM ICD-9-CM   1. Hypercalcemia E83.52 275.42   2. Fatigue, unspecified type R53.83 780.79   3. EDER (acute kidney injury) ([facility] N17.9 584.9   4.  Mass of lung parenchyma R91.8 793.19

## 2018-05-17 NOTE — DISCHARGE INSTRUCTIONS
HOME DISCHARGE INSTRUCTIONS    Mi Clement / [id number] : 1935    Admission date: 5/15/2018 Discharge date: 2018     Please bring this form with you to show your care provider at your follow-up appointment. Primary care provider:  Sonja Knutson MD    Discharging provider:  Nicolette Junior, [address] [facility] Resident  Dr. Leann Barntet MD - Attending, Family Medicine     You have been admitted to the hospital with the following diagnoses:    ACUTE DIAGNOSES:  Hypercalcemia  . . . . . . . . . . . . . . . . . . . . . . . . . . . . . . . . . . . . . . . . . . . . . . . . . . . . . . . . . . . . . . . . . . . . . . . Jocelyn Larson FOLLOW-UP CARE RECOMMENDATIONS:    Appointments  Follow-up Information     Follow up With Details Comments Contact Info    Sonja Knutson MD [place] [facility] [place] 2:10PM [address] [facility]  [phone]      Stanford Cruz MD Schedule an appointment as soon as possible for a visit in 1 week Nurse Coordinator will call to schedule appt in next week. [address] [place] [address]  [phone]          Med Changes  - Stop taking your Hyzaar - it could cause your calcium to be higher  - Start taking Norvasc 10mg, 1 tab daily  - You can continue taking your Methotrexate but please let your Oncologist know you are still taking this medicine so they can decide if you should be on it. - It is very important for you to follow up closely with your blood doctor (hematology) and Dr. Oly Medina. Keep these appointments. Follow-up tests needed: Repeat BMP, PET/CT outpatient    Pending test results: At the time of your discharge the following test results are still pending: PTHrP, ACE, SPEP, UPEP. Please make sure you review these results with your outpatient follow-up provider(s).     Specific symptoms to watch for: chest pain, shortness of breath, fever, chills, nausea, vomiting, diarrhea, change in mentation, falling, weakness, bleeding. DIET/what to eat:  Renal Diet with diabetic consistent carb and low Ca    ACTIVITY:  Per [facility] PT/OT    Wound care: None    Equipment needed:  Poli Frame    What to do if new or unexpected symptoms occur? If you experience any of the above symptoms (or should other concerns or questions arise after discharge) please call your primary care physician. Return to the emergency room if you cannot get hold of your doctor. · It is very important that you keep your follow-up appointment(s). · Please bring discharge papers, medication list (and/or medication bottles) to your follow-up appointments for review by your outpatient provider(s). · Please check the list of medications and be sure it includes every medication (even non-prescription medications) that your provider wants you to take. · It is important that you take the medication exactly as they are prescribed. · Keep your medication in the bottles provided by the pharmacist and keep a list of the medication names, dosages, and times to be taken in your wallet. · Do not take other medications without consulting your doctor. · If you have any questions about your medications or other instructions, please talk to your nurse or care provider before you leave the hospital.     Information obtained by:     I understand that if any problems occur once I am at home I am to contact my physician. These instructions were explained to me and I had the opportunity to ask questions. I understand and acknowledge receipt of the instructions indicated above.                                                                                                                                                Physician's or R.N.'s Signature                                                                  Date/Time Patient or Representative Signature                                                          Date/Time

## 2018-05-17 NOTE — PROGRESS NOTES
1118  Report received from Ramez Adhikari pt denies any pain lying in bed call bell within reach     0855  Pt given meds without difficulty     1007  Labs drawn     1107  Pt sitting in bed spouse at bedside     1226  Discharge instructions given by Leroy Arceo RN

## 2018-05-17 NOTE — PROGRESS NOTES
[facility] [place] [address] [address] [facility]  Albertina Pipe: [phone]  F: [phone]      Reason for Visit:   Chaka Mittal is a 81 y.o. female who is seen in consultation at the request of Dr. Olimpia Escalona  for evaluation of new lung mass in the setting of hypercalcemia and prior PE 1 yr ago. Michelle Cardoso History of Present Illness:     Ms Paz Tinsley presented to the ED on 5/15/2018 after being directed there for elevated calcium of 14.5. ED  Labs Ca 13.4 Mg 1.0  Therefore she was admitted for further eval and management. Ms Paz Tinsley reports noticed area to left groin in Jan; had ultrasound and was told it was a fatty tissue or blood clot. In April area started growing; was referred to a surgeon/ Dr Manuel Grace; recommended stopping coumadin and IVC filter was placed on 5/07/2018. Hx of previous clot 3-4 yrs ago; in left groin area and in both lungs. Was started on coumadin and remained on it until 2 weeks ago. 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 Unsure of cause. 1998 had clot in rt shoulder was hospitalized; started on heparin and then treated with coumadin for about 3 years. Denies any travel. Recent surgery or hormone replacement associated with dx of clots. Follows with Dr Indira Apple with [facility] on a regular basis; was seen last week.  at beside. Interval History:   Going home today. No complaints at present. Aware needs to follow with Dr Indira Apple.  and family  at bedside. Past Medical History:   Diagnosis Date    Allergies     Asymptomatic carotid artery stenosis without infarction 2/22/2011    Depression     Diverticulosis     DJD (degenerative joint disease)     DVT (deep venous thrombosis) ([address] 2016    DVT RLE.     GERD (gastroesophageal reflux disease)     HTN     Hypercholesterolemia     Mammography less than 12 months ago     NIDDM     Obesity (BMI 30.0-34. 9)     PE (pulmonary thromboembolism) ([facility] [address] 2017    B/L.  Proteinuria       Past Surgical History:   Procedure Laterality Date    ENDOSCOPY, COLON, DIAGNOSTIC  2008    normal    HX APPENDECTOMY      With JAYME.  HX HERNIA REPAIR      ? Umbilical hernia repair.  HX HYSTERECTOMY      HX KNEE REPLACEMENT Left     HX KNEE REPLACEMENT Right     HX SPLENECTOMY      Lap splenectomy. Social History   Substance Use Topics    Smoking status: Never Smoker    Smokeless tobacco: Never Used    Alcohol use No      Family History   Problem Relation Age of Onset    Hypertension Father     Cancer Sister      breast    Diabetes Brother      Current Facility-Administered Medications   Medication Dose Route Frequency    potassium, sodium phosphates (NEUTRA-PHOS) packet 1 Packet  1 Packet Oral QID    docusate sodium (COLACE) capsule 100 mg  100 mg Oral BID    acetaminophen (TYLENOL) tablet 500 mg  500 mg Oral Q6H PRN    amLODIPine (NORVASC) tablet 10 mg  10 mg Oral DAILY    sodium chloride (NS) flush 5-10 mL  5-10 mL IntraVENous Q8H    sodium chloride (NS) flush 5-10 mL  5-10 mL IntraVENous PRN    heparin (porcine) injection 5,000 Units  5,000 Units SubCUTAneous Q8H    0.9% sodium chloride infusion  150 mL/hr IntraVENous CONTINUOUS    insulin lispro (HUMALOG) injection   SubCUTAneous Q6H    glucose chewable tablet 16 g  4 Tab Oral PRN    dextrose (D50W) injection syrg 12.5-25 g  12.5-25 g IntraVENous PRN    glucagon (GLUCAGEN) injection 1 mg  1 mg IntraMUSCular PRN    atorvastatin (LIPITOR) tablet 40 mg  40 mg Oral DAILY    pantoprazole (PROTONIX) 40 mg in sodium chloride 0.9% 10 mL injection  40 mg IntraVENous DAILY      Allergies   Allergen Reactions    Sulfa (Sulfonamide Antibiotics) Other (comments)        Review of Systems: A complete review of systems was obtained, negative except as described above.       Physical Exam:     Visit Vitals    /68 (BP 1 Location: Left arm, BP Patient Position: At rest)    Pulse 84    Temp 98.3 °F (36.8 °C)    Resp 18    Ht 5' 1\" (1.549 m)    Wt 87.5 kg (192 lb 14.4 oz)    SpO2 97%    BMI 36.45 kg/m2     ECOG PS: 1  General: No distress  Eyes: PERRLA, anicteric sclerae  HENT: Atraumatic with normal appearance of ears and nose; OP clear  Neck: Supple; no thyromegaly   Lymphatic: large left groin mass noted; Respiratory: CTAB, normal respiratory effort  CV: Normal rate, regular rhythm, no murmurs, no peripheral edema  GI: Soft, nontender, nondistended, no masses, no hepatomegaly, no splenomegaly  MS:  Digits without clubbing or cyanosis. Skin: No rashes, ecchymoses, or petechiae. Normal temperature, turgor, and texture. Neuro/Psych: Alert, oriented, appropriate affect, normal judgment/insight      Results:     Lab Results   Component Value Date/Time    WBC 12.2 (H) 05/17/2018 01:12 AM    HGB 9.8 (L) 05/17/2018 01:12 AM    HCT 30.0 (L) 05/17/2018 01:12 AM    PLATELET 332 14/95/1252 01:12 AM    MCV 93.8 05/17/2018 01:12 AM    ABS.  NEUTROPHILS 6.5 05/17/2018 01:12 AM    Hemoglobin (POC) 13.3 02/19/2016 08:35 PM    Hematocrit (POC) 39 02/19/2016 08:35 PM     Lab Results   Component Value Date/Time    Sodium 139 05/17/2018 01:12 AM    Potassium 3.7 05/17/2018 01:12 AM    Chloride 109 (H) 05/17/2018 01:12 AM    CO2 20 (L) 05/17/2018 01:12 AM    Glucose 80 05/17/2018 01:12 AM    BUN 24 (H) 05/17/2018 01:12 AM    Creatinine 1.76 (H) 05/17/2018 01:12 AM    GFR est AA 34 (L) 05/17/2018 01:12 AM    GFR est non-AA 28 (L) 05/17/2018 01:12 AM    Calcium 11.7 (H) 05/17/2018 01:12 AM    Sodium (POC) 138 02/19/2016 08:35 PM    Potassium (POC) 4.1 02/19/2016 08:35 PM    Chloride (POC) 101 02/19/2016 08:35 PM    Glucose (POC) 90 05/17/2018 07:03 AM    BUN (POC) 38 (H) 02/19/2016 08:35 PM    Creatinine (POC) 1.5 (H) 05/11/2017 08:53 AM    Calcium, ionized (POC) 1.39 (H) 02/19/2016 08:35 PM     Lab Results   Component Value Date/Time    Bilirubin, total 0.5 05/14/2018 11:53 AM    ALT (SGPT) 14 05/14/2018 11:53 AM    AST (SGOT) 19 05/14/2018 11:53 AM    Alk. phosphatase 68 05/14/2018 11:53 AM    Protein, total 6.4 05/14/2018 11:53 AM    Albumin 4.0 05/14/2018 11:53 AM    Globulin 3.1 10/12/2010 07:54 AM     Lab Results   Component Value Date/Time    Reticulocyte count 1.6 05/16/2018 09:47 AM    Iron % saturation 18 (L) 05/16/2018 09:47 AM    TIBC 292 05/16/2018 09:47 AM    Ferritin 62 05/16/2018 09:47 AM    Vitamin B12 370 05/16/2018 09:47 AM    Folate 30.7 (H) 05/16/2018 09:47 AM    BOBBY Poly POS 05/15/2018 11:23 AM     05/16/2018 09:47 AM    TSH 2.540 05/14/2018 11:53 AM     Lab Results   Component Value Date/Time    INR 1.1 05/15/2018 11:23 AM     5/15/2018 US EXT NONVAS  IMPRESSION: Heterogeneous soft tissue abnormality is noted in the left inguinal  region. As there was a similar appearing lesion on the prior ultrasound  1/8/2018, clinical correlation is recommended with regards to any resolution of  the previously noted abnormality as well as correlation with the acuteness of  the new palpable abnormality. Mass lesion cannot be excluded on the basis of  sonographic imaging.    5/01/2018 CT ABS PELV WO CONT  impression: Prior splenectomy. Large left groin lesion likely hematoma. No  intra-abdominal components. Incidentals as above. 5/15/2018 CT CHEST WO CONT  IMPRESSION:  Parenchymal abnormality in the left apex has increased in size, concerning for  neoplasm. This can be further evaluated with PET/CT. Otherwise no acute  abnormality is identified. Assessment and Recommendations:   1. Left Lung lesion  Noted on CT scan; concern for neoplasm:   Recommend outpatient PET/CT for further evaluate; will be obtained by Dr nAa Duong as out patient. Have discussed with Dr Ana Duong and aware. 2. Hypercalcemia  improved this am  Nephrology following  PTH low; not hyperparathyroidism  PTHrP pending  IVFs  Consider bisphosphonate, pending nephrology workup      3. Left groin mass  Unclear etiology: likely hematoma per CT imaging on 5/01/2018  Evaluated  by surgery as out patient   Coumadin stopped and IVC filter placed 5/07/2018  Will follow with surgery      4. Hypomagnesemia  resolved  Receiving repletion      5. EDER  Nephrology following      Records reviewed from [facility]: follows with Dr Diandra Treviño; last seen on 5/10/2018; Follows for h/o AHA and recurrent DVT and PE. First clot 1998 s/p splenectomy. Also s/p DVT to RLE 2/2016; CTA 2017 showed bilateral multiple PE, pancreatic mass possible in tail, hepatic granulomas. 5/16/2018 Spoke with Dr David Busch and communicated with Dr Diandra Treviño via text regarding Ms Parks's current admission and new findings in the lung and hypercalcemia with recommendation for PET scan. Dr Diandra Treviño will see as out patient upon discharge and obtain imaging. Ms Jose Hanson would like to make her follow up with Dr Diandra Treviño once she has gotten home and looked at her calendar. Will make the appt for next week.      Plan reviewed with Dr Flex Krishnamurthy By: Eron Frederick NP

## 2018-05-17 NOTE — PROGRESS NOTES
Discharge instructions, including information on new medication, were reviewed with patient and her . All questions were answered, IV and heart monitor were removed. Patient received a copy of her discharge papers and prescription and will be discharged home with her . Case Management set up home health through [facility] and also ordered a rolling walker.

## 2018-05-17 NOTE — PROGRESS NOTES
[address] [facility]  YOB: 1935          Assessment & Plan:   1. EDER  · Cr up from 1.5 to 2.1 mg, better at 1.8 mg with correction of Ca and IVF  · Not at baseline yet  · DD: Hypercalcemia causing vasocontriction, HTN ATN  · ON DC she will need labs in 3-4 days: wanda to DR. ZARATE: dw pt,family,team  · TSH is ok  2. CKD 3  · : cr 1.6 May 10,2018  · DM,HTN nephrosclerosis  3. Hypercalcemia : non parathyroid mediated   · PTH supressed  · Off HCTZ  · Await PTHRP  · Normal Vit D and 1 25 vit d  · Immunofixation pending  · This degree of calcium elevation can not be explained on the bases of HCTZ  ·    4. HypoMg/K  · Replete, repeat  · Mg interfere with PTH secretion   · Mg might be low due to Urine loss:? PPI vs thiazide. 5. HTN  · Urgency  · Resumed home meds except ARB/Thiazide  · Increase Amlodpine, once cr better, resume ARB  6. DM  7. S/P IVC       Subjective:   CC:arf  HPI: Patient seen   EDER is severe, cr betterBUT NOT at basline  HTN : Not better, required hydralazine. now better  Ca is better  Feels better  Poor sleep  ROS:neg for 12 sys  Current Facility-Administered Medications   Medication Dose Route Frequency    potassium, sodium phosphates (NEUTRA-PHOS) packet 1 Packet  1 Packet Oral QID    docusate sodium (COLACE) capsule 100 mg  100 mg Oral BID    acetaminophen (TYLENOL) tablet 500 mg  500 mg Oral Q6H PRN    amLODIPine (NORVASC) tablet 10 mg  10 mg Oral DAILY    sodium chloride (NS) flush 5-10 mL  5-10 mL IntraVENous Q8H    sodium chloride (NS) flush 5-10 mL  5-10 mL IntraVENous PRN    heparin (porcine) injection 5,000 Units  5,000 Units SubCUTAneous Q8H    0.9% sodium chloride infusion  150 mL/hr IntraVENous CONTINUOUS    insulin lispro (HUMALOG) injection   SubCUTAneous Q6H    glucose chewable tablet 16 g  4 Tab Oral PRN    dextrose (D50W) injection syrg 12.5-25 g  12.5-25 g IntraVENous PRN    glucagon (GLUCAGEN) injection 1 mg  1 mg IntraMUSCular PRN    atorvastatin (LIPITOR) tablet 40 mg  40 mg Oral DAILY    pantoprazole (PROTONIX) 40 mg in sodium chloride 0.9% 10 mL injection  40 mg IntraVENous DAILY          Objective:     Vitals:  Blood pressure 148/68, pulse 84, temperature 98.3 °F (36.8 °C), resp. rate 18, height 5' 1\" (1.549 m), weight 87.5 kg (192 lb 14.4 oz), SpO2 97 %. Temp (24hrs), Av.4 °F (36.9 °C), Min:98.1 °F (36.7 °C), Max:98.6 °F (37 °C)      Intake and Output:     05/15 1901 -  0700  In: 43520 [P.O.:2400; I.V.:8230]  Out: 3945 [Urine:3280]    Physical Exam:                Patient is intubated:  no    Physical Examination:   GENERAL ASSESSMENT: NAD  HEENT:Nontraumatic   CHEST: CTA  HEART: S1S2  ABDOMEN: Soft,NT,  :Ram: n  EXTREMITY: EDEMA n  NEURO:Grossly non focal          ECG/rhythm:    Data Review      No results for input(s): TNIPOC in the last 72 hours. No lab exists for component: ITNL   No results for input(s): CPK, CKMB, TROIQ in the last 72 hours.   Recent Labs      18   1007  18   0112  18   1719  18   0947  18   0215   05/15/18   1723   05/15/18   1123  05/15/18   0903  18   1153   NA  137  139  140  141  139   < >   --    < >   --   137  143   K  4.3  3.7  4.2  4.0  3.2*   < >   --    < >   --   4.0  4.7   CL  108  109*  109*  109*  106   < >   --    < >   --   101  101   CO2  21  20*  24  23  26   < >   --    < >   --   25  24   BUN  21*  24*  23*  23*  26*   < >   --    < >   --   29*  26   CREA  1.80*  1.76*  1.84*  1.82*  1.94*   < >   --    < >   --   2.10*  1.57*   GLU  151*  80  106*  114*  111*   < >   --    < >   --   249*  135*   PHOS   --   2.4*   --    --   3.0   --    --    --   3.6   --    --    MG   --   1.6   --    --   1.3*   --   1.8   < >  1.0*   --    --    CA  11.6*  11.7*  12.4*  12.5*  12.3*   < >   --    < >  13.4*  14.0*  14.5*   ALB   --    --    --    --    --    --    --    --    --    --   4.0   WBC   -- 12.2*   --    --   12.2*   --    --    --    --   15.2*  13.1*   HGB   --   9.8*   --   10.7*  9.8*   --    --    --    --   12.1  12.4   HCT   --   30.0*   --   32.4*  30.1*   --    --    --    --   36.8  38.4   PLT   --   234   --    --   220   --    --    --    --   265  276    < > = values in this interval not displayed. Recent Labs      05/15/18   1123   INR  1.1   PTP  11.4*     Needs: urine analysis, urine sodium, protein and creatinine  Lab Results   Component Value Date/Time    Creatinine, urine 178.0 03/13/2018 10:15 AM         Discussed with:  Family, Staff, Colleague, Nursing    : Niharika Healy MD  5/17/2018        [facility]:  [email or web address]. [email or web address]  [place] office:  [address], [address] [place] [address],[address]  [address]  Phone: [phone]  Fax :     [phone]    [place] office:  [address] [facility], [address] [place] [address]  Phone - [phone]  Fax - [phone]

## 2018-05-17 NOTE — PROGRESS NOTES
Beginning of shift: Bedside and Verbal shift change report given to Mele Simental RN (oncoming nurse) by FAN Haknins and FAN Danielle (offgoing nurse). Report included the following information SBAR, Kardex, Intake/Output, MAR, Accordion, Recent Results, Med Rec Status and Cardiac Rhythm NSR.     1926  Initial assessment completed. Initially, pt was in bed asleep. She was easily aroused. Denied any pain and/or complaints. Asked to be assisted to the bedside commode. Output documented. Ambulated well, w/ slight unsteadiness. Assisted her back to the bed. No other needs requested at this time. 1958  Spoke w/ [employer] from [facility] blood bank regarding pt's hx of transfusion and antibodies. Informed her that I will speak to pt and get back to her. 2011  Returned phone call to [employer] after speaking to pt. Per pt, she recalls one incident of blood transfusion in [id number] at [place] [address] (she first mentioned [facility]) for a \"rare blood disease. \"  She mentioned being told that she has hemolytic anemia and had to have the blood transfusion ceased early d/t her \"antibodies attacking the blood. \"  She denies having any other transfusion anywhere else. [employer] has been informed and no other questions asked. 2142  Assisted pt to bedside commode and back to bed. Gait steady. Well tolerated. Output documented. 2329  Assisted pt to bedside commode and back into bed. Gait steady. Well tolerated. Output documented. End of shift: Bedside and Verbal shift change report given to Crescencio Tapia RN (oncoming nurse) by Mele Simental RN (offgoing nurse). Report included the following information SBAR, Kardex, Intake/Output, MAR, Accordion, Recent Results, Med Rec Status and Cardiac Rhythm NSR.

## 2018-05-18 ENCOUNTER — PATIENT OUTREACH (OUTPATIENT)
Dept: FAMILY MEDICINE CLINIC | Age: 83
End: 2018-05-18

## 2018-05-18 NOTE — PROGRESS NOTES
Hospital Discharge Follow-Up      Date/Time:  2018 1:48 PM    Patient was admitted to [facility] on 5/15/18 and discharged on 18 for hypercalcemia. The physician discharge summary was available at the time of outreach. Patient was contacted within 1 business days of discharge. Top Challenges reviewed with the provider     Pending LABS at the time of Discharge: SPEP, UPEP, ACE, PTHrP  Labs Need to Repeat by PCP: BMP  Needs PET/CT outpatient       Method of communication with provider :chart routing    Inpatient RRAT score: 39  Was this a readmission? no   Patient stated reason for the readmission: n/a    Nurse Navigator (NN) contacted the patient by telephone to perform post hospital discharge assessment. Verified name and  with patient as identifiers. Provided introduction to self, and explanation of the Nurse Navigator role. Reviewed discharge instructions and red flags with patient who verbalized understanding. Patient given an opportunity to ask questions and does not have any further questions or concerns at this time. The patient agrees to contact the PCP office for questions related to their healthcare. NN provided contact information for future reference. Summary of patients top problems: 1. Severe Hypercalcemia in the setting of EDER and New Lung Mass, asx - Cr 2.1 on admission (BL 1.38). CT Chest shows parenchymal abnormality in L apex, concerning for neoplasm. Needs outpatient PET/CT. On day of discharge Cr 1.76, Ca 11.7. Repeat BMP on Monday. Patient advised to follow renal diet, drink plenty of fluids, and stop Hyzaar. 2. L Groin Hematoma - CT Abd showed large L groin lesion likely hematoma with no intra abdominal components. Possible mass lesion. 3. Diabetes Mellitus - HgA1c 7.4 (3/13/18). On Glimepride and Tradjenta.     Home Health orders at discharge: Physical Therapy and [address] [place] [address]: [facility]  Date of initial visit: upcoming    Durable Medical Equipment ordered/company: [employer]  Durable Medical Equipment received: Rolling Walker    Barriers to care? None identified    Advance Care Planning:   Does patient have an Advance Directive:  not on file       Medication:     New Medications at Discharge: norvasc  Changed Medications at Discharge: none  Discontinued Medications at Discharge: hyzaar, nystatin, hydrocortisone, debrox, baclofen    Medication reconciliation was performed with patient, who verbalizes understanding of administration of home medications. There were no barriers to obtaining medications identified at this time. Referral to Pharm D needed: no     Current Outpatient Prescriptions   Medication Sig    amLODIPine (NORVASC) 10 mg tablet Take 1 Tab by mouth daily.  loratadine (CLARITIN) 10 mg tablet Take 10 mg by mouth nightly.  esomeprazole (NEXIUM) 20 mg capsule Take 20 mg by mouth daily.  fluticasone (FLONASE ALLERGY RELIEF) 50 mcg/actuation nasal spray 1 [place] by Both Nostrils route daily as needed for Rhinitis.  linagliptin (TRADJENTA) 5 mg tablet Take 5 mg by mouth daily (with dinner).  folic acid (FOLVITE) 1 mg tablet TAKE ONE TABLET BY MOUTH ONCE DAILY. *GENERIC FOR FOLVITE    atorvastatin (LIPITOR) 40 mg tablet Take 40 mg by mouth nightly.  glimepiride (AMARYL) 4 mg tablet Take 2 mg by mouth Daily (before breakfast).  ondansetron (ZOFRAN ODT) 4 mg disintegrating tablet Take 1 Tab by mouth every eight (8) hours as needed for Nausea.  methotrexate (RHEUMATREX) 2.5 mg tablet Take 7.5 mg by mouth Every Thursday. Patient takes three tablets which is 7.5 mg every Thursday    senna-docusate (SENNA PLUS) 8.6-50 mg per tablet Take 1 Tab by mouth daily. No current facility-administered medications for this visit. There are no discontinued medications.     PCP/Specialist follow up:   Future Appointments  Date Time Provider Elizabeth Meme   5/21/2018 2:10 PM Anders Blas MD [facility] SCHED   5/30/2018 10:20 AM Nohemi He MD [facility] Sebastian Ballard Attends follow-up appointments as directed. 5/18/18 - Patient scheduled for PCP follow-up on 5/21/18. Patient states she will be calling to schedule appointment with Dr. Jovita Thayer (heme/onc) after speaking with NN. Patient will also be seen by [address]. Called [facility] to verify referral; representative stated they will be calling the patient to schedule initial visit this evening or tomorrow morning. Patient will attend all follow-up appointments. KODI       Patient follows diet plan            5/18/18 - Discussed diabetic renal diet. Patient has been reading information online and states she has a friend who is a retired dietician who she will reach out to for more information. Patient will start following diet plan and will be able to verbalize foods she should and should not eat at next follow-up call. Plan to discuss monitoring blood sugars at appointment on 5/21/18. KODI       Supportive resources in place to maintain patient in the community            5/18/18 - Will monitor for outcome of appointment with heme/onc and CT/PET scan. Will offer support/resources as appropriate. Also plan to discuss Advanced Care Planning at appointment on 5/21/18.  KODI

## 2018-05-19 ENCOUNTER — HOME CARE VISIT (OUTPATIENT)
Dept: SCHEDULING | Facility: HOME HEALTH | Age: 83
End: 2018-05-19
Payer: MEDICARE

## 2018-05-19 VITALS
OXYGEN SATURATION: 97 % | DIASTOLIC BLOOD PRESSURE: 78 MMHG | RESPIRATION RATE: 18 BRPM | TEMPERATURE: 98.1 F | SYSTOLIC BLOOD PRESSURE: 138 MMHG | HEART RATE: 69 BPM

## 2018-05-19 LAB
ABO + RH BLD: NORMAL
ANTI-COMPLEMENT (C3B,C3D): NORMAL
ANTIGENS PRESENT BLD: NORMAL
ANTIGENS PRESENT RBC DONR: NORMAL
ANTIGENS PRESENT RBC DONR: NORMAL
BLD GP AB SCN SERPL QL ELUTION: NORMAL
BLD PROD TYP BPU: NORMAL
BLD PROD TYP BPU: NORMAL
BLOOD BANK CMNT PATIENT-IMP: NORMAL
BLOOD GROUP ANTIBODIES SERPL: NORMAL
BLOOD GROUP ANTIBODIES SERPL: NORMAL
BPU ID: NORMAL
BPU ID: NORMAL
CROSSMATCH RESULT,%XM: NORMAL
CROSSMATCH RESULT,%XM: NORMAL
DAT IGG-SP REAG RBC QL: NORMAL
DAT POLY-SP REAG RBC QL: NORMAL
SPECIMEN EXP DATE BLD: NORMAL
STATUS OF UNIT,%ST: NORMAL
STATUS OF UNIT,%ST: NORMAL
UNIT DIVISION, %UDIV: 0
UNIT DIVISION, %UDIV: 0

## 2018-05-19 PROCEDURE — 3331090003 HH PPS REVENUE ADJ

## 2018-05-19 PROCEDURE — G0299 HHS/HOSPICE OF RN EA 15 MIN: HCPCS

## 2018-05-19 PROCEDURE — 3331090001 HH PPS REVENUE CREDIT

## 2018-05-19 PROCEDURE — 400013 HH SOC

## 2018-05-19 PROCEDURE — 3331090002 HH PPS REVENUE DEBIT

## 2018-05-20 PROCEDURE — 3331090001 HH PPS REVENUE CREDIT

## 2018-05-20 PROCEDURE — 3331090002 HH PPS REVENUE DEBIT

## 2018-05-21 ENCOUNTER — OFFICE VISIT (OUTPATIENT)
Dept: FAMILY MEDICINE CLINIC | Age: 83
End: 2018-05-21

## 2018-05-21 ENCOUNTER — HOSPITAL ENCOUNTER (OUTPATIENT)
Dept: LAB | Age: 83
Discharge: HOME | End: 2018-05-21
Payer: MEDICARE

## 2018-05-21 ENCOUNTER — PATIENT OUTREACH (OUTPATIENT)
Dept: FAMILY MEDICINE CLINIC | Age: 83
End: 2018-05-21

## 2018-05-21 VITALS
DIASTOLIC BLOOD PRESSURE: 71 MMHG | HEART RATE: 109 BPM | HEIGHT: 61 IN | TEMPERATURE: 97.9 F | WEIGHT: 192 LBS | SYSTOLIC BLOOD PRESSURE: 142 MMHG | OXYGEN SATURATION: 97 % | BODY MASS INDEX: 36.25 KG/M2 | RESPIRATION RATE: 17 BRPM

## 2018-05-21 DIAGNOSIS — E83.52 HYPERCALCEMIA: Primary | ICD-10-CM

## 2018-05-21 DIAGNOSIS — N17.9 AKI (ACUTE KIDNEY INJURY) (HCC): ICD-10-CM

## 2018-05-21 DIAGNOSIS — I10 ESSENTIAL HYPERTENSION: ICD-10-CM

## 2018-05-21 DIAGNOSIS — I82.4Y9 DEEP VEIN THROMBOSIS (DVT) OF PROXIMAL LOWER EXTREMITY, UNSPECIFIED CHRONICITY, UNSPECIFIED LATERALITY (HCC): ICD-10-CM

## 2018-05-21 DIAGNOSIS — R91.8 MASS OF LUNG PARENCHYMA: ICD-10-CM

## 2018-05-21 PROBLEM — E66.01 SEVERE OBESITY (BMI 35.0-39.9) WITH COMORBIDITY (HCC): Status: ACTIVE | Noted: 2018-05-21

## 2018-05-21 LAB
ALBUMIN 24H MFR UR ELPH: 100 %
ALPHA1 GLOB 24H MFR UR ELPH: 0 %
ALPHA2 GLOB 24H MFR UR ELPH: 0 %
B-GLOBULIN MFR UR ELPH: 0 %
COLLECT DURATION TIME UR: 24 HR
GAMMA GLOB 24H MFR UR ELPH: 0 %
INTERPRETATION UR IFE-IMP: ABNORMAL
M PROTEIN 24H MFR UR ELPH: ABNORMAL %
NOTE, 149533: ABNORMAL
PROT 24H UR-MRATE: 243 MG/24 HR (ref 30–150)
PROT UR-MCNC: 10.8 MG/DL
PTH RELATED PROT SERPL-SCNC: <2 PMOL/L
SPECIMEN VOL ?TM UR: 2250 ML

## 2018-05-21 PROCEDURE — 80053 COMPREHEN METABOLIC PANEL: CPT

## 2018-05-21 PROCEDURE — 3331090002 HH PPS REVENUE DEBIT

## 2018-05-21 PROCEDURE — 3331090001 HH PPS REVENUE CREDIT

## 2018-05-21 NOTE — PROGRESS NOTES
Goals      Attends follow-up appointments as directed. 5/18/18 - Patient scheduled for PCP follow-up on 5/21/18. Patient states she will be calling to schedule appointment with Dr. Janie Naidu (heme/onc) after speaking with NN. Patient will also be seen by Patricia Edwards. Called [facility] to verify referral; representative stated they will be calling the patient to schedule initial visit this evening or tomorrow morning. Patient will attend all follow-up appointments. KODI    5/21/18 - Patient attended appointment today with Dr. Agus Hernandez. Scheduled to see heme/onc on 5/30/18. KODI       Patient follows diet plan            5/18/18 - Discussed diabetic renal diet. Patient has been reading information online and states she has a friend who is a retired dietician who she will reach out to for more information. Patient will start following diet plan and will be able to verbalize foods she should and should not eat at next follow-up call. Plan to discuss monitoring blood sugars at appointment on 5/21/18. KODI    5/21/18 - Met with patient at appointment and discussed diabetic renal diet. Patient will start counting carbohydrates. Will eat 45-60 carbs per meal and monitor blood sugars 2 hours after meals this week. Will discuss diet and blood sugars at next follow-up call. KODI       Supportive resources in place to maintain patient in the community            5/18/18 - Will monitor for outcome of appointment with heme/onc and CT/PET scan. Will offer support/resources as appropriate. Also plan to discuss Advanced Care Planning at appointment on 5/21/18. KODI    5/21/18 - Met with patient at [facility] appointment. Discussed Advanced Care Planning and blue [employer] information folder given. Patient will review and will discuss booking an appointment with [employer] Facilitator, Ivana Newton at next follow-up call.  KODI

## 2018-05-21 NOTE — MR AVS SNAPSHOT
[address] [address] 
[phone] Patient: Kailey Freeman MRN:  ENC:[id number] Visit Information Date & Time Provider Department Dept. Phone Encounter #  
 5/21/2018  2:10 PM Anibal Rojas MD [address] [place] [address] [phone] [id number] Follow-up Instructions Return if symptoms worsen or fail to improve. Your Appointments 5/30/2018 10:20 AM  
ESTABLISHED PATIENT with Anibal Rojas MD  
[address] [place] [address]-[facility]) Appt Note: 2 week follow up  
 [id number] [place] [address] [address] [address]  
[phone]  
  
   
 [address] [address] Upcoming Health Maintenance Date Due Influenza Age 5 to Adult 8/1/2018 HEMOGLOBIN A1C Q6M 9/13/2018 EYE EXAM RETINAL OR DILATED Q1 10/27/2018 FOOT EXAM Q1 3/13/2019 MICROALBUMIN Q1 3/13/2019 LIPID PANEL Q1 3/13/2019 MEDICARE YEARLY EXAM 3/14/2019 GLAUCOMA SCREENING Q2Y 10/27/2019 DTaP/Tdap/Td series (2 - Td) 10/30/2025 Allergies as of 5/21/2018  Review Complete On: 5/21/2018 By: Anibal Rojas MD  
  
 Severity Noted Reaction Type Reactions Sulfa (Sulfonamide Antibiotics)  06/09/2010    Other (comments) Current Immunizations  Reviewed on 2/29/2016 Name Date Influenza High Dose Vaccine PF 9/14/2016 Influenza Vaccine Split 11/8/2012, 11/22/2010 Pneumococcal Conjugate (PCV-13) 7/22/2015 Tdap 10/30/2015 Varicella Virus Vaccine Live 9/10/2011 ZZZ-RETIRED (DO NOT USE) Pneumococcal Vaccine (Unspecified Type) 11/8/2010 Not reviewed this visit You Were Diagnosed With   
  
 Codes Comments Hypercalcemia    -  Primary ICD-10-CM: J14.82 
ICD-9-CM: 275.42 EDER (acute kidney injury) ([facility]     ICD-10-CM: N17.9 ICD-9-CM: 584.9 Mass of lung parenchyma     ICD-10-CM: R91.8 ICD-9-CM: 793.19 Essential hypertension     ICD-10-CM: I10 
ICD-9-CM: 401.9 Deep vein thrombosis (DVT) of proximal lower extremity, unspecified chronicity, unspecified laterality (HCC)     ICD-10-CM: I82.4Y9 ICD-9-CM: 453.41 Vitals BP Pulse Temp Resp Height(growth percentile) Weight(growth percentile) 142/71 (!) 109 97.9 °F (36.6 °C) (Oral) 17 5' 1\" (1.549 m) 192 lb (87.1 kg) SpO2 BMI OB Status Smoking Status 97% 36.28 kg/m2 Postmenopausal Never Smoker BMI and BSA Data Body Mass Index Body Surface Area  
 36.28 kg/m 2 1.94 m 2 Preferred Pharmacy Pharmacy Name Phone [facility] [address] [facility], [address] [place] [address], [address] [phone] Your Updated Medication List  
  
   
This list is accurate as of 5/21/18  3:11 PM.  Always use your most recent med list. amLODIPine 10 mg tablet Commonly known as:  Nelsy Matar Take 1 Tab by mouth daily. atorvastatin 40 mg tablet Commonly known as:  LIPITOR Take 40 mg by mouth nightly. FLONASE ALLERGY RELIEF 50 mcg/actuation nasal spray Generic drug:  fluticasone 1 [place] by Both Nostrils route daily as needed for Rhinitis. folic acid 1 mg tablet Commonly known as:  FOLVITE  
TAKE ONE TABLET BY MOUTH ONCE DAILY. *GENERIC FOR FOLVITE  
  
 glimepiride 4 mg tablet Commonly known as:  AMARYL Take 2 mg by mouth Daily (before breakfast). linagliptin 5 mg tablet Commonly known as:  Sharon Josafat Take 5 mg by mouth daily (with dinner). loratadine 10 mg tablet Commonly known as:  Phyliss Kwadwo Take 10 mg by mouth nightly. methotrexate 2.5 mg tablet Commonly known as:  Carolina Dawood Take 7.5 mg by mouth Every Thursday. Patient takes three tablets which is 7.5 mg every Thursday NexIUM 20 mg capsule Generic drug:  esomeprazole Take 20 mg by mouth daily. ondansetron 4 mg disintegrating tablet Commonly known as:  ZOFRAN ODT Take 1 Tab by mouth every eight (8) hours as needed for Nausea. SENNA PLUS 8.6-50 mg per tablet Generic drug:  senna-docusate Take 1 Tab by mouth daily. We Performed the Following METABOLIC PANEL, COMPREHENSIVE [99602 CPT(R)] Follow-up Instructions Return if symptoms worsen or fail to improve. Introducing [facility]! Dear Pavan Greene: Thank you for requesting a [employer] account. Our records indicate that you already have an active [employer] account. You can access your account anytime at [email or web address] Did you know that you can access your hospital and ER discharge instructions at any time in Web Performance? You can also review all of your test results from your hospital stay or ER visit. Additional Information If you have questions, please visit the Frequently Asked Questions section of the Web [employer] website at [email or web address] Remember, Web [employer] is NOT to be used for urgent needs. For medical emergencies, dial 911. Now available from your iPhone and Android! Please provide this summary of care documentation to your next provider. Your primary care clinician is listed as MI PALMER. If you have any questions after today's visit, please call [phone].

## 2018-05-21 NOTE — PROGRESS NOTES
Chief Complaint   Patient presents with   [facility] Follow Up    Elevated Blood Calcium     [facility] ED :5/15/18-5/17/18     1. Have you been to the ER, urgent care clinic since your last visit? Hospitalized since your last visit? Yes Where: [facility] ED    2. Have you seen or consulted any other health care providers outside of the [address] [place] [address] since your last visit? Include any pap smears or colon screening. No      Here after DC, see DC summary and NN note    Feels ok, no concerns from pt, to see heme/onc in one week    BP rx changed      Chief Complaint   Patient presents with   [facility] Follow Up    Elevated Blood Calcium     [facility] ED :5/15/18-5/17/18     She is a 81 y.o. female who presents for evalution. Reviewed PmHx, RxHx, FmHx, SocHx, AllgHx and updated and dated in the chart. Patient Active Problem List    Diagnosis    Severe obesity (BMI 35.0-39. 9) with comorbidity ([address]    Hypercalcemia    Mass of lung parenchyma    Pure hypercholesterolemia    Diastolic dysfunction    EDER (acute kidney injury) ([address]    Hematoma of groin     Left.       Type 2 diabetes mellitus with nephropathy (HCC)    Recurrent depression ([address]    Type 2 diabetes mellitus without complication, without long-term current use of insulin ([address]    Advanced care planning/counseling discussion     Patient does have Will and POA, will brind documents for chart when she can find them      DVT (deep venous thrombosis) ([address]    CRI (chronic renal insufficiency)    Asymptomatic carotid artery stenosis without infarction    Diverticulosis    IBS (irritable bowel syndrome)    Depressive disorder, not elsewhere classified    Mixed hyperlipidemia    DJD (degenerative joint disease)    PUD (peptic ulcer disease)    Allergic rhinitis due to other allergen    Proteinuria    RA (rheumatoid arthritis) ([address]    Essential hypertension       Review of Systems - negative except as listed above in the HPI    Objective: Vitals:    05/21/18 1427   BP: 142/71   Pulse: (!) 109   Resp: 17   Temp: 97.9 °F (36.6 °C)   TempSrc: Oral   SpO2: 97%   Weight: 192 lb (87.1 kg)   Height: 5' 1\" (1.549 m)     Physical Examination: General appearance - alert, well appearing, and in no distress  Eyes - pupils equal and reactive, extraocular eye movements intact  Ears - bilateral TM's and external ear canals normal  Nose - normal and patent, no erythema, discharge or polyps  Mouth - mucous membranes moist, pharynx normal without lesions  Neck - supple, no significant adenopathy  Chest - clear to auscultation, no wheezes, rales or rhonchi, symmetric air entry  Heart - normal rate, regular rhythm, normal S1, S2, no murmurs, rubs, clicks or gallops  Abdomen - soft, nontender, nondistended, no masses or organomegaly  Neurological - alert, oriented, normal speech, no focal findings or movement disorder noted  Extremities - peripheral pulses normal, no pedal edema, no clubbing or cyanosis    Assessment/ Plan:   Diagnoses and all orders for this visit:    1. Hypercalcemia  -     METABOLIC PANEL, COMPREHENSIVE  -most likely due to cancer  -has pet scan and CT ordered  -pt to see heme/onc in one week  -no sxs    2. EDER (acute kidney injury) ([address]  -     METABOLIC PANEL, COMPREHENSIVE    3. Mass of lung parenchyma  -see above    4. Essential hypertension  -     METABOLIC PANEL, COMPREHENSIVE  -ok with recent rx changes    5. Deep vein thrombosis (DVT) of proximal lower extremity, unspecified chronicity, unspecified laterality ([address]  -off rx due to hematoma       Follow-up Disposition:  Return if symptoms worsen or fail to improve. I have discussed the diagnosis with the patient and the intended plan as seen in the above orders. The patient understands and agrees with the plan. The patient has received an after-visit summary and questions were answered concerning future plans.      Medication Side Effects and Warnings were discussed with patient  Patient Labs were reviewed and or requested:  Patient Past Records were reviewed and or requested    Jeffrey Kenny M.D. There are no Patient Instructions on file for this visit.

## 2018-05-21 NOTE — ACP (ADVANCE CARE PLANNING)
5/21/18 - Met with patient at [facility] appointment. Discussed Advanced Care Planning and blue [employer] information folder given. Patient will review and will discuss booking an appointment with [employer] Facilitator, Fernando Heart at next follow-up call.  KODI

## 2018-05-22 ENCOUNTER — DOCUMENTATION ONLY (OUTPATIENT)
Dept: FAMILY MEDICINE CLINIC | Age: 83
End: 2018-05-22

## 2018-05-22 LAB
ALBUMIN SERPL-MCNC: 3.9 G/DL (ref 3.5–4.7)
ALBUMIN/GLOB SERPL: 1.7 {RATIO} (ref 1.2–2.2)
ALP SERPL-CCNC: 63 IU/L (ref 39–117)
ALT SERPL-CCNC: 165 IU/L (ref 0–32)
AST SERPL-CCNC: 89 IU/L (ref 0–40)
BILIRUB SERPL-MCNC: 0.4 MG/DL (ref 0–1.2)
BUN SERPL-MCNC: 19 MG/DL (ref 8–27)
BUN/CREAT SERPL: 14 (ref 12–28)
CALCIUM SERPL-MCNC: 12.5 MG/DL (ref 8.7–10.3)
CHLORIDE SERPL-SCNC: 104 MMOL/L (ref 96–106)
CO2 SERPL-SCNC: 24 MMOL/L (ref 18–29)
CREAT SERPL-MCNC: 1.37 MG/DL (ref 0.57–1)
GFR SERPLBLD CREATININE-BSD FMLA CKD-EPI: 36 ML/MIN/1.73
GFR SERPLBLD CREATININE-BSD FMLA CKD-EPI: 41 ML/MIN/1.73
GLOBULIN SER CALC-MCNC: 2.3 G/DL (ref 1.5–4.5)
GLUCOSE SERPL-MCNC: 169 MG/DL (ref 65–99)
INTERPRETATION: NORMAL
POTASSIUM SERPL-SCNC: 4.5 MMOL/L (ref 3.5–5.2)
PROT SERPL-MCNC: 6.2 G/DL (ref 6–8.5)
SODIUM SERPL-SCNC: 140 MMOL/L (ref 134–144)

## 2018-05-22 PROCEDURE — 3331090001 HH PPS REVENUE CREDIT

## 2018-05-22 PROCEDURE — 3331090002 HH PPS REVENUE DEBIT

## 2018-05-22 NOTE — PROGRESS NOTES
Sebastian Choi attestation of record receipt & signature verification was put on Dr Linares Severe desk to process

## 2018-05-23 ENCOUNTER — DOCUMENTATION ONLY (OUTPATIENT)
Dept: FAMILY MEDICINE CLINIC | Age: 83
End: 2018-05-23

## 2018-05-23 PROCEDURE — 3331090001 HH PPS REVENUE CREDIT

## 2018-05-23 PROCEDURE — 3331090002 HH PPS REVENUE DEBIT

## 2018-05-24 PROCEDURE — 3331090002 HH PPS REVENUE DEBIT

## 2018-05-24 PROCEDURE — 3331090001 HH PPS REVENUE CREDIT

## 2018-05-25 PROCEDURE — 3331090002 HH PPS REVENUE DEBIT

## 2018-05-25 PROCEDURE — 3331090001 HH PPS REVENUE CREDIT

## 2018-05-26 PROCEDURE — 3331090002 HH PPS REVENUE DEBIT

## 2018-05-26 PROCEDURE — 3331090001 HH PPS REVENUE CREDIT

## 2018-05-27 PROCEDURE — 3331090001 HH PPS REVENUE CREDIT

## 2018-05-27 PROCEDURE — 3331090002 HH PPS REVENUE DEBIT

## 2018-05-28 PROCEDURE — 3331090001 HH PPS REVENUE CREDIT

## 2018-05-28 PROCEDURE — 3331090002 HH PPS REVENUE DEBIT

## 2018-05-29 ENCOUNTER — PATIENT OUTREACH (OUTPATIENT)
Dept: FAMILY MEDICINE CLINIC | Age: 83
End: 2018-05-29

## 2018-05-29 PROCEDURE — 3331090002 HH PPS REVENUE DEBIT

## 2018-05-29 PROCEDURE — 3331090001 HH PPS REVENUE CREDIT

## 2018-05-29 NOTE — PROGRESS NOTES
Goals      Attends follow-up appointments as directed. 5/18/18 - Patient scheduled for PCP follow-up on 5/21/18. Patient states she will be calling to schedule appointment with Dr. Indira Apple (heme/onc) after speaking with NN. Patient will also be seen by [facility]. Called [facility] to verify referral; representative stated they will be calling the patient to schedule initial visit this evening or tomorrow morning. Patient will attend all follow-up appointments. KODI    5/21/18 - Patient attended appointment today with Dr. Rodri Bell. Scheduled to see heme/onc on 5/30/18. KODI       Patient follows diet plan            5/18/18 - Discussed diabetic renal diet. Patient has been reading information online and states she has a friend who is a retired dietician who she will reach out to for more information. Patient will start following diet plan and will be able to verbalize foods she should and should not eat at next follow-up call. Plan to discuss monitoring blood sugars at appointment on 5/21/18. KODI    5/21/18 - Met with patient at appointment and discussed diabetic renal diet. Patient will start counting carbohydrates. Will eat 45-60 carbs per meal and monitor blood sugars 2 hours after meals this week. Will discuss diet and blood sugars at next follow-up call. KODI    5/29/18 - Patient reports that she is monitoring AM fasting blood sugars and also monitoring BP and pulse qAM. Patient is keeping a log of all values. Patient reports the following blood saugar, BP, and Pulse:  5/24/18 - blood sugar 93, /78, pulse 106  5/25/18 - blood sugar 104, /70, pulse 106  5/26/18 - blood sugar 86, /73, pulse 118  5/27/18 - blood sugar 99, /81, pulse 104  5/28/18 - blood sugar 95, /77, pulse 106  Patient states that she plans to start checking blood sugar 2 hours after meals starting on 6/1/18. Patient reports following low sodium and carb controlled diet.  KODI         Supportive resources in place to maintain patient in the community            5/18/18 - Will monitor for outcome of appointment with heme/onc and CT/PET scan. Will offer support/resources as appropriate. Also plan to discuss Advanced Care Planning at appointment on 5/21/18. KODI    5/21/18 - Met with patient at [facility] appointment. Discussed Advanced Care Planning and blue [employer] information folder given. Patient will review and will discuss booking an appointment with [employer] Facilitator, Cosmo Burton at next follow-up call. KODI    5/29/18 - Patient states she is still discussing ACP with her . Will reassess at next call.  KODI

## 2018-05-30 ENCOUNTER — HOSPITAL ENCOUNTER (OUTPATIENT)
Dept: LAB | Age: 83
Discharge: HOME | End: 2018-05-30
Payer: MEDICARE

## 2018-05-30 ENCOUNTER — OFFICE VISIT (OUTPATIENT)
Dept: FAMILY MEDICINE CLINIC | Age: 83
End: 2018-05-30

## 2018-05-30 VITALS
DIASTOLIC BLOOD PRESSURE: 84 MMHG | TEMPERATURE: 97.5 F | HEIGHT: 61 IN | SYSTOLIC BLOOD PRESSURE: 139 MMHG | RESPIRATION RATE: 18 BRPM | WEIGHT: 187 LBS | HEART RATE: 96 BPM | BODY MASS INDEX: 35.3 KG/M2

## 2018-05-30 DIAGNOSIS — I10 ESSENTIAL HYPERTENSION: ICD-10-CM

## 2018-05-30 DIAGNOSIS — N17.9 AKI (ACUTE KIDNEY INJURY) (HCC): Primary | ICD-10-CM

## 2018-05-30 DIAGNOSIS — E83.52 HYPERCALCEMIA: ICD-10-CM

## 2018-05-30 PROCEDURE — 80053 COMPREHEN METABOLIC PANEL: CPT

## 2018-05-30 PROCEDURE — 3331090001 HH PPS REVENUE CREDIT

## 2018-05-30 PROCEDURE — 3331090002 HH PPS REVENUE DEBIT

## 2018-05-30 PROCEDURE — 82330 ASSAY OF CALCIUM: CPT

## 2018-05-30 NOTE — PROGRESS NOTES
Patient here for 2 week f/u hypercalcemia. 1. Have you been to the ER, urgent care clinic since your last visit? Hospitalized since your last visit? No    2. Have you seen or consulted any other health care providers outside of the [address] [place] [address] since your last visit? Include any pap smears or colon screening. No       Chief Complaint   Patient presents with    Follow-up     2 week f/u hypercalcemia     She is a 81 y.o. female who presents for evalution. Reviewed PmHx, RxHx, FmHx, SocHx, AllgHx and updated and dated in the chart. Patient Active Problem List    Diagnosis    Severe obesity (BMI 35.0-39. 9) with comorbidity ([address]    Hypercalcemia    Mass of lung parenchyma    Pure hypercholesterolemia    Diastolic dysfunction    EDER (acute kidney injury) ([address]    Hematoma of groin     Left.       Type 2 diabetes mellitus with nephropathy (HCC)    Recurrent depression (HCC)    Type 2 diabetes mellitus without complication, without long-term current use of insulin ([address]    Advanced care planning/counseling discussion     Patient does have Will and POA, will brind documents for chart when she can find them      D[facility] ([facility] venous thrombosis) [facility]    CRI (chronic renal insufficiency)    Asymptomatic carotid artery stenosis without infarction    Diverticulosis    IBS (irritable bowel syndrome)    Depressive disorder, not elsewhere classified    Mixed hyperlipidemia    DJD (degenerative joint disease)    PUD (peptic ulcer disease)    Allergic rhinitis due to other allergen    Proteinuria    RA (rheumatoid arthritis) ([address]    Essential hypertension       Review of Systems - negative except as listed above in the HPI    Objective:     Vitals:    05/30/18 1054   BP: 139/84   Pulse: 96   Resp: 18   Temp: 97.5 °F (36.4 °C)   Weight: 187 lb (84.8 kg)   Height: 5' 1\" (1.549 m)     Physical Examination: General appearance - alert, well appearing, and in no distress  Chest - clear to auscultation, no wheezes, rales or rhonchi, symmetric air entry  Heart - normal rate, regular rhythm, normal S1, S2, no murmurs, rubs, clicks or gallops      Assessment/ Plan:   Diagnoses and all orders for this visit:    1. EDER (acute kidney injury) ([facility]  -     METABOLIC PANEL, COMPREHENSIVE  -     CALCIUM, IONIZED  -better    2. Hypercalcemia  -     METABOLIC PANEL, COMPREHENSIVE  -     CALCIUM, IONIZED    3. Essential hypertension  -at goal    -seeing Heme/Onc tomorrow       Follow-up Disposition:  Return if symptoms worsen or fail to improve. I have discussed the diagnosis with the patient and the intended plan as seen in the above orders. The patient understands and agrees with the plan. The patient has received an after-visit summary and questions were answered concerning future plans. Medication Side Effects and Warnings were discussed with patient  Patient Labs were reviewed and or requested:  Patient Past Records were reviewed and or requested    Merlinda Means, M.D. There are no Patient Instructions on file for this visit.

## 2018-05-30 NOTE — MR AVS SNAPSHOT
[address] [address] 
[phone] Patient: Rebeca Lucero MRN:  [id number] Visit Information Date & Time Provider Department Dept. Phone Encounter #  
 5/30/2018 10:20 AM Cole Fitzgerald MD [address] [phone] [id number] Follow-up Instructions Return if symptoms worsen or fail to improve. Follow-up and Disposition History Upcoming Health Maintenance Date Due Influenza Age 5 to Adult 8/1/2018 HEMOGLOBIN A1C Q6M 9/13/2018 EYE EXAM RETINAL OR DILATED Q1 10/27/2018 FOOT EXAM Q1 3/13/2019 MICROALBUMIN Q1 3/13/2019 LIPID PANEL Q1 3/13/2019 MEDICARE YEARLY EXAM 3/14/2019 GLAUCOMA SCREENING Q2Y 10/27/2019 DTaP/Tdap/Td series (2 - Td) 10/30/2025 Allergies as of 5/30/2018  Review Complete On: 5/30/2018 By: Cole Fitzgerald MD  
  
 Severity Noted Reaction Type Reactions Sulfa (Sulfonamide Antibiotics)  06/09/2010    Other (comments) Current Immunizations  Reviewed on 2/29/2016 Name Date Influenza High Dose Vaccine PF 9/14/2016 Influenza Vaccine Split 11/8/2012, 11/22/2010 Pneumococcal Conjugate (PCV-13) 7/22/2015 Tdap 10/30/2015 Varicella Virus Vaccine Live 9/10/2011 ZZZ-RETIRED (DO NOT USE) Pneumococcal Vaccine (Unspecified Type) 11/8/2010 Not reviewed this visit You Were Diagnosed With   
  
 Codes Comments ED[facility]    -  Primary ICD-10-CM: N17.9 ICD-9-CM: 584.9 Hypercalcemia     ICD-10-CM: R38.10 
ICD-9-CM: 275.42 Essential hypertension     ICD-10-CM: I10 
ICD-9-CM: 401.9 Vitals BP Pulse Temp Resp Height(growth percentile) Weight(growth percentile) 139/84 96 97.5 °F (36.4 °C) 18 5' 1\" (1.549 m) 187 lb (84.8 kg) BMI OB Status Smoking Status 35.33 kg/m2 Postmenopausal Never Smoker Vitals History BMI and BSA Data Body Mass Index Body Surface Area 35.33 kg/m 2 1.91 m 2 Preferred Pharmacy Pharmacy Name Phone [facility] [address] [address], [address] [facility], [address] [phone] Your Updated Medication List  
  
   
This list is accurate as of 5/30/18 11:21 AM.  Always use your most recent med list. amLODIPine 10 mg tablet Commonly known as:  Yonny Rings Take 1 Tab by mouth daily. atorvastatin 40 mg tablet Commonly known as:  LIPITOR Take 40 mg by mouth nightly. FLONASE ALLERGY RELIEF 50 mcg/actuation nasal spray Generic drug:  fluticasone 1 [place] by Both Nostrils route daily as needed for Rhinitis. folic acid 1 mg tablet Commonly known as:  FOLVITE  
TAKE ONE TABLET BY MOUTH ONCE DAILY. *GENERIC FOR FOLVITE  
  
 glimepiride 4 mg tablet Commonly known as:  AMARYL Take 2 mg by mouth Daily (before breakfast). linagliptin 5 mg tablet Commonly known as:  Nohemi Bleacher Take 5 mg by mouth daily (with dinner). loratadine 10 mg tablet Commonly known as:  Scarlett Buddle Take 10 mg by mouth nightly. methotrexate 2.5 mg tablet Commonly known as:  Ollen Blu Take 7.5 mg by mouth Every Thursday. Patient takes three tablets which is 7.5 mg every Thursday NexIUM 20 mg capsule Generic drug:  esomeprazole Take 20 mg by mouth daily. ondansetron 4 mg disintegrating tablet Commonly known as:  ZOFRAN ODT Take 1 Tab by mouth every eight (8) hours as needed for Nausea. SENNA PLUS 8.6-50 mg per tablet Generic drug:  senna-docusate Take 1 Tab by mouth daily. We Performed the Following CALCIUM, IONIZED [id number] CPT(R)] METABOLIC PANEL, COMPREHENSIVE [00104 CPT(R)] Follow-up Instructions Return if symptoms worsen or fail to improve. Introducing [facility]! [employer] introduces webtide patient portal. Now you can access parts of your medical record, email your doctor's office, and request medication refills online. 1. In your internet browser, go to [email or web address] 2. Click on the First Time User? Click Here link in the Sign In box. You will see the New Member Sign Up page. 3. Enter your [employer] Access Code exactly as it appears below. You will not need to use this code after youve completed the sign-up process. If you do not sign up before the expiration date, you must request a new code. · [employer] Access Code: [email or web address] Expires: 8/21/2018  4:23 PM 
 
4. Enter the last four digits of your Social Security Number (xxxx) and Date of Birth (mm/dd/yyyy) as indicated and click Submit. You will be taken to the next sign-up page. 5. Create a [employer] ID. This will be your [employer] login ID and cannot be changed, so think of one that is secure and easy to remember. 6. Create a [employer] password. You can change your password at any time. 7. Enter your Password Reset Question and Answer. This can be used at a later time if you forget your password. 8. Enter your e-mail address. You will receive e-mail notification when new information is available in [address] 9. Click Sign Up. You can now view and download portions of your medical record. 10. Click the Download Summary menu link to download a portable copy of your medical information. If you have questions, please visit the Frequently Asked Questions section of the [employer] website. Remember, [employer] is NOT to be used for urgent needs. For medical emergencies, dial 911. Now available from your iPhone and Android! Please provide this summary of care documentation to your next provider. Your primary care clinician is listed as MI PALMER. If you have any questions after today's visit, please call [phone].

## 2018-05-31 LAB
ALBUMIN SERPL-MCNC: 4.1 G/DL (ref 3.5–4.7)
ALBUMIN/GLOB SERPL: 1.8 {RATIO} (ref 1.2–2.2)
ALP SERPL-CCNC: 63 IU/L (ref 39–117)
ALT SERPL-CCNC: 30 IU/L (ref 0–32)
AST SERPL-CCNC: 21 IU/L (ref 0–40)
BILIRUB SERPL-MCNC: 0.4 MG/DL (ref 0–1.2)
BUN SERPL-MCNC: 21 MG/DL (ref 8–27)
BUN/CREAT SERPL: 15 (ref 12–28)
CA-I SERPL ISE-MCNC: 5.7 MG/DL (ref 4.5–5.6)
CALCIUM SERPL-MCNC: 10.9 MG/DL (ref 8.7–10.3)
CHLORIDE SERPL-SCNC: 102 MMOL/L (ref 96–106)
CO2 SERPL-SCNC: 23 MMOL/L (ref 18–29)
CREAT SERPL-MCNC: 1.37 MG/DL (ref 0.57–1)
GFR SERPLBLD CREATININE-BSD FMLA CKD-EPI: 36 ML/MIN/1.73
GFR SERPLBLD CREATININE-BSD FMLA CKD-EPI: 41 ML/MIN/1.73
GLOBULIN SER CALC-MCNC: 2.3 G/DL (ref 1.5–4.5)
GLUCOSE SERPL-MCNC: 163 MG/DL (ref 65–99)
INTERPRETATION: NORMAL
POTASSIUM SERPL-SCNC: 4.3 MMOL/L (ref 3.5–5.2)
PROT SERPL-MCNC: 6.4 G/DL (ref 6–8.5)
SODIUM SERPL-SCNC: 141 MMOL/L (ref 134–144)

## 2018-05-31 PROCEDURE — 3331090002 HH PPS REVENUE DEBIT

## 2018-05-31 PROCEDURE — 3331090001 HH PPS REVENUE CREDIT

## 2018-06-01 PROCEDURE — 3331090001 HH PPS REVENUE CREDIT

## 2018-06-01 PROCEDURE — 3331090002 HH PPS REVENUE DEBIT

## 2018-06-02 PROCEDURE — 3331090001 HH PPS REVENUE CREDIT

## 2018-06-02 PROCEDURE — 3331090002 HH PPS REVENUE DEBIT

## 2018-06-03 PROCEDURE — 3331090002 HH PPS REVENUE DEBIT

## 2018-06-03 PROCEDURE — 3331090001 HH PPS REVENUE CREDIT

## 2018-06-04 PROCEDURE — 3331090001 HH PPS REVENUE CREDIT

## 2018-06-04 PROCEDURE — 3331090002 HH PPS REVENUE DEBIT

## 2018-06-04 NOTE — PROGRESS NOTES
Called and spoke to patient. Romulo Prater Pt states understanding of lab results per Dr Zuleika Diana: Looks much better! Copy of results sent to patient. 5/31/18.

## 2018-06-05 PROCEDURE — 3331090002 HH PPS REVENUE DEBIT

## 2018-06-05 PROCEDURE — 3331090001 HH PPS REVENUE CREDIT

## 2018-06-06 PROCEDURE — 3331090002 HH PPS REVENUE DEBIT

## 2018-06-06 PROCEDURE — 3331090001 HH PPS REVENUE CREDIT

## 2018-06-07 PROCEDURE — 3331090001 HH PPS REVENUE CREDIT

## 2018-06-07 PROCEDURE — 3331090002 HH PPS REVENUE DEBIT

## 2018-06-07 RX ORDER — AMLODIPINE BESYLATE 10 MG/1
10 TABLET ORAL DAILY
Qty: 90 TAB | Refills: 3 | Status: SHIPPED | OUTPATIENT
Start: 2018-06-07 | End: 2019-06-21 | Stop reason: SDUPTHER

## 2018-06-08 PROCEDURE — 3331090002 HH PPS REVENUE DEBIT

## 2018-06-08 PROCEDURE — 3331090001 HH PPS REVENUE CREDIT

## 2018-06-09 PROCEDURE — 3331090002 HH PPS REVENUE DEBIT

## 2018-06-09 PROCEDURE — 3331090001 HH PPS REVENUE CREDIT

## 2018-06-10 PROCEDURE — 3331090002 HH PPS REVENUE DEBIT

## 2018-06-10 PROCEDURE — 3331090001 HH PPS REVENUE CREDIT

## 2018-06-11 ENCOUNTER — PATIENT OUTREACH (OUTPATIENT)
Dept: FAMILY MEDICINE CLINIC | Age: 83
End: 2018-06-11

## 2018-06-11 PROCEDURE — 3331090002 HH PPS REVENUE DEBIT

## 2018-06-11 PROCEDURE — 3331090001 HH PPS REVENUE CREDIT

## 2018-06-11 NOTE — PROGRESS NOTES
Goals      Attends follow-up appointments as directed. 5/18/18 - Patient scheduled for PCP follow-up on 5/21/18. Patient states she will be calling to schedule appointment with Dr. Celi Epps (heme/onc) after speaking with NN. Patient will also be seen by [facility]. Called [facility] to verify referral; representative stated they will be calling the patient to schedule initial visit this evening or tomorrow morning. Patient will attend all follow-up appointments. KODI    5/21/18 - Patient attended appointment today with Dr. Brooks. Scheduled to see heme/onc on 5/30/18. KODI    6/11/18 - Patient has attended appointments with heme/onc. Patient reports that she had PET scan and biopsy of the mass in her groin. She is waiting on biopsy results and states she should have them by Wednesday. Patient requested refill of Norvasc. Will send refill request to Dr. Brooks. KODI       Patient follows diet plan            5/18/18 - Discussed diabetic renal diet. Patient has been reading information online and states she has a friend who is a retired dietician who she will reach out to for more information. Patient will start following diet plan and will be able to verbalize foods she should and should not eat at next follow-up call. Plan to discuss monitoring blood sugars at appointment on 5/21/18. KODI    5/21/18 - Met with patient at appointment and discussed diabetic renal diet. Patient will start counting carbohydrates. Will eat 45-60 carbs per meal and monitor blood sugars 2 hours after meals this week. Will discuss diet and blood sugars at next follow-up call. KODI    5/29/18 - Patient reports that she is monitoring AM fasting blood sugars and also monitoring BP and pulse qAM. Patient is keeping a log of all values.  Patient reports the following blood saugar, BP, and Pulse:  5/24/18 - blood sugar 93, /78, pulse 106  5/25/18 - blood sugar 104, /70, pulse 106  5/26/18 - blood sugar 86, /73, pulse 118  5/27/18 - blood sugar 99, /81, pulse 104  5/28/18 - blood sugar 95, /77, pulse 106  Patient states that she plans to start checking blood sugar 2 hours after meals starting on 6/1/18. Patient reports following low sodium and carb controlled diet. KODI    6/11/18 - Patient reports taking blood sugars sporadically throughout the day. Reported two blood sugars > 200, however, patient stated she took those less than 1 hour after eating. Discussed only monitoring postprandial blood sugars 2 hours after meals and/or AM Fasting blood sugars. Patient stated understanding. Patient reports that she is following diabetic renal diet. Reported the following blood sugars from this past week: 142, 224, 99, 189, 283, 156, 170, 111, 115, 130, 110. Last A1C was 7.4 on 3/13/18. Will send message to Dr. Jose Luis Reynolds to inquire about rechecking this month. KODI       Supportive resources in place to maintain patient in the community            5/18/18 - Will monitor for outcome of appointment with heme/onc and CT/PET scan. Will offer support/resources as appropriate. Also plan to discuss Advanced Care Planning at appointment on 5/21/18. KODI    5/21/18 - Met with patient at [facility] appointment. Discussed Advanced Care Planning and blue [employer] information folder given. Patient will review and will discuss booking an appointment with [employer] Facilitator, Agatha Terry at next follow-up call. KODI    5/29/18 - Patient states she is still discussing ACP with her . Will reassess at next call.  KODI

## 2018-06-11 NOTE — Clinical Note
Patient requesting refill of Norvasc. Looks like there is already a refill encounter in, just needs signed. Also, patient's last A1C was 7.4 on 3/13/18, would you like to repeat this month?

## 2018-06-12 PROCEDURE — 3331090001 HH PPS REVENUE CREDIT

## 2018-06-12 PROCEDURE — 3331090002 HH PPS REVENUE DEBIT

## 2018-06-20 ENCOUNTER — PATIENT OUTREACH (OUTPATIENT)
Dept: FAMILY MEDICINE CLINIC | Age: 83
End: 2018-06-20

## 2018-06-20 NOTE — PROGRESS NOTES
Goals Addressed             Most Recent     COMPLETED: Attends follow-up appointments as directed. On track (6/11/2018)             5/18/18 - Patient scheduled for PCP follow-up on 5/21/18. Patient states she will be calling to schedule appointment with Dr. Ana Duong (heme/onc) after speaking with NN. Patient will also be seen by [facility]. Called [phone] to verify referral; representative stated they will be calling the patient to schedule initial visit this evening or tomorrow morning. Patient will attend all follow-up appointments. KODI    5/21/18 - Patient attended appointment today with Dr. Veronica Freitas. Scheduled to see heme/onc on 5/30/18. KODI    6/11/18 - Patient has attended appointments with heme/onc. Patient reports that she had PET scan and biopsy of the mass in her groin. She is waiting on biopsy results and states she should have them by Wednesday. Patient requested refill of Norvasc. Will send refill request to Dr. Veronica Freitas. KODI    6/20/18 - Patient has attended all follow-up appointments. Received diagnosis of non-hodgkin's lymphoma. Patient is seeing Dr. Ana Duong today to discuss treatement options. Will complete goal. KEYOANDY        COMPLETED: Patient follows diet plan   On track (6/11/2018)             5/18/18 - Discussed diabetic renal diet. Patient has been reading information online and states she has a friend who is a retired dietician who she will reach out to for more information. Patient will start following diet plan and will be able to verbalize foods she should and should not eat at next follow-up call. Plan to discuss monitoring blood sugars at appointment on 5/21/18. KODI    5/21/18 - Met with patient at appointment and discussed diabetic renal diet. Patient will start counting carbohydrates. Will eat 45-60 carbs per meal and monitor blood sugars 2 hours after meals this week. Will discuss diet and blood sugars at next follow-up call.  KODI    5/29/18 - Patient reports that she is monitoring AM fasting blood sugars and also monitoring BP and pulse qAM. Patient is keeping a log of all values. Patient reports the following blood saugar, BP, and Pulse:  5/24/18 - blood sugar 93, /78, pulse 106  5/25/18 - blood sugar 104, /70, pulse 106  5/26/18 - blood sugar 86, /73, pulse 118  5/27/18 - blood sugar 99, /81, pulse 104  5/28/18 - blood sugar 95, /77, pulse 106  Patient states that she plans to start checking blood sugar 2 hours after meals starting on 6/1/18. Patient reports following low sodium and carb controlled diet. KEYOANDY    6/11/18 - Patient reports taking blood sugars sporadically throughout the day. Reported two blood sugars > 200, however, patient stated she took those less than 1 hour after eating. Discussed only monitoring postprandial blood sugars 2 hours after meals and/or AM Fasting blood sugars. Patient stated understanding. Patient reports that she is following diabetic renal diet. Reported the following blood sugars from this past week: 142, 224, 99, 189, 283, 156, 170, 111, 115, 130, 110. Last A1C was 7.4 on 3/13/18. Will send message to Dr. Ayaz Rainey to inquire about rechecking this month. KODI    6/20/18 - Patient made aware that Dr. Ayaz Rainey would like A1C repeated this month. Patient states that she is seeing her endocrinologist Dr. Marietta Cabral on 6/25/18. Patient states she is still checking her blood sugars and following diabetic/renal diet. Did not have her blood sugar log with her at the time of the call. Will complete goal. KODI       Supportive resources in place to maintain patient in the community   On track (6/20/2018)             5/18/18 - Will monitor for outcome of appointment with heme/onc and CT/PET scan. Will offer support/resources as appropriate. Also plan to discuss Advanced Care Planning at appointment on 5/21/18. KODI    5/21/18 - Met with patient at [facility] appointment. Discussed Advanced Care Planning and blue [employer] Cho[employer] information folder given.  Patient will review and will discuss booking an appointment with Wilfrido Sebastian Dr at next follow-up call. KODI    5/29/18 - Patient states she is still discussing ACP with her . Will reassess at next call. KODI    6/20/18 - Plan to make final call next week after patient meets with Dr. Christian Rodriguez to discuss treatment plan for non-hodgkin's lymphoma. Will offer resources as needed at next call and will inquire about ACP. Plan to resolve episode if able.  KODI

## 2018-06-29 ENCOUNTER — PATIENT OUTREACH (OUTPATIENT)
Dept: FAMILY MEDICINE CLINIC | Age: 83
End: 2018-06-29

## 2018-06-29 NOTE — PROGRESS NOTES
Patient has graduated from the Transitions of Care Coordination  program on 6/29/18. Patient's symptoms are stable at this time. Patient/family has the ability to self-manage. Care management goals have been completed at this time. No further nurse navigator follow up scheduled. Goals Addressed             Most Recent     COMPLETED: Supportive resources in place to maintain patient in the community   On track (6/20/2018)             5/18/18 - Will monitor for outcome of appointment with heme/onc and CT/PET scan. Will offer support/resources as appropriate. Also plan to discuss Advanced Care Planning at appointment on 5/21/18. KODI    5/21/18 - Met with patient at [facility] appointment. Discussed Advanced Care Planning and blue [employer] information folder given. Patient will review and will discuss booking an appointment with [employer] Facilitator, Diandra Martin at next follow-up call. KODI    5/29/18 - Patient states she is still discussing ACP with her . Will reassess at next call. KODI    6/20/18 - Plan to make final call next week after patient meets with Dr. Eddy Bertrand to discuss treatment plan for non-hodgkin's lymphoma. Will offer resources as needed at next call and will inquire about ACP. Plan to resolve episode if able. KODI    6/29/18 - Patient states she is doing well, no questions or concerns at this time. States she is having her port placed on Monday to start chemo treatments. Declined discussing ACP; stated that she is aware of what an ACP is, but would not like to complete one at this time. Resolving episode. KODI            Pt has nurse navigator's contact information for any further questions, concerns, or needs. Patients upcoming visits:  No future appointments.

## 2018-07-09 ENCOUNTER — OFFICE VISIT (OUTPATIENT)
Dept: FAMILY MEDICINE CLINIC | Age: 83
End: 2018-07-09

## 2018-07-09 ENCOUNTER — HOSPITAL ENCOUNTER (OUTPATIENT)
Dept: LAB | Age: 83
Discharge: HOME | End: 2018-07-09
Payer: MEDICARE

## 2018-07-09 VITALS
HEART RATE: 95 BPM | TEMPERATURE: 98.2 F | SYSTOLIC BLOOD PRESSURE: 129 MMHG | HEIGHT: 61 IN | WEIGHT: 188 LBS | RESPIRATION RATE: 16 BRPM | BODY MASS INDEX: 35.5 KG/M2 | DIASTOLIC BLOOD PRESSURE: 67 MMHG | OXYGEN SATURATION: 99 %

## 2018-07-09 DIAGNOSIS — M79.89 RIGHT LEG SWELLING: Primary | ICD-10-CM

## 2018-07-09 PROCEDURE — 85379 FIBRIN DEGRADATION QUANT: CPT

## 2018-07-09 NOTE — PROGRESS NOTES
Chief Complaint   Patient presents with    Leg Swelling     Right, x1 month     she is a 81y.o. year old female who presents for evalution. Pt states has been having intermittent swelling in legs since about a month ago. Will resolve over night but then returns every morning. No pain or erythema. Does have hx of DVT. Has compression stockings at home but has not been using. Reviewed PmHx, RxHx, FmHx, SocHx, AllgHx and updated and dated in the chart. Review of Systems - negative except as listed above in the HPI    Objective:     Vitals:    07/09/18 0717   BP: 129/67   Pulse: 95   Resp: 16   Temp: 98.2 °F (36.8 °C)   TempSrc: Oral   SpO2: 99%   Weight: 188 lb (85.3 kg)   Height: 5' 1\" (1.549 m)     Physical Examination: General appearance - alert, well appearing, and in no distress  Chest - clear to auscultation, no wheezes, rales or rhonchi, symmetric air entry  Heart - normal rate, regular rhythm, normal S1, S2, no murmurs, rubs, clicks or gallops  Extremities - pedal edema 1 + right lower leg     Assessment/ Plan:   Diagnoses and all orders for this visit:    1. Right leg swelling  -     D DIMER  Labs pending. Use compression stockings and elevation prn. If d-dimer positive will send for doppler. Pt voiced understanding regarding plan of care. Follow-up Disposition:  Return if symptoms worsen or fail to improve. I have discussed the diagnosis with the patient and the intended plan as seen in the above orders. The patient has received an after-visit summary and questions were answered concerning future plans.      Medication Side Effects and Warnings were discussed with patient    Kristie Fong NP

## 2018-07-09 NOTE — MR AVS SNAPSHOT
[address] [address] 
[phone] Patient: Criselda Conrad MRN:  [id number] Visit Information Date & Time Provider Department Dept. Phone Encounter #  
 7/9/2018  7:15 AM Vijay Chisholm NP [address] [facility] [phone] [id number] Follow-up Instructions Return if symptoms worsen or fail to improve. Upcoming Health Maintenance Date Due Influenza Age 5 to Adult 8/1/2018 HEMOGLOBIN A1C Q6M 9/13/2018 EYE EXAM RETINAL OR DILATED Q1 10/27/2018 FOOT EXAM Q1 3/13/2019 MICROALBUMIN Q1 3/13/2019 LIPID PANEL Q1 3/13/2019 MEDICARE YEARLY EXAM 3/14/2019 GLAUCOMA SCREENING Q2Y 10/27/2019 DTaP/Tdap/Td series (2 - Td) 10/30/2025 Allergies as of 7/9/2018  Review Complete On: 7/9/2018 By: Cate Worthy LPN Severity Noted Reaction Type Reactions Sulfa (Sulfonamide Antibiotics)  06/09/2010    Other (comments) Current Immunizations  Reviewed on 2/29/2016 Name Date Influenza High Dose Vaccine PF 9/14/2016 Influenza Vaccine Split 11/8/2012, 11/22/2010 Pneumococcal Conjugate (PCV-13) 7/22/2015 Tdap 10/30/2015 Varicella Virus Vaccine Live 9/10/2011 ZZZ-RETIRED (DO NOT USE) Pneumococcal Vaccine (Unspecified Type) 11/8/2010 Not reviewed this visit You Were Diagnosed With   
  
 Codes Comments Right leg swelling    -  Primary ICD-10-CM: M79.89 ICD-9-CM: 729.81 Vitals BP Pulse Temp Resp Height(growth percentile) Weight(growth percentile) 129/67 95 98.2 °F (36.8 °C) (Oral) 16 5' 1\" (1.549 m) 188 lb (85.3 kg) SpO2 BMI OB Status Smoking Status 99% 35.52 kg/m2 Postmenopausal Never Smoker BMI and BSA Data Body Mass Index Body Surface Area 35.52 kg/m 2 1.92 m 2 Preferred Pharmacy Pharmacy Name Phone Hal[facility] Lopez [id number] [address], [address] [phone] Your Updated Medication List  
  
   
This list is accurate as of 7/9/18  7:36 AM.  Always use your most recent med list. amLODIPine 10 mg tablet Commonly known as:  Ashu Simin Take 1 Tab by mouth daily. atorvastatin 40 mg tablet Commonly known as:  LIPITOR Take 40 mg by mouth nightly. FLONASE ALLERGY RELIEF 50 mcg/actuation nasal spray Generic drug:  fluticasone 1 [place] by Both Nostrils route daily as needed for Rhinitis. folic acid 1 mg tablet Commonly known as:  FOLVITE  
TAKE ONE TABLET BY MOUTH ONCE DAILY. *GENERIC FOR FOLVITE  
  
 glimepiride 4 mg tablet Commonly known as:  AMARYL Take 2 mg by mouth Daily (before breakfast). linagliptin 5 mg tablet Commonly known as:  Authur  Take 5 mg by mouth daily (with dinner). loratadine 10 mg tablet Commonly known as:  Drena Magic Take 10 mg by mouth nightly. methotrexate 2.5 mg tablet Commonly known as:  Emma Frames Take 7.5 mg by mouth Every Thursday. Patient takes three tablets which is 7.5 mg every Thursday NexIUM 20 mg capsule Generic drug:  esomeprazole Take 20 mg by mouth daily. ondansetron 4 mg disintegrating tablet Commonly known as:  ZOFRAN ODT Take 1 Tab by mouth every eight (8) hours as needed for Nausea. SENNA PLUS 8.6-50 mg per tablet Generic drug:  senna-docusate Take 1 Tab by mouth daily. We Performed the Following D DIMER [id number] CPT(R)] Follow-up Instructions Return if symptoms worsen or fail to improve. To-Do List   
 09/06/2018 10:00 AM  
  Appointment with [facility] CT 1 at [facility] CT ([phone]) NON-CONTRAST STUDY: 1. Bring any non [facility] facility films/images pertaining to the area of interest with you on the day of appointment. 2. Check in at registration at least 30 minutes before appt time unless you were instructed to do otherwise.  3. If you have to drink oral contrast please pick it up any weekday prior to your appointment, if you cannot please check in 2 hrs  before appt time. Patient Instructions Leg and Ankle Edema: Care Instructions Your Care Instructions Swelling in the legs, ankles, and feet is called edema. It is common after you sit or stand for a while. Long plane flights or car rides often cause swelling in the legs and feet. You may also have swelling if you have to stand for long periods of time at your job. Problems with the veins in the legs (varicose veins) and changes in hormones can also cause swelling. Sometimes the swelling in the ankles and feet is caused by a more serious problem, such as heart failure, infection, blood clots, or liver or kidney disease. Follow-up care is a key part of your treatment and safety. Be sure to make and go to all appointments, and call your doctor if you are having problems. It's also a good idea to know your test results and keep a list of the medicines you take. How can you care for yourself at home? · If your doctor gave you medicine, take it as prescribed. Call your doctor if you think you are having a problem with your medicine. · Whenever you are resting, raise your legs up. Try to keep the swollen area higher than the level of your heart. · Take breaks from standing or sitting in one position. ¨ Walk around to increase the blood flow in your lower legs. ¨ Move your feet and ankles often while you stand, or tighten and relax your leg muscles. · Wear support stockings. Put them on in the morning, before swelling gets worse. · Eat a balanced diet. Lose weight if you need to. · Limit the amount of salt (sodium) in your diet. Salt holds fluid in the body and may increase swelling. When should you call for help? Call 911 anytime you think you may need emergency care. For example, call if: 
? · You have symptoms of a blood clot in your lung (called a pulmonary embolism). These may include: ¨ Sudden chest pain. ¨ Trouble breathing. ¨ Coughing up blood. ?Call your doctor now or seek immediate medical care if: 
? · You have signs of a blood clot, such as: 
¨ Pain in your calf, back of the knee, thigh, or groin. ¨ Redness and swelling in your leg or groin. ? · You have symptoms of infection, such as: 
¨ Increased pain, swelling, warmth, or redness. ¨ Red streaks or pus. ¨ A fever. ? Watch closely for changes in your health, and be sure to contact your doctor if: 
? · Your swelling is getting worse. ? · You have new or worsening pain in your legs. ? · You do not get better as expected. Where can you learn more? Go to http://yakov-philip.info/. Enter X268 in the search box to learn more about \"Leg and Ankle Edema: Care Instructions. \" Current as of: March 20, 2017 Content Version: 11.4 © 9049-7441 [employer]. Care instructions adapted under license by [employer] (which disclaims liability or warranty for this information). If you have questions about a medical condition or this instruction, always ask your healthcare professional. [address] [address] any warranty or liability for your use of this information. Introducing [facility]! [facility] introduces [employer] patient portal. Now you can access parts of your medical record, email your doctor's office, and request medication refills online. 1. In your internet browser, go to [email or web address] 2. Click on the First Time User? Click Here link in the Sign In box. You will see the New Member Sign Up page. 3. Enter your [employer] Access Code exactly as it appears below. You will not need to use this code after youve completed the sign-up process. If you do not sign up before the expiration date, you must request a new code. · [employer] Access Code: [email or web address] Expires: 8/21/2018  4:23 PM 
 
 4. Enter the last four digits of your Social Security Number (xxxx) and Date of Birth (mm/dd/yyyy) as indicated and click Submit. You will be taken to the next sign-up page. 5. Create a Dolosys ID. This will be your [employer] login ID and cannot be changed, so think of one that is secure and easy to remember. 6. Create a [employer]ys password. You can change your password at any time. 7. Enter your Password Reset Question and Answer. This can be used at a later time if you forget your password. 8. Enter your e-mail address. You will receive e-mail notification when new information is available in [address] 9. Click Sign Up. You can now view and download portions of your medical record. 10. Click the Download Summary menu link to download a portable copy of your medical information. If you have questions, please visit the Frequently Asked Questions section of the [employer] website. Remember, [employer] is NOT to be used for urgent needs. For medical emergencies, dial 911. Now available from your iPhone and Android! Please provide this summary of care documentation to your next provider. Your primary care clinician is listed as MI PALMER. If you have any questions after today's visit, please call [phone].

## 2018-07-09 NOTE — PROGRESS NOTES
1. Have you been to the ER, urgent care clinic since your last visit? Hospitalized since your last visit? No    2. Have you seen or consulted any other health care providers outside of the [address] [place] [address] since your last visit? Include any pap smears or colon screening.  No     Chief Complaint   Patient presents with    Leg Swelling     Right, x1 month

## 2018-07-09 NOTE — PATIENT INSTRUCTIONS
Leg and Ankle Edema: Care Instructions  Your Care Instructions  Swelling in the legs, ankles, and feet is called edema. It is common after you sit or stand for a while. Long plane flights or car rides often cause swelling in the legs and feet. You may also have swelling if you have to stand for long periods of time at your job. Problems with the veins in the legs (varicose veins) and changes in hormones can also cause swelling. Sometimes the swelling in the ankles and feet is caused by a more serious problem, such as heart failure, infection, blood clots, or liver or kidney disease. Follow-up care is a key part of your treatment and safety. Be sure to make and go to all appointments, and call your doctor if you are having problems. It's also a good idea to know your test results and keep a list of the medicines you take. How can you care for yourself at home? · If your doctor gave you medicine, take it as prescribed. Call your doctor if you think you are having a problem with your medicine. · Whenever you are resting, raise your legs up. Try to keep the swollen area higher than the level of your heart. · Take breaks from standing or sitting in one position. ¨ Walk around to increase the blood flow in your lower legs. ¨ Move your feet and ankles often while you stand, or tighten and relax your leg muscles. · Wear support stockings. Put them on in the morning, before swelling gets worse. · Eat a balanced diet. Lose weight if you need to. · Limit the amount of salt (sodium) in your diet. Salt holds fluid in the body and may increase swelling. When should you call for help? Call 911 anytime you think you may need emergency care. For example, call if:  ? · You have symptoms of a blood clot in your lung (called a pulmonary embolism). These may include:  ¨ Sudden chest pain. ¨ Trouble breathing. ¨ Coughing up blood.    ?Call your doctor now or seek immediate medical care if:  ? · You have signs of a blood clot, such as:  ¨ Pain in your calf, back of the knee, thigh, or groin. ¨ Redness and swelling in your leg or groin. ? · You have symptoms of infection, such as:  ¨ Increased pain, swelling, warmth, or redness. ¨ Red streaks or pus. ¨ A fever. ? Watch closely for changes in your health, and be sure to contact your doctor if:  ? · Your swelling is getting worse. ? · You have new or worsening pain in your legs. ? · You do not get better as expected. Where can you learn more? Go to http://yakov-philip.info/. Enter A346 in the search box to learn more about \"Leg and Ankle Edema: Care Instructions. \"  Current as of: March 20, 2017  Content Version: 11.4  © 0146-5996 [employer]. Care instructions adapted under license by [employer] (which disclaims liability or warranty for this information). If you have questions about a medical condition or this instruction, always ask your healthcare professional. [address] [address] any warranty or liability for your use of this information.

## 2018-07-10 ENCOUNTER — HOSPITAL ENCOUNTER (EMERGENCY)
Age: 83
Discharge: HOME | End: 2018-07-10
Attending: EMERGENCY MEDICINE
Payer: MEDICARE

## 2018-07-10 VITALS
BODY MASS INDEX: 34.74 KG/M2 | TEMPERATURE: 98.1 F | OXYGEN SATURATION: 98 % | SYSTOLIC BLOOD PRESSURE: 118 MMHG | DIASTOLIC BLOOD PRESSURE: 73 MMHG | RESPIRATION RATE: 18 BRPM | HEART RATE: 75 BPM | WEIGHT: 184 LBS | HEIGHT: 61 IN

## 2018-07-10 DIAGNOSIS — I82.401 ACUTE DEEP VEIN THROMBOSIS (DVT) OF RIGHT LOWER EXTREMITY, UNSPECIFIED VEIN (HCC): Primary | ICD-10-CM

## 2018-07-10 LAB — D DIMER PPP FEU-MCNC: 2.35 MG/L FEU (ref 0–0.49)

## 2018-07-10 PROCEDURE — 74011250637 HC RX REV CODE- 250/637: Performed by: EMERGENCY MEDICINE

## 2018-07-10 PROCEDURE — 93971 EXTREMITY STUDY: CPT

## 2018-07-10 PROCEDURE — 99282 EMERGENCY DEPT VISIT SF MDM: CPT

## 2018-07-10 RX ORDER — ENOXAPARIN SODIUM 100 MG/ML
80 INJECTION SUBCUTANEOUS
Status: DISCONTINUED | OUTPATIENT
Start: 2018-07-10 | End: 2018-07-10

## 2018-07-10 RX ADMIN — APIXABAN 10 MG: 5 TABLET, FILM COATED ORAL at 17:38

## 2018-07-10 NOTE — PROCEDURES
[address]  *** FINAL REPORT ***    Name: Maria Luisa Vasques  MRN: [id number]  : 1935  HIS Order #: [id number]  [id number] [facility] Visit #: [id number]  Date: 10 Jul 2018    TYPE OF TEST: Peripheral Venous Testing    REASON FOR TEST  Limb swelling    Right Leg:-  Deep venous thrombosis:           Yes  Proximal extent of thrombus:      Common Femoral  Superficial venous thrombosis:    No  Deep venous insufficiency:        No  Superficial venous insufficiency: No      INTERPRETATION/FINDINGS  PROCEDURE:  RIGHT LOWER EXTREMITY  VENOUS DUPLEX. Evaluation of lower  extremity veins with ultrasound (B-mode imaging, pulsed Doppler, color   Doppler). Includes the common femoral, deep femoral, femoral,  popliteal, posterior tibial, peroneal, and great saphenous veins. Other veins, for example the gastrocnemius and soleal veins, may also  be visualized. FINDINGS: Cynda Jewel scale and color flow duplex images of the veins in the  right lower extremity demonstrate non-compressibility, with a dilated  vessels and filling defects in the common femoral vein, consistent  with thrombus formation. CONCLUSION: Acute on chronic partially occlusive thrombus identified  in the right common femoral. Left common femoral vein is thrombus  free. NOTE: Avascular hypoechoic region noted right popliteal fossa  measuring 2.86 cm by 1.76 cm, cosistent with a Baker's cyst.    ADDITIONAL COMMENTS    I have personally reviewed the data relevant to the interpretation of  this  study.     TECHNOLOGIST: EHSAN Holliday  Signed: 07/10/2018 05:14 PM    PHYSICIAN: Brionna Linares MD  Signed: 2018 07:08 AM

## 2018-07-10 NOTE — ED NOTES
Patient discharged by MD. pt given the opportunity to ask questions, verbalized understanding of teaching.

## 2018-07-10 NOTE — ED PROVIDER NOTES
HPI Comments: 81 y.o. female with past medical history significant for HTN, DJD, PE, DVT, carotid artery stenosis who presents via private vehicle with chief complaint of leg swelling. Pt c/o R leg swelling for the past two weeks. Pt reports that the swelling is better at night. Pt saw her PCP yesterday and had a D dimer which was positive and was instructed to be seen in the ED for further evaluation. Pt denies CP or SOB. Pt has a IVC filter in place, which was placed in May. 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 There are no other acute medical concerns at this time. Social hx: denies tobacco use, denies EtOH consumption     PCP: Urbano Palomo MD  Hematology: Jase Hussein MD    Note written by Oseas Ross, as dictated by Pamela Centeno MD 3:39 PM      The history is provided by the patient. No  was used. Past Medical History:   Diagnosis Date    Allergies     Asymptomatic carotid artery stenosis without infarction 2/22/2011    Depression     Diverticulosis     DJD (degenerative joint disease)     DVT (deep venous thrombosis) ([facility] 2016    DVT RLE.  GERD (gastroesophageal reflux disease)     HTN     Hypercholesterolemia     Mammography less than 12 months ago     NIDDM     Obesity (BMI 30.0-34. 9)     PE (pulmonary thromboembolism) ([facility] [address] 2017    B/L.  Proteinuria        Past Surgical History:   Procedure Laterality Date    ENDOSCOPY, COLON, DIAGNOSTIC  2008    normal    HX APPENDECTOMY      With JAYME.  HX HERNIA REPAIR      ? Umbilical hernia repair.  HX HYSTERECTOMY      HX KNEE REPLACEMENT Left     HX KNEE REPLACEMENT Right     HX SPLENECTOMY      Lap splenectomy.          Family History:   Problem Relation Age of Onset    Hypertension Father     Cancer Sister      breast    Diabetes Brother        Social History     Social History    Marital status:      Spouse name: N/A    Number of children: N/A    Years of education: N/A     Occupational History    Not on file. Social History Main Topics    Smoking status: Never Smoker    Smokeless tobacco: Never Used    Alcohol use No    Drug use: No    Sexual activity: Yes     Partners: Male     Other Topics Concern    Not on file     Social History Narrative         ALLERGIES: Sulfa (sulfonamide antibiotics)    Review of Systems   Constitutional: Negative for chills and fever. Respiratory: Negative for shortness of breath. Cardiovascular: Positive for leg swelling. Negative for chest pain. Gastrointestinal: Negative for abdominal pain, constipation, diarrhea and vomiting. Neurological: Negative for dizziness and light-headedness. All other systems reviewed and are negative. Vitals:    07/10/18 1521   BP: 118/73   Pulse: 75   Resp: 18   Temp: 98.1 °F (36.7 °C)   SpO2: 98%   Weight: 83.5 kg (184 lb)   Height: 5' 1\" (1.549 m)            Physical Exam   Constitutional: She appears well-developed. No distress. HENT:   Head: Normocephalic and atraumatic. Eyes: Pupils are equal, round, and reactive to light. No scleral icterus. Neck: Normal range of motion. Neck supple. Cardiovascular: Normal rate and regular rhythm. Pulmonary/Chest: Effort normal and breath sounds normal.   Abdominal: Soft. She exhibits no distension. There is no tenderness. There is no rebound and no guarding. Musculoskeletal: Normal range of motion. Bilateral knee replacement scars. Bilateral trace edema. Compression stocking on R leg. Neurological: She is alert. Skin: Skin is warm and dry. She is not diaphoretic. Psychiatric: She has a normal mood and affect. Her behavior is normal. Thought content normal.   Nursing note and vitals reviewed.    Note written by Oseas Riley, as dictated by Gopal Mendez MD 3:56 PM        MDM  Number of Diagnoses or Management Options  Acute deep vein thrombosis (DVT) of right lower extremity, unspecified vein ([address]): new and requires workup  Diagnosis management comments: Pt w hx of DVT presenting with leg swelling and elevated dimer found to have RLE DVT. No evidence of cellulitis, fracture, peripheral edema. Started on Eliquis and will follow-up with heme-onc. ED Course       Procedures    CONSULT NOTE:  5:16 PM Lina Laguna MD spoke with Dr. Marcial Duffy, Consult for hematology oncology. Discussed available diagnostic tests and clinical findings. Dr. Marcial Duffy agrees with plan to start patient on anticoagulant, prefers eliquis, and recommends f/u with Dr. Kt Hdz as soon as possible. PROGRESS NOTE:  5:17 PM  Will give pt a dose of eliquis in the ED to ensure that she is therapeutically anticoagulated and d/c home with an rx for eliquis.

## 2018-07-10 NOTE — ED TRIAGE NOTES
Pt referred here by PCP to have a doppler done on right leg. Pt states \"I had blood drawn yesterday and they called me today to be seen, I have an IVC filter\". Hx of DVT. Pt reports swelling to right leg x2 weeks, denies pain. Denies chest pain or SOB.

## 2018-07-10 NOTE — PROGRESS NOTES
Pt returned call - informed to go to nearest [address] [place] [address] Extension- pt voiced understanding.

## 2018-08-08 ENCOUNTER — OFFICE VISIT (OUTPATIENT)
Dept: FAMILY MEDICINE CLINIC | Age: 83
End: 2018-08-08

## 2018-08-08 ENCOUNTER — DOCUMENTATION ONLY (OUTPATIENT)
Dept: FAMILY MEDICINE CLINIC | Age: 83
End: 2018-08-08

## 2018-08-08 VITALS
WEIGHT: 185 LBS | HEART RATE: 109 BPM | OXYGEN SATURATION: 99 % | DIASTOLIC BLOOD PRESSURE: 86 MMHG | TEMPERATURE: 99.5 F | SYSTOLIC BLOOD PRESSURE: 138 MMHG | HEIGHT: 61 IN | RESPIRATION RATE: 16 BRPM | BODY MASS INDEX: 34.93 KG/M2

## 2018-08-08 DIAGNOSIS — K57.92 DIVERTICULITIS: Primary | ICD-10-CM

## 2018-08-08 RX ORDER — AMOXICILLIN AND CLAVULANATE POTASSIUM 875; 125 MG/1; MG/1
TABLET, FILM COATED ORAL
COMMUNITY
Start: 2018-08-03 | End: 2018-09-17 | Stop reason: ALTCHOICE

## 2018-08-08 RX ORDER — WARFARIN SODIUM 5 MG/1
2.5 TABLET ORAL DAILY
COMMUNITY
Start: 2018-07-12 | End: 2021-01-28 | Stop reason: SDUPTHER

## 2018-08-08 NOTE — PROGRESS NOTES
[employer] in [address] [place] [address] order request was placed on Glennda Sandhoff NP desk to process.

## 2018-08-08 NOTE — MR AVS SNAPSHOT
[address] [address] 
[phone] Patient: Chacho Castaneda MRN:  [id number] Visit Information Date & Time Provider Department Dept. Phone Encounter #  
 8/8/2018  4:00 PM Nohemi Camp NP [facility] [phone] [id number] Upcoming Health Maintenance Date Due Influenza Age 5 to Adult 8/1/2018 HEMOGLOBIN A1C Q6M 9/13/2018 EYE EXAM RETINAL OR DILATED Q1 10/27/2018 FOOT EXAM Q1 3/13/2019 MICROALBUMIN Q1 3/13/2019 LIPID PANEL Q1 3/13/2019 MEDICARE YEARLY EXAM 3/14/2019 GLAUCOMA SCREENING Q2Y 10/27/2019 DTaP/Tdap/Td series (2 - Td) 10/30/2025 Allergies as of 8/8/2018  Review Complete On: 8/8/2018 By: Mimi Hanna Severity Noted Reaction Type Reactions Sulfa (Sulfonamide Antibiotics)  06/09/2010    Other (comments) Current Immunizations  Reviewed on 2/29/2016 Name Date Influenza High Dose Vaccine PF 9/14/2016 Influenza Vaccine Split 11/8/2012, 11/22/2010 Pneumococcal Conjugate (PCV-13) 7/22/2015 Tdap 10/30/2015 Varicella Virus Vaccine Live 9/10/2011 ZZZ-RETIRED (DO NOT USE) Pneumococcal Vaccine (Unspecified Type) 11/8/2010 Not reviewed this visit You Were Diagnosed With   
  
 Codes Comments Diverticulitis    -  Primary ICD-10-CM: K57.92 
ICD-9-CM: 562.11 Vitals BP Pulse Temp Resp Height(growth percentile) Weight(growth percentile) 138/86 (BP 1 Location: Right arm, BP Patient Position: Sitting) (!) 109 99.5 °F (37.5 °C) (Oral) 16 5' 1\" (1.549 m) 185 lb (83.9 kg) SpO2 BMI OB Status Smoking Status 99% 34.96 kg/m2 Postmenopausal Never Smoker Vitals History BMI and BSA Data Body Mass Index Body Surface Area 34.96 kg/m 2 1.9 m 2 Preferred Pharmacy Pharmacy Name Phone Fiona Snell [address] [facility], [address] [place] [address], [address] [phone] Your Updated Medication List  
  
   
This list is accurate as of 8/8/18  4:21 PM.  Always use your most recent med list. amLODIPine 10 mg tablet Commonly known as:  Vondaniellerocio Spring Take 1 Tab by mouth daily. amoxicillin-clavulanate 875-125 mg per tablet Commonly known as:  AUGMENTIN  
  
 apixaban 5 mg (74 tabs) starter pack Commonly known as:  Brandi Falling Take 10 mg (two 5 mg tablets) by mouth twice a day for 7 days  Followed by 5 mg (one 5 mg tablet) by mouth twice a day  
  
 atorvastatin 40 mg tablet Commonly known as:  LIPITOR Take 40 mg by mouth nightly. FLONASE ALLERGY RELIEF 50 mcg/actuation nasal spray Generic drug:  fluticasone 1 [place] by Both Nostrils route daily as needed for Rhinitis. folic acid 1 mg tablet Commonly known as:  FOLVITE  
TAKE ONE TABLET BY MOUTH ONCE DAILY. *GENERIC FOR FOLVITE  
  
 glimepiride 4 mg tablet Commonly known as:  AMARYL Take 2 mg by mouth Daily (before breakfast). linagliptin 5 mg tablet Commonly known as:  Veda Foster Take 5 mg by mouth daily (with dinner). loratadine 10 mg tablet Commonly known as:  Janeth Addison Take 10 mg by mouth nightly. methotrexate 2.5 mg tablet Commonly known as:  Rhoda Bye Take 7.5 mg by mouth Every Thursday. Patient takes three tablets which is 7.5 mg every Thursday NexIUM 20 mg capsule Generic drug:  esomeprazole Take 20 mg by mouth daily. ondansetron 4 mg disintegrating tablet Commonly known as:  ZOFRAN ODT Take 1 Tab by mouth every eight (8) hours as needed for Nausea. SENNA PLUS 8.6-50 mg per tablet Generic drug:  senna-docusate Take 1 Tab by mouth daily. warfarin 5 mg tablet Commonly known as:  COUMADIN Take 2.5 mg by mouth daily. To-Do List   
 09/06/2018 10:00 AM  
  Appointment with [facility] 1 at [facility] ([phone]) NON-CONTRAST STUDY: 1. Bring any non [facility] facility films/images pertaining to the area of interest with you on the day of appointment. 2. Check in at registration at least 30 minutes before appt time unless you were instructed to do otherwise. 3. If you have to drink oral contrast please pick it up any weekday prior to your appointment, if you cannot please check in 2 hrs  before appt time. Introducing [facility]! [employer] introduces OOTU patient portal. Now you can access parts of your medical record, email your doctor's office, and request medication refills online. 1. In your internet browser, go to [email or web address] 2. Click on the First Time User? Click Here link in the Sign In box. You will see the New Member Sign Up page. 3. Enter your OOTU Access Code exactly as it appears below. You will not need to use this code after youve completed the sign-up process. If you do not sign up before the expiration date, you must request a new code. · OOTU Access Code: [email or web address] Expires: 8/21/2018  4:23 PM 
 
4. Enter the last four digits of your Social Security Number (xxxx) and Date of Birth (mm/dd/yyyy) as indicated and click Submit. You will be taken to the next sign-up page. 5. Create a OOTU ID. This will be your OOTU login ID and cannot be changed, so think of one that is secure and easy to remember. 6. Create a OOTU password. You can change your password at any time. 7. Enter your Password Reset Question and Answer. This can be used at a later time if you forget your password. 8. Enter your e-mail address. You will receive e-mail notification when new information is available in [address] 9. Click Sign Up. You can now view and download portions of your medical record. 10. Click the Download Summary menu link to download a portable copy of your medical information.  
 
If you have questions, please visit the Frequently Asked Questions section of the UpRace. Remember, Fliplifehart is NOT to be used for urgent needs. For medical emergencies, dial 911. Now available from your iPhone and Android! Please provide this summary of care documentation to your next provider. Your primary care clinician is listed as MI PALMER. If you have any questions after today's visit, please call [phone].

## 2018-08-08 NOTE — PROGRESS NOTES
HISTORY OF PRESENT ILLNESS  Flaquita Parra is a 81 y.o. female. HPI  She is here for follow up hospital admission for diverticulitis  JW for 4 days, followed by Dr. Monica Kapoor for NonHodgkins Lymphoma   Has only done 1 round of chemo  Followed by Althea Bernabe for labile blood sugars due to chemo    ROS  A comprehensive review of system was obtained and negative except findings in the HPI    Visit Vitals    /86 (BP 1 Location: Right arm, BP Patient Position: Sitting)    Pulse (!) 109    Temp 99.5 °F (37.5 °C) (Oral)    Resp 16    Ht 5' 1\" (1.549 m)    Wt 185 lb (83.9 kg)    SpO2 99%    BMI 34.96 kg/m2     Physical Exam   Constitutional: She is oriented to person, place, and time. She appears well-developed and well-nourished. Neck: No JVD present. Cardiovascular: Normal rate, regular rhythm and intact distal pulses. Exam reveals no gallop and no friction rub. No murmur heard. Pulmonary/Chest: Effort normal and breath sounds normal. No respiratory distress. She has no wheezes. Musculoskeletal: She exhibits no edema. Neurological: She is alert and oriented to person, place, and time. Skin: Skin is warm. Nursing note and vitals reviewed. ASSESSMENT and PLAN  Encounter Diagnoses   Name Primary?  Diverticulitis Yes     Orders Placed This Encounter    amoxicillin-clavulanate (AUGMENTIN) 875-125 mg per tablet    warfarin (COUMADIN) 5 mg tablet     Reviewed side effects of meds and what to expect from the Augmentin  Follow up in this office prn  I have discussed the diagnosis with the patient and the intended plan as seen in the above orders. The patient has received an after-visit summary and questions were answered concerning future plans. Patient conveyed understanding of the plan at the time of the visit.     Fabiana Gaona, MSN, ANP  8/8/2018

## 2018-08-08 NOTE — PROGRESS NOTES
Marine Hernandez is a 81 y.o. female      Chief Complaint   Patient presents with   [facility] Follow Up     [place] on 7/31/18-8/3/18 for Low White blood cells and acute diverticulitis       1. Have you been to the ER, urgent care clinic since your last visit? Hospitalized since your last visit? Yes, ER at [facility] on 7/31/18-8/3/18 for Low White blood cells and acute diverticulitis. 2. Have you seen or consulted any other health care providers outside of the [address] [place] [address] since your last visit? Include any pap smears or colon screening.  No

## 2018-08-14 ENCOUNTER — OFFICE VISIT (OUTPATIENT)
Dept: FAMILY MEDICINE CLINIC | Age: 83
End: 2018-08-14

## 2018-08-14 ENCOUNTER — DOCUMENTATION ONLY (OUTPATIENT)
Dept: FAMILY MEDICINE CLINIC | Age: 83
End: 2018-08-14

## 2018-08-14 VITALS
OXYGEN SATURATION: 97 % | HEART RATE: 107 BPM | WEIGHT: 182 LBS | HEIGHT: 61 IN | TEMPERATURE: 98.7 F | BODY MASS INDEX: 34.36 KG/M2 | SYSTOLIC BLOOD PRESSURE: 122 MMHG | RESPIRATION RATE: 17 BRPM | DIASTOLIC BLOOD PRESSURE: 79 MMHG

## 2018-08-14 DIAGNOSIS — K57.92 DIVERTICULITIS: ICD-10-CM

## 2018-08-14 DIAGNOSIS — R52 TOTAL BODY PAIN: Primary | ICD-10-CM

## 2018-08-14 DIAGNOSIS — C85.90 NON-HODGKIN'S LYMPHOMA, UNSPECIFIED BODY REGION, UNSPECIFIED NON-HODGKIN LYMPHOMA TYPE (HCC): ICD-10-CM

## 2018-08-14 DIAGNOSIS — I10 ESSENTIAL HYPERTENSION: ICD-10-CM

## 2018-08-14 RX ORDER — HYDROCODONE BITARTRATE AND ACETAMINOPHEN 5; 325 MG/1; MG/1
1 TABLET ORAL
Qty: 30 TAB | Refills: 0 | Status: SHIPPED | OUTPATIENT
Start: 2018-08-14 | End: 2018-10-25

## 2018-08-14 RX ORDER — TRAMADOL HYDROCHLORIDE 50 MG/1
50 TABLET ORAL
COMMUNITY
End: 2018-10-25

## 2018-08-14 RX ORDER — TIZANIDINE 4 MG/1
4 TABLET ORAL
Qty: 60 TAB | Refills: 0 | Status: SHIPPED | OUTPATIENT
Start: 2018-08-14 | End: 2020-01-06

## 2018-08-14 NOTE — PROGRESS NOTES
Chief Complaint   Patient presents with    Diabetes    Hypertension   [facility] Follow Up     [facility]: Neck/back pain-Diverticulitis just finished Augmentin    Decreased Appetite    Back Pain     Tramadol not helping     1. Have you been to the ER, urgent care clinic since your last visit? Hospitalized since your last visit? Yes Where: [facility]    2. Have you seen or consulted any other health care providers outside of the [facility] since your last visit? Include any pap smears or colon screening. No      Admitted JW for divertic and just completed augmentin    Severe R shoulder pain after PT at home    Hurts all over 10/10      Chief Complaint   Patient presents with    Diabetes    Hypertension   [facility] Follow Up     [facility]: Neck/back pain-Diverticulitis just finished Augmentin    Decreased Appetite    Back Pain     Tramadol not helping     She is a 81 y.o. female who presents for evalution. Reviewed PmHx, RxHx, FmHx, SocHx, AllgHx and updated and dated in the chart. Patient Active Problem List    Diagnosis    Non-Hodgkin's lymphoma ([address]    Severe obesity (BMI 35.0-39. 9) with comorbidity ([address]    Hypercalcemia    Mass of lung parenchyma    Pure hypercholesterolemia    Diastolic dysfunction    EDER (acute kidney injury) ([address]    Hematoma of groin     Left.       Type 2 diabetes mellitus with nephropathy (HCC)    Recurrent depression ([address]    Type 2 diabetes mellitus without complication, without long-term current use of insulin ([address]    Advanced care planning/counseling discussion     Patient does have Will and POA, will brind documents for chart when she can find them      DVT (deep venous thrombosis) ([address]    CRI (chronic renal insufficiency)    Asymptomatic carotid artery stenosis without infarction    Diverticulosis    IBS (irritable bowel syndrome)    Depressive disorder, not elsewhere classified    Mixed hyperlipidemia    DJD (degenerative joint disease)    PUD (peptic ulcer disease)    Allergic rhinitis due to other allergen    Proteinuria    RA (rheumatoid arthritis) ([facility]    Essential hypertension       Review of Systems - negative except as listed above in the HPI    Objective:     Vitals:    08/14/18 1422   BP: 122/79   Pulse: (!) 107   Resp: 17   Temp: 98.7 °F (37.1 °C)   TempSrc: Oral   SpO2: 97%   Weight: 182 lb (82.6 kg)   Height: 5' 1\" (1.549 m)     Physical Examination: General appearance - alert, well appearing, and in no distress  Neck - bilat trap pain  Chest - clear to auscultation, no wheezes, rales or rhonchi, symmetric air entry  Heart - normal rate, regular rhythm, normal S1, S2, no murmurs, rubs, clicks or gallops    Assessment/ Plan:   Diagnoses and all orders for this visit:    1. Total body pain  -     tiZANidine (ZANAFLEX) 4 mg tablet; Take 1 Tab by mouth three (3) times daily as needed. Indications: Muscle Spasm  -     HYDROcodone-acetaminophen (NORCO) 5-325 mg per tablet; Take 1 Tab by mouth every six (6) hours as needed for Pain. Max Daily Amount: 4 Tabs. Indications: severe pain  -add rx and if worse go to ER    2. Non-Hodgkin's lymphoma, unspecified body region, unspecified non-Hodgkin lymphoma type ([facility]  -seeing heme/onc    3. Diverticulitis  -just completed rx    4. Essential hypertension  -at goal       Follow-up Disposition:  Return if symptoms worsen or fail to improve. I have discussed the diagnosis with the patient and the intended plan as seen in the above orders. The patient understands and agrees with the plan. The patient has received an after-visit summary and questions were answered concerning future plans. Medication Side Effects and Warnings were discussed with patient  Patient Labs were reviewed and or requested:  Patient Past Records were reviewed and or requested    Bin Doe M.D. There are no Patient Instructions on file for this visit.

## 2018-08-14 NOTE — MR AVS SNAPSHOT
[address] [address] 
[phone] Patient: Marly Agrawal MRN:  [id number] Visit Information Date & Time Provider Department Dept. Phone Encounter #  
 8/14/2018  1:40 PM Stephan Garcia MD [address] [facility] [phone] [id number] Follow-up Instructions Return if symptoms worsen or fail to improve. Upcoming Health Maintenance Date Due Influenza Age 5 to Adult 8/1/2018 HEMOGLOBIN A1C Q6M 9/13/2018 EYE EXAM RETINAL OR DILATED Q1 10/27/2018 FOOT EXAM Q1 3/13/2019 MICROALBUMIN Q1 3/13/2019 LIPID PANEL Q1 3/13/2019 MEDICARE YEARLY EXAM 3/14/2019 GLAUCOMA SCREENING Q2Y 10/27/2019 DTaP/Tdap/Td series (2 - Td) 10/30/2025 Allergies as of 8/14/2018  Review Complete On: 8/14/2018 By: Stephan Garcia MD  
  
 Severity Noted Reaction Type Reactions Sulfa (Sulfonamide Antibiotics)  06/09/2010    Other (comments) Current Immunizations  Reviewed on 2/29/2016 Name Date Influenza High Dose Vaccine PF 9/14/2016 Influenza Vaccine Split 11/8/2012, 11/22/2010 Pneumococcal Conjugate (PCV-13) 7/22/2015 Tdap 10/30/2015 Varicella Virus Vaccine Live 9/10/2011 ZZZ-RETIRED (DO NOT USE) Pneumococcal Vaccine (Unspecified Type) 11/8/2010 Not reviewed this visit You Were Diagnosed With   
  
 Codes Comments Total body pain    -  Primary ICD-10-CM: I59 ICD-9-CM: 780.96 Non-Hodgkin's lymphoma, unspecified body region, unspecified non-Hodgkin lymphoma type ([facility]     ICD-10-CM: C85.90 ICD-9-CM: 202.80 Diverticulitis     ICD-10-CM: N54.44 
ICD-9-CM: 562.11 Essential hypertension     ICD-10-CM: I10 
ICD-9-CM: 401.9 Vitals BP Pulse Temp Resp Height(growth percentile) Weight(growth percentile) 122/79 (!) 107 98.7 °F (37.1 °C) (Oral) 17 5' 1\" (1.549 m) 182 lb (82.6 kg) SpO2 BMI OB Status Smoking Status 97% 34.39 kg/m2 Postmenopausal Never Smoker Vitals History BMI and BSA Data Body Mass Index Body Surface Area  
 34.39 kg/m 2 1.89 m 2 Preferred Pharmacy Pharmacy Name Phone Subhash Chavez, [address] [facility], [address] [phone] Your Updated Medication List  
  
   
This list is accurate as of 8/14/18  3:00 PM.  Always use your most recent med list. amLODIPine 10 mg tablet Commonly known as:  Mcneill Mullet Take 1 Tab by mouth daily. amoxicillin-clavulanate 875-125 mg per tablet Commonly known as:  AUGMENTIN  
  
 apixaban 5 mg (74 tabs) starter pack Commonly known as:  Virgel Chaparrita Take 10 mg (two 5 mg tablets) by mouth twice a day for 7 days  Followed by 5 mg (one 5 mg tablet) by mouth twice a day  
  
 atorvastatin 40 mg tablet Commonly known as:  LIPITOR Take 40 mg by mouth nightly. FLONASE ALLERGY RELIEF 50 mcg/actuation nasal spray Generic drug:  fluticasone 1 [place] by Both Nostrils route daily as needed for Rhinitis. folic acid 1 mg tablet Commonly known as:  FOLVITE  
TAKE ONE TABLET BY MOUTH ONCE DAILY. *GENERIC FOR FOLVITE  
  
 glimepiride 4 mg tablet Commonly known as:  AMARYL Take 2 mg by mouth Daily (before breakfast). HYDROcodone-acetaminophen 5-325 mg per tablet Commonly known as:  Bentley Dolphin Take 1 Tab by mouth every six (6) hours as needed for Pain. Max Daily Amount: 4 Tabs. Indications: severe pain  
  
 linagliptin 5 mg tablet Commonly known as:  Dean Rancher Take 5 mg by mouth daily (with dinner). loratadine 10 mg tablet Commonly known as:  Nallely Draft Take 10 mg by mouth nightly. methotrexate 2.5 mg tablet Commonly known as:  Chhaya Dirk Take 7.5 mg by mouth Every Thursday. Patient takes three tablets which is 7.5 mg every Thursday NexIUM 20 mg capsule Generic drug:  esomeprazole Take 20 mg by mouth daily. ondansetron 4 mg disintegrating tablet Commonly known as:  ZOFRAN ODT Take 1 Tab by mouth every eight (8) hours as needed for Nausea. SENNA PLUS 8.6-50 mg per tablet Generic drug:  senna-docusate Take 1 Tab by mouth daily. tiZANidine 4 mg tablet Commonly known as:  Kip Schirmer Take 1 Tab by mouth three (3) times daily as needed. Indications: Muscle Spasm  
  
 traMADol 50 mg tablet Commonly known as:  ULTRAM  
Take 50 mg by mouth every six (6) hours as needed for Pain.  
  
 warfarin 5 mg tablet Commonly known as:  COUMADIN Take 2.5 mg by mouth daily. Prescriptions Printed Refills HYDROcodone-acetaminophen (NORCO) 5-325 mg per tablet 0 Sig: Take 1 Tab by mouth every six (6) hours as needed for Pain. Max Daily Amount: 4 Tabs. Indications: severe pain  
 Class: Print Route: Oral  
  
Prescriptions Sent to Pharmacy Refills  
 tiZANidine (ZANAFLEX) 4 mg tablet 0 Sig: Take 1 Tab by mouth three (3) times daily as needed. Indications: Muscle Spasm Class: Normal  
 Pharmacy: [facility] [address] [place] [address] #: [phone] Route: Oral  
  
Follow-up Instructions Return if symptoms worsen or fail to improve. To-Do List   
 09/06/2018 10:00 AM  
  Appointment with [facility] 1 at [facility] ([phone]) NON-CONTRAST STUDY: 1. Bring any non [facility] facility films/images pertaining to the area of interest with you on the day of appointment. 2. Check in at registration at least 30 minutes before appt time unless you were instructed to do otherwise. 3. If you have to drink oral contrast please pick it up any weekday prior to your appointment, if you cannot please check in 2 hrs  before appt time. Introducing [address]! [facility] [employer] introduces Workshare patient portal. Now you can access parts of your medical record, email your doctor's office, and request medication refills online.    
 
1. In your internet browser, go to [email or web address] 2. Click on the First Time User? Click Here link in the Sign In box. You will see the New Member Sign Up page. 3. Enter your Sanguine Access Code exactly as it appears below. You will not need to use this code after youve completed the sign-up process. If you do not sign up before the expiration date, you must request a new code. · Sanguine Access Code: [email or web address] Expires: 8/21/2018  4:23 PM 
 
4. Enter the last four digits of your Social Security Number (xxxx) and Date of Birth (mm/dd/yyyy) as indicated and click Submit. You will be taken to the next sign-up page. 5. Create a MD SolarSciencest ID. This will be your Sanguine login ID and cannot be changed, so think of one that is secure and easy to remember. 6. Create a Sanguine password. You can change your password at any time. 7. Enter your Password Reset Question and Answer. This can be used at a later time if you forget your password. 8. Enter your e-mail address. You will receive e-mail notification when new information is available in [address] 9. Click Sign Up. You can now view and download portions of your medical record. 10. Click the Download Summary menu link to download a portable copy of your medical information. If you have questions, please visit the Frequently Asked Questions section of the Sanguine website. Remember, Sanguine is NOT to be used for urgent needs. For medical emergencies, dial 911. Now available from your iPhone and Android! Please provide this summary of care documentation to your next provider. Your primary care clinician is listed as MI PALMER. If you have any questions after today's visit, please call [phone].

## 2018-08-15 ENCOUNTER — DOCUMENTATION ONLY (OUTPATIENT)
Dept: FAMILY MEDICINE CLINIC | Age: 83
End: 2018-08-15

## 2018-08-15 NOTE — PROGRESS NOTES
[address] [address] certification & POC order was signed & faxed to [phone],ok,Copy placed in scan folder to be scanned to chart.

## 2018-08-27 ENCOUNTER — DOCUMENTATION ONLY (OUTPATIENT)
Dept: FAMILY MEDICINE CLINIC | Age: 83
End: 2018-08-27

## 2018-08-28 ENCOUNTER — DOCUMENTATION ONLY (OUTPATIENT)
Dept: FAMILY MEDICINE CLINIC | Age: 83
End: 2018-08-28

## 2018-08-28 NOTE — PROGRESS NOTES
[facility] order was signed & faxed to [phone], .ok,Copy placed in scan folder to be scanned to chart.

## 2018-09-06 ENCOUNTER — HOSPITAL ENCOUNTER (OUTPATIENT)
Dept: CT IMAGING | Age: 83
Discharge: HOME | End: 2018-09-06
Attending: INTERNAL MEDICINE
Payer: MEDICARE

## 2018-09-06 DIAGNOSIS — R91.1 COIN LESION: ICD-10-CM

## 2018-09-06 LAB — CREAT BLD-MCNC: 0.9 MG/DL (ref 0.6–1.3)

## 2018-09-06 PROCEDURE — 82565 ASSAY OF CREATININE: CPT

## 2018-09-06 PROCEDURE — 71260 CT THORAX DX C+: CPT

## 2018-09-06 PROCEDURE — 74011636320 HC RX REV CODE- 636/320: Performed by: RADIOLOGY

## 2018-09-06 RX ADMIN — IOPAMIDOL 80 ML: 612 INJECTION, SOLUTION INTRAVENOUS at 10:11

## 2018-09-17 ENCOUNTER — OFFICE VISIT (OUTPATIENT)
Dept: FAMILY MEDICINE CLINIC | Age: 83
End: 2018-09-17

## 2018-09-17 VITALS
DIASTOLIC BLOOD PRESSURE: 82 MMHG | HEART RATE: 117 BPM | OXYGEN SATURATION: 96 % | RESPIRATION RATE: 18 BRPM | WEIGHT: 175 LBS | BODY MASS INDEX: 33.04 KG/M2 | TEMPERATURE: 98.2 F | HEIGHT: 61 IN | SYSTOLIC BLOOD PRESSURE: 119 MMHG

## 2018-09-17 DIAGNOSIS — J01.00 ACUTE MAXILLARY SINUSITIS, RECURRENCE NOT SPECIFIED: Primary | ICD-10-CM

## 2018-09-17 RX ORDER — CEFDINIR 300 MG/1
300 CAPSULE ORAL 2 TIMES DAILY
Qty: 10 CAP | Refills: 0 | Status: SHIPPED | OUTPATIENT
Start: 2018-09-17 | End: 2018-09-22

## 2018-09-17 NOTE — PROGRESS NOTES
Chief Complaint   Patient presents with    Allergies     states that she has head congestion, cough, and ear fullness x1 months, worse in the last 2 weeks. Has been taking claritin. 1. Have you been to the ER, urgent care clinic since your last visit? Hospitalized since your last visit? No    2. Have you seen or consulted any other health care providers outside of the [address] [place] [address] since your last visit? Include any pap smears or colon screening.  No

## 2018-09-17 NOTE — MR AVS SNAPSHOT
[address] [address] 
[phone] Patient: Shakira Yoo MRN:  [id number] Visit Information Date & Time Provider Department Dept. Phone Encounter #  
 9/17/2018  7:00 AM Savanah Dodge NP [address] [facility] [phone] [id number] Upcoming Health Maintenance Date Due Influenza Age 5 to Adult 8/1/2018 HEMOGLOBIN A1C Q6M 9/13/2018 EYE EXAM RETINAL OR DILATED Q1 10/27/2018 FOOT EXAM Q1 3/13/2019 MICROALBUMIN Q1 3/13/2019 LIPID PANEL Q1 3/13/2019 MEDICARE YEARLY EXAM 3/14/2019 GLAUCOMA SCREENING Q2Y 10/27/2019 DTaP/Tdap/Td series (2 - Td) 10/30/2025 Allergies as of 9/17/2018  Review Complete On: 9/17/2018 By: Savanah Dodge NP Severity Noted Reaction Type Reactions Sulfa (Sulfonamide Antibiotics)  06/09/2010    Other (comments) Current Immunizations  Reviewed on 2/29/2016 Name Date Influenza High Dose Vaccine PF 9/14/2016 Influenza Vaccine Split 11/8/2012, 11/22/2010 Pneumococcal Conjugate (PCV-13) 7/22/2015 Tdap 10/30/2015 Varicella Virus Vaccine Live 9/10/2011 ZZZ-RETIRED (DO NOT USE) Pneumococcal Vaccine (Unspecified Type) 11/8/2010 Not reviewed this visit You Were Diagnosed With   
  
 Codes Comments Acute maxillary sinusitis, recurrence not specified    -  Primary ICD-10-CM: J01.00 ICD-9-CM: 461.0 Vitals BP Pulse Temp Resp Height(growth percentile) Weight(growth percentile) 119/82 (!) 117 98.2 °F (36.8 °C) 18 5' 1\" (1.549 m) 175 lb (79.4 kg) SpO2 BMI OB Status Smoking Status 96% 33.07 kg/m2 Postmenopausal Never Smoker Vitals History BMI and BSA Data Body Mass Index Body Surface Area 33.07 kg/m 2 1.85 m 2 Preferred Pharmacy Pharmacy Name Phone [address] [facility], [address] [place] [address] [phone] Your Updated Medication List  
  
   
This list is accurate as of 9/17/18  7:34 AM.  Always use your most recent med list. amLODIPine 10 mg tablet Commonly known as:  Stephanie Spruce Take 1 Tab by mouth daily. apixaban 5 mg (74 tabs) starter pack Commonly known as:  Renetta Singhl Take 10 mg (two 5 mg tablets) by mouth twice a day for 7 days  Followed by 5 mg (one 5 mg tablet) by mouth twice a day  
  
 atorvastatin 40 mg tablet Commonly known as:  LIPITOR Take 40 mg by mouth nightly. cefdinir 300 mg capsule Commonly known as:  OMNICEF Take 1 Cap by mouth two (2) times a day for 5 days. FLONASE ALLERGY RELIEF 50 mcg/actuation nasal spray Generic drug:  fluticasone 1 Alexander by Both Nostrils route daily as needed for Rhinitis. folic acid 1 mg tablet Commonly known as:  FOLVITE  
TAKE ONE TABLET BY MOUTH ONCE DAILY. *GENERIC FOR FOLVITE  
  
 glimepiride 4 mg tablet Commonly known as:  AMARYL Take 2 mg by mouth Daily (before breakfast). HYDROcodone-acetaminophen 5-325 mg per tablet Commonly known as:  Jones Minor Take 1 Tab by mouth every six (6) hours as needed for Pain. Max Daily Amount: 4 Tabs. Indications: severe pain  
  
 linagliptin 5 mg tablet Commonly known as:  Southwick Grout Take 5 mg by mouth daily (with dinner). loratadine 10 mg tablet Commonly known as:  Ruddy Zeyad Take 10 mg by mouth nightly. methotrexate 2.5 mg tablet Commonly known as:  Victoriano Philadelphia Take 7.5 mg by mouth Every Thursday. Patient takes three tablets which is 7.5 mg every Thursday NexIUM 20 mg capsule Generic drug:  esomeprazole Take 20 mg by mouth daily. ondansetron 4 mg disintegrating tablet Commonly known as:  ZOFRAN ODT Take 1 Tab by mouth every eight (8) hours as needed for Nausea. SENNA PLUS 8.6-50 mg per tablet Generic drug:  senna-docusate Take 1 Tab by mouth daily. tiZANidine 4 mg tablet Commonly known as:  Aimee Frey Take 1 Tab by mouth three (3) times daily as needed. Indications: Muscle Spasm  
  
 traMADol 50 mg tablet Commonly known as:  ULTRAM  
Take 50 mg by mouth every six (6) hours as needed for Pain.  
  
 warfarin 5 mg tablet Commonly known as:  COUMADIN Take 2.5 mg by mouth daily. Prescriptions Sent to Pharmacy Refills  
 cefdinir (OMNICEF) 300 mg capsule 0 Sig: Take 1 Cap by mouth two (2) times a day for 5 days. Class: Normal  
 Pharmacy: [facility] [address], [address] #: [phone] Route: Oral  
  
Introducing [facility]! [employer] introduces [employer] patient portal. Now you can access parts of your medical record, email your doctor's office, and request medication refills online. 1. In your internet browser, go to [email or web address] 2. Click on the First Time User? Click Here link in the Sign In box. You will see the New Member Sign Up page. 3. Enter your [employer] Access Code exactly as it appears below. You will not need to use this code after youve completed the sign-up process. If you do not sign up before the expiration date, you must request a new code. · [employer] Access Code: [email or web address] Expires: 12/5/2018  9:34 AM 
 
4. Enter the last four digits of your Social Security Number (xxxx) and Date of Birth (mm/dd/yyyy) as indicated and click Submit. You will be taken to the next sign-up page. 5. Create a itsDappert ID. This will be your [employer] login ID and cannot be changed, so think of one that is secure and easy to remember. 6. Create a [employer] password. You can change your password at any time. 7. Enter your Password Reset Question and Answer. This can be used at a later time if you forget your password. 8. Enter your e-mail address. You will receive e-mail notification when new information is available in [address] 9. Click Sign Up. You can now view and download portions of your medical record. 10. Click the Download Summary menu link to download a portable copy of your medical information. If you have questions, please visit the Frequently Asked Questions section of the [employer] website. Remember, [employer] is NOT to be used for urgent needs. For medical emergencies, dial 911. Now available from your iPhone and Android! Please provide this summary of care documentation to your next provider. Your primary care clinician is listed as MI PALMER. If you have any questions after today's visit, please call [phone].

## 2018-09-17 NOTE — PROGRESS NOTES
HPI/ROS  Patient complains of bilateral ear pressure/pain. Symptoms include congestion, headache described as frontal, lightheadedness, low grade fever, post nasal drip, productive cough with  yellow colored sputum, sinus pressure, tooth pain and vertigo. Onset of symptoms was 5 days ago, gradually worsening since that time. Patient is drinking plenty of fluids. .  Past history is significant for no history of pneumonia or bronchitis. Patient is non-smoker. Using claritin and flonase intermittently but not helping. Visit Vitals    /82    Pulse (!) 117    Temp 98.2 °F (36.8 °C)    Resp 18    Ht 5' 1\" (1.549 m)    Wt 175 lb (79.4 kg)    SpO2 96%    BMI 33.07 kg/m2     Physical Examination:   GENERAL ASSESSMENT: well developed and well nourished  SKIN: normal color, no lesions  HEAD: normocephalic  EYES: normal eyes  EARS: external auditory canal: clear and tympanic membrane: yellow, bulging  NOSE: normal external appearance and nares patent  MOUTH: yellow exudates of OP  NECK: normal  CHEST: normal air exchange, no rales, no rhonchi, no wheezes, respiratory effort normal with no retractions  HEART: regular rate and rhythm, normal S1/S2, no murmurs  ABDOMEN:  not examined  EXTREMITY: not examined  NEURO: not examined    Diagnoses and all orders for this visit:    1. Acute maxillary sinusitis, recurrence not specified    Other orders  -     cefdinir (OMNICEF) 300 mg capsule; Take 1 Cap by mouth two (2) times a day for 5 days. Reveiwed adr/se of medication  Push fluids, rest, suggested mucinex for congestion and drainage  Recheck 5-7 days if sx not improved. I have discussed the diagnosis with the patient and the intended plan as seen in the above orders. The patient has received an after-visit summary and questions were answered concerning future plans. Patient conveyed understanding of the plan at the time of the visit.     Elena Paniagua, MSN, ANP  9/17/2018

## 2018-10-08 ENCOUNTER — OFFICE VISIT (OUTPATIENT)
Dept: FAMILY MEDICINE CLINIC | Age: 83
End: 2018-10-08

## 2018-10-08 VITALS
HEIGHT: 61 IN | TEMPERATURE: 98.5 F | OXYGEN SATURATION: 99 % | RESPIRATION RATE: 16 BRPM | BODY MASS INDEX: 33.79 KG/M2 | WEIGHT: 179 LBS | HEART RATE: 129 BPM | SYSTOLIC BLOOD PRESSURE: 121 MMHG | DIASTOLIC BLOOD PRESSURE: 85 MMHG

## 2018-10-08 DIAGNOSIS — N90.7 SEBACEOUS CYST OF LABIA: Primary | ICD-10-CM

## 2018-10-08 RX ORDER — CEPHALEXIN 500 MG/1
500 CAPSULE ORAL 3 TIMES DAILY
Qty: 21 CAP | Refills: 0 | Status: SHIPPED | OUTPATIENT
Start: 2018-10-08 | End: 2018-10-15

## 2018-10-08 RX ORDER — FLUCONAZOLE 150 MG/1
150 TABLET ORAL ONCE
Qty: 1 TAB | Refills: 0 | Status: SHIPPED | OUTPATIENT
Start: 2018-10-08 | End: 2018-10-08

## 2018-10-08 NOTE — PROGRESS NOTES
HISTORY OF PRESENT ILLNESS  Walter Turner is a 81 y.o. female. HPI  Patient is here for right labia pain with hard nodule on palp  Hurts to sit, looks like white head is present  Has been doing a lot of sitting and wears pad for urinary incont  She is currently doing chemo and next tx is supposed to be on Friday    ROS  A comprehensive review of system was obtained and negative except findings in the HPI    Visit Vitals    /85    Pulse (!) 129    Temp 98.5 °F (36.9 °C) (Oral)    Resp 16    Ht 5' 1\" (1.549 m)    Wt 179 lb (81.2 kg)    SpO2 99%    BMI 33.82 kg/m2     Physical Exam   Constitutional: She appears well-developed and well-nourished. No distress. Skin:   Right labia with sebb cyst, red hard skin of the site and tiny white head present, area 2cm in diameter   Nursing note and vitals reviewed. ASSESSMENT and PLAN  Encounter Diagnoses   Name Primary?  Sebaceous cyst of labia Yes     Orders Placed This Encounter    cephALEXin (KEFLEX) 500 mg capsule    fluconazole (DIFLUCAN) 150 mg tablet     Given keflex 500mg tid x 7 days  Warm compresses to draw to head and drain  Follow up 2-3 days if not improving  Must contact Onc to see if chemo still ok for Friday. I have discussed the diagnosis with the patient and the intended plan as seen in the above orders. The patient has received an after-visit summary and questions were answered concerning future plans. Patient conveyed understanding of the plan at the time of the visit.     Geofm Dakins, MSN, ANP  10/8/2018

## 2018-10-08 NOTE — PROGRESS NOTES
1. Have you been to the ER, urgent care clinic since your last visit? Hospitalized since your last visit? No    2. Have you seen or consulted any other health care providers outside of the [facility] since your last visit? Include any pap smears or colon screening.  No     Chief Complaint   Patient presents with    Groin Pain     x5 days

## 2018-10-08 NOTE — MR AVS SNAPSHOT
[address] [address] 
[phone] Patient: Keisha Trinh MRN:  [id number]:[id number]/[id number] Visit Information Date & Time Provider Department Dept. Phone Encounter #  
 10/8/2018  7:30 AM Narinder Miller NP [facility] [phone] [id number] Upcoming Health Maintenance Date Due Shingrix Vaccine Age 50> (1 of 2) 2/1/1986 Influenza Age 5 to Adult 8/1/2018 HEMOGLOBIN A1C Q6M 9/13/2018 EYE EXAM RETINAL OR DILATED Q1 10/27/2018 FOOT EXAM Q1 3/13/2019 MICROALBUMIN Q1 3/13/2019 LIPID PANEL Q1 3/13/2019 MEDICARE YEARLY EXAM 3/14/2019 GLAUCOMA SCREENING Q2Y 10/27/2019 DTaP/Tdap/Td series (2 - Td) 10/30/2025 Allergies as of 10/8/2018  Review Complete On: 10/8/2018 By: Gayle Carpio LPN Severity Noted Reaction Type Reactions Sulfa (Sulfonamide Antibiotics)  06/09/2010    Other (comments) Current Immunizations  Reviewed on 2/29/2016 Name Date Influenza High Dose Vaccine PF 9/14/2016 Influenza Vaccine Split 11/8/2012, 11/22/2010 Pneumococcal Conjugate (PCV-13) 7/22/2015 Tdap 10/30/2015 Varicella Virus Vaccine Live 9/10/2011 ZZZ-RETIRED (DO NOT USE) Pneumococcal Vaccine (Unspecified Type) 11/8/2010 Not reviewed this visit You Were Diagnosed With   
  
 Codes Comments Sebaceous cyst of labia    -  Primary ICD-10-CM: N90.7 ICD-9-CM: 629.89 Vitals BP Pulse Temp Resp Height(growth percentile) Weight(growth percentile) 121/85 (!) 129 98.5 °F (36.9 °C) (Oral) 16 5' 1\" (1.549 m) 179 lb (81.2 kg) SpO2 BMI OB Status Smoking Status 99% 33.82 kg/m2 Postmenopausal Never Smoker Vitals History BMI and BSA Data Body Mass Index Body Surface Area  
 33.82 kg/m 2 1.87 m 2 Preferred Pharmacy Pharmacy Name Phone Cira Jordan [address] [facility], [address] [place] [address] [phone] Your Updated Medication List  
  
   
This list is accurate as of 10/8/18  7:40 AM.  Always use your most recent med list. amLODIPine 10 mg tablet Commonly known as:  Toni Miguel Take 1 Tab by mouth daily. apixaban 5 mg (74 tabs) starter pack Commonly known as:  Kendrick Sprinkles Take 10 mg (two 5 mg tablets) by mouth twice a day for 7 days  Followed by 5 mg (one 5 mg tablet) by mouth twice a day  
  
 atorvastatin 40 mg tablet Commonly known as:  LIPITOR Take 40 mg by mouth nightly. cephALEXin 500 mg capsule Commonly known as:  Serrano Crockett Take 1 Cap by mouth three (3) times daily for 7 days. FLONASE ALLERGY RELIEF 50 mcg/actuation nasal spray Generic drug:  fluticasone 1 Clemons by Both Nostrils route daily as needed for Rhinitis. fluconazole 150 mg tablet Commonly known as:  DIFLUCAN Take 1 Tab by mouth once for 1 dose. folic acid 1 mg tablet Commonly known as:  FOLVITE  
TAKE ONE TABLET BY MOUTH ONCE DAILY. *GENERIC FOR FOLVITE  
  
 glimepiride 4 mg tablet Commonly known as:  AMARYL Take 2 mg by mouth Daily (before breakfast). HYDROcodone-acetaminophen 5-325 mg per tablet Commonly known as:  Tushar Rosendo Take 1 Tab by mouth every six (6) hours as needed for Pain. Max Daily Amount: 4 Tabs. Indications: severe pain  
  
 linagliptin 5 mg tablet Commonly known as:  Eleanora Flack Take 5 mg by mouth daily (with dinner). loratadine 10 mg tablet Commonly known as:  Skye Greet Take 10 mg by mouth nightly. NexIUM 20 mg capsule Generic drug:  esomeprazole Take 20 mg by mouth daily. ondansetron 4 mg disintegrating tablet Commonly known as:  ZOFRAN ODT Take 1 Tab by mouth every eight (8) hours as needed for Nausea. tiZANidine 4 mg tablet Commonly known as:  Zach Schapavelz Take 1 Tab by mouth three (3) times daily as needed. Indications: Muscle Spasm  
  
 traMADol 50 mg tablet Commonly known as:  Davide Aguiar  
 Take 50 mg by mouth every six (6) hours as needed for Pain.  
  
 warfarin 5 mg tablet Commonly known as:  COUMADIN Take 2.5 mg by mouth daily. Prescriptions Sent to Pharmacy Refills  
 cephALEXin (KEFLEX) 500 mg capsule 0 Sig: Take 1 Cap by mouth three (3) times daily for 7 days. Class: Normal  
 Pharmacy: [address], [address] [place] [address], [address] Ph #: [phone] Route: Oral  
 fluconazole (DIFLUCAN) 150 mg tablet 0 Sig: Take 1 Tab by mouth once for 1 dose. Class: Normal  
 Pharmacy: [address], [address] [place] [address], [address] Ph #: [phone] Route: Oral  
  
Introducing [address]! [employer] introduces [employer] patient portal. Now you can access parts of your medical record, email your doctor's office, and request medication refills online. 1. In your internet browser, go to [email or web address] 2. Click on the First Time User? Click Here link in the Sign In box. You will see the New Member Sign Up page. 3. Enter your [employer] Access Code exactly as it appears below. You will not need to use this code after youve completed the sign-up process. If you do not sign up before the expiration date, you must request a new code. · [employer] Access Code: [email or web address] Expires: 12/5/2018  9:34 AM 
 
4. Enter the last four digits of your Social Security Number (xxxx) and Date of Birth (mm/dd/yyyy) as indicated and click Submit. You will be taken to the next sign-up page. 5. Create a Playdomt ID. This will be your [employer] login ID and cannot be changed, so think of one that is secure and easy to remember. 6. Create a [employer] password. You can change your password at any time. 7. Enter your Password Reset Question and Answer. This can be used at a later time if you forget your password. 8. Enter your e-mail address. You will receive e-mail notification when new information is available in [address] 9. Click Sign Up. You can now view and download portions of your medical record. 10. Click the Download Summary menu link to download a portable copy of your medical information. If you have questions, please visit the Frequently Asked Questions section of the [employer] website. Remember, [employer] is NOT to be used for urgent needs. For medical emergencies, dial 911. Now available from your iPhone and Android! Please provide this summary of care documentation to your next provider. Your primary care clinician is listed as MI PALMER. If you have any questions after today's visit, please call [phone].

## 2018-10-18 ENCOUNTER — OFFICE VISIT (OUTPATIENT)
Dept: FAMILY MEDICINE CLINIC | Age: 83
End: 2018-10-18

## 2018-10-18 ENCOUNTER — HOSPITAL ENCOUNTER (OUTPATIENT)
Dept: LAB | Age: 83
Discharge: HOME | End: 2018-10-18
Payer: MEDICARE

## 2018-10-18 VITALS
WEIGHT: 174 LBS | HEIGHT: 61 IN | RESPIRATION RATE: 16 BRPM | SYSTOLIC BLOOD PRESSURE: 127 MMHG | DIASTOLIC BLOOD PRESSURE: 80 MMHG | BODY MASS INDEX: 32.85 KG/M2 | OXYGEN SATURATION: 99 % | HEART RATE: 132 BPM | TEMPERATURE: 99.7 F

## 2018-10-18 DIAGNOSIS — R82.998 URINE WBC INCREASED: ICD-10-CM

## 2018-10-18 DIAGNOSIS — M54.50 ACUTE LOW BACK PAIN WITHOUT SCIATICA, UNSPECIFIED BACK PAIN LATERALITY: ICD-10-CM

## 2018-10-18 DIAGNOSIS — R35.0 URINARY FREQUENCY: Primary | ICD-10-CM

## 2018-10-18 DIAGNOSIS — R00.0 TACHYCARDIA: ICD-10-CM

## 2018-10-18 PROCEDURE — 87088 URINE BACTERIA CULTURE: CPT

## 2018-10-18 PROCEDURE — 87186 SC STD MICRODIL/AGAR DIL: CPT

## 2018-10-18 PROCEDURE — 87086 URINE CULTURE/COLONY COUNT: CPT

## 2018-10-18 RX ORDER — CEFTRIAXONE 250 MG/8ML
500 INJECTION, POWDER, FOR SOLUTION INTRAMUSCULAR; INTRAVENOUS ONCE
Qty: 500 MG | Refills: 0
Start: 2018-10-18 | End: 2018-10-18

## 2018-10-18 RX ORDER — CIPROFLOXACIN 500 MG/1
500 TABLET ORAL 2 TIMES DAILY
Qty: 10 TAB | Refills: 0 | Status: SHIPPED | OUTPATIENT
Start: 2018-10-18 | End: 2018-10-23

## 2018-10-18 NOTE — PATIENT INSTRUCTIONS
Urinary Tract Infection in Women: Care Instructions  Your Care Instructions    A urinary tract infection, or UTI, is a general term for an infection anywhere between the kidneys and the urethra (where urine comes out). Most UTIs are bladder infections. They often cause pain or burning when you urinate. UTIs are caused by bacteria and can be cured with antibiotics. Be sure to complete your treatment so that the infection goes away. Follow-up care is a key part of your treatment and safety. Be sure to make and go to all appointments, and call your doctor if you are having problems. It's also a good idea to know your test results and keep a list of the medicines you take. How can you care for yourself at home? · Take your antibiotics as directed. Do not stop taking them just because you feel better. You need to take the full course of antibiotics. · Drink extra water and other fluids for the next day or two. This may help wash out the bacteria that are causing the infection. (If you have kidney, heart, or liver disease and have to limit fluids, talk with your doctor before you increase your fluid intake.)  · Avoid drinks that are carbonated or have caffeine. They can irritate the bladder. · Urinate often. Try to empty your bladder each time. · To relieve pain, take a hot bath or lay a heating pad set on low over your lower belly or genital area. Never go to sleep with a heating pad in place. To prevent UTIs  · Drink plenty of water each day. This helps you urinate often, which clears bacteria from your system. (If you have kidney, heart, or liver disease and have to limit fluids, talk with your doctor before you increase your fluid intake.)  · Urinate when you need to. · Urinate right after you have sex. · Change sanitary pads often. · Avoid douches, bubble baths, feminine hygiene sprays, and other feminine hygiene products that have deodorants.   · After going to the bathroom, wipe from front to back.  When should you call for help? Call your doctor now or seek immediate medical care if:    · Symptoms such as fever, chills, nausea, or vomiting get worse or appear for the first time.     · You have new pain in your back just below your rib cage. This is called flank pain.     · There is new blood or pus in your urine.     · You have any problems with your antibiotic medicine.    Watch closely for changes in your health, and be sure to contact your doctor if:    · You are not getting better after taking an antibiotic for 2 days.     · Your symptoms go away but then come back. Where can you learn more? Go to http://yakov-philip.info/. Enter D215 in the search box to learn more about \"Urinary Tract Infection in Women: Care Instructions. \"  Current as of: March 21, 2018  Content Version: 11.8  © 9174-7419 [employer]. Care instructions adapted under license by [employer] (which disclaims liability or warranty for this information). If you have questions about a medical condition or this instruction, always ask your healthcare professional. [address] any warranty or liability for your use of this information.

## 2018-10-18 NOTE — PROGRESS NOTES
Chief Complaint   Patient presents with    Bladder Infection     x4 day     she is a 81y.o. year old female who presents for evalution. Pt has been having urinary incontince for past 4 days, also having back pain and pelvic pain. Course is worsening. No fever or chills. Just feels terrible generally - has been doing courses of Chemo and high dose Prednisone. Reviewed PmHx, RxHx, FmHx, SocHx, AllgHx and updated and dated in the chart. Review of Systems - negative except as listed above in the HPI    Objective:     Vitals:    10/18/18 1342   BP: 127/80   Pulse: (!) 132   Resp: 16   Temp: 99.7 °F (37.6 °C)   TempSrc: Oral   SpO2: 99%   Weight: 174 lb (78.9 kg)   Height: 5' 1\" (1.549 m)     Physical Examination: General appearance - alert, well appearing, and in mild distress and ill-appearing  Chest - clear to auscultation, no wheezes, rales or rhonchi, symmetric air entry  Heart - normal rate, regular rhythm, normal S1, S2, no murmurs, rubs, clicks or gallops  Abdomen - soft, nontender, nondistended, no masses or organomegaly  Slight bilateral CVA tenderness     Assessment/ Plan:   Diagnoses and all orders for this visit:    Urinary frequency  -     CULTURE, URINE  -     cefTRIAXone (ROCEPHIN) 250 mg injection; 500 mg by IntraMUSCular route once for 1 dose. -     CEFTRIAXONE SODIUM INJECTION  MG  -     [place] THER/PROPH/DIAG INJECTION, SUBCUT/IM  -     ciprofloxacin HCl (CIPRO) 500 mg tablet; Take 1 Tab by mouth two (2) times a day for 5 days. New rxs. Tried urine dipstick in machine x 4, unable to read - did manual dipstick and showed 4+ WBC, no nitrates, large blood. Pt instructed to take full course of antibiotics and increase water consumption. Contact office if no improvement or develops fever/chills/nausea/vomiting. F/U 4 days for recheck and PT/INR recheck.      Acute low back pain without sciatica, unspecified back pain laterality  -     CULTURE, URINE  -     cefTRIAXone (ROCEPHIN) 250 mg injection; 500 mg by IntraMUSCular route once for 1 dose. -     CEFTRIAXONE SODIUM INJECTION  MG  -     [place] THER/PROPH/DIAG INJECTION, SUBCUT/IM  -     ciprofloxacin HCl (CIPRO) 500 mg tablet; Take 1 Tab by mouth two (2) times a day for 5 days. Tachycardia    Urine WBC increased  -     CULTURE, URINE  -     cefTRIAXone (ROCEPHIN) 250 mg injection; 500 mg by IntraMUSCular route once for 1 dose. -     CEFTRIAXONE SODIUM INJECTION  MG  -     [place] THER/PROPH/DIAG INJECTION, SUBCUT/IM  -     ciprofloxacin HCl (CIPRO) 500 mg tablet; Take 1 Tab by mouth two (2) times a day for 5 days. Pt voiced understanding regarding plan of care. Follow-up Disposition:  Return in about 4 days (around 10/22/2018) for Pt/INR recheck and urine recheck . I have discussed the diagnosis with the patient and the intended plan as seen in the above orders. The patient has received an after-visit summary and questions were answered concerning future plans.      Medication Side Effects and Warnings were discussed with patient    Freddie Shea NP

## 2018-10-18 NOTE — PROGRESS NOTES
1. Have you been to the ER, urgent care clinic since your last visit? Hospitalized since your last visit? No    2. Have you seen or consulted any other health care providers outside of the [address] [place] [address] since your last visit? Include any pap smears or colon screening.  No     Chief Complaint   Patient presents with    Bladder Infection     x4 day

## 2018-10-19 ENCOUNTER — TELEPHONE (OUTPATIENT)
Dept: FAMILY MEDICINE CLINIC | Age: 83
End: 2018-10-19

## 2018-10-19 NOTE — TELEPHONE ENCOUNTER
Mr. Victor Mass id x 2. States that pt is feeling a little better. She is still slow, but is getting around.    States that on 10/31/18 she goes for testing to see if the cancer is in remission

## 2018-10-19 NOTE — TELEPHONE ENCOUNTER
Please call and speak with pt/ and find out how she is feeling today - hopefully slight improvement from yesterday?    Thanks,  N

## 2018-10-20 LAB — BACTERIA UR CULT: ABNORMAL

## 2018-10-22 ENCOUNTER — OFFICE VISIT (OUTPATIENT)
Dept: FAMILY MEDICINE CLINIC | Age: 83
End: 2018-10-22

## 2018-10-22 VITALS
RESPIRATION RATE: 16 BRPM | BODY MASS INDEX: 32.47 KG/M2 | WEIGHT: 172 LBS | HEIGHT: 61 IN | HEART RATE: 134 BPM | DIASTOLIC BLOOD PRESSURE: 76 MMHG | SYSTOLIC BLOOD PRESSURE: 108 MMHG | TEMPERATURE: 98.7 F | OXYGEN SATURATION: 99 %

## 2018-10-22 DIAGNOSIS — G89.29 CHRONIC MIDLINE LOW BACK PAIN WITHOUT SCIATICA: ICD-10-CM

## 2018-10-22 DIAGNOSIS — M54.50 CHRONIC MIDLINE LOW BACK PAIN WITHOUT SCIATICA: ICD-10-CM

## 2018-10-22 DIAGNOSIS — N39.0 URINARY TRACT INFECTION WITHOUT HEMATURIA, SITE UNSPECIFIED: Primary | ICD-10-CM

## 2018-10-22 LAB
BILIRUB UR QL STRIP: NEGATIVE
GLUCOSE UR-MCNC: NEGATIVE MG/DL
KETONES P FAST UR STRIP-MCNC: NEGATIVE MG/DL
PH UR STRIP: 7 [PH] (ref 4.6–8)
PROT UR QL STRIP: NEGATIVE
SP GR UR STRIP: 1.01 (ref 1–1.03)
UA UROBILINOGEN AMB POC: NORMAL (ref 0.2–1)
URINALYSIS CLARITY POC: CLEAR
URINALYSIS COLOR POC: YELLOW
URINE BLOOD POC: NEGATIVE
URINE LEUKOCYTES POC: NEGATIVE
URINE NITRITES POC: NEGATIVE

## 2018-10-22 NOTE — PROGRESS NOTES
1. Have you been to the ER, urgent care clinic since your last visit? Hospitalized since your last visit? No    2. Have you seen or consulted any other health care providers outside of the [address] [place] [address] since your last visit? Include any pap smears or colon screening.  No     Chief Complaint   Patient presents with    Follow-up     Last OV

## 2018-10-22 NOTE — PROGRESS NOTES
Chief Complaint   Patient presents with   [address]     she is a 81y.o. year old female who presents for evalution. Pt states has been feeling better - no longer having urinary incontinence, does feel as sick and tired. Taking medication daily with no problems or side effects. Is still having significant back pain - has been taking Norco but not the muscle relaxer. Doesn't like taking Norco since doesn't seem to help her pain. Pain has been present even before UTI. No improvement in pain while on high dose steroids during chemo. Reviewed PmHx, RxHx, FmHx, SocHx, AllgHx and updated and dated in the chart. Review of Systems - negative except as listed above in the HPI    Objective:     Vitals:    10/22/18 0931   BP: 108/76   Pulse: (!) 134   Resp: 16   Temp: 98.7 °F (37.1 °C)   TempSrc: Oral   SpO2: 99%   Weight: 172 lb (78 kg)   Height: 5' 1\" (1.549 m)     Physical Examination: General appearance - alert, well appearing, and in mild distress  Chest - clear to auscultation, no wheezes, rales or rhonchi, symmetric air entry  Heart - normal rate, regular rhythm, normal S1, S2, no murmurs, rubs, clicks or gallops  Back exam - limited range of motion, pain with motion noted during exam, tenderness noted lower lumbar     Assessment/ Plan:   Diagnoses and all orders for this visit:    1. Urinary tract infection without hematuria, site unspecified  -     AMB POC URINALYSIS DIP STICK AUTO W/O MICRO  Improving, cont current meds. 2. Chronic midline low back pain without sciatica  Advised pt to start taking muscle relaxer, may take 2 Norco at 1 time if needed to help with severe pain. Pt and  report they have enough medication at home. Discussed if no improvement with 2 Norco may call back on Wednesday and I will increase medication to Percocet but they will have to  rx in office. Pt and  voiced understanding. Pt voiced understanding regarding plan of care. Follow-up Disposition:  Return if symptoms worsen or fail to improve. I have discussed the diagnosis with the patient and the intended plan as seen in the above orders. The patient has received an after-visit summary and questions were answered concerning future plans.      Medication Side Effects and Warnings were discussed with patient    Chinyere Duncan NP

## 2018-11-26 ENCOUNTER — OFFICE VISIT (OUTPATIENT)
Dept: FAMILY MEDICINE CLINIC | Age: 83
End: 2018-11-26

## 2018-11-26 VITALS
HEIGHT: 61 IN | DIASTOLIC BLOOD PRESSURE: 64 MMHG | WEIGHT: 176 LBS | TEMPERATURE: 98 F | HEART RATE: 134 BPM | RESPIRATION RATE: 17 BRPM | BODY MASS INDEX: 33.23 KG/M2 | OXYGEN SATURATION: 99 % | SYSTOLIC BLOOD PRESSURE: 108 MMHG

## 2018-11-26 DIAGNOSIS — L03.115 CELLULITIS OF RIGHT LOWER EXTREMITY: ICD-10-CM

## 2018-11-26 DIAGNOSIS — N30.00 ACUTE CYSTITIS WITHOUT HEMATURIA: ICD-10-CM

## 2018-11-26 DIAGNOSIS — G47.00 INSOMNIA, UNSPECIFIED TYPE: ICD-10-CM

## 2018-11-26 DIAGNOSIS — J01.00 ACUTE MAXILLARY SINUSITIS, RECURRENCE NOT SPECIFIED: Primary | ICD-10-CM

## 2018-11-26 DIAGNOSIS — R30.0 DYSURIA: ICD-10-CM

## 2018-11-26 LAB
BILIRUB UR QL STRIP: NEGATIVE
GLUCOSE UR-MCNC: NEGATIVE MG/DL
KETONES P FAST UR STRIP-MCNC: NEGATIVE MG/DL
PH UR STRIP: 6.5 [PH] (ref 4.6–8)
PROT UR QL STRIP: NORMAL
SP GR UR STRIP: 1.01 (ref 1–1.03)
UA UROBILINOGEN AMB POC: NORMAL (ref 0.2–1)
URINALYSIS CLARITY POC: NORMAL
URINALYSIS COLOR POC: YELLOW
URINE BLOOD POC: NEGATIVE
URINE LEUKOCYTES POC: NORMAL
URINE NITRITES POC: NEGATIVE

## 2018-11-26 RX ORDER — TRAZODONE HYDROCHLORIDE 100 MG/1
100 TABLET ORAL
Qty: 30 TAB | Refills: 1 | Status: SHIPPED | OUTPATIENT
Start: 2018-11-26 | End: 2019-04-30 | Stop reason: ALTCHOICE

## 2018-11-26 RX ORDER — AMOXICILLIN AND CLAVULANATE POTASSIUM 500; 125 MG/1; MG/1
1 TABLET, FILM COATED ORAL EVERY 12 HOURS
Qty: 20 TAB | Refills: 0 | Status: SHIPPED | OUTPATIENT
Start: 2018-11-26 | End: 2018-12-06

## 2018-11-26 NOTE — PROGRESS NOTES
HPI/ROS  Patient complains of bilateral ear pressure/pain. Symptoms include congestion, headache described as frontal, lightheadedness, low grade fever, post nasal drip, productive cough with  yellow colored sputum, sinus pressure, tooth pain and vertigo. Onset of symptoms was 5 days ago, gradually worsening since that time. Patient is drinking plenty of fluids. .  Past history is significant for no history of pneumonia or bronchitis. Patient is non-smoker. She is not using any otc meds for congestion. Ears have really started getting bad today. She is having burning with urination x 5 days  Having trouble sleeping at night and wants sleep med  Right lower leg is swollen and red/hot/tender to touch  Ongoing 10 days, elevation is not helping  The FamHx, SocHx, MedHx, Medication, and Allergy lists have been reviewed and updated in the chart. Visit Vitals  /64   Pulse (!) 134   Temp 98 °F (36.7 °C)   Resp 17   Ht 5' 1\" (1.549 m)   Wt 176 lb (79.8 kg)   SpO2 99%   BMI 33.25 kg/m²     Physical Examination:   GENERAL ASSESSMENT: well developed and well nourished  SKIN: normal color, no lesions  HEAD: normocephalic  EYES: normal eyes  EARS: external auditory canal: clear and tympanic membrane: yellow, bulging  NOSE: normal external appearance and nares patent  MOUTH: yellow exudates of OP  NECK: normal  CHEST: normal air exchange, no rales, no rhonchi, no wheezes, respiratory effort normal with no retractions  HEART: regular rate and rhythm, normal S1/S2, no murmurs  ABDOMEN:  not examined  EXTREMITY: right lower leg swollen 2+ with anterior redness and heat; neg Amada's sign, already on coumadin. NEURO: not examined    Diagnoses and all orders for this visit:    1. Acute maxillary sinusitis, recurrence not specified  -     amoxicillin-clavulanate (AUGMENTIN) 500-125 mg per tablet; Take 1 Tab by mouth every twelve (12) hours for 10 days.     2. Acute cystitis without hematuria  -     amoxicillin-clavulanate (AUGMENTIN) 500-125 mg per tablet; Take 1 Tab by mouth every twelve (12) hours for 10 days. 3. Cellulitis of right lower extremity  -     amoxicillin-clavulanate (AUGMENTIN) 500-125 mg per tablet; Take 1 Tab by mouth every twelve (12) hours for 10 days. 4. Dysuria  -     AMB POC URINALYSIS DIP STICK AUTO W/O MICRO    5. Insomnia, unspecified type  -     traZODone (DESYREL) 100 mg tablet; Take 1 Tab by mouth nightly. Reveiwed adr/se of medication  Push fluids, rest, suggested mucinex for congestion and drainage  Recheck 5-7 days if sx not improved. Recheck Friday for leg swelling    I have discussed the diagnosis with the patient and the intended plan as seen in the above orders. The patient has received an after-visit summary and questions were answered concerning future plans. Patient conveyed understanding of the plan at the time of the visit. Adrian Dunn MSN, ANP  11/26/2018        I have discussed the diagnosis with the patient and the intended plan as seen in the above orders. The patient has received an after-visit summary and questions were answered concerning future plans. Patient conveyed understanding of the plan at the time of the visit.     Adrian Dunn MSN, ANP  11/26/2018

## 2018-11-30 ENCOUNTER — OFFICE VISIT (OUTPATIENT)
Dept: FAMILY MEDICINE CLINIC | Age: 83
End: 2018-11-30

## 2018-11-30 VITALS
RESPIRATION RATE: 22 BRPM | SYSTOLIC BLOOD PRESSURE: 130 MMHG | HEIGHT: 61 IN | WEIGHT: 177 LBS | OXYGEN SATURATION: 98 % | TEMPERATURE: 97.6 F | DIASTOLIC BLOOD PRESSURE: 76 MMHG | BODY MASS INDEX: 33.42 KG/M2 | HEART RATE: 84 BPM

## 2018-11-30 DIAGNOSIS — L03.115 CELLULITIS OF RIGHT LOWER EXTREMITY: Primary | ICD-10-CM

## 2018-11-30 DIAGNOSIS — J01.00 ACUTE MAXILLARY SINUSITIS, RECURRENCE NOT SPECIFIED: ICD-10-CM

## 2018-11-30 NOTE — PROGRESS NOTES
Chief Complaint   Patient presents with    Sinus Infection    Bladder Infection     Here for a follow up to last visit. States that she has not gotten any better.

## 2018-12-02 NOTE — PROGRESS NOTES
HISTORY OF PRESENT ILLNESS  Malinda Omer is a 81 y.o. female. HPI  Patient is here today for follow up from [facility] visit  Start augmentin for leg cellulitis and URI  Redness has gone down of the right lower leg and less tight feeling  Sinus congestion is clear  No UTI sx noted    ROS  A comprehensive review of system was obtained and negative except findings in the HPI    Visit Vitals  /76   Pulse 84   Temp 97.6 °F (36.4 °C)   Resp 22   Ht 5' 1\" (1.549 m)   Wt 177 lb (80.3 kg)   SpO2 98%   BMI 33.44 kg/m²     Physical Exam   Constitutional: She is oriented to person, place, and time. She appears well-developed and well-nourished. Neck: No JVD present. Cardiovascular: Normal rate, regular rhythm and intact distal pulses. Exam reveals no gallop and no friction rub. No murmur heard. Pulmonary/Chest: Effort normal and breath sounds normal. No respiratory distress. She has no wheezes. Musculoskeletal: She exhibits no edema. Neurological: She is alert and oriented to person, place, and time. Skin: Skin is warm. Right lower leg is 50% improved with redness and swelling, less tight feeling on palp   Nursing note and vitals reviewed. ASSESSMENT and PLAN  Encounter Diagnoses   Name Primary?  Acute cellulitis right lower extremity  Sinusitis Yes     No orders of the defined types were placed in this encounter. No changes in meds at this time  Follow up 4-5 days if not resolved  I have discussed the diagnosis with the patient and the intended plan as seen in the above orders. The patient has received an after-visit summary and questions were answered concerning future plans. Patient conveyed understanding of the plan at the time of the visit.     Cody Flowers, MSN, ANP  12/2/2018

## 2018-12-21 ENCOUNTER — TELEPHONE (OUTPATIENT)
Dept: FAMILY MEDICINE CLINIC | Age: 83
End: 2018-12-21

## 2018-12-21 RX ORDER — CIPROFLOXACIN 500 MG/1
500 TABLET ORAL 2 TIMES DAILY
Qty: 14 TAB | Refills: 0 | Status: SHIPPED | OUTPATIENT
Start: 2018-12-21 | End: 2018-12-28

## 2018-12-24 ENCOUNTER — OFFICE VISIT (OUTPATIENT)
Dept: FAMILY MEDICINE CLINIC | Age: 83
End: 2018-12-24

## 2018-12-24 VITALS
OXYGEN SATURATION: 98 % | SYSTOLIC BLOOD PRESSURE: 118 MMHG | BODY MASS INDEX: 32.85 KG/M2 | TEMPERATURE: 97.5 F | RESPIRATION RATE: 16 BRPM | HEIGHT: 61 IN | WEIGHT: 174 LBS | HEART RATE: 100 BPM | DIASTOLIC BLOOD PRESSURE: 80 MMHG

## 2018-12-24 DIAGNOSIS — L03.115 CELLULITIS OF RIGHT LOWER EXTREMITY: ICD-10-CM

## 2018-12-24 DIAGNOSIS — N30.00 ACUTE CYSTITIS WITHOUT HEMATURIA: Primary | ICD-10-CM

## 2018-12-24 LAB
BILIRUB UR QL STRIP: NEGATIVE
GLUCOSE UR-MCNC: NEGATIVE MG/DL
KETONES P FAST UR STRIP-MCNC: NEGATIVE MG/DL
PH UR STRIP: 7 [PH] (ref 4.6–8)
PROT UR QL STRIP: NEGATIVE
SP GR UR STRIP: 1.02 (ref 1–1.03)
UA UROBILINOGEN AMB POC: NORMAL (ref 0.2–1)
URINALYSIS CLARITY POC: CLEAR
URINALYSIS COLOR POC: YELLOW
URINE BLOOD POC: NORMAL
URINE LEUKOCYTES POC: NEGATIVE
URINE NITRITES POC: NEGATIVE

## 2018-12-24 NOTE — PROGRESS NOTES
Chief Complaint   Patient presents with    UTI     follow up    Leg Swelling     right leg, blistering    Ear Pain     right ear     Pt in office today for follow up with uti  Pt states it had improved but it started back again  Pt states she complited the antibiotics that were prescribed    Pt also c/o pain in her right ear that started 1wk ago  Pt states she thought she slept on it wrong but it continues to bother her  Pt has not done anything to treat ear    Pt presents with right leg swelling that begin over 1month ago  Pt states it keeps swelling and blistering  Pt states she elevates it with no reliefe    Pt has no other concerns

## 2018-12-24 NOTE — PROGRESS NOTES
HISTORY OF PRESENT ILLNESS  Antoinette Jackson is a 81 y.o. female. HPI  Pt in office today for follow up with uti  Pt states it had improved but it started back again  Pt states she complited the antibiotics that were prescribed    Pt also c/o pain in her right ear that started 1wk ago  Pt states she thought she slept on it wrong but it continues to bother her  Pt has not done anything to treat ear    Pt presents with right leg swelling that begin over 1month ago  Pt states it keeps swelling and blistering  Pt states she elevates it with no relief, this morning it does look better compared to yesterday with the redness and blistering  She has had 3 fulls days of cipro      ROS  A comprehensive review of system was obtained and negative except findings in the HPI    Visit Vitals  /80 (BP 1 Location: Right arm, BP Patient Position: Sitting)   Pulse 100   Temp 97.5 °F (36.4 °C) (Oral)   Resp 16   Ht 5' 1\" (1.549 m)   Wt 174 lb (78.9 kg)   SpO2 98%   BMI 32.88 kg/m²     Physical Exam   Constitutional: She is oriented to person, place, and time. She appears well-developed and well-nourished. Neck: No JVD present. Cardiovascular: Normal rate, regular rhythm and intact distal pulses. Exam reveals no gallop and no friction rub. No murmur heard. Pulmonary/Chest: Effort normal and breath sounds normal. No respiratory distress. She has no wheezes. Musculoskeletal: She exhibits no edema. Neurological: She is alert and oriented to person, place, and time. Skin: Skin is warm. There is erythema. Right lower leg anterior with redness and healing blisters, no tracking or open infected wounds   Nursing note and vitals reviewed. ASSESSMENT and PLAN  Encounter Diagnoses   Name Primary?     Acute cystitis without hematuria Yes    Cellulitis of right lower extremity      Orders Placed This Encounter    AMB POC URINALYSIS DIP STICK AUTO W/O MICRO     Finish the cipro until gone  Reviewed cellulitis management as well  Follow up 4-5 days if not improved    I have discussed the diagnosis with the patient and the intended plan as seen in the above orders. The patient has received an after-visit summary and questions were answered concerning future plans. Patient conveyed understanding of the plan at the time of the visit.     Ramona Peterson, MSN, ANP  12/24/2018

## 2019-01-07 ENCOUNTER — OFFICE VISIT (OUTPATIENT)
Dept: FAMILY MEDICINE CLINIC | Age: 84
End: 2019-01-07

## 2019-01-07 VITALS
WEIGHT: 175 LBS | OXYGEN SATURATION: 100 % | DIASTOLIC BLOOD PRESSURE: 85 MMHG | HEART RATE: 112 BPM | BODY MASS INDEX: 33.04 KG/M2 | TEMPERATURE: 97.5 F | SYSTOLIC BLOOD PRESSURE: 127 MMHG | RESPIRATION RATE: 16 BRPM | HEIGHT: 61 IN

## 2019-01-07 DIAGNOSIS — I82.4Y1 DEEP VEIN THROMBOSIS (DVT) OF PROXIMAL VEIN OF RIGHT LOWER EXTREMITY, UNSPECIFIED CHRONICITY (HCC): ICD-10-CM

## 2019-01-07 DIAGNOSIS — M79.89 RIGHT LEG SWELLING: ICD-10-CM

## 2019-01-07 DIAGNOSIS — L03.115 CELLULITIS OF RIGHT LOWER EXTREMITY: Primary | ICD-10-CM

## 2019-01-07 RX ORDER — CEPHALEXIN 500 MG/1
500 CAPSULE ORAL 2 TIMES DAILY
Qty: 20 CAP | Refills: 0 | Status: SHIPPED | OUTPATIENT
Start: 2019-01-07 | End: 2019-01-17

## 2019-01-07 RX ORDER — BISMUTH SUBSALICYLATE 262 MG
1 TABLET,CHEWABLE ORAL DAILY
COMMUNITY
End: 2020-09-29 | Stop reason: ALTCHOICE

## 2019-01-07 NOTE — PROGRESS NOTES
1. Have you been to the ER, urgent care clinic since your last visit? Hospitalized since your last visit? No    2. Have you seen or consulted any other health care providers outside of the [address] [place] [address] since your last visit? Include any pap smears or colon screening.  No     Chief Complaint   Patient presents with    Leg Swelling     Right, x1 month

## 2019-01-07 NOTE — PROGRESS NOTES
Chief Complaint   Patient presents with    Leg Swelling     Right, x1 month     she is a 81y.o. year old female who presents for evalution. Pt here for R leg swelling and erythema, tenderness. States was just treated for cellulitis at beginning of Dec but course has returned. Is leg with DVT, states unable to wear KARIS stocking - too painful and hard to get on. When takes antibiotics redness and pain improve, swelling remains. Seeing Heme/Onc tomorrow for appt. Reviewed PmHx, RxHx, FmHx, SocHx, AllgHx and updated and dated in the chart. Review of Systems - negative except as listed above in the HPI    Objective:     Vitals:    01/07/19 0741   BP: 127/85   Pulse: (!) 112   Resp: 16   Temp: 97.5 °F (36.4 °C)   TempSrc: Oral   SpO2: 100%   Weight: 175 lb (79.4 kg)   Height: 5' 1\" (1.549 m)     Physical Examination: General appearance - alert, well appearing, and in no distress  Chest - clear to auscultation, no wheezes, rales or rhonchi, symmetric air entry  Heart - normal rate, regular rhythm, normal S1, S2, no murmurs, rubs, clicks or gallops  Extremities - R lower leg pedal edema 2 +, erythema, warmth     Assessment/ Plan:   Diagnoses and all orders for this visit:    1. Cellulitis of right lower extremity  -     cephALEXin (KEFLEX) 500 mg capsule; Take 1 Cap by mouth two (2) times a day for 10 days. New rx. Take full course of antibiotic. Apply warm compresses to affected area 2-3 times daily. Reviewed S/S of worsening infection. F/U in 2 days if no improvement. 2. Right leg swelling  3. Deep vein thrombosis (DVT) of proximal vein of right lower extremity, unspecified chronicity (HCC)  Unclear if heme/onc has repeated imaging since July. Discuss management with them. Recommended OTC compression stocking if unable to tolerate KARIS since will at least provide some compression. Pt voiced understanding regarding plan of care.      Follow-up Disposition:  Return if symptoms worsen or fail to improve. I have discussed the diagnosis with the patient and the intended plan as seen in the above orders. The patient has received an after-visit summary and questions were answered concerning future plans.      Medication Side Effects and Warnings were discussed with patient    Blank Newman NP

## 2019-01-24 ENCOUNTER — OFFICE VISIT (OUTPATIENT)
Dept: FAMILY MEDICINE CLINIC | Age: 84
End: 2019-01-24

## 2019-01-24 ENCOUNTER — HOSPITAL ENCOUNTER (OUTPATIENT)
Dept: LAB | Age: 84
Discharge: HOME | End: 2019-01-24
Payer: MEDICARE

## 2019-01-24 VITALS
HEART RATE: 113 BPM | DIASTOLIC BLOOD PRESSURE: 79 MMHG | SYSTOLIC BLOOD PRESSURE: 125 MMHG | TEMPERATURE: 97.8 F | BODY MASS INDEX: 33.42 KG/M2 | RESPIRATION RATE: 16 BRPM | WEIGHT: 177 LBS | HEIGHT: 61 IN | OXYGEN SATURATION: 99 %

## 2019-01-24 DIAGNOSIS — N32.81 OAB (OVERACTIVE BLADDER): ICD-10-CM

## 2019-01-24 DIAGNOSIS — R30.0 DYSURIA: Primary | ICD-10-CM

## 2019-01-24 LAB
BILIRUB UR QL STRIP: NEGATIVE
GLUCOSE UR-MCNC: NEGATIVE MG/DL
KETONES P FAST UR STRIP-MCNC: NEGATIVE MG/DL
PH UR STRIP: 6.5 [PH] (ref 4.6–8)
PROT UR QL STRIP: NEGATIVE
SP GR UR STRIP: 1.02 (ref 1–1.03)
UA UROBILINOGEN AMB POC: NORMAL (ref 0.2–1)
URINALYSIS CLARITY POC: CLEAR
URINALYSIS COLOR POC: YELLOW
URINE BLOOD POC: NEGATIVE
URINE LEUKOCYTES POC: NORMAL
URINE NITRITES POC: NEGATIVE

## 2019-01-24 PROCEDURE — 87186 SC STD MICRODIL/AGAR DIL: CPT

## 2019-01-24 PROCEDURE — 87086 URINE CULTURE/COLONY COUNT: CPT

## 2019-01-24 PROCEDURE — 87088 URINE BACTERIA CULTURE: CPT

## 2019-01-24 RX ORDER — SOLIFENACIN SUCCINATE 5 MG/1
5 TABLET, FILM COATED ORAL DAILY
Qty: 30 TAB | Refills: 1 | Status: SHIPPED | OUTPATIENT
Start: 2019-01-24 | End: 2019-04-30 | Stop reason: ALTCHOICE

## 2019-01-24 RX ORDER — NITROFURANTOIN 25; 75 MG/1; MG/1
100 CAPSULE ORAL 2 TIMES DAILY
Qty: 10 CAP | Refills: 0 | Status: SHIPPED | OUTPATIENT
Start: 2019-01-24 | End: 2019-01-29

## 2019-01-24 NOTE — PROGRESS NOTES
Chief Complaint   Patient presents with    Bladder Infection     x1 week     she is a 81y.o. year old female who presents for evalution. Pt has been having dysuria, frequency, and urgency x 1 week. Pt states has been slightly improving but symptoms still present. No fevers or chills. Pt states believes she has OAB, would like to try medication to help with this, has appt scheduled in future for evaluation with Urology. Reviewed PmHx, RxHx, FmHx, SocHx, AllgHx and updated and dated in the chart. Review of Systems - negative except as listed above in the HPI    Objective:     Vitals:    01/24/19 0910   BP: 125/79   Pulse: (!) 113   Resp: 16   Temp: 97.8 °F (36.6 °C)   TempSrc: Oral   SpO2: 99%   Weight: 177 lb (80.3 kg)   Height: 5' 1\" (1.549 m)     Physical Examination: General appearance - alert, well appearing, and in no distress  Chest - clear to auscultation, no wheezes, rales or rhonchi, symmetric air entry  Heart - normal rate, regular rhythm, normal S1, S2, no murmurs, rubs, clicks or gallops  Abdomen - soft, nontender, nondistended, no masses or organomegaly  no CVA tenderness    Assessment/ Plan:   Diagnoses and all orders for this visit:    1. Dysuria  -     AMB POC URINALYSIS DIP STICK MANUAL W/O MICRO  -     CULTURE, URINE  -     nitrofurantoin, macrocrystal-monohydrate, (MACROBID) 100 mg capsule; Take 1 Cap by mouth two (2) times a day for 5 days. New rx. Pt instructed to take full course of antibiotics and increase water consumption. F/U if no improvement or develops fever/chills/nausea/vomiting. 2. OAB (overactive bladder)  -     solifenacin (VESICARE) 5 mg tablet; Take 1 Tab by mouth daily. New rx to try after completed course of antibiotics. Pt voiced understanding regarding plan of care. Follow-up Disposition:  Return if symptoms worsen or fail to improve. I have discussed the diagnosis with the patient and the intended plan as seen in the above orders.   The patient has received an after-visit summary and questions were answered concerning future plans.      Medication Side Effects and Warnings were discussed with patient    Len Angelo NP

## 2019-01-24 NOTE — PATIENT INSTRUCTIONS
Bladder Training: Care Instructions  Your Care Instructions    Bladder training is used to treat urge incontinence and stress incontinence. Urge incontinence means that the need to urinate comes on so fast that you can't get to a toilet in time. Stress incontinence means that you leak urine because of pressure on your bladder. For example, it may happen when you laugh, cough, or lift something heavy. Bladder training can increase how long you can wait before you have to urinate. It can also help your bladder hold more urine. And it can give you better control over the urge to urinate. It is important to remember that bladder training takes a few weeks to a few months to make a difference. You may not see results right away, but don't give up. Follow-up care is a key part of your treatment and safety. Be sure to make and go to all appointments, and call your doctor if you are having problems. It's also a good idea to know your test results and keep a list of the medicines you take. How can you care for yourself at home? Work with your doctor to come up with a bladder training program that is right for you. You may use one or more of the following methods. Delayed urination  · In the beginning, try to keep from urinating for 5 minutes after you first feel the need to go. · While you wait, take deep, slow breaths to relax. Kegel exercises can also help you delay the need to go to the bathroom. · After some practice, when you can easily wait 5 minutes to urinate, try to wait 10 minutes before you urinate. · Slowly increase the waiting period until you are able to control when you have to urinate. Scheduled urination  · Empty your bladder when you first wake up in the morning. · Schedule times throughout the day when you will urinate. · Start by going to the bathroom every hour, even if you don't need to go. · Slowly increase the time between trips to the bathroom.   · When you have found a schedule that works well for you, keep doing it. · If you wake up during the night and have to urinate, do it. Apply your schedule to waking hours only. Kegel exercises  These tighten and strengthen pelvic muscles, which can help you control the flow of urine. To do Kegel exercises:  · Squeeze the same muscles you would use to stop your urine. Your belly and thighs should not move. · Hold the squeeze for 3 seconds, and then relax for 3 seconds. · Start with 3 seconds. Then add 1 second each week until you are able to squeeze for 10 seconds. · Repeat the exercise 10 to 15 times a session. Do three or more sessions a day. When should you call for help? Watch closely for changes in your health, and be sure to contact your doctor if:    · Your incontinence is getting worse.     · You do not get better as expected. Where can you learn more? Go to http://yakov-philip.info/. Enter V794 in the search box to learn more about \"Bladder Training: Care Instructions. \"  Current as of: March 20, 2018  Content Version: 11.9  © 6061-2144 [employer]. Care instructions adapted under license by [employer] (which disclaims liability or warranty for this information). If you have questions about a medical condition or this instruction, always ask your healthcare professional. [address] any warranty or liability for your use of this information.

## 2019-01-24 NOTE — PROGRESS NOTES
1. Have you been to the ER, urgent care clinic since your last visit? Hospitalized since your last visit? No    2. Have you seen or consulted any other health care providers outside of the [address] [place] [address] since your last visit? Include any pap smears or colon screening.  No   Chief Complaint   Patient presents with    Bladder Infection     x1 week

## 2019-01-26 LAB — BACTERIA UR CULT: ABNORMAL

## 2019-01-28 ENCOUNTER — TELEPHONE (OUTPATIENT)
Dept: FAMILY MEDICINE CLINIC | Age: 84
End: 2019-01-28

## 2019-01-28 NOTE — PROGRESS NOTES
Please inform pt urine came back positive for infection and she was treated correctly in office.    Thanks,  N

## 2019-03-16 RX ORDER — FOLIC ACID 1 MG/1
TABLET ORAL
Qty: 90 TAB | Refills: 3 | Status: SHIPPED | OUTPATIENT
Start: 2019-03-16 | End: 2020-03-15

## 2019-03-22 ENCOUNTER — HOSPITAL ENCOUNTER (OUTPATIENT)
Dept: LAB | Age: 84
Discharge: HOME | End: 2019-03-22
Payer: MEDICARE

## 2019-03-22 ENCOUNTER — OFFICE VISIT (OUTPATIENT)
Dept: FAMILY MEDICINE CLINIC | Age: 84
End: 2019-03-22

## 2019-03-22 VITALS
OXYGEN SATURATION: 93 % | WEIGHT: 179 LBS | HEART RATE: 94 BPM | RESPIRATION RATE: 16 BRPM | SYSTOLIC BLOOD PRESSURE: 122 MMHG | DIASTOLIC BLOOD PRESSURE: 71 MMHG | TEMPERATURE: 98 F | HEIGHT: 61 IN | BODY MASS INDEX: 33.79 KG/M2

## 2019-03-22 DIAGNOSIS — M54.5 LOW BACK PAIN, UNSPECIFIED BACK PAIN LATERALITY, UNSPECIFIED CHRONICITY, WITH SCIATICA PRESENCE UNSPECIFIED: Primary | ICD-10-CM

## 2019-03-22 DIAGNOSIS — R35.0 URINARY FREQUENCY: ICD-10-CM

## 2019-03-22 DIAGNOSIS — H61.22 CERUMINOSIS, LEFT: ICD-10-CM

## 2019-03-22 LAB
BILIRUB UR QL STRIP: NORMAL
GLUCOSE UR-MCNC: NEGATIVE MG/DL
KETONES P FAST UR STRIP-MCNC: NEGATIVE MG/DL
PH UR STRIP: 5.5 [PH] (ref 4.6–8)
PROT UR QL STRIP: NORMAL
SP GR UR STRIP: 1.03 (ref 1–1.03)
UA UROBILINOGEN AMB POC: NORMAL (ref 0.2–1)
URINALYSIS CLARITY POC: CLEAR
URINALYSIS COLOR POC: YELLOW
URINE BLOOD POC: NEGATIVE
URINE LEUKOCYTES POC: NEGATIVE
URINE NITRITES POC: NEGATIVE

## 2019-03-22 PROCEDURE — 87086 URINE CULTURE/COLONY COUNT: CPT

## 2019-03-22 NOTE — PATIENT INSTRUCTIONS
A Healthy Lifestyle: Care Instructions Your Care Instructions A healthy lifestyle can help you feel good, stay at a healthy weight, and have plenty of energy for both work and play. A healthy lifestyle is something you can share with your whole family. A healthy lifestyle also can lower your risk for serious health problems, such as high blood pressure, heart disease, and diabetes. You can follow a few steps listed below to improve your health and the health of your family. Follow-up care is a key part of your treatment and safety. Be sure to make and go to all appointments, and call your doctor if you are having problems. It's also a good idea to know your test results and keep a list of the medicines you take. How can you care for yourself at home? · Do not eat too much sugar, fat, or fast foods. You can still have dessert and treats now and then. The goal is moderation. · Start small to improve your eating habits. Pay attention to portion sizes, drink less juice and soda pop, and eat more fruits and vegetables. ? Eat a healthy amount of food. A 3-ounce serving of meat, for example, is about the size of a deck of cards. Fill the rest of your plate with vegetables and whole grains. ? Limit the amount of soda and sports drinks you have every day. Drink more water when you are thirsty. ? Eat at least 5 servings of fruits and vegetables every day. It may seem like a lot, but it is not hard to reach this goal. A serving or helping is 1 piece of fruit, 1 cup of vegetables, or 2 cups of leafy, raw vegetables. Have an apple or some carrot sticks as an afternoon snack instead of a candy bar. Try to have fruits and/or vegetables at every meal. 
· Make exercise part of your daily routine. You may want to start with simple activities, such as walking, bicycling, or slow swimming. Try to be active 30 to 60 minutes every day.  You do not need to do all 30 to 60 minutes all at once. For example, you can exercise 3 times a day for 10 or 20 minutes. Moderate exercise is safe for most people, but it is always a good idea to talk to your doctor before starting an exercise program. 
· Keep moving. [employer] the lawn, work in the garden, or [employer]. Take the stairs instead of the elevator at work. · If you smoke, quit. People who smoke have an increased risk for heart attack, stroke, cancer, and other lung illnesses. Quitting is hard, but there are ways to boost your chance of quitting tobacco for good. ? Use nicotine gum, patches, or lozenges. ? Ask your doctor about stop-smoking programs and medicines. ? Keep trying. In addition to reducing your risk of diseases in the future, you will notice some benefits soon after you stop using tobacco. If you have shortness of breath or asthma symptoms, they will likely get better within a few weeks after you quit. · Limit how much alcohol you drink. Moderate amounts of alcohol (up to 2 drinks a day for men, 1 drink a day for women) are okay. But drinking too much can lead to liver problems, high blood pressure, and other health problems. Family health If you have a family, there are many things you can do together to improve your health. · Eat meals together as a family as often as possible. · Eat healthy foods. This includes fruits, vegetables, lean meats and dairy, and whole grains. · Include your family in your fitness plan. Most people think of activities such as jogging or tennis as the way to fitness, but there are many ways you and your family can be more active. Anything that makes you breathe hard and gets your heart pumping is exercise. Here are some tips: 
? Walk to do errands or to take your child to school or the bus. 
? Go for a family bike ride after dinner instead of watching TV. Where can you learn more? Go to http://yakov-philip.info/. Enter L920 in the search box to learn more about \"A Healthy Lifestyle: Care Instructions. \" Current as of: September 11, 2018 Content Version: 11.9 © 9941-6848 [employer]. Care instructions adapted under license by [employer] (which disclaims liability or warranty for this information). If you have questions about a medical condition or this instruction, always ask your healthcare professional. [place] [address] any warranty or liability for your use of this information.

## 2019-03-22 NOTE — PROGRESS NOTES
Divina Koo is a 81 y.o. female Chief Complaint Patient presents with  Back Pain  Urinary Frequency  
 pt states she has had some back pain and urinary frequency. Pt is concerned she may have a uti. No burning with urination. Pt did have a recent spinal surgery T12 L1 due to compression fractures. Pt also feels like her L ear is sticky, does not hurt tho. No fever no chills, R side feels fine 
she is a 81y.o. year old female who presents for evalution. Reviewed PmHx, RxHx, FmHx, SocHx, AllgHx and updated and dated in the chart. Review of Systems - negative except as listed above in the HPI Objective:  
 
Vitals:  
 03/22/19 1511 BP: 122/71 Pulse: 94 Resp: 16 Temp: 98 °F (36.7 °C) TempSrc: Oral  
SpO2: 93% Weight: 179 lb (81.2 kg) Height: 5' 1\" (1.549 m) Current Outpatient Medications Medication Sig  
 folic acid (FOLVITE) 1 mg tablet TAKE 1 TABLET BY MOUTH ONCE DAILY  solifenacin (VESICARE) 5 mg tablet Take 1 Tab by mouth daily.  multivitamin (ONE A DAY) tablet Take 1 Tab by mouth daily.  traZODone (DESYREL) 100 mg tablet Take 1 Tab by mouth nightly.  oxyCODONE-acetaminophen (PERCOCET 7.5) 7.5-325 mg per tablet Take 1 Tab by mouth every four (4) hours as needed for Pain. Max Daily Amount: 6 Tabs.  tiZANidine (ZANAFLEX) 4 mg tablet Take 1 Tab by mouth three (3) times daily as needed. Indications: Muscle Spasm  warfarin (COUMADIN) 5 mg tablet Take 2.5 mg by mouth daily.  apixaban (ELIQUIS) 5 mg (74 tabs) starter pack Take 10 mg (two 5 mg tablets) by mouth twice a day for 7 days Followed by 5 mg (one 5 mg tablet) by mouth twice a day  amLODIPine (NORVASC) 10 mg tablet Take 1 Tab by mouth daily.  loratadine (CLARITIN) 10 mg tablet Take 10 mg by mouth nightly.  esomeprazole (NEXIUM) 20 mg capsule Take 20 mg by mouth daily.   
 fluticasone (FLONASE ALLERGY RELIEF) 50 mcg/actuation nasal spray 1 [place] by Both Nostrils route daily as needed for Rhinitis.  linagliptin (TRADJENTA) 5 mg tablet Take 5 mg by mouth daily (with dinner).  atorvastatin (LIPITOR) 40 mg tablet Take 40 mg by mouth nightly.  glimepiride (AMARYL) 4 mg tablet Take 2 mg by mouth Daily (before breakfast).  ondansetron (ZOFRAN ODT) 4 mg disintegrating tablet Take 1 Tab by mouth every eight (8) hours as needed for Nausea. No current facility-administered medications for this visit. Physical Examination: General appearance - alert, well appearing, and in no distress Ears - R ear normal L ear with cerumen Chest - clear to auscultation, no wheezes, rales or rhonchi, symmetric air entry Heart - normal rate, regular rhythm, normal S1, S2, no murmurs, rubs, clicks or gallops Assessment/ Plan:  
Diagnoses and all orders for this visit: 1. Low back pain, unspecified back pain laterality, unspecified chronicity, with sciatica presence unspecified -     AMB POC URINALYSIS DIP STICK MANUAL W/O MICRO Urine does not seem abnormal and UA is normal except concentrated, suspect back pain is from her recent sx and leora lsend culture to ensure but will defer abx unless this comes back clin sig 2. Urinary frequency -     AMB POC URINALYSIS DIP STICK MANUAL W/O MICRO 
-     CULTURE, URINE 3. Ceruminosis, left 
h2o2 bid Follow-up and Dispositions · Return if symptoms worsen or fail to improve. I have discussed the diagnosis with the patient and the intended plan as seen in the above orders. The patient has received an after-visit summary and questions were answered concerning future plans. Pt conveyed understanding of plan. Medication Side Effects and Warnings were discussed with patient [id number] [facility], DO

## 2019-03-22 NOTE — PROGRESS NOTES
Chief Complaint Patient presents with  Back Pain  Urinary Frequency Patient presents in office today with c/o urinary frequency and back pain for 2-3 days. No other concerns. 1. Have you been to the ER, urgent care clinic since your last visit? Hospitalized since your last visit? No 
 
2. Have you seen or consulted any other health care providers outside of the [address] [place] [address] since your last visit? Include any pap smears or colon screening. Yes When: 2/13/19 ortho Dr. Jesse Perez Learning Assessment 1/2/2018 PRIMARY LEARNER Patient HIGHEST LEVEL OF EDUCATION - PRIMARY LEARNER  GRADUATED HIGH SCHOOL OR GED  
BARRIERS PRIMARY LEARNER -  
CO-LEARNER CAREGIVER -  
PRIMARY LANGUAGE ENGLISH  
LEARNER PREFERENCE PRIMARY DEMONSTRATION  
ANSWERED BY patient RELATIONSHIP SELF

## 2019-03-24 LAB — BACTERIA UR CULT: NO GROWTH

## 2019-04-30 ENCOUNTER — HOSPITAL ENCOUNTER (OUTPATIENT)
Dept: LAB | Age: 84
Discharge: HOME | End: 2019-04-30
Payer: MEDICARE

## 2019-04-30 ENCOUNTER — OFFICE VISIT (OUTPATIENT)
Dept: FAMILY MEDICINE CLINIC | Age: 84
End: 2019-04-30

## 2019-04-30 VITALS
WEIGHT: 176 LBS | DIASTOLIC BLOOD PRESSURE: 90 MMHG | HEART RATE: 73 BPM | SYSTOLIC BLOOD PRESSURE: 130 MMHG | BODY MASS INDEX: 33.23 KG/M2 | RESPIRATION RATE: 14 BRPM | HEIGHT: 61 IN | TEMPERATURE: 97.7 F | OXYGEN SATURATION: 96 %

## 2019-04-30 DIAGNOSIS — Z00.00 WELL ADULT EXAM: Primary | ICD-10-CM

## 2019-04-30 DIAGNOSIS — E78.00 HYPERCHOLESTEREMIA: ICD-10-CM

## 2019-04-30 DIAGNOSIS — I10 ESSENTIAL HYPERTENSION, BENIGN: ICD-10-CM

## 2019-04-30 DIAGNOSIS — C85.90 NON-HODGKIN'S LYMPHOMA, UNSPECIFIED BODY REGION, UNSPECIFIED NON-HODGKIN LYMPHOMA TYPE (HCC): ICD-10-CM

## 2019-04-30 DIAGNOSIS — E11.9 CONTROLLED TYPE 2 DIABETES MELLITUS WITHOUT COMPLICATION, WITHOUT LONG-TERM CURRENT USE OF INSULIN (HCC): ICD-10-CM

## 2019-04-30 PROCEDURE — 84443 ASSAY THYROID STIM HORMONE: CPT

## 2019-04-30 PROCEDURE — 80053 COMPREHEN METABOLIC PANEL: CPT

## 2019-04-30 PROCEDURE — 82043 UR ALBUMIN QUANTITATIVE: CPT

## 2019-04-30 PROCEDURE — 85025 COMPLETE CBC W/AUTO DIFF WBC: CPT

## 2019-04-30 PROCEDURE — 83036 HEMOGLOBIN GLYCOSYLATED A1C: CPT

## 2019-04-30 PROCEDURE — 80061 LIPID PANEL: CPT

## 2019-04-30 NOTE — PROGRESS NOTES
This is the Subsequent Medicare Annual Wellness Exam, performed 12 months or more after the Initial AWV or the last Subsequent AWV I have reviewed the patient's medical history in detail and updated the computerized patient record. History Past Medical History:  
Diagnosis Date  Allergies  Asymptomatic carotid artery stenosis without infarction 2/22/2011  Closed traumatic compression fracture of lumbar vertebra ([facility] 2019  Depression  Diverticulosis  DJD (degenerative joint disease)  [facility] DVT RLE.  GERD (gastroesophageal reflux disease)  HTN   
 Hypercholesterolemia  Mammography less than 12 months ago  NIDDM  Obesity (BMI 30.0-34. 9)  PE [facility]emb[facility] B/L.  Proteinuria Past Surgical History:  
Procedure Laterality Date  ENDOSCOPY, COLON, DIAGNOSTIC  2008  
 normal  
 HX APPENDECTOMY With JAYME.  HX HERNIA REPAIR    
 ? Umbilical hernia repair.  HX HYSTERECTOMY  HX KNEE REPLACEMENT Left  HX KNEE REPLACEMENT Right  HX SPLENECTOMY Lap splenectomy. Current Outpatient Medications Medication Sig Dispense Refill  folic acid (FOLVITE) 1 mg tablet TAKE 1 TABLET BY MOUTH ONCE DAILY 90 Tab 3  
 multivitamin (ONE A DAY) tablet Take 1 Tab by mouth daily.  tiZANidine (ZANAFLEX) 4 mg tablet Take 1 Tab by mouth three (3) times daily as needed. Indications: Muscle Spasm 60 Tab 0  
 warfarin (COUMADIN) 5 mg tablet Take 2.5 mg by mouth daily.  amLODIPine (NORVASC) 10 mg tablet Take 1 Tab by mouth daily. 90 Tab 3  
 loratadine (CLARITIN) 10 mg tablet Take 10 mg by mouth nightly.  esomeprazole (NEXIUM) 20 mg capsule Take 20 mg by mouth daily.  fluticasone (FLONASE ALLERGY RELIEF) 50 mcg/actuation nasal spray 1 [place] by Both Nostrils route daily as needed for Rhinitis.     
 linagliptin (TRADJENTA) 5 mg tablet Take 5 mg by mouth daily (with dinner).  atorvastatin (LIPITOR) 40 mg tablet Take 40 mg by mouth nightly.  glimepiride (AMARYL) 4 mg tablet Take 2 mg by mouth Daily (before breakfast).  ondansetron (ZOFRAN ODT) 4 mg disintegrating tablet Take 1 Tab by mouth every eight (8) hours as needed for Nausea. 20 Tab 0 Allergies Allergen Reactions  Sulfa (Sulfonamide Antibiotics) Other (comments) Family History Problem Relation Age of Onset  Hypertension Father  Cancer Sister   
     breast  
 Diabetes Brother Social History Tobacco Use  Smoking status: Never Smoker  Smokeless tobacco: Never Used Substance Use Topics  Alcohol use: No  
 
Patient Active Problem List  
Diagnosis Code  Diverticulosis K57.90  
 IBS (irritable bowel syndrome) K58.9  Depressive disorder, not elsewhere classified F32.9  Mixed hyperlipidemia E78.2  DJD (degenerative joint disease) M19.90  
 PUD (peptic ulcer disease) K27.9  Allergic rhinitis due to other allergen J30.89  Proteinuria R80.9  RA (rheumatoid arthritis) ([facility] M06.9  Essential hypertension I10  
 Asymptomatic carotid artery stenosis without infarction I65.29  
 CRI (chronic renal insufficiency) N18.9  DVT (deep venous thrombosis) ([facility]) I82.409  Advanced care planning/counseling discussion Z71.89  Type 2 diabetes mellitus without complication, without long-term current use of insulin ([facility]) E11.9  Type 2 diabetes mellitus with nephropathy ([facility]) E11.21  
 Recurrent depression ([address] F33.9  Hematoma of groin S30. Jose L Noemi  Hypercalcemia E83.52  
 Mass of lung parenchyma R91.8  Pure hypercholesterolemia L37.71  
 Diastolic dysfunction N40.48  
 EDER (acute kidney injury) ([address] N17.9  Severe obesity (BMI 35.0-39. 9) with comorbidity ([facility]) E66.01  
 Non-Hodgkin's lymphoma ([address] C85.90 Depression Risk Factor Screening: No flowsheet data found. Alcohol Risk Factor Screening: You do not drink alcohol or very rarely. Functional Ability and Level of Safety:  
Hearing Loss Hearing is good. Activities of Daily Living The home contains: handrails and grab bars Patient does total self care Fall Risk Fall Risk Assessment, last 12 mths 4/30/2019 Able to walk? Yes Fall in past 12 months? Yes Fall with injury? Yes  
Number of falls in past 12 months 2 Fall Risk Score 3 Abuse Screen Patient is not abused Cognitive Screening Evaluation of Cognitive Function: 
Has your family/caregiver stated any concerns about your memory: no 
Normal 
 
Patient Care Team  
Patient Care Team: 
Cristiana Hawk MD as PCP - General [facility] [place]) Stephane Nair MD (Hematology and Oncology) Yesica Love MD (Surgery) Gasper Smith RN as Ambulatory Care Navigator [facility] [place]) Patrick Smith MD (Internal Medicine) Visit Vitals /90 (BP 1 Location: Right arm, BP Patient Position: Sitting) Pulse 73 Temp 97.7 °F (36.5 °C) (Oral) Resp 14 Ht 5' 1\" (1.549 m) Wt 176 lb (79.8 kg) SpO2 96% BMI 33.25 kg/m² Physical Examination:  
GENERAL ASSESSMENT: well developed and well nourished CHEST: normal air exchange, no rales, no rhonchi, no wheezes, respiratory effort normal with no retractions HEART: regular rate and rhythm, normal S1/S2, no murmurs Assessment/Plan Education and counseling provided: 
Are appropriate based on today's review and evaluation Diagnoses and all orders for this visit: 
 
1. Well adult exam 
-     LIPID PANEL 
-     METABOLIC PANEL, COMPREHENSIVE 
-     CBC WITH AUTOMATED DIFF 
-     TSH 3RD GENERATION 2. Non-Hodgkin's lymphoma, unspecified body region, unspecified non-Hodgkin lymphoma type ([facility] [facility] 3. Essential hypertension, benign -     METABOLIC PANEL, COMPREHENSIVE 
-     CBC WITH AUTOMATED DIFF 4.  Controlled type 2 diabetes mellitus without complication, without long-term current use of insulin ([facility] [address] [facility] -     MICROALBUMIN, UR, RAND W/ MICROALB/CREAT RATIO 
-     HEMOGLOBIN A1C WITH EAG I have discussed the diagnosis with the patient and the intended plan as seen in the above orders. The patient has received an after-visit summary and questions were answered concerning future plans. Patient conveyed understanding of the plan at the time of the visit. Brandon Butt, MSN, ANP  4/30/2019

## 2019-04-30 NOTE — PATIENT INSTRUCTIONS
Fax all labs to Dr. Leeanna Summers at [facility] location for review. appt with him second week May. Medicare Wellness Visit, Female The best way to live healthy is to have a lifestyle where you eat a well-balanced diet, exercise regularly, limit alcohol use, and quit all forms of tobacco/nicotine, if applicable. Regular preventive services are another way to keep healthy. Preventive services (vaccines, screening tests, monitoring & exams) can help personalize your care plan, which helps you manage your own care. Screening tests can find health problems at the earliest stages, when they are easiest to treat. [facility] follows the current, evidence-based guidelines published by the [facility] States Randy Herrera ([facility]) when recommending preventive services for our patients. Because we follow these guidelines, sometimes recommendations change over time as research supports it. (For example, mammograms used to be recommended annually. Even though Medicare will still pay for an annual mammogram, the newer guidelines recommend a mammogram every two years for women of average risk.) Of course, you and your doctor may decide to screen more often for some diseases, based on your risk and your health status. Preventive services for you include: - Medicare offers their members a free annual wellness visit, which is time for you and your primary care provider to discuss and plan for your preventive service needs. Take advantage of this benefit every year! 
-All adults over the age of 72 should receive the recommended pneumonia vaccines. Current USPSTF guidelines recommend a series of two vaccines for the best pneumonia protection.  
-All adults should have a flu vaccine yearly and a tetanus vaccine every 10 years.  All adults age 61 and older should receive a shingles vaccine once in their lifetime.   
-A bone mass density test is recommended when a woman turns 65 to screen for osteoporosis. This test is only recommended one time, as a screening. Some providers will use this same test as a disease monitoring tool if you already have osteoporosis. -All adults age 38-68 who are overweight should have a diabetes screening test once every three years.  
-Other screening tests and preventive services for persons with diabetes include: an eye exam to screen for diabetic retinopathy, a kidney function test, a foot exam, and stricter control over your cholesterol.  
-Cardiovascular screening for adults with routine risk involves an electrocardiogram (ECG) at intervals determined by your doctor.  
-Colorectal cancer screenings should be done for adults age 54-65 with no increased risk factors for colorectal cancer. There are a number of acceptable methods of screening for this type of cancer. Each test has its own benefits and drawbacks. Discuss with your doctor what is most appropriate for you during your annual wellness visit. The different tests include: colonoscopy (considered the best screening method), a fecal occult blood test, a fecal DNA test, and sigmoidoscopy. -Breast cancer screenings are recommended every other year for women of normal risk, age 54-69. 
-Cervical cancer screenings for women over age 72 are only recommended with certain risk factors.  
-All adults born between [facility] should be screened once for Hepatitis C. Here is a list of your current Health Maintenance items (your personalized list of preventive services) with a due date: 
Health Maintenance Due Topic Date Due  Shingles Vaccine (1 of 2) 02/01/1986  Pneumococcal Vaccine (2 of 2 - PPSV23) 07/22/2016  Hemoglobin A1C    09/13/2018 [facility] Diabetic Foot Care  03/13/2019  Albumin Urine Test  03/13/2019  Cholesterol Test   03/13/2019 [facility] Annual Well Visit  03/14/2019

## 2019-04-30 NOTE — PROGRESS NOTES
Chief Complaint Patient presents with  Complete Physical  
 
Pt in office today for wellness visit 
-pt states she has pain in both legs bc of blood clots noticed 1yr ago 
-pt states she had compression fracture to her back and had a surgery done w/cement Pt has no other concerns today

## 2019-05-01 LAB
ALBUMIN SERPL-MCNC: 4.6 G/DL (ref 3.5–4.7)
ALBUMIN/CREAT UR: 15.7 MG/G CREAT (ref 0–30)
ALBUMIN/GLOB SERPL: 2.4 {RATIO} (ref 1.2–2.2)
ALP SERPL-CCNC: 102 IU/L (ref 39–117)
ALT SERPL-CCNC: 20 IU/L (ref 0–32)
AST SERPL-CCNC: 24 IU/L (ref 0–40)
BASOPHILS # BLD AUTO: 0 X10E3/UL (ref 0–0.2)
BASOPHILS NFR BLD AUTO: 0 %
BILIRUB SERPL-MCNC: 0.4 MG/DL (ref 0–1.2)
BUN SERPL-MCNC: 21 MG/DL (ref 8–27)
BUN/CREAT SERPL: 16 (ref 12–28)
CALCIUM SERPL-MCNC: 10.2 MG/DL (ref 8.7–10.3)
CHLORIDE SERPL-SCNC: 105 MMOL/L (ref 96–106)
CHOLEST SERPL-MCNC: 183 MG/DL (ref 100–199)
CO2 SERPL-SCNC: 22 MMOL/L (ref 20–29)
CREAT SERPL-MCNC: 1.3 MG/DL (ref 0.57–1)
CREAT UR-MCNC: 33.8 MG/DL
EOSINOPHIL # BLD AUTO: 0.3 X10E3/UL (ref 0–0.4)
EOSINOPHIL NFR BLD AUTO: 3 %
ERYTHROCYTE [DISTWIDTH] IN BLOOD BY AUTOMATED COUNT: 18.9 % (ref 12.3–15.4)
EST. AVERAGE GLUCOSE BLD GHB EST-MCNC: 148 MG/DL
GLOBULIN SER CALC-MCNC: 1.9 G/DL (ref 1.5–4.5)
GLUCOSE SERPL-MCNC: 161 MG/DL (ref 65–99)
HBA1C MFR BLD: 6.8 % (ref 4.8–5.6)
HCT VFR BLD AUTO: 37.3 % (ref 34–46.6)
HDLC SERPL-MCNC: 58 MG/DL
HGB BLD-MCNC: 11.4 G/DL (ref 11.1–15.9)
IMM GRANULOCYTES # BLD AUTO: 0 X10E3/UL (ref 0–0.1)
IMM GRANULOCYTES NFR BLD AUTO: 0 %
INTERPRETATION, 910389: NORMAL
INTERPRETATION: NORMAL
LDLC SERPL CALC-MCNC: 85 MG/DL (ref 0–99)
LYMPHOCYTES # BLD AUTO: 2 X10E3/UL (ref 0.7–3.1)
LYMPHOCYTES NFR BLD AUTO: 23 %
Lab: NORMAL
MCH RBC QN AUTO: 24.7 PG (ref 26.6–33)
MCHC RBC AUTO-ENTMCNC: 30.6 G/DL (ref 31.5–35.7)
MCV RBC AUTO: 81 FL (ref 79–97)
MICROALBUMIN UR-MCNC: 5.3 UG/ML
MONOCYTES # BLD AUTO: 1.1 X10E3/UL (ref 0.1–0.9)
MONOCYTES NFR BLD AUTO: 12 %
NEUTROPHILS # BLD AUTO: 5.4 X10E3/UL (ref 1.4–7)
NEUTROPHILS NFR BLD AUTO: 62 %
PDF IMAGE, 910387: NORMAL
PLATELET # BLD AUTO: 390 X10E3/UL (ref 150–379)
POTASSIUM SERPL-SCNC: 4.5 MMOL/L (ref 3.5–5.2)
PROT SERPL-MCNC: 6.5 G/DL (ref 6–8.5)
RBC # BLD AUTO: 4.61 X10E6/UL (ref 3.77–5.28)
SODIUM SERPL-SCNC: 146 MMOL/L (ref 134–144)
TRIGL SERPL-MCNC: 200 MG/DL (ref 0–149)
TSH SERPL DL<=0.005 MIU/L-ACNC: 3.99 UIU/ML (ref 0.45–4.5)
VLDLC SERPL CALC-MCNC: 40 MG/DL (ref 5–40)
WBC # BLD AUTO: 8.8 X10E3/UL (ref 3.4–10.8)

## 2019-06-24 RX ORDER — AMLODIPINE BESYLATE 10 MG/1
TABLET ORAL
Qty: 90 TAB | Refills: 2 | Status: SHIPPED | OUTPATIENT
Start: 2019-06-24 | End: 2020-03-19

## 2019-08-06 ENCOUNTER — OFFICE VISIT (OUTPATIENT)
Dept: FAMILY MEDICINE CLINIC | Age: 84
End: 2019-08-06

## 2019-08-06 VITALS
TEMPERATURE: 97.8 F | SYSTOLIC BLOOD PRESSURE: 123 MMHG | DIASTOLIC BLOOD PRESSURE: 72 MMHG | RESPIRATION RATE: 18 BRPM | HEART RATE: 97 BPM | OXYGEN SATURATION: 97 % | BODY MASS INDEX: 32.28 KG/M2 | WEIGHT: 171 LBS | HEIGHT: 61 IN

## 2019-08-06 DIAGNOSIS — J06.9 ACUTE URI: Primary | ICD-10-CM

## 2019-08-06 DIAGNOSIS — J40 BRONCHITIS: ICD-10-CM

## 2019-08-06 LAB — HBA1C MFR BLD HPLC: 6.9 %

## 2019-08-06 RX ORDER — CEFDINIR 300 MG/1
300 CAPSULE ORAL 2 TIMES DAILY
Qty: 20 CAP | Refills: 0 | Status: SHIPPED | OUTPATIENT
Start: 2019-08-06 | End: 2019-08-16

## 2019-08-06 NOTE — PROGRESS NOTES
Chief Complaint   Patient presents with    Cough    Nasal Congestion     Patient in office today for sx that began Friday. Sx are worse in the morning. Cough is productive,secretions are thin and clear. Have been treating with allergy medication and mucinex bid. 1. Have you been to the ER, urgent care clinic since your last visit? Hospitalized since your last visit? No    2. Have you seen or consulted any other health care providers outside of the [address] [place] [address] since your last visit? Include any pap smears or colon screening.  No

## 2019-08-06 NOTE — PROGRESS NOTES
Chief Complaint   Patient presents with    Cough    Nasal Congestion     Patient in office today for sx that began Friday. Sx are worse in the morning. Cough is productive, secretions are thin and clear. Have been treating with allergy medication and mucinex bid. Thinks it could be r/t her allergies. Congestion and pressure worse in the morning. Denies any fever. Denies any sick contacts. Denies any recent abx. Denies any sinus pressure but having some pain in the eyes with itchiness. Cough is dry and occasionally productive. Denies any wheezing, dyspnea or SOB. Denies any other concerns at this time. Chief Complaint   Patient presents with    Cough    Nasal Congestion     she is a 81y.o. year old female who presents for evalution. Reviewed PmHx, RxHx, FmHx, SocHx, AllgHx and updated and dated in the chart. Review of Systems - negative except as listed above in the HPI    Objective:     Vitals:    08/06/19 1059   BP: 123/72   Pulse: 97   Resp: 18   Temp: 97.8 °F (36.6 °C)   TempSrc: Oral   SpO2: 97%   Weight: 171 lb (77.6 kg)   Height: 5' 1\" (1.549 m)     Physical Examination: General appearance - alert, well appearing, and in no distress  Eyes - pupils equal and reactive, extraocular eye movements intact  Ears - bilateral TM's and external ear canals normal  Nose - mucosal congestion, mucosal erythema, purulent rhinorrhea and sinus tenderness noted   Mouth - mucous membranes moist, pharynx normal without lesions  Neck - supple, no significant adenopathy  Chest - clear to auscultation, no wheezes, rales or rhonchi, symmetric air entry, dry frequent cough  Heart - normal rate, regular rhythm, normal S1, S2    Assessment/ Plan:   Diagnoses and all orders for this visit:    1. Acute URI / 2. Bronchitis  -     cefdinir (OMNICEF) 300 mg capsule; Take 1 Cap by mouth two (2) times a day for 10 days. Start and complete full course of omnicef. Dwp ADRs/SEs of medication. Push fluids. Rest. Saline nasal spray for nasal congestion. OTC motrin/apap for fevers. RTC if sx persist or worsen. Follow-up and Dispositions    · Return if symptoms worsen or fail to improve. I have discussed the diagnosis with the patient and the intended plan as seen in the above orders. The patient has received an after-visit summary and questions were answered concerning future plans. Medication Side Effects and Warnings were discussed with patient: yes  Patient Labs were reviewed and or requested:  no  Patient Past Records were reviewed and or requested  yes  Patient / Caregiver Understanding of treatment plan was verbalized during office visit YES    TRE Guerrero    Patient Instructions   I have prescribed you the antibiotic Omnicef. Take this medication as directed and complete the entire course, even if you're feeling better. If you miss a dose, take it as soon as possible. Common side effects of this medication include diarrhea, vomiting, and rash. Notify your provider if symptoms do not improve one week after completing the course of this antibiotic.

## 2019-08-28 ENCOUNTER — OFFICE VISIT (OUTPATIENT)
Dept: FAMILY MEDICINE CLINIC | Age: 84
End: 2019-08-28

## 2019-08-28 VITALS
RESPIRATION RATE: 12 BRPM | OXYGEN SATURATION: 98 % | DIASTOLIC BLOOD PRESSURE: 79 MMHG | SYSTOLIC BLOOD PRESSURE: 145 MMHG | TEMPERATURE: 97.8 F | WEIGHT: 171.8 LBS | HEART RATE: 104 BPM | BODY MASS INDEX: 32.44 KG/M2 | HEIGHT: 61 IN

## 2019-08-28 DIAGNOSIS — J30.89 ENVIRONMENTAL AND SEASONAL ALLERGIES: Primary | ICD-10-CM

## 2019-08-28 DIAGNOSIS — J01.00 ACUTE NON-RECURRENT MAXILLARY SINUSITIS: ICD-10-CM

## 2019-08-28 DIAGNOSIS — G44.83 PRIMARY COUGH HEADACHE: ICD-10-CM

## 2019-08-28 DIAGNOSIS — J40 BRONCHITIS: ICD-10-CM

## 2019-08-28 RX ORDER — LEVOCETIRIZINE DIHYDROCHLORIDE 5 MG/1
5 TABLET, FILM COATED ORAL DAILY
Qty: 90 TAB | Refills: 3 | Status: SHIPPED | OUTPATIENT
Start: 2019-08-28 | End: 2020-01-06

## 2019-08-28 RX ORDER — AMOXICILLIN AND CLAVULANATE POTASSIUM 875; 125 MG/1; MG/1
1 TABLET, FILM COATED ORAL EVERY 12 HOURS
Qty: 13 TAB | Refills: 0 | Status: SHIPPED | OUTPATIENT
Start: 2019-08-28 | End: 2019-09-04

## 2019-08-28 NOTE — PROGRESS NOTES
Harmony Grijalva is a 81 y.o. female , id x 2(name and ). Reviewed record, history, and  medications. Chief Complaint   Patient presents with    Sinus Infection     [id number] pt states she saw byron lindquist and she was given an abx which she finished states 5days later it came back. Vitals:    19 1014   BP: 145/79   Pulse: (!) 104   Resp: 12   Temp: 97.8 °F (36.6 °C)   SpO2: 98%   Weight: 171 lb 12.8 oz (77.9 kg)   Height: 5' 1\" (1.549 m)   PainSc:   0 - No pain     Coordination of Care Questionnaire:   1) Have you been to an emergency room, urgent care, or hospitalized since your last visit?   no       2. Have seen or consulted any other health care provider since your last visit? NO    3) Do you have an Advanced Directive/ Living Will in place? NO  If yes, do we have a copy on file NO  If no, would you like information NO    Patient is accompanied by  I have received verbal consent from Harmony Grijalva to discuss any/all medical information while they are present in the room.

## 2019-08-28 NOTE — PROGRESS NOTES
Chief Complaint   Patient presents with    Sinus Infection     [id number] pt states she saw np  and she was given an abx which she finished states 5days later it came back. Bessy Sen is a 81 y.o. female who presents for evaluation. Was seen by Landen Davis on 8/6/19, diagnosed with URI and started on Omnicef for 10 days. Patient completed course of abx, states she started feeling better initially but approx 5 days after completing abx, symptoms returned. States primary symptom of concern today is cough and HA. Only able to take tyleol for HA but has not taken any today. Denies fever, sore throat, nasal congestion, SOB, ear pain. Notes she has clear nasal drainage and dry cough. Describes HA as pressure, frontal and bilateral. Has back surgery scheduled soon and wanted to make sure she doesn't have another infection prior to surgery, pre-op appointment is today. Reviewed PmHx, RxHx, FmHx, SocHx, AllgHx and updated and dated in the chart. Patient Active Problem List    Diagnosis    Non-Hodgkin's lymphoma ([address]    Severe obesity (BMI 35.0-39. 9) with comorbidity ([address]    Hypercalcemia    Mass of lung parenchyma    Hypercholesteremia    Diastolic dysfunction    EDER (acute kidney injury) ([address]    Hematoma of groin     Left.       Type 2 diabetes mellitus with nephropathy (HCC)    Recurrent depression ([address]    Type 2 diabetes mellitus without complication, without long-term current use of insulin ([address]    Advanced care planning/counseling discussion     Patient does have Will and POA, will brind documents for chart when she can find them      DVT (deep venous thrombosis) ([address]    CRI (chronic renal insufficiency)    Asymptomatic carotid artery stenosis without infarction    Diverticulosis    IBS (irritable bowel syndrome)    Depressive disorder, not elsewhere classified    Mixed hyperlipidemia    DJD (degenerative joint disease)    PUD (peptic ulcer disease)    Allergic rhinitis due to other allergen    Proteinuria    RA ([facility]he[facility]oid arthritis) [facility]    Essential hypertension       Review of Systems - negative except as listed above in the HPI    Objective:     Vitals:    08/28/19 1014   BP: 145/79   Pulse: (!) 104   Resp: 12   Temp: 97.8 °F (36.6 °C)   SpO2: 98%   Weight: 171 lb 12.8 oz (77.9 kg)   Height: 5' 1\" (1.549 m)     Physical Examination: General appearance - alert, well appearing, and in no distress  Mental status - alert, oriented to person, place, and time  Eyes - pupils equal and reactive, extraocular eye movements intact  Ears - bilateral TM's and external ear canals normal  Nose - normal and patent, no erythema, discharge or polyps, clear rhinorrhea and sinuses normal and nontender  Mouth - mucous membranes moist, pharynx normal without lesions  Neck - supple, no significant adenopathy  Chest - clear to auscultation, no wheezes, rales or rhonchi, symmetric air entry  Heart - normal rate, regular rhythm, normal S1, S2, no murmurs, rubs, clicks or gallops  Extremities - peripheral pulses normal, no pedal edema, no clubbing or cyanosis    Assessment/ Plan:   Diagnoses and all orders for this visit:    1. Environmental and seasonal allergies  -     levocetirizine (XYZAL) 5 mg tablet; Take 1 Tab by mouth daily.  - Discussed that patients persistent cough most likely related to allergies vs viral URI   - Changed allergy medicine from cetirizine to xyzal in hopes of better symptom control    2. Bronchitis   - Discussed that majority of bronchitis are viral in nature, will run their course without abx in 7-14 days  - Advised that clinically no sign of bacterial infection today, no sinus tenderness, no pharyngitis, lungs clear, ears normal, no fever, clear rhinorrhea, dry cough    2. Acute non-recurrent maxillary sinusitis  -     amoxicillin-clavulanate (AUGMENTIN) 875-125 mg per tablet; Take 1 Tab by mouth every twelve (12) hours for 7 days.   - Discussed will send in new rx for sinus infection if patient develops worsening of sxs over the next week, advised to watch for sinus pressure/pain, fever - only start med if symptoms worsen  - Advised that clinically no sign of bacterial infection today as above    3. Primary cough headache  - Continue OTC Tylenol as needed        Follow-up and Dispositions    · Return if symptoms worsen or fail to improve. I have discussed the diagnosis with the patient and the intended plan as seen in the above orders. The patient understands and agrees with the plan. The patient has received an after-visit summary and questions were answered concerning future plans. Medication Side Effects and Warnings were discussed with patient  Patient Labs were reviewed and or requested:  Patient Past Records were reviewed and or requested    Wiley Siemens, NP  [address] [facility]    There are no Patient Instructions on file for this visit.

## 2019-10-01 ENCOUNTER — OFFICE VISIT (OUTPATIENT)
Dept: FAMILY MEDICINE CLINIC | Age: 84
End: 2019-10-01

## 2019-10-01 VITALS
TEMPERATURE: 98.2 F | RESPIRATION RATE: 20 BRPM | DIASTOLIC BLOOD PRESSURE: 87 MMHG | HEIGHT: 61 IN | HEART RATE: 66 BPM | BODY MASS INDEX: 31.72 KG/M2 | OXYGEN SATURATION: 95 % | WEIGHT: 168 LBS | SYSTOLIC BLOOD PRESSURE: 145 MMHG

## 2019-10-01 DIAGNOSIS — J06.9 UPPER RESPIRATORY TRACT INFECTION, UNSPECIFIED TYPE: Primary | ICD-10-CM

## 2019-10-01 DIAGNOSIS — I10 ESSENTIAL HYPERTENSION: ICD-10-CM

## 2019-10-01 RX ORDER — BENZONATATE 100 MG/1
100 CAPSULE ORAL
Qty: 60 CAP | Refills: 1 | Status: SHIPPED | OUTPATIENT
Start: 2019-10-01 | End: 2019-10-08

## 2019-10-01 RX ORDER — CARBINOXAMINE MALEATE 4 MG/1
1 TABLET ORAL 2 TIMES DAILY
Qty: 60 TAB | Refills: 5 | Status: SHIPPED | OUTPATIENT
Start: 2019-10-01 | End: 2020-03-29

## 2019-10-01 RX ORDER — AZITHROMYCIN 250 MG/1
TABLET, FILM COATED ORAL
Qty: 6 TAB | Refills: 0 | Status: SHIPPED | OUTPATIENT
Start: 2019-10-01 | End: 2019-11-11 | Stop reason: SDUPTHER

## 2019-10-01 NOTE — PROGRESS NOTES
Patient here for cough, congestion. This started before back surgery. She came in x 2 to [facility] and was on antibiotics and allergy medication. Nothing is working. Patient had back surgery 09/10/2019 and coughing is hurting back. Patient also being treated for non-hodgkins Lymphoma. 1. Have you been to the ER, urgent care clinic since your last visit? Hospitalized since your last visit? No    2. Have you seen or consulted any other health care providers outside of the [address] [place] [address] since your last visit? Include any pap smears or colon screening. No       Chief Complaint   Patient presents with    Cough     cough, congestion moving to chest x 2 months. Back hurts after coughing, back surgery 9/10/2019     She is a 81 y.o. female who presents for evalution. Reviewed PmHx, RxHx, FmHx, SocHx, AllgHx and updated and dated in the chart. Patient Active Problem List    Diagnosis    Non-Hodgkin's lymphoma ([address]    Severe obesity (BMI 35.0-39. 9) with comorbidity ([address]    Hypercalcemia    Mass of lung parenchyma    Hypercholesteremia    Diastolic dysfunction    EDER (acute kidney injury) ([address]    Hematoma of groin     Left.       Type 2 diabetes mellitus with nephropathy (HCC)    Recurrent depression ([address]    Type 2 diabetes mellitus without complication, without long-term current use of insulin ([address]    Advanced care planning/counseling discussion     Patient does have Will and POA, will brind documents for chart when she can find them      DVT (deep venous thrombosis) ([address]    CRI (chronic renal insufficiency)    Asymptomatic carotid artery stenosis without infarction    Diverticulosis    IBS (irritable bowel syndrome)    Depressive disorder, not elsewhere classified    Mixed hyperlipidemia    DJD (degenerative joint disease)    PUD (peptic ulcer disease)    Allergic rhinitis due to other allergen    Proteinuria    RA (rheumatoid arthritis) ([address]    Essential hypertension Review of Systems - negative except as listed above in the HPI    Objective:     Vitals:    10/01/19 1135   BP: 145/87   Pulse: 66   Resp: 20   Temp: 98.2 °F (36.8 °C)   SpO2: 95%   Weight: 168 lb (76.2 kg)   Height: 5' 1\" (1.549 m)     Physical Examination: General appearance - alert, well appearing, and in no distress  Nose - clear rhinorrhea  Neck - supple, no significant adenopathy  Chest - clear to auscultation, no wheezes, rales or rhonchi, symmetric air entry  Heart - normal rate, regular rhythm, normal S1, S2, no murmurs, rubs, clicks or gallops    Assessment/ Plan:   Diagnoses and all orders for this visit:    1. Upper respiratory tract infection, unspecified type  -     carbinoxamine maleate 4 mg tab; Take 1 Tab by mouth two (2) times a day. Indications: Allergic Conjunctivitis  -     azithromycin (ZITHROMAX) 250 mg tablet; Take two tablets today then one tablet daily  -     benzonatate (TESSALON PERLES) 100 mg capsule; Take 1 Cap by mouth three (3) times daily as needed for Cough for up to 7 days.  -dc xyzal    2. Essential hypertension  -at goal for age           I have discussed the diagnosis with the patient and the intended plan as seen in the above orders. The patient understands and agrees with the plan. The patient has received an after-visit summary and questions were answered concerning future plans. Medication Side Effects and Warnings were discussed with patient  Patient Labs were reviewed and or requested:  Patient Past Records were reviewed and or requested    Meliton Ignacio M.D. There are no Patient Instructions on file for this visit.

## 2019-11-11 ENCOUNTER — OFFICE VISIT (OUTPATIENT)
Dept: FAMILY MEDICINE CLINIC | Age: 84
End: 2019-11-11

## 2019-11-11 VITALS
SYSTOLIC BLOOD PRESSURE: 138 MMHG | WEIGHT: 169.2 LBS | DIASTOLIC BLOOD PRESSURE: 78 MMHG | TEMPERATURE: 97.6 F | HEIGHT: 61 IN | HEART RATE: 106 BPM | RESPIRATION RATE: 20 BRPM | BODY MASS INDEX: 31.95 KG/M2 | OXYGEN SATURATION: 98 %

## 2019-11-11 DIAGNOSIS — J06.9 UPPER RESPIRATORY TRACT INFECTION, UNSPECIFIED TYPE: Primary | ICD-10-CM

## 2019-11-11 DIAGNOSIS — J30.89 ENVIRONMENTAL AND SEASONAL ALLERGIES: ICD-10-CM

## 2019-11-11 RX ORDER — CODEINE PHOSPHATE AND GUAIFENESIN 10; 100 MG/5ML; MG/5ML
5 SOLUTION ORAL
Qty: 1 BOTTLE | Refills: 0 | Status: SHIPPED | OUTPATIENT
Start: 2019-11-11 | End: 2019-11-18

## 2019-11-11 RX ORDER — FLUTICASONE PROPIONATE 50 MCG
1 SPRAY, SUSPENSION (ML) NASAL
Qty: 1 BOTTLE | Refills: 3 | Status: SHIPPED | OUTPATIENT
Start: 2019-11-11

## 2019-11-11 RX ORDER — AZITHROMYCIN 250 MG/1
TABLET, FILM COATED ORAL
Qty: 6 TAB | Refills: 0 | Status: SHIPPED | OUTPATIENT
Start: 2019-11-11 | End: 2020-01-06

## 2019-11-11 NOTE — PROGRESS NOTES
Anton Evans is a 81 y.o. female , id x 2(name and ). Reviewed record, history, and  medications. Chief Complaint   Patient presents with    Cold Symptoms     green flem treats w allergy med     Medication Refill     flonase       Vitals:    19 0813   BP: 138/78   Pulse: (!) 106   Resp: 20   Temp: 97.6 °F (36.4 °C)   SpO2: 98%   Weight: 169 lb 3.2 oz (76.7 kg)   Height: 5' 1\" (1.549 m)       Coordination of Care Questionnaire:   1) Have you been to an emergency room, urgent care, or hospitalized since your last visit?   no       2. Have seen or consulted any other health care provider since your last visit? NO    Patient is accompanied by spouse I have received verbal consent from Anton Evans to discuss any/all medical information while they are present in the room.

## 2019-11-11 NOTE — PROGRESS NOTES
Chief Complaint   Patient presents with    Cold Symptoms     green flem treats w allergy med     Medication Refill     flonase     Af[place] Zayas is a 81 y.o. female who presents for evaluation. Here as walk in for congestion and cough. Thought it was just allergies for the past 2 weeks, however cough has worsened and now with yellow/green phlegm. Denies HA, sore throat, fever, ear pain. Primary symptoms of concern are productive cough and nasal congestion. Needs refill of flonase    Reviewed PmHx, RxHx, FmHx, SocHx, AllgHx and updated and dated in the chart. Patient Active Problem List    Diagnosis    Non-Hodgkin's lymphoma ([address]    Severe obesity (BMI 35.0-39. 9) with comorbidity ([address]    Hypercalcemia    Mass of lung parenchyma    Hypercholesteremia    Diastolic dysfunction    EDER (acute kidney injury) ([address]    Hematoma of groin     Left.       Type 2 diabetes mellitus with nephropathy (HCC)    Recurrent depression (HCC)    Type 2 diabetes mellitus without complication, without long-term current use of insulin ([address]    Advanced care planning/counseling discussion     Patient does have Will and POA, will brind documents for chart when she can find them      DVT (deep venous thrombosis) ([address]    CRI (chronic renal insufficiency)    Asymptomatic carotid artery stenosis without infarction    Diverticulosis    IBS (irritable bowel syndrome)    Depressive disorder, not elsewhere classified    Mixed hyperlipidemia    DJD (degenerative joint disease)    PUD (peptic ulcer disease)    Allergic rhinitis due to other allergen    Proteinuria    RA (rheumatoid arthritis) ([address]    Essential hypertension       Review of Systems - negative except as listed above in the HPI    Objective:     Vitals:    11/11/19 0813   BP: 138/78   Pulse: (!) 106   Resp: 20   Temp: 97.6 °F (36.4 °C)   SpO2: 98%   Weight: 169 lb 3.2 oz (76.7 kg)   Height: 5' 1\" (1.549 m)     Physical Examination: General appearance - alert, well appearing, and in no distress  Mental status - alert, oriented to person, place, and time  Eyes - pupils equal and reactive, extraocular eye movements intact  Ears - bilateral TM's and external ear canals normal  Nose - normal and patent, no erythema, discharge or polyps  Mouth - mucous membranes moist, pharynx normal without lesions  Neck - supple, no significant adenopathy  Chest - clear to auscultation, no wheezes, rales or rhonchi, symmetric air entry  Heart - normal rate, regular rhythm, normal S1, S2, no murmurs, rubs, clicks or gallops    Assessment/ Plan:   Diagnoses and all orders for this visit:    1. Upper respiratory tract infection, unspecified type  -     azithromycin (ZITHROMAX) 250 mg tablet; Take 2 tablets today, then 1 tab daily for 4 days.  -     guaiFENesin-codeine (ROBITUSSIN AC) 100-10 mg/5 mL solution; Take 5 mL by mouth three (3) times daily as needed for Cough for up to 7 days. Max Daily Amount: 15 mL. - New Rx, advised on use and SE/ADRs  -. F/U if no improvement    2. Environmental and seasonal allergies  -     fluticasone propionate (FLONASE ALLERGY RELIEF) 50 mcg/actuation nasal spray; 1 Bernice by Both Nostrils route daily as needed for Rhinitis. - Stable, refilled       Follow-up and Dispositions    · Return if symptoms worsen or fail to improve. I have discussed the diagnosis with the patient and the intended plan as seen in the above orders. The patient understands and agrees with the plan. The patient has received an after-visit summary and questions were answered concerning future plans. Medication Side Effects and Warnings were discussed with patient  Patient Labs were reviewed and or requested:  Patient Past Records were reviewed and or requested    Melinda Calderón NP  [address] [facility]    There are no Patient Instructions on file for this visit.

## 2020-01-06 ENCOUNTER — OFFICE VISIT (OUTPATIENT)
Dept: FAMILY MEDICINE CLINIC | Age: 85
End: 2020-01-06

## 2020-01-06 VITALS
WEIGHT: 164 LBS | HEART RATE: 104 BPM | BODY MASS INDEX: 30.96 KG/M2 | RESPIRATION RATE: 18 BRPM | TEMPERATURE: 97.6 F | OXYGEN SATURATION: 97 % | SYSTOLIC BLOOD PRESSURE: 121 MMHG | DIASTOLIC BLOOD PRESSURE: 72 MMHG | HEIGHT: 61 IN

## 2020-01-06 DIAGNOSIS — J01.01 ACUTE RECURRENT MAXILLARY SINUSITIS: Primary | ICD-10-CM

## 2020-01-06 RX ORDER — AMOXICILLIN AND CLAVULANATE POTASSIUM 875; 125 MG/1; MG/1
1 TABLET, FILM COATED ORAL EVERY 12 HOURS
Qty: 20 TAB | Refills: 0 | Status: SHIPPED | OUTPATIENT
Start: 2020-01-06 | End: 2020-01-16

## 2020-01-06 RX ORDER — FLUCONAZOLE 150 MG/1
150 TABLET ORAL DAILY
Qty: 1 TAB | Refills: 1 | Status: SHIPPED | OUTPATIENT
Start: 2020-01-06 | End: 2020-01-07

## 2020-01-06 NOTE — PROGRESS NOTES
Chief Complaint   Patient presents with    Cough    Nasal Congestion    Head Pain     Patient presents during walk in clinic with sx that have not improved since lov. Pt states sx have been present for 2 months,lov carbinoxamine and flonase was started. Pt did not initial improvement in sx when starting carbinoxamine. Cough is productive,secretions thick and green. Have treated with mucinex and tylenol. 1. Have you been to the ER, urgent care clinic since your last visit? Hospitalized since your last visit? No    2. Have you seen or consulted any other health care providers outside of the [address] [place] [address] since your last visit? Include any pap smears or colon screening.  No

## 2020-01-06 NOTE — PROGRESS NOTES
Chief Complaint   Patient presents with    Cough    Nasal Congestion    Head Pain     Patient presents during walk in clinic with sx that have not improved since lov. Pt states sx have been present for 2 months, lov carbinoxamine and flonase was started. Pt did not initial improvement in sx when starting carbinoxamine. Cough is productive, secretions thick and green. Have treated with mucinex and tylenol. Has been taking all medications as prescribed without relief. Has sores present in the nose. Sores in the nose are bleeding on and off. Sinus congestion is green. Frontal and maxillary sinus pain. Denies any fevers. Denies any sick contacts. Last abx was zithromax in early November. Coughing some. Mostly in the morning and at night. Cough is productive. Denies any wheezing, sob, and dyspnea. Just feels tired. Denies any other concerns at this time. Chief Complaint   Patient presents with    Cough    Nasal Congestion    Head Pain     she is a 81y.o. year old female who presents for evalution. Reviewed PmHx, RxHx, FmHx, SocHx, AllgHx and updated and dated in the chart.     Review of Systems - negative except as listed above in the HPI    Objective:     Vitals:    01/06/20 0739   BP: 121/72   Pulse: (!) 104   Resp: 18   Temp: 97.6 °F (36.4 °C)   TempSrc: Oral   SpO2: 97%   Weight: 164 lb (74.4 kg)   Height: 5' 1\" (1.549 m)     Physical Examination: General appearance - alert, well appearing, and in no distress  Eyes - pupils equal and reactive, extraocular eye movements intact  Ears - bilateral TM's and external ear canals normal, ceruminosis noted left ear  Nose - mucosal congestion but mucosa is also visibly dry alfred in left nare, mucosal erythema and sinus tenderness noted noted bilateral maxillary sinuses with percussion  Mouth - mucous membranes moist, pharynx normal without lesions  Neck - supple, no significant adenopathy  Chest - clear to auscultation, no wheezes, rales or rhonchi, symmetric air entry; productive sounding cough  Heart - normal rate, regular rhythm, normal S1, S2, no murmurs    Assessment/ Plan:   Diagnoses and all orders for this visit:    1. Acute recurrent maxillary sinusitis  -     amoxicillin-clavulanate (AUGMENTIN) 875-125 mg per tablet; Take 1 Tab by mouth every twelve (12) hours for 10 days. -     fluconazole (DIFLUCAN) 150 mg tablet; Take 1 Tab by mouth daily for 1 day. Start and complete full course of augmentin. Enc pt to use vaseline on q tips and apply to nasal mucosa to help with dryness. Also enc to use humidifier in bedroom at night. Dwp ADRs/SEs of medication. Push fluids. Rest. Saline nasal spray for nasal congestion. OTC motrin/apap for fevers. Call in 2 weeks if sx persist or worsen and will refer to ENT for further evaluation. I have discussed the diagnosis with the patient and the intended plan as seen in the above orders. The patient has received an after-visit summary and questions were answered concerning future plans. Medication Side Effects and Warnings were discussed with patient: yes  Patient Labs were reviewed and or requested: no  Patient Past Records were reviewed and or requested  yes  Patient / Caregiver Understanding of treatment plan was verbalized during office visit YES    TRE Arellano    There are no Patient Instructions on file for this visit.

## 2020-02-18 PROBLEM — K63.5 COLON POLYPS: Status: ACTIVE | Noted: 2020-02-18

## 2020-03-15 RX ORDER — FOLIC ACID 1 MG/1
TABLET ORAL
Qty: 90 TAB | Refills: 0 | Status: SHIPPED | OUTPATIENT
Start: 2020-03-15 | End: 2020-06-01

## 2020-03-19 RX ORDER — AMLODIPINE BESYLATE 10 MG/1
TABLET ORAL
Qty: 90 TAB | Refills: 1 | Status: SHIPPED | OUTPATIENT
Start: 2020-03-19 | End: 2020-09-15

## 2020-03-28 DIAGNOSIS — J06.9 UPPER RESPIRATORY TRACT INFECTION, UNSPECIFIED TYPE: ICD-10-CM

## 2020-03-29 RX ORDER — CARBINOXAMINE MALEATE 4 MG/1
TABLET ORAL
Qty: 60 TAB | Refills: 4 | Status: SHIPPED | OUTPATIENT
Start: 2020-03-29 | End: 2020-08-06

## 2020-05-13 ENCOUNTER — VIRTUAL VISIT (OUTPATIENT)
Dept: FAMILY MEDICINE CLINIC | Age: 85
End: 2020-05-13

## 2020-05-13 DIAGNOSIS — J01.01 ACUTE RECURRENT MAXILLARY SINUSITIS: Primary | ICD-10-CM

## 2020-05-13 RX ORDER — CEFDINIR 300 MG/1
300 CAPSULE ORAL 2 TIMES DAILY
Qty: 20 CAP | Refills: 0 | Status: SHIPPED | OUTPATIENT
Start: 2020-05-13 | End: 2020-05-23

## 2020-05-13 NOTE — PROGRESS NOTES
Aye Miller is a 81 y.o. female who was seen by synchronous (real-time) audio-video technology on 5/13/2020. Consent: Aye Miller, who was seen by synchronous (real-time) audio-video technology, and/or her healthcare decision maker, is aware that this patient-initiated, Telehealth encounter on 5/13/2020 is a billable service, with coverage as determined by her insurance carrier. She is aware that she may receive a bill and has provided verbal consent to proceed: Yes. I spent at least 23 minutes on this visit with this established patient. [phone]  Subjective:   Aye Miller is a 81 y.o. female who was seen for Sinus Pain  one week of sinus pressure, headache, severe drainage and off balance feeling  Using Palgic which does help with allergy sx  Denies fever    Prior to Admission medications    Medication Sig Start Date End Date Taking? Authorizing Provider   cefdinir (OMNICEF) 300 mg capsule Take 1 Cap by mouth two (2) times a day for 10 days. 5/13/20 5/23/20 Yes Alissa Baron NP   carbinoxamine maleate 4 mg tab TAKE ONE TABLET BY MOUTH TWICE A DAY 3/29/20   David Hdez MD   amLODIPine (NORVASC) 10 mg tablet TAKE ONE TABLET BY MOUTH DAILY 3/19/20   Alissa Baron NP   folic acid (FOLVITE) 1 mg tablet Take 1 tablet by mouth once daily 3/15/20   Alissa Baron NP   fluticasone propionate (FLONASE ALLERGY RELIEF) 50 mcg/actuation nasal spray 1 [place] by Both Nostrils route daily as needed for Rhinitis. 11/11/19   Loco Rankin NP   multivitamin (ONE A DAY) tablet Take 1 Tab by mouth daily. Provider, Historical   warfarin (COUMADIN) 5 mg tablet Take 2.5 mg by mouth daily. 7/12/18   Provider, Historical   esomeprazole (NEXIUM) 20 mg capsule Take 20 mg by mouth daily. Provider, Historical   linagliptin (TRADJENTA) 5 mg tablet Take 5 mg by mouth daily (with dinner). Provider, Historical   atorvastatin (LIPITOR) 40 mg tablet Take 40 mg by mouth nightly. Provider, Historical   glimepiride (AMARYL) 4 mg tablet Take 2 mg by mouth Daily (before breakfast). Provider, Historical     Allergies   Allergen Reactions    Sulfa (Sulfonamide Antibiotics) Other (comments)       Patient Active Problem List    Diagnosis Date Noted    Colon polyps 02/18/2020    Non-Hodgkin's lymphoma ([facility] 08/14/2018    Severe obesity (BMI 35.0-39. 9) with comorbidity ([facility] 05/21/2018    Hypercalcemia 05/15/2018    Mass of lung parenchyma 05/15/2018    Hypercholesteremia 35/05/8424    Diastolic dysfunction 32/82/5675    EDER (acute kidney injury) ([facility] 05/15/2018    Hematoma of groin 04/30/2018    Type 2 diabetes mellitus with nephropathy ([facility] 01/02/2018    Recurrent depression ([facility] 01/02/2018    Type 2 diabetes mellitus without complication, without long-term current use of insulin ([facility] 04/13/2017    Advanced care planning/counseling discussion 02/15/2017    DVT (deep venous thrombosis) ([facility] 02/29/2016    CRI (chronic renal insufficiency) 10/04/2011    Asymptomatic carotid artery stenosis without infarction 02/22/2011    Diverticulosis 06/09/2010    IBS (irritable bowel syndrome) 06/09/2010    Depressive disorder, not elsewhere classified 06/09/2010    Mixed hyperlipidemia 06/09/2010    DJD (degenerative joint disease) 06/09/2010    PUD (peptic ulcer disease) 06/09/2010    Allergic rhinitis due to other allergen 06/09/2010    Proteinuria 06/09/2010    RA (rheumatoid arthritis) ([facility] 06/09/2010    Essential hypertension 06/09/2010       ROS  A comprehensive review of system was obtained and negative except findings in the HPI      Objective: There were no vitals taken for this visit.    General: alert, cooperative, no distress   Mental  status: normal mood, behavior, speech, dress, motor activity, and thought processes, able to follow commands   HENT: NCAT   Neck: no visualized mass   Resp: no respiratory distress   Neuro: no gross deficits   Skin: no discoloration or lesions of concern on visible areas   Psychiatric: normal affect, consistent with stated mood, no evidence of hallucinations     Additional exam findings: none      Diagnoses and all orders for this visit:    1. Acute recurrent maxillary sinusitis    Other orders  -     cefdinir (OMNICEF) 300 mg capsule; Take 1 Cap by mouth two (2) times a day for 10 days. Reveiwed adr/se of medication  Push fluids, rest, suggested mucinex for congestion and drainage  Recheck 5-7 days if sx not improved. We discussed the expected course, resolution and complications of the diagnosis(es) in detail. Medication risks, benefits, costs, interactions, and alternatives were discussed as indicated. I advised her to contact the office if her condition worsens, changes or fails to improve as anticipated. She expressed understanding with the diagnosis(es) and plan. Latoya Mann is a 81 y.o. female who was evaluated by a video visit encounter for concerns as above. Patient identification was verified prior to start of the visit. A caregiver was present when appropriate. Due to this being a TeleHealth encounter (During THTYB-15 public health emergency), evaluation of the following organ systems was limited: Vitals/Constitutional/EENT/Resp/CV/GI//MS/Neuro/Skin/Heme-Lymph-Imm. Pursuant to the emergency declaration under the [facility] waiver authority and the [employer] General Act, this Virtual  Visit was conducted, with patient's (and/or legal guardian's) consent, to reduce the patient's risk of exposure to COVID-19 and provide necessary medical care. Services were provided through a video synchronous discussion virtually to substitute for in-person clinic visit. Patient and provider were located at their individual homes.       Rivka Stephen NP

## 2020-06-01 RX ORDER — FOLIC ACID 1 MG/1
TABLET ORAL
Qty: 90 TAB | Refills: 0 | Status: SHIPPED | OUTPATIENT
Start: 2020-06-01 | End: 2020-06-22

## 2020-06-18 ENCOUNTER — TELEPHONE (OUTPATIENT)
Dept: FAMILY MEDICINE CLINIC | Age: 85
End: 2020-06-18

## 2020-06-18 NOTE — TELEPHONE ENCOUNTER
Patient would like to discuss diabetes. Her diabetes specialist has retired and she is having some problems.  Patient's phone: [phone]

## 2020-06-18 NOTE — TELEPHONE ENCOUNTER
Sent in Januvia 100mg a day to replace the [address] WORTHY can take over the diabetes med for her.  Tara Kekaha

## 2020-06-18 NOTE — TELEPHONE ENCOUNTER
Attempted to reach pt. Patient x2 id verified. Pt stated that her Diabetes  Retired . She last saw him on 5/1/20 and he used to  Manage her Diabetes. Pt was taking Tradjenta prescribed by her old doctor along with Glimepiride. Pt stated that Tacey Wilman is expensive ($400) and pt would like to know if you can prescribe something alternative. Pt is taking glimepiride 2.5mg in AM and 5mg in PM due to not taking Tradjenta and Blood Sugar being high. Pt stated that she checks it in the morning before brkfast and its between 159-187.

## 2020-06-22 RX ORDER — FOLIC ACID 1 MG/1
TABLET ORAL
Qty: 90 TAB | Refills: 0 | Status: SHIPPED | OUTPATIENT
Start: 2020-06-22 | End: 2020-09-15

## 2020-07-20 ENCOUNTER — VIRTUAL VISIT (OUTPATIENT)
Dept: FAMILY MEDICINE CLINIC | Age: 85
End: 2020-07-20

## 2020-07-20 DIAGNOSIS — E11.21 TYPE 2 DIABETES MELLITUS WITH NEPHROPATHY (HCC): Primary | ICD-10-CM

## 2020-07-20 NOTE — PROGRESS NOTES
Pardeep Ron is a 81 y.o. female who was seen by synchronous (real-time) audio-video technology on 7/20/2020 for Blood sugar problem      I spent at least 15 minutes on this visit with this established patient. 712  Subjective: The Januvia she was taking was very expensive so she started taking the Amaryl 2mg one in the am and 2 pm last night to keep her sugar from going up  Sugar this am was 118 fasting  No hypoglycemia type sx  Not on any other meds for DM due to renal functions. Prior to Admission medications    Medication Sig Start Date End Date Taking? Authorizing Provider   folic acid (FOLVITE) 1 mg tablet Take 1 tablet by mouth once daily 6/22/20   Guerrero Ramos NP   SITagliptin (JANUVIA) 100 mg tablet Take 1 Tab by mouth daily. 6/18/20   Guerrreo Ramos NP   carbinoxamine maleate 4 mg tab TAKE ONE TABLET BY MOUTH TWICE A DAY 3/29/20   Jelena Amador MD   amLODIPine (NORVASC) 10 mg tablet TAKE ONE TABLET BY MOUTH DAILY 3/19/20   Guerrero Ramos NP   fluticasone propionate (FLONASE ALLERGY RELIEF) 50 mcg/actuation nasal spray 1 [place] by Both Nostrils route daily as needed for Rhinitis. 11/11/19   Dominick Toscano NP   multivitamin (ONE A DAY) tablet Take 1 Tab by mouth daily. Provider, Historical   warfarin (COUMADIN) 5 mg tablet Take 2.5 mg by mouth daily. 7/12/18   Provider, Historical   esomeprazole (NEXIUM) 20 mg capsule Take 20 mg by mouth daily. Provider, Historical   atorvastatin (LIPITOR) 40 mg tablet Take 40 mg by mouth nightly. Provider, Historical   glimepiride (AMARYL) 4 mg tablet Take 2 mg by mouth Daily (before breakfast). Provider, Historical     Patient Active Problem List    Diagnosis Date Noted    Colon polyps 02/18/2020    Non-Hodgkin's lymphoma ([facility] 08/14/2018    Severe obesity (BMI 35.0-39. 9) with comorbidity ([facility] 05/21/2018    Hypercalcemia 05/15/2018    Mass of lung parenchyma 05/15/2018    Hypercholesteremia 32/23/9755    Diastolic dysfunction 05/15/2018    EDER [facility]acute kidney injury) [facility] 05/15/2018    Hematoma of groin 04/30/2018    Type 2 diabetes mellitus with nephropathy ([facility] 01/02/2018    Recurrent depression ([facility] 01/02/2018    Type 2 diabetes mellitus without complication, without long-term current use of insulin ([facility] 04/13/2017    Advanced care planning/counseling discussion 02/15/2017    DVT (deep venous thrombosis) ([facility] 02/29/2016    CRI (chronic renal insufficiency) 10/04/2011    Asymptomatic carotid artery stenosis without infarction 02/22/2011    Diverticulosis 06/09/2010    IBS (irritable bowel syndrome) 06/09/2010    Depressive disorder, not elsewhere classified 06/09/2010    Mixed hyperlipidemia 06/09/2010    DJD (degenerative joint disease) 06/09/2010    PUD (peptic ulcer disease) 06/09/2010    Allergic rhinitis due to other allergen 06/09/2010    Proteinuria 06/09/2010    RA (rheumatoid arthritis) ([facility] 06/09/2010    Essential hypertension 06/09/2010       ROS  A comprehensive review of system was obtained and negative except findings in the HPI    Objective:   No flowsheet data found. General: alert, cooperative, no distress   Mental  status: normal mood, behavior, speech, dress, motor activity, and thought processes, able to follow commands   HENT: NCAT   Neck: no visualized mass   Resp: no respiratory distress   Neuro: no gross deficits   Skin: no discoloration or lesions of concern on visible areas   Psychiatric: normal affect, consistent with stated mood, no evidence of hallucinations     Additional exam findings: none    Diagnoses and all orders for this visit:    1. Type 2 diabetes mellitus with nephropathy ([facility]      She will start taking the Amaryl 2mg, one twice a day with meals and keep close eye on sugars, if start to run above 130, she will call the office. Reviewed risk of hypoglycemia      We discussed the expected course, resolution and complications of the diagnosis(es) in detail. Medication risks, benefits, costs, interactions, and alternatives were discussed as indicated. I advised her to contact the office if her condition worsens, changes or fails to improve as anticipated. She expressed understanding with the diagnosis(es) and plan. Destiny Gillis, who was evaluated through a patient-initiated, synchronous (real-time) audio-video encounter, and/or her healthcare decision maker, is aware that it is a billable service, with coverage as determined by her insurance carrier. She provided verbal consent to proceed: Yes, and patient identification was verified. It was conducted pursuant to the emergency declaration under the [address] [place] [address], [address] [place] [address] authority and the [employer] Act. A caregiver was present when appropriate. Ability to conduct physical exam was limited. I was in the office. The patient was at home.       Tracie Garcia NP

## 2020-08-06 DIAGNOSIS — J06.9 UPPER RESPIRATORY TRACT INFECTION, UNSPECIFIED TYPE: ICD-10-CM

## 2020-08-06 RX ORDER — CARBINOXAMINE MALEATE 4 MG/1
TABLET ORAL
Qty: 90 TAB | Refills: 3 | Status: SHIPPED | OUTPATIENT
Start: 2020-08-06 | End: 2021-03-07

## 2020-09-15 RX ORDER — FOLIC ACID 1 MG/1
TABLET ORAL
Qty: 90 TAB | Refills: 0 | Status: SHIPPED | OUTPATIENT
Start: 2020-09-15 | End: 2020-12-15

## 2020-09-15 RX ORDER — AMLODIPINE BESYLATE 10 MG/1
TABLET ORAL
Qty: 90 TAB | Refills: 0 | Status: SHIPPED | OUTPATIENT
Start: 2020-09-15 | End: 2020-09-20

## 2020-09-20 RX ORDER — AMLODIPINE BESYLATE 10 MG/1
TABLET ORAL
Qty: 90 TAB | Refills: 0 | Status: SHIPPED | OUTPATIENT
Start: 2020-09-20 | End: 2020-12-17

## 2020-09-29 ENCOUNTER — OFFICE VISIT (OUTPATIENT)
Dept: FAMILY MEDICINE CLINIC | Age: 85
End: 2020-09-29
Payer: MEDICARE

## 2020-09-29 VITALS
DIASTOLIC BLOOD PRESSURE: 72 MMHG | HEART RATE: 105 BPM | HEIGHT: 61 IN | WEIGHT: 168 LBS | TEMPERATURE: 98.1 F | OXYGEN SATURATION: 95 % | SYSTOLIC BLOOD PRESSURE: 105 MMHG | RESPIRATION RATE: 14 BRPM | BODY MASS INDEX: 31.72 KG/M2

## 2020-09-29 DIAGNOSIS — Z00.00 WELL ADULT EXAM: Primary | ICD-10-CM

## 2020-09-29 DIAGNOSIS — C85.90 NON-HODGKIN'S LYMPHOMA, UNSPECIFIED BODY REGION, UNSPECIFIED NON-HODGKIN LYMPHOMA TYPE (HCC): ICD-10-CM

## 2020-09-29 DIAGNOSIS — E78.00 HYPERCHOLESTEREMIA: ICD-10-CM

## 2020-09-29 DIAGNOSIS — I10 ESSENTIAL HYPERTENSION: ICD-10-CM

## 2020-09-29 DIAGNOSIS — E11.21 TYPE 2 DIABETES MELLITUS WITH NEPHROPATHY (HCC): ICD-10-CM

## 2020-09-29 DIAGNOSIS — Z23 NEEDS FLU SHOT: ICD-10-CM

## 2020-09-29 DIAGNOSIS — E66.01 SEVERE OBESITY (BMI 35.0-39.9) WITH COMORBIDITY (HCC): ICD-10-CM

## 2020-09-29 LAB
ALBUMIN SERPL-MCNC: 4.5 G/DL (ref 3.5–5)
ALBUMIN/GLOB SERPL: 1.8 {RATIO} (ref 1.1–2.2)
ALP SERPL-CCNC: 79 U/L (ref 45–117)
ALT SERPL-CCNC: 38 U/L (ref 12–78)
ANION GAP SERPL CALC-SCNC: 8 MMOL/L (ref 5–15)
AST SERPL-CCNC: 20 U/L (ref 15–37)
BASOPHILS # BLD: 0.1 K/UL (ref 0–0.1)
BASOPHILS NFR BLD: 1 % (ref 0–1)
BILIRUB SERPL-MCNC: 0.7 MG/DL (ref 0.2–1)
BUN SERPL-MCNC: 14 MG/DL (ref 6–20)
BUN/CREAT SERPL: 12 (ref 12–20)
CALCIUM SERPL-MCNC: 10 MG/DL (ref 8.5–10.1)
CHLORIDE SERPL-SCNC: 102 MMOL/L (ref 97–108)
CHOLEST SERPL-MCNC: 177 MG/DL
CO2 SERPL-SCNC: 29 MMOL/L (ref 21–32)
CREAT SERPL-MCNC: 1.14 MG/DL (ref 0.55–1.02)
CREAT UR-MCNC: 114 MG/DL
DIFFERENTIAL METHOD BLD: ABNORMAL
EOSINOPHIL # BLD: 0.2 K/UL (ref 0–0.4)
EOSINOPHIL NFR BLD: 1 % (ref 0–7)
ERYTHROCYTE [DISTWIDTH] IN BLOOD BY AUTOMATED COUNT: 13.6 % (ref 11.5–14.5)
EST. AVERAGE GLUCOSE BLD GHB EST-MCNC: 163 MG/DL
GLOBULIN SER CALC-MCNC: 2.5 G/DL (ref 2–4)
GLUCOSE SERPL-MCNC: 205 MG/DL (ref 65–100)
HBA1C MFR BLD: 7.3 % (ref 4–5.6)
HCT VFR BLD AUTO: 47.9 % (ref 35–47)
HDLC SERPL-MCNC: 56 MG/DL
HDLC SERPL: 3.2 {RATIO} (ref 0–5)
HGB BLD-MCNC: 15.3 G/DL (ref 11.5–16)
IMM GRANULOCYTES # BLD AUTO: 0.1 K/UL (ref 0–0.04)
IMM GRANULOCYTES NFR BLD AUTO: 1 % (ref 0–0.5)
LDLC SERPL CALC-MCNC: 79.2 MG/DL (ref 0–100)
LIPID PROFILE,FLP: ABNORMAL
LYMPHOCYTES # BLD: 3.3 K/UL (ref 0.8–3.5)
LYMPHOCYTES NFR BLD: 30 % (ref 12–49)
MCH RBC QN AUTO: 30.5 PG (ref 26–34)
MCHC RBC AUTO-ENTMCNC: 31.9 G/DL (ref 30–36.5)
MCV RBC AUTO: 95.4 FL (ref 80–99)
MICROALBUMIN UR-MCNC: 7.68 MG/DL
MICROALBUMIN/CREAT UR-RTO: 67 MG/G (ref 0–30)
MONOCYTES # BLD: 0.9 K/UL (ref 0–1)
MONOCYTES NFR BLD: 9 % (ref 5–13)
NEUTS SEG # BLD: 6.6 K/UL (ref 1.8–8)
NEUTS SEG NFR BLD: 58 % (ref 32–75)
NRBC # BLD: 0 K/UL (ref 0–0.01)
NRBC BLD-RTO: 0 PER 100 WBC
PLATELET # BLD AUTO: 260 K/UL (ref 150–400)
PMV BLD AUTO: 12.1 FL (ref 8.9–12.9)
POTASSIUM SERPL-SCNC: 4.3 MMOL/L (ref 3.5–5.1)
PROT SERPL-MCNC: 7 G/DL (ref 6.4–8.2)
RBC # BLD AUTO: 5.02 M/UL (ref 3.8–5.2)
SODIUM SERPL-SCNC: 139 MMOL/L (ref 136–145)
TRIGL SERPL-MCNC: 209 MG/DL (ref ?–150)
TSH SERPL DL<=0.05 MIU/L-ACNC: 1.45 UIU/ML (ref 0.36–3.74)
VLDLC SERPL CALC-MCNC: 41.8 MG/DL
WBC # BLD AUTO: 11.1 K/UL (ref 3.6–11)

## 2020-09-29 PROCEDURE — G8754 DIAS BP LESS 90: HCPCS | Performed by: NURSE PRACTITIONER

## 2020-09-29 PROCEDURE — G0463 HOSPITAL OUTPT CLINIC VISIT: HCPCS | Performed by: NURSE PRACTITIONER

## 2020-09-29 PROCEDURE — G0439 PPPS, SUBSEQ VISIT: HCPCS | Performed by: NURSE PRACTITIONER

## 2020-09-29 PROCEDURE — G8752 SYS BP LESS 140: HCPCS | Performed by: NURSE PRACTITIONER

## 2020-09-29 PROCEDURE — 90694 VACC AIIV4 NO PRSRV 0.5ML IM: CPT

## 2020-09-29 PROCEDURE — G8536 NO DOC ELDER MAL SCRN: HCPCS | Performed by: NURSE PRACTITIONER

## 2020-09-29 PROCEDURE — 1090F PRES/ABSN URINE INCON ASSESS: CPT | Performed by: NURSE PRACTITIONER

## 2020-09-29 PROCEDURE — G8427 DOCREV CUR MEDS BY ELIG CLIN: HCPCS | Performed by: NURSE PRACTITIONER

## 2020-09-29 PROCEDURE — 99214 OFFICE O/P EST MOD 30 MIN: CPT | Performed by: NURSE PRACTITIONER

## 2020-09-29 PROCEDURE — G8399 PT W/DXA RESULTS DOCUMENT: HCPCS | Performed by: NURSE PRACTITIONER

## 2020-09-29 PROCEDURE — 1101F PT FALLS ASSESS-DOCD LE1/YR: CPT | Performed by: NURSE PRACTITIONER

## 2020-09-29 PROCEDURE — G8417 CALC BMI ABV UP PARAM F/U: HCPCS | Performed by: NURSE PRACTITIONER

## 2020-09-29 PROCEDURE — G9717 DOC PT DX DEP/BP F/U NT REQ: HCPCS | Performed by: NURSE PRACTITIONER

## 2020-09-29 RX ORDER — INSULIN LISPRO 100 [IU]/ML
INJECTION, SOLUTION INTRAVENOUS; SUBCUTANEOUS
Qty: 1 PEN | Refills: 2 | Status: SHIPPED | OUTPATIENT
Start: 2020-09-29 | End: 2020-10-06 | Stop reason: SDUPTHER

## 2020-09-29 RX ORDER — POLYETHYLENE GLYCOL 3350 17 G/17G
17 POWDER, FOR SOLUTION ORAL DAILY
COMMUNITY
End: 2020-11-30

## 2020-09-29 RX ORDER — SENNOSIDES 8.6 MG/1
1 TABLET ORAL DAILY
COMMUNITY
End: 2020-11-30

## 2020-09-29 RX ORDER — GLIMEPIRIDE 4 MG/1
TABLET ORAL
COMMUNITY
Start: 2020-09-29 | End: 2021-01-28

## 2020-09-29 NOTE — PROGRESS NOTES
Chief Complaint   Patient presents with    Labs     Pt in office today for labs    1. Have you been to the ER, urgent care clinic since your last visit? Hospitalized since your last visit? No    2. Have you seen or consulted any other health care providers outside of the [address] [place] [address] since your last visit? Include any pap smears or colon screening. Nephrology, rheumatology     Visit Vitals  /72 (BP 1 Location: Right arm, BP Patient Position: Sitting)   Pulse (!) 105   Temp 98.1 °F (36.7 °C) (Oral)   Resp 14   Ht 5' 1\" (1.549 m)   Wt 168 lb (76.2 kg)   SpO2 95%   BMI 31.74 kg/m²       After obtaining Marla Parks's consent, and per orders of griselda, injection of flu given by [employer] in (R) delt. Patient instructed to remain in clinic for 20 minutes afterwards, and to report any adverse reaction to me immediately. Patient did not display any adverse side effects. Patient was advsied to return in 1yr     Pt / caregiver given opportunity to review vaccine information sheet prior to vaccine administration. Opportunity given for questions and concerns. No questions or concerns at this time.           Pt has no other concerns

## 2020-09-29 NOTE — PATIENT INSTRUCTIONS
Medicare Wellness Visit, Female The best way to live healthy is to have a lifestyle where you eat a well-balanced diet, exercise regularly, limit alcohol use, and quit all forms of tobacco/nicotine, if applicable. Regular preventive services are another way to keep healthy. Preventive services (vaccines, screening tests, monitoring & exams) can help personalize your care plan, which helps you manage your own care. Screening tests can find health problems at the earliest stages, when they are easiest to treat. [facility] follows the current, evidence-based guidelines published by the [facility] Randy Herrera ([facility]) when recommending preventive services for our patients. Because we follow these guidelines, sometimes recommendations change over time as research supports it. (For example, mammograms used to be recommended annually. Even though Medicare will still pay for an annual mammogram, the newer guidelines recommend a mammogram every two years for women of average risk). Of course, you and your doctor may decide to screen more often for some diseases, based on your risk and your co-morbidities (chronic disease you are already diagnosed with). Preventive services for you include: - Medicare offers their members a free annual wellness visit, which is time for you and your primary care provider to discuss and plan for your preventive service needs. Take advantage of this benefit every year! 
-All adults over the age of 72 should receive the recommended pneumonia vaccines. Current USPSTF guidelines recommend a series of two vaccines for the best pneumonia protection.  
-All adults should have a flu vaccine yearly and a tetanus vaccine every 10 years.  
-All adults age 48 and older should receive the shingles vaccines (series of two vaccines). -All adults age 38-68 who are overweight should have a diabetes screening test once every three years. -All adults born between [birth year] and 1964 should be screened once for Hepatitis C. 
-Other screening tests and preventive services for persons with diabetes include: an eye exam to screen for diabetic retinopathy, a kidney function test, a foot exam, and stricter control over your cholesterol.  
-Cardiovascular screening for adults with routine risk involves an electrocardiogram (ECG) at intervals determined by your doctor.  
-Colorectal cancer screenings should be done for adults age 54-65 with no increased risk factors for colorectal cancer. There are a number of acceptable methods of screening for this type of cancer. Each test has its own benefits and drawbacks. Discuss with your doctor what is most appropriate for you during your annual wellness visit. The different tests include: colonoscopy (considered the best screening method), a fecal occult blood test, a fecal DNA test, and sigmoidoscopy. 
 
-A bone mass density test is recommended when a woman turns 65 to screen for osteoporosis. This test is only recommended one time, as a screening. Some providers will use this same test as a disease monitoring tool if you already have osteoporosis. -Breast cancer screenings are recommended every other year for women of normal risk, age 54-69. 
-Cervical cancer screenings for women over age 72 are only recommended with certain risk factors. Here is a list of your current Health Maintenance items (your personalized list of preventive services) with a due date: 
Health Maintenance Due Topic Date Due  Cholesterol lowering medication - statin  1935  Shingles Vaccine (1 of 2) 02/01/1986  Pneumococcal Vaccine (2 of 2 - PPSV23) 07/22/2016  Pneumococcal Vaccine 65+ years at Risk (2 of 2 - PPSV23) 07/22/2016 [facility]  03/13/2019  Hemoglobin A1C    02/06/2020 Gabriela Sierra Annual Well Visit  04/30/2020  Albumin Urine Test  04/30/2020  Yearly Flu Vaccine (1) 09/01/2020

## 2020-09-29 NOTE — PROGRESS NOTES
This is the Subsequent Medicare Annual Wellness Exam, performed 12 months or more after the Initial AWV or the last Subsequent AWV    I have reviewed the patient's medical history in detail and updated the computerized patient record. History     Patient Active Problem List   Diagnosis Code    Diverticulosis K57.90    IBS (irritable bowel syndrome) K58.9    Depressive disorder, not elsewhere classified F32.9    Mixed hyperlipidemia E78.2    DJD (degenerative joint disease) M19.90    PUD (peptic ulcer disease) K27.9    Allergic rhinitis due to other allergen J30.89    Proteinuria R80.9    RA (rheumatoid arthritis) ([facility] M06.9    Essential hypertension I10    Asymptomatic carotid artery stenosis without infarction I65.29    CRI (chronic renal insufficiency) N18.9    DVT (deep venous thrombosis) ([facility] I82.409    Advanced care planning/counseling discussion Z71.89    Type 2 diabetes mellitus without complication, without long-term current use of insulin ([facility]) E11.9    Type 2 diabetes mellitus with nephropathy ([facility]) E11.21    Recurrent depression ([facility]) F33.9    Hematoma of groin S30. 1XXA    Hypercalcemia E83.52    Mass of lung parenchyma R91.8    Hypercholesteremia D00.51    Diastolic dysfunction E42.34    EDER (acute kidney injury) ([facility]) N17.9    Severe obesity (BMI 35.0-39. 9) with comorbidity ([facility] E66.01    Non-Hodgkin's lymphoma ([facility] C85.90    Colon polyps K63.5     Past Medical History:   Diagnosis Date    Allergies     Asymptomatic carotid artery stenosis without infarction 2/22/2011    Closed traumatic compression fracture of lumbar vertebra ([facility] 2019    Depression     Diverticulosis     DJD (degenerative joint disease)     DVT (deep venous thrombosis) ([facility] 2016    DVT RLE.  GERD (gastroesophageal reflux disease)     HTN     Hypercholesterolemia     Mammography less than 12 months ago     NIDDM     Obesity (BMI 30.0-34. 9)     PE (pulmonary thromboembolism) ([address] 2017 B/L.    Proteinuria       Past Surgical History:   Procedure Laterality Date    ENDOSCOPY, COLON, DIAGNOSTIC  2008    normal    HX APPENDECTOMY      With JAYME.  HX HERNIA REPAIR      ? Umbilical hernia repair.  HX HYSTERECTOMY      HX KNEE REPLACEMENT Left     HX KNEE REPLACEMENT Right     HX SPLENECTOMY      Lap splenectomy. Current Outpatient Medications   Medication Sig Dispense Refill    senna (Senna) 8.6 mg tablet Take 1 Tab by mouth daily.  polyethylene glycol (Miralax) 17 gram packet Take 17 g by mouth daily.  insulin lispro (HUMALOG) 100 unit/mL kwikpen To be used with sliding scale once daily as needed in the morning before breakfast dx: E11.9 1 Pen 2    glimepiride (AMARYL) 4 mg tablet 1 tab with breakfast and 2 tabs with dinner      amLODIPine (NORVASC) 10 mg tablet TAKE ONE TABLET BY MOUTH DAILY 90 Tab 0    folic acid (FOLVITE) 1 mg tablet Take 1 tablet by mouth once daily 90 Tab 0    carbinoxamine maleate 4 mg tab TAKE ONE TABLET BY MOUTH TWICE A DAY 90 Tab 3    fluticasone propionate (FLONASE ALLERGY RELIEF) 50 mcg/actuation nasal spray 1 [place] by Both Nostrils route daily as needed for Rhinitis. 1 Bottle 3    warfarin (COUMADIN) 5 mg tablet Take 2.5 mg by mouth daily.  esomeprazole (NEXIUM) 20 mg capsule Take 20 mg by mouth daily.  atorvastatin (LIPITOR) 40 mg tablet Take 40 mg by mouth nightly. Allergies   Allergen Reactions    Sulfa (Sulfonamide Antibiotics) Other (comments)       Family History   Problem Relation Age of Onset    Hypertension Father     Cancer Sister         breast    Diabetes Brother      Social History     Tobacco Use    Smoking status: Never Smoker    Smokeless tobacco: Never Used   Substance Use Topics    Alcohol use: No       Depression Risk Factor Screening:   No flowsheet data found.     Alcohol Risk Screen   Do you average more than 1 drink per night or more than 7 drinks a week:  No    On any one occasion in the past three months have you have had more than 3 drinks containing alcohol:  No        Functional Ability and Level of Safety:   Hearing: The patient needs further evaluation. Activities of Daily Living: The home contains: grab bars  Patient does total self care     Ambulation: with difficulty, uses a cane     Fall Risk:  Fall Risk Assessment, last 12 mths 9/29/2020   Able to walk? Yes   Fall in past 12 months? No   Fall with injury? -   Number of falls in past 12 months -   Fall Risk Score -     Abuse Screen:  Patient is not abused       Cognitive Screening   Has your family/caregiver stated any concerns about your memory: no     Cognitive Screening: Normal - Verbal Fluency Test    Patient Care Team   Patient Care Team:  Claudia Terry NP as PCP - General (Family Medicine)  Claudia Terry NP [facility] PCP - [facility]  Alireza Baez MD (Hematology and Oncology)  Attila Henao MD (Surgery)  Santo Colón RN as [facility] (Family Medicine)  Dustin Soria MD (Internal Medicine)  Miquel Ambrose MD (Gastroenterology)    Assessment/Plan   Education and counseling provided:  Are appropriate based on today's review and evaluation    Diagnoses and all orders for this visit:    1. Needs flu shot  -     FLU (FLUAD QUAD INFLUENZA VACCINE,QUAD,ADJUVANTED)    2. Well adult exam  -     LIPID PANEL; Future  -     METABOLIC PANEL, COMPREHENSIVE; Future  -     CBC WITH AUTOMATED DIFF; Future  -     TSH 3RD GENERATION; Future    3. Type 2 diabetes mellitus with nephropathy (HCC)  -     HEMOGLOBIN A1C WITH EAG; Future  -     MICROALBUMIN, UR, RAND W/ MICROALB/CREAT RATIO; Future    4. Essential hypertension  -     METABOLIC PANEL, COMPREHENSIVE; Future  -     CBC WITH AUTOMATED DIFF; Future    5. Hypercholesteremia  -     LIPID PANEL; Future    6. Non-Hodgkin's lymphoma, unspecified body region, unspecified non-Hodgkin lymphoma type ([facility]    7. Severe obesity (BMI 35.0-39. 9) with comorbidity ([facility])    Other orders  -     insulin lispro (HUMALOG) 100 unit/mL kwikpen; To be used with sliding scale once daily as needed in the morning before breakfast dx: E11.9        I have discussed the diagnosis with the patient and the intended plan as seen in the above orders. The patient has received an after-visit summary and questions were answered concerning future plans. Patient conveyed understanding of the plan at the time of the visit.     Star Baron MSN, ANP  9/29/2020

## 2020-10-01 ENCOUNTER — VIRTUAL VISIT (OUTPATIENT)
Dept: FAMILY MEDICINE CLINIC | Age: 85
End: 2020-10-01
Payer: MEDICARE

## 2020-10-01 DIAGNOSIS — J40 BRONCHITIS: Primary | ICD-10-CM

## 2020-10-01 PROCEDURE — 1101F PT FALLS ASSESS-DOCD LE1/YR: CPT | Performed by: NURSE PRACTITIONER

## 2020-10-01 PROCEDURE — G8399 PT W/DXA RESULTS DOCUMENT: HCPCS | Performed by: NURSE PRACTITIONER

## 2020-10-01 PROCEDURE — G8428 CUR MEDS NOT DOCUMENT: HCPCS | Performed by: NURSE PRACTITIONER

## 2020-10-01 PROCEDURE — G9717 DOC PT DX DEP/BP F/U NT REQ: HCPCS | Performed by: NURSE PRACTITIONER

## 2020-10-01 PROCEDURE — 1090F PRES/ABSN URINE INCON ASSESS: CPT | Performed by: NURSE PRACTITIONER

## 2020-10-01 PROCEDURE — 99213 OFFICE O/P EST LOW 20 MIN: CPT | Performed by: NURSE PRACTITIONER

## 2020-10-01 PROCEDURE — G8756 NO BP MEASURE DOC: HCPCS | Performed by: NURSE PRACTITIONER

## 2020-10-01 RX ORDER — AMOXICILLIN AND CLAVULANATE POTASSIUM 500; 125 MG/1; MG/1
1 TABLET, FILM COATED ORAL EVERY 12 HOURS
Qty: 20 TAB | Refills: 0 | Status: SHIPPED | OUTPATIENT
Start: 2020-10-01 | End: 2020-10-11

## 2020-10-01 RX ORDER — CALCIUM CITRATE/VITAMIN D3 200MG-6.25
TABLET ORAL
Qty: 100 STRIP | Refills: 5 | Status: SHIPPED | OUTPATIENT
Start: 2020-10-01 | End: 2022-01-25

## 2020-10-01 NOTE — PROGRESS NOTES
Isatu De La Fuente is a 81 y.o. female who was seen by synchronous (real-time) audio-video technology on 10/1/2020 for No chief complaint on file. I spent at least 15 minutes on this visit with this established patient. 712  Subjective:   Patient with 5 day pmh of deep cough, chest congsetion  Ear pain and drainage  No fever or chills  Using Robitussin for cough    Prior to Admission medications    Medication Sig Start Date End Date Taking? Authorizing Provider   amoxicillin-clavulanate (AUGMENTIN) 500-125 mg per tablet Take 1 Tab by mouth every twelve (12) hours for 10 days. 10/1/20 10/11/20 Yes Gabriel Dust, NP   glucose blood VI test strips (True Metrix Glucose Test Strip) strip Three times a day monitoring before meals dx: E11.9 10/1/20  Yes Gabriel Dust, NP   senna (Senna) 8.6 mg tablet Take 1 Tab by mouth daily. Provider, Historical   polyethylene glycol (Miralax) 17 gram packet Take 17 g by mouth daily. Provider, Historical   insulin lispro (HUMALOG) 100 unit/mL kwikpen To be used with sliding scale once daily as needed in the morning before breakfast dx: E11.9 9/29/20   Gabriel Dust, NP   glimepiride (AMARYL) 4 mg tablet 1 tab with breakfast and 2 tabs with dinner 9/29/20   Gabriel Dust, NP   amLODIPine (NORVASC) 10 mg tablet TAKE ONE TABLET BY MOUTH DAILY 9/20/20   Gabriel Dust, NP   folic acid (FOLVITE) 1 mg tablet Take 1 tablet by mouth once daily 9/15/20   Gabriel Dust, NP   carbinoxamine maleate 4 mg tab TAKE ONE TABLET BY MOUTH TWICE A DAY 8/6/20   Ronald Garcia MD   fluticasone propionate (FLONASE ALLERGY RELIEF) 50 mcg/actuation nasal spray 1 [place] by Both Nostrils route daily as needed for Rhinitis. 11/11/19   Marie Oconnell NP   warfarin (COUMADIN) 5 mg tablet Take 2.5 mg by mouth daily. 7/12/18   Provider, Historical   esomeprazole (NEXIUM) 20 mg capsule Take 20 mg by mouth daily.     Provider, Historical   atorvastatin (LIPITOR) 40 mg tablet Take 40 mg by mouth nightly. Provider, Historical     Patient Active Problem List    Diagnosis Date Noted    Colon polyps 02/18/2020    Non-Hodgkin's lymphoma ([facility] 08/14/2018    Severe obesity (BMI 35.0-39. 9) with comorbidity ([address] 05/21/2018    Hypercalcemia 05/15/2018    Mass of lung parenchyma 05/15/2018    Hypercholesteremia 61/72/4531    Diastolic dysfunction 90/97/3559    EDER (acute kidney injury) ([address] 05/15/2018    Hematoma of groin 04/30/2018    Type 2 diabetes mellitus with nephropathy ([address] 01/02/2018    Recurrent depression ([facility] 01/02/2018    Type 2 diabetes mellitus without complication, without long-term current use of insulin ([facility] 04/13/2017    Advanced care planning/counseling discussion 02/15/2017    DVT (deep venous thrombosis) ([facility] 02/29/2016    CRI (chronic renal insufficiency) 10/04/2011    Asymptomatic carotid artery stenosis without infarction 02/22/2011    Diverticulosis 06/09/2010    IBS (irritable bowel syndrome) 06/09/2010    Depressive disorder, not elsewhere classified 06/09/2010    Mixed hyperlipidemia 06/09/2010    DJD (degenerative joint disease) 06/09/2010    PUD (peptic ulcer disease) 06/09/2010    Allergic rhinitis due to other allergen 06/09/2010    Proteinuria 06/09/2010    RA (rheumatoid arthritis) ([facility] 06/09/2010    Essential hypertension 06/09/2010       ROS  A comprehensive review of system was obtained and negative except findings in the HPI    Objective:   No flowsheet data found.    General: alert, cooperative, no distress   Mental  status: normal mood, behavior, speech, dress, motor activity, and thought processes, able to follow commands   HENT: NCAT   Neck: no visualized mass   Resp: no respiratory distress   Neuro: no gross deficits   Skin: no discoloration or lesions of concern on visible areas   Psychiatric: normal affect, consistent with stated mood, no evidence of hallucinations     Additional exam findings: none    Diagnoses and all orders for this visit:    1. Bronchitis    Other orders  -     amoxicillin-clavulanate (AUGMENTIN) 500-125 mg per tablet; Take 1 Tab by mouth every twelve (12) hours for 10 days.  -     glucose blood VI test strips (True Metrix Glucose Test Strip) strip; Three times a day monitoring before meals dx: E11.9      Reveiwed adr/se of medication  Push fluids, rest, suggested mucinex for congestion and drainage  Recheck 5-7 days if sx not improved. We discussed the expected course, resolution and complications of the diagnosis(es) in detail. Medication risks, benefits, costs, interactions, and alternatives were discussed as indicated. I advised her to contact the office if her condition worsens, changes or fails to improve as anticipated. She expressed understanding with the diagnosis(es) and plan. Luke Wood, who was evaluated through a patient-initiated, synchronous (real-time) audio-video encounter, and/or her healthcare decision maker, is aware that it is a billable service, with coverage as determined by her insurance carrier. She provided verbal consent to proceed: Yes, and patient identification was verified. It was conducted pursuant to the emergency declaration under the [address] [place] [address], [address] [place] [address] authority and the [employer] Act. A caregiver was present when appropriate. Ability to conduct physical exam was limited. I was in the office. The patient was at home.       Ravindra Cloud NP

## 2020-10-02 NOTE — PROGRESS NOTES
Hey there, your labs overall look great! Your sugar has climbed some so watch the carbs and sweets.  Recheck labs in 3 months fasting. Landy Mccullough

## 2020-10-06 RX ORDER — INSULIN LISPRO 100 [IU]/ML
INJECTION, SOLUTION INTRAVENOUS; SUBCUTANEOUS
Qty: 1 PEN | Refills: 2 | Status: SHIPPED | OUTPATIENT
Start: 2020-10-06 | End: 2021-01-28 | Stop reason: ALTCHOICE

## 2020-11-23 ENCOUNTER — TELEPHONE (OUTPATIENT)
Dept: FAMILY MEDICINE CLINIC | Age: 85
End: 2020-11-23

## 2020-11-23 RX ORDER — ATORVASTATIN CALCIUM 40 MG/1
40 TABLET, FILM COATED ORAL
Qty: 30 TAB | Refills: 5 | Status: SHIPPED | OUTPATIENT
Start: 2020-11-23 | End: 2021-06-01

## 2020-11-30 ENCOUNTER — OFFICE VISIT (OUTPATIENT)
Dept: CARDIOLOGY CLINIC | Age: 85
End: 2020-11-30
Payer: MEDICARE

## 2020-11-30 VITALS
DIASTOLIC BLOOD PRESSURE: 80 MMHG | WEIGHT: 173 LBS | SYSTOLIC BLOOD PRESSURE: 130 MMHG | BODY MASS INDEX: 32.66 KG/M2 | HEART RATE: 61 BPM | OXYGEN SATURATION: 97 % | HEIGHT: 61 IN

## 2020-11-30 DIAGNOSIS — E78.2 MIXED HYPERLIPIDEMIA: ICD-10-CM

## 2020-11-30 DIAGNOSIS — I10 ESSENTIAL HYPERTENSION: Primary | ICD-10-CM

## 2020-11-30 DIAGNOSIS — E78.00 HYPERCHOLESTEREMIA: ICD-10-CM

## 2020-11-30 PROCEDURE — 93010 ELECTROCARDIOGRAM REPORT: CPT | Performed by: SPECIALIST

## 2020-11-30 PROCEDURE — 93005 ELECTROCARDIOGRAM TRACING: CPT | Performed by: SPECIALIST

## 2020-11-30 PROCEDURE — G9717 DOC PT DX DEP/BP F/U NT REQ: HCPCS | Performed by: SPECIALIST

## 2020-11-30 PROCEDURE — G8752 SYS BP LESS 140: HCPCS | Performed by: SPECIALIST

## 2020-11-30 PROCEDURE — G8417 CALC BMI ABV UP PARAM F/U: HCPCS | Performed by: SPECIALIST

## 2020-11-30 PROCEDURE — 1090F PRES/ABSN URINE INCON ASSESS: CPT | Performed by: SPECIALIST

## 2020-11-30 PROCEDURE — G8427 DOCREV CUR MEDS BY ELIG CLIN: HCPCS | Performed by: SPECIALIST

## 2020-11-30 PROCEDURE — 1101F PT FALLS ASSESS-DOCD LE1/YR: CPT | Performed by: SPECIALIST

## 2020-11-30 PROCEDURE — G8754 DIAS BP LESS 90: HCPCS | Performed by: SPECIALIST

## 2020-11-30 PROCEDURE — G8536 NO DOC ELDER MAL SCRN: HCPCS | Performed by: SPECIALIST

## 2020-11-30 PROCEDURE — G8399 PT W/DXA RESULTS DOCUMENT: HCPCS | Performed by: SPECIALIST

## 2020-11-30 PROCEDURE — G0463 HOSPITAL OUTPT CLINIC VISIT: HCPCS | Performed by: SPECIALIST

## 2020-11-30 PROCEDURE — 99204 OFFICE O/P NEW MOD 45 MIN: CPT | Performed by: SPECIALIST

## 2020-11-30 RX ORDER — METHYLCELLULOSE (WITH SUGAR)
POWDER IN PACKET (EA) ORAL
COMMUNITY
End: 2020-11-30

## 2020-11-30 RX ORDER — DOCUSATE SODIUM 100 MG/1
100 CAPSULE, LIQUID FILLED ORAL 2 TIMES DAILY
COMMUNITY

## 2020-11-30 NOTE — PROGRESS NOTES
ATTENTION:   This medical record was transcribed using an electronic medical records/speech recognition system. Although proofread, it may and can contain electronic, spelling and other errors. Corrections may be executed at a later time. Please feel free to contact us for any clarifications as needed. ICD-10-CM ICD-9-CM   1. Essential hypertension  I10 401.9   2. Hypercholesteremia  E78.00 272.0   3. Mixed hyperlipidemia  E78.2 272.2            [place] Mena is a 81 y.o. female with dyslipidemia and hypertension referred for 8 week follow up. Cardiac risk factors: post menopausal, dyslipidemia, hypertension. I have personally obtained the history from the patient. HISTORY OF PRESENTING ILLNESS      She returns today because of some shortness of breath and some concern since her sister had a heart attack and a stroke recently. She does have her shortness of breath that is little concerning but she does not experience any chest pain. She has no PND orthopnea. No history of diabetes with her last LDL daily 79. She is currently on Coumadin have been was on Xarelto in the past.       ACTIVE PROBLEM LIST     Patient Active Problem List    Diagnosis Date Noted    Colon polyps 02/18/2020    Non-Hodgkin's lymphoma ([address] 08/14/2018    Severe obesity (BMI 35.0-39. 9) with comorbidity ([address] 05/21/2018    Hypercalcemia 05/15/2018    Mass of lung parenchyma 05/15/2018    Hypercholesteremia 35/48/0452    Diastolic dysfunction 89/42/4849    EDER (acute kidney injury) ([address] 05/15/2018    Hematoma of groin 04/30/2018    Type 2 diabetes mellitus with nephropathy ([address] 01/02/2018    Recurrent depression ([address] 01/02/2018    Type 2 diabetes mellitus without complication, without long-term current use of insulin ([address] 04/13/2017    Advanced care planning/counseling discussion 02/15/2017    DVT (deep venous thrombosis) ([address] 02/29/2016    CRI (chronic renal insufficiency) 10/04/2011    Asymptomatic carotid artery stenosis without infarction 02/22/2011    Diverticulosis 06/09/2010    IBS (irritable bowel syndrome) 06/09/2010    Depressive disorder, not elsewhere classified 06/09/2010    Mixed hyperlipidemia 06/09/2010    DJD (degenerative joint disease) 06/09/2010    PUD (peptic ulcer disease) 06/09/2010    Allergic rhinitis due to other allergen 06/09/2010    Proteinuria 06/09/2010    RA (rheumatoid arthritis) ([facility] 06/09/2010    Essential hypertension 06/09/2010           PAST MEDICAL HISTORY     Past Medical History:   Diagnosis Date    Allergies     Asymptomatic carotid artery stenosis without infarction 2/22/2011    Closed traumatic compression fracture of lumbar vertebra ([facility] 2019    Depression     Diverticulosis     DJD (degenerative joint disease)     DVT (deep venous thrombosis) ([facility] 2016    DVT RLE.  GERD (gastroesophageal reflux disease)     HTN     Hypercholesterolemia     Mammography less than 12 months ago     NIDDM     Obesity (BMI 30.0-34. 9)     PE (pulmonary thromboembolism) ([facility] 2017    B/L.  Proteinuria            PAST SURGICAL HISTORY     Past Surgical History:   Procedure Laterality Date    ENDOSCOPY, COLON, DIAGNOSTIC  2008    normal    HX APPENDECTOMY      With JAYME.  HX HERNIA REPAIR      ? Umbilical hernia repair.  HX HYSTERECTOMY      HX KNEE REPLACEMENT Left     HX KNEE REPLACEMENT Right     HX SPLENECTOMY      Lap splenectomy.           ALLERGIES     Allergies   Allergen Reactions    Sulfa (Sulfonamide Antibiotics) Other (comments)          FAMILY HISTORY     Family History   Problem Relation Age of Onset    Hypertension Father     Cancer Sister         breast    Diabetes Brother     negative for cardiac disease       SOCIAL HISTORY     Social History     Socioeconomic History    Marital status:      Spouse name: Not on file    Number of children: Not on file    Years of education: Not on file    Highest education level: Not on file   Tobacco Use    Smoking status: Never Smoker    Smokeless tobacco: Never Used   Substance and Sexual Activity    Alcohol use: No    Drug use: No    Sexual activity: Yes     Partners: Male         MEDICATIONS     Current Outpatient Medications   Medication Sig    docusate sodium (Colace) 100 mg capsule Take 100 mg by mouth two (2) times a day.  atorvastatin (LIPITOR) 40 mg tablet Take 1 Tab by mouth nightly.  insulin lispro (HUMALOG) 100 unit/mL kwikpen To be used with sliding scale once daily as needed in the morning before breakfast- max daily dose 10 units  dx: E11.9    glucose blood VI test strips (True Metrix Glucose Test Strip) strip Three times a day monitoring before meals dx: E11.9    glimepiride (AMARYL) 4 mg tablet 1 tab with breakfast and 2 tabs with dinner    amLODIPine (NORVASC) 10 mg tablet TAKE ONE TABLET BY MOUTH DAILY    folic acid (FOLVITE) 1 mg tablet Take 1 tablet by mouth once daily    carbinoxamine maleate 4 mg tab TAKE ONE TABLET BY MOUTH TWICE A DAY    fluticasone propionate (FLONASE ALLERGY RELIEF) 50 mcg/actuation nasal spray 1 [place] by Both Nostrils route daily as needed for Rhinitis.  warfarin (COUMADIN) 5 mg tablet Take 2.5 mg by mouth daily.  esomeprazole (NEXIUM) 20 mg capsule Take 20 mg by mouth daily. No current facility-administered medications for this visit. I have reviewed the nurses notes, vitals, problem list, allergy list, medical history, family, social history and medications. REVIEW OF SYMPTOMS      General: Pt denies excessive weight gain or loss. Pt is able to conduct ADL's  HEENT: Denies blurred vision, headaches, hearing loss, epistaxis and difficulty swallowing. Respiratory: Denies cough, congestion, shortness of breath, MERRITT, wheezing or stridor.   Cardiovascular: Denies precordial pain, palpitations, edema or PND  Gastrointestinal: Denies poor appetite, indigestion, abdominal pain or blood in stool  Genitourinary: Denies hematuria, dysuria, increased urinary frequency  Musculoskeletal: Denies joint pain or swelling from muscles or joints  Neurologic: Denies tremor, paresthesias, headache, or sensory motor disturbance  Psychiatric: Denies confusion, insomnia, depression  Integumentray: Denies rash, itching or ulcers. Hematologic: Denies easy bruising, bleeding     PHYSICAL EXAMINATION      Vitals:    11/30/20 1153   BP: 130/80   Pulse: 61   SpO2: 97%   Weight: 173 lb (78.5 kg)   Height: 5' 1\" (1.549 m)     General: Well developed, in no acute distress. HEENT: No jaundice, oral mucosa moist, no oral ulcers  Neck: Supple, no stiffness, no lymphadenopathy, supple  Heart: Regular rate and rhythm with a 2/6 systolic murmur  Respiratory: Clear bilaterally x 4, no wheezing or rales     Extremities:  No edema considering she has a history of lymphedema but she uses compression device, normal cap refill, no cyanosis. Musculoskeletal: No clubbing, no deformities  Neuro: A&Ox3, speech clear, gait stable, cooperative, no focal neurologic deficits  Skin: Skin color is normal. No rashes or lesions. Non diaphoretic, moist.  Vascular: 2+ pulses symmetric in all extremities       DIAGNOSTIC DATA     1. Lipids  2/15/17 -, HDL 40, ,     2. Echo  6/19/17- EF 65%    3.  Lexiscan  6/19/17- No ischemia         LABORATORY DATA            Lab Results   Component Value Date/Time    WBC 11.1 (H) 09/29/2020 11:22 AM    Hemoglobin (POC) 13.3 02/19/2016 08:35 PM    HGB 15.3 09/29/2020 11:22 AM    Hematocrit (POC) 39 02/19/2016 08:35 PM    HCT 47.9 (H) 09/29/2020 11:22 AM    PLATELET 544 83/64/1271 11:22 AM    MCV 95.4 09/29/2020 11:22 AM      Lab Results   Component Value Date/Time    Sodium 139 09/29/2020 11:22 AM    Potassium 4.3 09/29/2020 11:22 AM    Chloride 102 09/29/2020 11:22 AM    CO2 29 09/29/2020 11:22 AM    Anion gap 8 09/29/2020 11:22 AM    Glucose 205 (H) 09/29/2020 11:22 AM    BUN 14 09/29/2020 11:22 AM    Creatinine 1.14 (H) 09/29/2020 11:22 AM    BUN/Creatinine ratio 12 09/29/2020 11:22 AM    GFR est AA 55 (L) 09/29/2020 11:22 AM    GFR est non-AA 45 (L) 09/29/2020 11:22 AM    Calcium 10.0 09/29/2020 11:22 AM    Bilirubin, total 0.7 09/29/2020 11:22 AM    Alk. phosphatase 79 09/29/2020 11:22 AM    Protein, total 7.0 09/29/2020 11:22 AM    Albumin 4.5 09/29/2020 11:22 AM    Globulin 2.5 09/29/2020 11:22 AM    A-G Ratio 1.8 09/29/2020 11:22 AM    ALT (SGPT) 38 09/29/2020 11:22 AM           ASSESSMENT/RECOMMENDATIONS:.      1. SOB  -she is having some SOB. -No PND or orthopnea  -will order echo and lexiscan cardiolite     2. Dyslipidemia  -lipids followed by Isidro Lambert NP  -LDL is close to goal at 79      3. Hypertension   -BP blood pressure is at goal on current medical regimen    4. Lymphedema  -Lites look great she is compression device at home     5. Follow up in 6 months or PRN    Orders Placed This Encounter    AMB POC EKG ROUTINE W/ 12 LEADS, INTER & REP     Order Specific Question:   Reason for Exam:     Answer:   HTN    docusate sodium (Colace) 100 mg capsule     Sig: Take 100 mg by mouth two (2) times a day.  DISCONTD: Methylcellulose, with Sugar, (Citrucel, sucrose,) powd     Sig: Take  by mouth. Follow-up and Dispositions  ·   Return in about 6 months (around 5/30/2021). I have discussed the diagnosis with  Marla Parks and the intended plan as seen in the above orders. Questions were answered concerning future plans. I have discussed medication side effects and warnings with the patient as well. Thank you,  Shmuel Gore NP for involving me in the care of  [address] [place] [address]. Please do not hesitate to contact me for further questions/concerns. Marquez Curtis MD, [address]      [facility], [address] [place] [address] [address]      [phone] / [phone] Fax

## 2020-11-30 NOTE — PROGRESS NOTES
Madelaine Alonso is a 81 y.o. female    Visit Vitals  /80 (BP 1 Location: Left arm, BP Patient Position: Sitting)   Pulse 61   Ht 5' 1\" (1.549 m)   Wt 173 lb (78.5 kg)   SpO2 97%   BMI 32.69 kg/m²       Chief Complaint   Patient presents with    Hypertension       Chest pain NO  SOB NO  Dizziness NO  Swelling NO  Recent hospital visit NO  Refills NO    TAKES CITRUCEL PILL FOR FIBER.

## 2020-12-09 ENCOUNTER — ANCILLARY PROCEDURE (OUTPATIENT)
Dept: CARDIOLOGY CLINIC | Age: 85
End: 2020-12-09
Payer: MEDICARE

## 2020-12-09 VITALS — BODY MASS INDEX: 32.66 KG/M2 | HEIGHT: 61 IN | WEIGHT: 173 LBS

## 2020-12-09 VITALS
WEIGHT: 173 LBS | BODY MASS INDEX: 32.66 KG/M2 | DIASTOLIC BLOOD PRESSURE: 80 MMHG | SYSTOLIC BLOOD PRESSURE: 130 MMHG | HEIGHT: 61 IN

## 2020-12-09 DIAGNOSIS — R06.02 SOB (SHORTNESS OF BREATH) ON EXERTION: ICD-10-CM

## 2020-12-09 DIAGNOSIS — I10 ESSENTIAL HYPERTENSION: ICD-10-CM

## 2020-12-09 DIAGNOSIS — I10 HYPERTENSION, UNSPECIFIED TYPE: ICD-10-CM

## 2020-12-09 DIAGNOSIS — R06.02 SOB (SHORTNESS OF BREATH): ICD-10-CM

## 2020-12-09 DIAGNOSIS — Z82.49 FAMILY HISTORY OF EARLY CAD: ICD-10-CM

## 2020-12-09 PROCEDURE — 78452 HT MUSCLE IMAGE SPECT MULT: CPT | Performed by: INTERNAL MEDICINE

## 2020-12-09 PROCEDURE — 93018 CV STRESS TEST I&R ONLY: CPT | Performed by: INTERNAL MEDICINE

## 2020-12-09 PROCEDURE — 93306 TTE W/DOPPLER COMPLETE: CPT | Performed by: SPECIALIST

## 2020-12-09 PROCEDURE — 93016 CV STRESS TEST SUPVJ ONLY: CPT | Performed by: INTERNAL MEDICINE

## 2020-12-09 RX ORDER — TETRAKIS(2-METHOXYISOBUTYLISOCYANIDE)COPPER(I) TETRAFLUOROBORATE 1 MG/ML
24.5 INJECTION, POWDER, LYOPHILIZED, FOR SOLUTION INTRAVENOUS ONCE
Status: COMPLETED | OUTPATIENT
Start: 2020-12-09 | End: 2020-12-09

## 2020-12-09 RX ORDER — TETRAKIS(2-METHOXYISOBUTYLISOCYANIDE)COPPER(I) TETRAFLUOROBORATE 1 MG/ML
8.3 INJECTION, POWDER, LYOPHILIZED, FOR SOLUTION INTRAVENOUS ONCE
Status: COMPLETED | OUTPATIENT
Start: 2020-12-09 | End: 2020-12-09

## 2020-12-09 RX ADMIN — TETRAKIS(2-METHOXYISOBUTYLISOCYANIDE)COPPER(I) TETRAFLUOROBORATE 24.5 MILLICURIE: 1 INJECTION, POWDER, LYOPHILIZED, FOR SOLUTION INTRAVENOUS at 14:20

## 2020-12-09 RX ADMIN — REGADENOSON 0.4 MG: 0.08 INJECTION, SOLUTION INTRAVENOUS at 14:19

## 2020-12-09 RX ADMIN — TETRAKIS(2-METHOXYISOBUTYLISOCYANIDE)COPPER(I) TETRAFLUOROBORATE 8.3 MILLICURIE: 1 INJECTION, POWDER, LYOPHILIZED, FOR SOLUTION INTRAVENOUS at 13:15

## 2020-12-10 LAB
STRESS BASELINE DIAS BP: 106 MMHG
STRESS BASELINE HR: 112 BPM
STRESS BASELINE SYS BP: 162 MMHG
STRESS O2 SAT PEAK: 97 %
STRESS O2 SAT REST: 96 %
STRESS PEAK DIAS BP: 106 MMHG
STRESS PEAK SYS BP: 162 MMHG
STRESS PERCENT HR ACHIEVED: 90 %
STRESS POST PEAK HR: 123 BPM
STRESS RATE PRESSURE PRODUCT: NORMAL BPM*MMHG
STRESS ST DEPRESSION: 0 MM
STRESS ST ELEVATION: 0 MM
STRESS TARGET HR: 136 BPM

## 2020-12-13 LAB
ECHO AO ROOT DIAM: 3.2 CM
ECHO AV MEAN GRADIENT: 27.41 MMHG
ECHO AV PEAK GRADIENT: 46.96 MMHG
ECHO AV PEAK VELOCITY: 342.65 CM/S
ECHO AV VTI: 74.12 CM
ECHO LA AREA 4C: 14.04 CM2
ECHO LA MAJOR AXIS: 3.6 CM
ECHO LA MINOR AXIS: 2.03 CM
ECHO LA VOL 2C: 49.83 ML (ref 22–52)
ECHO LA VOL 4C: 26.69 ML (ref 22–52)
ECHO LA VOL BP: 41.08 ML (ref 22–52)
ECHO LA VOL/BSA BIPLANE: 23.13 ML/M2 (ref 16–28)
ECHO LA VOLUME INDEX A2C: 28.05 ML/M2 (ref 16–28)
ECHO LA VOLUME INDEX A4C: 15.03 ML/M2 (ref 16–28)
ECHO LV E' LATERAL VELOCITY: 7.17 CM/S
ECHO LV E' SEPTAL VELOCITY: 6.19 CM/S
ECHO LV EDV A2C: 61.9 ML
ECHO LV EDV A4C: 52.46 ML
ECHO LV EDV BP: 59.22 ML (ref 56–104)
ECHO LV EDV INDEX A4C: 29.5 ML/M2
ECHO LV EDV INDEX BP: 33.3 ML/M2
ECHO LV EDV NDEX A2C: 34.9 ML/M2
ECHO LV EJECTION FRACTION A2C: 68 PERCENT
ECHO LV EJECTION FRACTION A4C: 68 PERCENT
ECHO LV EJECTION FRACTION BIPLANE: 68.4 PERCENT (ref 55–100)
ECHO LV ESV A2C: 19.66 ML
ECHO LV ESV A4C: 16.75 ML
ECHO LV ESV BP: 18.74 ML (ref 19–49)
ECHO LV ESV INDEX A2C: 11.1 ML/M2
ECHO LV ESV INDEX A4C: 9.4 ML/M2
ECHO LV ESV INDEX BP: 10.6 ML/M2
ECHO LV INTERNAL DIMENSION DIASTOLIC: 3.11 CM (ref 3.9–5.3)
ECHO LV INTERNAL DIMENSION SYSTOLIC: 2.17 CM
ECHO LV IVSD: 1 CM (ref 0.6–0.9)
ECHO LV MASS 2D: 74.1 G (ref 67–162)
ECHO LV MASS INDEX 2D: 41.7 G/M2 (ref 43–95)
ECHO LV POSTERIOR WALL DIASTOLIC: 0.8 CM (ref 0.6–0.9)
ECHO LVOT PEAK GRADIENT: 3.43 MMHG
ECHO LVOT PEAK VELOCITY: 92.67 CM/S
ECHO LVOT VTI: 19.86 CM
ECHO MV A VELOCITY: 148.46 CM/S
ECHO MV AREA PHT: 3.51 CM2
ECHO MV E DECELERATION TIME (DT): 216.08 MS
ECHO MV E VELOCITY: 90.68 CM/S
ECHO MV E/A RATIO: 0.61
ECHO MV E/E' LATERAL: 12.65
ECHO MV E/E' RATIO (AVERAGED): 13.65
ECHO MV E/E' SEPTAL: 14.65
ECHO MV PRESSURE HALF TIME (PHT): 62.66 MS
ECHO RA AREA 4C: 11.47 CM2
ECHO RV INTERNAL DIMENSION: 3.18 CM
ECHO RV TAPSE: 2.4 CM (ref 1.5–2)
LA VOL DISK BP: 38.1 ML (ref 22–52)

## 2020-12-15 RX ORDER — FOLIC ACID 1 MG/1
TABLET ORAL
Qty: 90 TAB | Refills: 0 | Status: SHIPPED | OUTPATIENT
Start: 2020-12-15 | End: 2021-03-14

## 2020-12-17 RX ORDER — AMLODIPINE BESYLATE 10 MG/1
TABLET ORAL
Qty: 90 TAB | Refills: 0 | Status: SHIPPED | OUTPATIENT
Start: 2020-12-17 | End: 2021-06-01

## 2021-01-15 ENCOUNTER — CLINICAL SUPPORT (OUTPATIENT)
Dept: CARDIOLOGY CLINIC | Age: 86
End: 2021-01-15
Payer: MEDICARE

## 2021-01-15 ENCOUNTER — OFFICE VISIT (OUTPATIENT)
Dept: CARDIOLOGY CLINIC | Age: 86
End: 2021-01-15
Payer: MEDICARE

## 2021-01-15 VITALS
HEIGHT: 61 IN | DIASTOLIC BLOOD PRESSURE: 82 MMHG | BODY MASS INDEX: 33.04 KG/M2 | SYSTOLIC BLOOD PRESSURE: 138 MMHG | WEIGHT: 175 LBS | HEART RATE: 92 BPM

## 2021-01-15 DIAGNOSIS — E78.00 HYPERCHOLESTEREMIA: ICD-10-CM

## 2021-01-15 DIAGNOSIS — I10 ESSENTIAL HYPERTENSION: ICD-10-CM

## 2021-01-15 DIAGNOSIS — R06.02 SOB (SHORTNESS OF BREATH): Primary | ICD-10-CM

## 2021-01-15 DIAGNOSIS — R00.0 TACHYCARDIA: Primary | ICD-10-CM

## 2021-01-15 DIAGNOSIS — Z82.49 FAMILY HISTORY OF EARLY CAD: ICD-10-CM

## 2021-01-15 PROCEDURE — G8427 DOCREV CUR MEDS BY ELIG CLIN: HCPCS | Performed by: SPECIALIST

## 2021-01-15 PROCEDURE — G8536 NO DOC ELDER MAL SCRN: HCPCS | Performed by: SPECIALIST

## 2021-01-15 PROCEDURE — G8752 SYS BP LESS 140: HCPCS | Performed by: SPECIALIST

## 2021-01-15 PROCEDURE — 93225 XTRNL ECG REC<48 HRS REC: CPT | Performed by: SPECIALIST

## 2021-01-15 PROCEDURE — 1090F PRES/ABSN URINE INCON ASSESS: CPT | Performed by: SPECIALIST

## 2021-01-15 PROCEDURE — G8399 PT W/DXA RESULTS DOCUMENT: HCPCS | Performed by: SPECIALIST

## 2021-01-15 PROCEDURE — 99214 OFFICE O/P EST MOD 30 MIN: CPT | Performed by: SPECIALIST

## 2021-01-15 PROCEDURE — 1101F PT FALLS ASSESS-DOCD LE1/YR: CPT | Performed by: SPECIALIST

## 2021-01-15 PROCEDURE — G8417 CALC BMI ABV UP PARAM F/U: HCPCS | Performed by: SPECIALIST

## 2021-01-15 PROCEDURE — G9717 DOC PT DX DEP/BP F/U NT REQ: HCPCS | Performed by: SPECIALIST

## 2021-01-15 PROCEDURE — 93227 XTRNL ECG REC<48 HR R&I: CPT | Performed by: SPECIALIST

## 2021-01-15 PROCEDURE — G8754 DIAS BP LESS 90: HCPCS | Performed by: SPECIALIST

## 2021-01-15 PROCEDURE — G0463 HOSPITAL OUTPT CLINIC VISIT: HCPCS | Performed by: SPECIALIST

## 2021-01-15 NOTE — PROGRESS NOTES
ATTENTION:   This medical record was transcribed using an electronic medical records/speech recognition system. Although proofread, it may and can contain electronic, spelling and other errors. Corrections may be executed at a later time. Please feel free to contact us for any clarifications as needed. ICD-10-CM ICD-9-CM   1. SOB (shortness of breath)  R06.02 786.05   2. Essential hypertension  I10 401.9   3. Family history of early CAD  Z82.49 V17.3   4. Hypercholesteremia  E78.00 272.0            Vita Boone is a 81 y.o. female with dyslipidemia and hypertension referred for 8 week follow up. Cardiac risk factors: post menopausal, dyslipidemia, hypertension. I have personally obtained the history from the patient. HISTORY OF PRESENTING ILLNESS      She has no interval cardiac issues. She now describes no chest pain or shortness of breath. She comes in to reveal her cardiac testing was done in December 20- 20. Her echo was normal except for some mild to moderate aortic stenosis with a mean aortic valve gradient of 27 mmHg. Her stress test demonstrated no evidence of ischemia. She is attempting to exercise with foot bike. Heart rate may be up a little bit on exam today but she is unaware of it. ACTIVE PROBLEM LIST     Patient Active Problem List    Diagnosis Date Noted    Colon polyps 02/18/2020    Non-Hodgkin's lymphoma ([address] 08/14/2018    Severe obesity (BMI 35.0-39. 9) with comorbidity ([address] 05/21/2018    Hypercalcemia 05/15/2018    Mass of lung parenchyma 05/15/2018    Hypercholesteremia 70/37/8928    Diastolic dysfunction 19/83/1572    EDER (acute kidney injury) ([address] 05/15/2018    Hematoma of groin 04/30/2018    Type 2 diabetes mellitus with nephropathy ([address] 01/02/2018    Recurrent depression ([address] 01/02/2018    Type 2 diabetes mellitus without complication, without long-term current use of insulin ([address] 04/13/2017    Advanced care planning/counseling discussion 02/15/2017    DVT [facility] thrombosis) [facility] 02/29/2016    CRI (chronic renal insufficiency) 10/04/2011    Asymptomatic carotid artery stenosis without infarction 02/22/2011    Diverticulosis 06/09/2010    IBS (irritable bowel syndrome) 06/09/2010    Depressive disorder, not elsewhere classified 06/09/2010    Mixed hyperlipidemia 06/09/2010    DJD (degenerative joint disease) 06/09/2010    PUD (peptic ulcer disease) 06/09/2010    Allergic rhinitis due to other allergen 06/09/2010    Proteinuria 06/09/2010    RA (rheumatoid arthritis) [facility] 06/09/2010    Essential hypertension 06/09/2010           PAST MEDICAL HISTORY     Past Medical History:   Diagnosis Date    Allergies     Asymptomatic carotid artery stenosis without infarction 2/22/2011    Closed traumatic compression fracture of lumbar vertebra ([facility] 2019    Depression     Diverticulosis     DJD (degenerative joint disease)     DVT (deep venous thrombosis) ([facility] 2016    DVT RLE.  GERD (gastroesophageal reflux disease)     HTN     Hypercholesterolemia     Mammography less than 12 months ago     NIDDM     Obesity (BMI 30.0-34. 9)     PE (pulmonary thromboembolism) ([facility] 2017    B/L.  Proteinuria            PAST SURGICAL HISTORY     Past Surgical History:   Procedure Laterality Date    ENDOSCOPY, COLON, DIAGNOSTIC  2008    normal    HX APPENDECTOMY      With JAYME.  HX HERNIA REPAIR      ? Umbilical hernia repair.  HX HYSTERECTOMY      HX KNEE REPLACEMENT Left     HX KNEE REPLACEMENT Right     HX SPLENECTOMY      Lap splenectomy.           ALLERGIES     Allergies   Allergen Reactions    Sulfa (Sulfonamide Antibiotics) Other (comments)          FAMILY HISTORY     Family History   Problem Relation Age of Onset    Hypertension Father     Cancer Sister         breast    Diabetes Brother     negative for cardiac disease       SOCIAL HISTORY     Social History     Socioeconomic History    Marital status:      Spouse name: Not on file    Number of children: Not on file    Years of education: Not on file    Highest education level: Not on file   Tobacco Use    Smoking status: Never Smoker    Smokeless tobacco: Never Used   Substance and Sexual Activity    Alcohol use: No    Drug use: No    Sexual activity: Yes     Partners: Male         MEDICATIONS     Current Outpatient Medications   Medication Sig    amLODIPine (NORVASC) 10 mg tablet TAKE ONE TABLET BY MOUTH DAILY    folic acid (FOLVITE) 1 mg tablet Take 1 tablet by mouth once daily    docusate sodium (Colace) 100 mg capsule Take 100 mg by mouth two (2) times a day.  atorvastatin (LIPITOR) 40 mg tablet Take 1 Tab by mouth nightly.  insulin lispro (HUMALOG) 100 unit/mL kwikpen To be used with sliding scale once daily as needed in the morning before breakfast- max daily dose 10 units  dx: E11.9    glucose blood VI test strips (True Metrix Glucose Test Strip) strip Three times a day monitoring before meals dx: E11.9    glimepiride (AMARYL) 4 mg tablet 1 tab with breakfast and 2 tabs with dinner    carbinoxamine maleate 4 mg tab TAKE ONE TABLET BY MOUTH TWICE A DAY    fluticasone propionate (FLONASE ALLERGY RELIEF) 50 mcg/actuation nasal spray 1 [place] by Both Nostrils route daily as needed for Rhinitis.  warfarin (COUMADIN) 5 mg tablet Take 2.5 mg by mouth daily.  esomeprazole (NEXIUM) 20 mg capsule Take 20 mg by mouth daily. No current facility-administered medications for this visit. I have reviewed the nurses notes, vitals, problem list, allergy list, medical history, family, social history and medications. REVIEW OF SYMPTOMS      General: Pt denies excessive weight gain or loss. Pt is able to conduct ADL's  HEENT: Denies blurred vision, headaches, hearing loss, epistaxis and difficulty swallowing. Respiratory: Denies cough, congestion, shortness of breath, MERRITT, wheezing or stridor.   Cardiovascular: Denies precordial pain, palpitations, edema or PND  Gastrointestinal: Denies poor appetite, indigestion, abdominal pain or blood in stool  Genitourinary: Denies hematuria, dysuria, increased urinary frequency  Musculoskeletal: Denies joint pain or swelling from muscles or joints  Neurologic: Denies tremor, paresthesias, headache, or sensory motor disturbance  Psychiatric: Denies confusion, insomnia, depression  Integumentray: Denies rash, itching or ulcers. Hematologic: Denies easy bruising, bleeding     PHYSICAL EXAMINATION      Vitals:    01/15/21 1109   Weight: 175 lb (79.4 kg)   Height: 5' 1\" (1.549 m)     General: Well developed, in no acute distress. HEENT: No jaundice, oral mucosa moist, no oral ulcers  Neck: Supple, no stiffness, no lymphadenopathy, supple  Heart: Regular rate and rhythm with a 2/6 systolic murmur  Respiratory: Clear bilaterally x 4, no wheezing or rales     Extremities:  No edema considering she has a history of lymphedema but she uses compression device, normal cap refill, no cyanosis. Musculoskeletal: No clubbing, no deformities  Neuro: A&Ox3, speech clear, gait stable, cooperative, no focal neurologic deficits  Skin: Skin color is normal. No rashes or lesions. Non diaphoretic, moist.  Vascular: 2+ pulses symmetric in all extremities       DIAGNOSTIC DATA     1. Lipids  2/15/17 -, HDL 40, ,     2. Echo  6/19/17- EF 65%    3.  Lexiscan  6/19/17- No ischemia         LABORATORY DATA            Lab Results   Component Value Date/Time    WBC 11.1 (H) 09/29/2020 11:22 AM    Hemoglobin (POC) 13.3 02/19/2016 08:35 PM    HGB 15.3 09/29/2020 11:22 AM    Hematocrit (POC) 39 02/19/2016 08:35 PM    HCT 47.9 (H) 09/29/2020 11:22 AM    PLATELET 255 50/52/4464 11:22 AM    MCV 95.4 09/29/2020 11:22 AM      Lab Results   Component Value Date/Time    Sodium 139 09/29/2020 11:22 AM    Potassium 4.3 09/29/2020 11:22 AM    Chloride 102 09/29/2020 11:22 AM    CO2 29 09/29/2020 11:22 AM Anion gap 8 09/29/2020 11:22 AM    Glucose 205 (H) 09/29/2020 11:22 AM    BUN 14 09/29/2020 11:22 AM    Creatinine 1.14 (H) 09/29/2020 11:22 AM    BUN/Creatinine ratio 12 09/29/2020 11:22 AM    GFR est AA 55 (L) 09/29/2020 11:22 AM    GFR est non-AA 45 (L) 09/29/2020 11:22 AM    Calcium 10.0 09/29/2020 11:22 AM    Bilirubin, total 0.7 09/29/2020 11:22 AM    Alk. phosphatase 79 09/29/2020 11:22 AM    Protein, total 7.0 09/29/2020 11:22 AM    Albumin 4.5 09/29/2020 11:22 AM    Globulin 2.5 09/29/2020 11:22 AM    A-G Ratio 1.8 09/29/2020 11:22 AM    ALT (SGPT) 38 09/29/2020 11:22 AM           ASSESSMENT/RECOMMENDATIONS:.      1. SOB  -All testing was negative she does have some mild to moderate aortic stenosis and will need serial echoes performed intermittently. I would suggest that we get another one in about a year     2. Dyslipidemia  -lipids followed by Martha Diaz NP  -LDL is close to goal at 79    3. Hypertension   -BP good today at 238/82    4. Lymphedema  -Uses compression devices at home    5. Elevated heart rate on exam in the 90s with occasional PVCs  -Favor placing a Holter monitor just to see her rhythm in a 24-hour.     5. Follow up in 6 months or PRN    No orders of the defined types were placed in this encounter. Follow-up and Dispositions  ·   Return in about 6 months (around 7/15/2021). I have discussed the diagnosis with  Marla Parks and the intended plan as seen in the above orders. Questions were answered concerning future plans. I have discussed medication side effects and warnings with the patient as well. Thank you,  Mayda Thayer NP for involving me in the care of  [address] [place] [address]. Please do not hesitate to contact me for further questions/concerns. Marquez Curtis MD, [address]      [facility], [address] [place] [address] [address]      [phone] / [phone] Fax

## 2021-01-28 ENCOUNTER — OFFICE VISIT (OUTPATIENT)
Dept: FAMILY MEDICINE CLINIC | Age: 86
End: 2021-01-28
Payer: MEDICARE

## 2021-01-28 VITALS
RESPIRATION RATE: 16 BRPM | HEART RATE: 107 BPM | DIASTOLIC BLOOD PRESSURE: 63 MMHG | SYSTOLIC BLOOD PRESSURE: 122 MMHG | WEIGHT: 176 LBS | OXYGEN SATURATION: 97 % | HEIGHT: 61 IN | BODY MASS INDEX: 33.23 KG/M2

## 2021-01-28 DIAGNOSIS — I10 ESSENTIAL HYPERTENSION: ICD-10-CM

## 2021-01-28 DIAGNOSIS — Z23 ENCOUNTER FOR IMMUNIZATION: ICD-10-CM

## 2021-01-28 DIAGNOSIS — E11.21 TYPE 2 DIABETES MELLITUS WITH NEPHROPATHY (HCC): Primary | ICD-10-CM

## 2021-01-28 DIAGNOSIS — E78.00 HYPERCHOLESTEREMIA: ICD-10-CM

## 2021-01-28 LAB
ALBUMIN SERPL-MCNC: 4.5 G/DL (ref 3.5–5)
ALBUMIN/GLOB SERPL: 1.7 {RATIO} (ref 1.1–2.2)
ALP SERPL-CCNC: 78 U/L (ref 45–117)
ALT SERPL-CCNC: 32 U/L (ref 12–78)
ANION GAP SERPL CALC-SCNC: 10 MMOL/L (ref 5–15)
AST SERPL-CCNC: 20 U/L (ref 15–37)
BASOPHILS # BLD: 0.1 K/UL (ref 0–0.1)
BASOPHILS NFR BLD: 1 % (ref 0–1)
BILIRUB SERPL-MCNC: 0.6 MG/DL (ref 0.2–1)
BUN SERPL-MCNC: 17 MG/DL (ref 6–20)
BUN/CREAT SERPL: 15 (ref 12–20)
CALCIUM SERPL-MCNC: 10.3 MG/DL (ref 8.5–10.1)
CHLORIDE SERPL-SCNC: 104 MMOL/L (ref 97–108)
CHOLEST SERPL-MCNC: 216 MG/DL
CO2 SERPL-SCNC: 28 MMOL/L (ref 21–32)
CREAT SERPL-MCNC: 1.16 MG/DL (ref 0.55–1.02)
DIFFERENTIAL METHOD BLD: ABNORMAL
EOSINOPHIL # BLD: 0.2 K/UL (ref 0–0.4)
EOSINOPHIL NFR BLD: 2 % (ref 0–7)
ERYTHROCYTE [DISTWIDTH] IN BLOOD BY AUTOMATED COUNT: 13.2 % (ref 11.5–14.5)
EST. AVERAGE GLUCOSE BLD GHB EST-MCNC: 177 MG/DL
GLOBULIN SER CALC-MCNC: 2.7 G/DL (ref 2–4)
GLUCOSE SERPL-MCNC: 208 MG/DL (ref 65–100)
HBA1C MFR BLD: 7.8 % (ref 4–5.6)
HCT VFR BLD AUTO: 46.7 % (ref 35–47)
HDLC SERPL-MCNC: 63 MG/DL
HDLC SERPL: 3.4 {RATIO} (ref 0–5)
HGB BLD-MCNC: 15.2 G/DL (ref 11.5–16)
IMM GRANULOCYTES # BLD AUTO: 0.1 K/UL (ref 0–0.04)
IMM GRANULOCYTES NFR BLD AUTO: 1 % (ref 0–0.5)
LDLC SERPL CALC-MCNC: 110 MG/DL (ref 0–100)
LIPID PROFILE,FLP: ABNORMAL
LYMPHOCYTES # BLD: 2.7 K/UL (ref 0.8–3.5)
LYMPHOCYTES NFR BLD: 27 % (ref 12–49)
MCH RBC QN AUTO: 30.5 PG (ref 26–34)
MCHC RBC AUTO-ENTMCNC: 32.5 G/DL (ref 30–36.5)
MCV RBC AUTO: 93.6 FL (ref 80–99)
MONOCYTES # BLD: 1 K/UL (ref 0–1)
MONOCYTES NFR BLD: 10 % (ref 5–13)
NEUTS SEG # BLD: 6 K/UL (ref 1.8–8)
NEUTS SEG NFR BLD: 59 % (ref 32–75)
NRBC # BLD: 0 K/UL (ref 0–0.01)
NRBC BLD-RTO: 0 PER 100 WBC
PLATELET # BLD AUTO: 259 K/UL (ref 150–400)
PMV BLD AUTO: 11.8 FL (ref 8.9–12.9)
POTASSIUM SERPL-SCNC: 4.4 MMOL/L (ref 3.5–5.1)
PROT SERPL-MCNC: 7.2 G/DL (ref 6.4–8.2)
RBC # BLD AUTO: 4.99 M/UL (ref 3.8–5.2)
SODIUM SERPL-SCNC: 142 MMOL/L (ref 136–145)
TRIGL SERPL-MCNC: 215 MG/DL (ref ?–150)
VLDLC SERPL CALC-MCNC: 43 MG/DL
WBC # BLD AUTO: 9.9 K/UL (ref 3.6–11)

## 2021-01-28 PROCEDURE — 1101F PT FALLS ASSESS-DOCD LE1/YR: CPT | Performed by: NURSE PRACTITIONER

## 2021-01-28 PROCEDURE — G8427 DOCREV CUR MEDS BY ELIG CLIN: HCPCS | Performed by: NURSE PRACTITIONER

## 2021-01-28 PROCEDURE — G0463 HOSPITAL OUTPT CLINIC VISIT: HCPCS | Performed by: NURSE PRACTITIONER

## 2021-01-28 PROCEDURE — 90732 PPSV23 VACC 2 YRS+ SUBQ/IM: CPT | Performed by: NURSE PRACTITIONER

## 2021-01-28 PROCEDURE — G9717 DOC PT DX DEP/BP F/U NT REQ: HCPCS | Performed by: NURSE PRACTITIONER

## 2021-01-28 PROCEDURE — 1090F PRES/ABSN URINE INCON ASSESS: CPT | Performed by: NURSE PRACTITIONER

## 2021-01-28 PROCEDURE — G8752 SYS BP LESS 140: HCPCS | Performed by: NURSE PRACTITIONER

## 2021-01-28 PROCEDURE — 99214 OFFICE O/P EST MOD 30 MIN: CPT | Performed by: NURSE PRACTITIONER

## 2021-01-28 PROCEDURE — G8536 NO DOC ELDER MAL SCRN: HCPCS | Performed by: NURSE PRACTITIONER

## 2021-01-28 PROCEDURE — G8417 CALC BMI ABV UP PARAM F/U: HCPCS | Performed by: NURSE PRACTITIONER

## 2021-01-28 PROCEDURE — G8754 DIAS BP LESS 90: HCPCS | Performed by: NURSE PRACTITIONER

## 2021-01-28 PROCEDURE — G8399 PT W/DXA RESULTS DOCUMENT: HCPCS | Performed by: NURSE PRACTITIONER

## 2021-01-28 RX ORDER — INSULIN LISPRO 100 [IU]/ML
INJECTION, SOLUTION INTRAVENOUS; SUBCUTANEOUS
Qty: 1 VIAL | Refills: 2 | COMMUNITY
Start: 2021-01-28

## 2021-01-28 RX ORDER — GLIMEPIRIDE 2 MG/1
2 TABLET ORAL 2 TIMES DAILY
Qty: 180 TAB | Refills: 3 | Status: SHIPPED | OUTPATIENT
Start: 2021-01-28 | End: 2021-02-03 | Stop reason: SDUPTHER

## 2021-01-28 RX ORDER — WARFARIN SODIUM 5 MG/1
5 TABLET ORAL DAILY
COMMUNITY
Start: 2021-01-28

## 2021-01-28 NOTE — PROGRESS NOTES
HISTORY OF PRESENT ILLNESS  Gabrielle Gaona is a 81 y.o. female. HPI  Cardiovascular Review:  The patient has hypertension and hyperlipidemia. Diet and Lifestyle: generally follows a low fat low cholesterol diet, generally follows a low sodium diet, exercises sporadically, nonsmoker  Home BP Monitoring: is not measured at home. Pertinent ROS: taking medications as instructed, no medication side effects noted, no TIA's, no chest pain on exertion, no dyspnea on exertion, no swelling of ankles. Diabetes Mellitus:  She has diabetes mellitus, and  hyperlipidemia. Diabetic ROS - medication compliance: compliant all of the time, diabetic diet compliance: compliant most of the time, home glucose monitoring: fasting values range 100-130. Lab review: orders written for new lab studies as appropriate; see orders. Immunization: pneu 23 is due    Patient Active Problem List    Diagnosis Date Noted    Colon polyps 02/18/2020    Non-Hodgkin's lymphoma ([facility] 08/14/2018    Severe obesity (BMI 35.0-39. 9) with comorbidity ([facility] 05/21/2018    Hypercalcemia 05/15/2018    Mass of lung parenchyma 05/15/2018    Hypercholesteremia 21/41/7240    Diastolic dysfunction 27/19/4700    EDER (acute kidney injury) ([address] 05/15/2018    Hematoma of groin 04/30/2018    Type 2 diabetes mellitus with nephropathy ([address] 01/02/2018    Recurrent depression ([address] 01/02/2018    Type 2 diabetes mellitus without complication, without long-term current use of insulin ([address] 04/13/2017    Advanced care planning/counseling discussion 02/15/2017    DVT (deep venous thrombosis) ([facility] 02/29/2016    CRI (chronic renal insufficiency) 10/04/2011    Asymptomatic carotid artery stenosis without infarction 02/22/2011    Diverticulosis 06/09/2010    IBS (irritable bowel syndrome) 06/09/2010    Depressive disorder, not elsewhere classified 06/09/2010    Mixed hyperlipidemia 06/09/2010    DJD (degenerative joint disease) 06/09/2010    PUD (peptic ulcer disease) 06/09/2010    Allergic rhinitis due to other allergen 06/09/2010    Proteinuria 06/09/2010    RA (rheumatoid arthritis) ([facility] 06/09/2010    Essential hypertension 06/09/2010     Current Outpatient Medications   Medication Sig Dispense Refill    glimepiride (AMARYL) 2 mg tablet Take 1 Tab by mouth two (2) times a day. 1 tab with breakfast and 2 tabs with dinner 180 Tab 3    insulin lispro (HUMALOG) 100 unit/mL injection 10 units once a day at night 1 Vial 2    warfarin (COUMADIN) 5 mg tablet Take 1 Tab by mouth daily. With 1/2 pill only on saturdays      amLODIPine (NORVASC) 10 mg tablet TAKE ONE TABLET BY MOUTH DAILY 90 Tab 0    folic acid (FOLVITE) 1 mg tablet Take 1 tablet by mouth once daily 90 Tab 0    docusate sodium (Colace) 100 mg capsule Take 100 mg by mouth two (2) times a day.  atorvastatin (LIPITOR) 40 mg tablet Take 1 Tab by mouth nightly. 30 Tab 5    glucose blood VI test strips (True Metrix Glucose Test Strip) strip Three times a day monitoring before meals dx: E11.9 100 Strip 5    carbinoxamine maleate 4 mg tab TAKE ONE TABLET BY MOUTH TWICE A DAY 90 Tab 3    fluticasone propionate (FLONASE ALLERGY RELIEF) 50 mcg/actuation nasal spray 1 [place] by Both Nostrils route daily as needed for Rhinitis. 1 Bottle 3    esomeprazole (NEXIUM) 20 mg capsule Take 20 mg by mouth daily.        Family History   Problem Relation Age of Onset    Hypertension Father     Cancer Sister         breast    Diabetes Brother      Social History     Tobacco Use    Smoking status: Never Smoker    Smokeless tobacco: Never Used   Substance Use Topics    Alcohol use: No        ROS  A comprehensive review of system was obtained and negative except findings in the HPI    Visit Vitals  /63 (BP 1 Location: Right arm, BP Patient Position: Sitting)   Pulse (!) 107   Resp 16   Ht 5' 1\" (1.549 m)   Wt 176 lb (79.8 kg)   SpO2 97%   BMI 33.25 kg/m²     Physical Exam  Vitals signs and nursing note reviewed. Constitutional:       Appearance: She is well-developed. Comments:      Neck:      Vascular: No JVD. Cardiovascular:      Rate and Rhythm: Normal rate and regular rhythm. Heart sounds: No murmur. No friction rub. No gallop. Pulmonary:      Effort: Pulmonary effort is normal. No respiratory distress. Breath sounds: Normal breath sounds. No wheezing. Skin:     General: Skin is warm. Neurological:      Mental Status: She is alert and oriented to person, place, and time. ASSESSMENT and PLAN  Encounter Diagnoses   Name Primary?  Type 2 diabetes mellitus with nephropathy ([facility] Yes    Essential hypertension     Hypercholesteremia     Encounter for immunization      Orders Placed This Encounter    PNEUMOCOCCAL POLYSACCHARIDE VACCINE, 23-VALENT, ADULT OR IMMUNOSUPPRESSED PT DOSE,    LIPID PANEL    CBC WITH AUTOMATED DIFF    METABOLIC PANEL, COMPREHENSIVE    HEMOGLOBIN A1C WITH EAG    glimepiride (AMARYL) 2 mg tablet    insulin lispro (HUMALOG) 100 unit/mL injection    warfarin (COUMADIN) 5 mg tablet     I have discussed the diagnosis with the patient and the intended plan as seen in the above orders. The patient has received an after-visit summary and questions were answered concerning future plans. Patient conveyed understanding of the plan at the time of the visit.     Dontae Courser, MSN, ANP  1/28/2021

## 2021-01-28 NOTE — PROGRESS NOTES
Chief Complaint   Patient presents with    Labs     Pt in office today for labs    1. Have you been to the ER, urgent care clinic since your last visit? Hospitalized since your last visit? No    2. Have you seen or consulted any other health care providers outside of the [address] [place] [address] since your last visit? Include any pap smears or colon screening.  No     Pt has no other concerns

## 2021-02-02 NOTE — PROGRESS NOTES
Hey there, your cholesterol is higher than usual. Did you run out of Lipitor? Also the sugar did climb some but the addition of the insulin should make a difference and the next time we do labs in 3 months it should be better in the A1c reading.  Leslie Noel

## 2021-02-03 ENCOUNTER — TELEPHONE (OUTPATIENT)
Dept: FAMILY MEDICINE CLINIC | Age: 86
End: 2021-02-03

## 2021-02-03 RX ORDER — GLIMEPIRIDE 2 MG/1
TABLET ORAL
Qty: 270 TAB | Refills: 3 | Status: SHIPPED | OUTPATIENT
Start: 2021-02-03 | End: 2022-05-22

## 2021-02-03 NOTE — TELEPHONE ENCOUNTER
Spoke with pt she states the pharmacy needs a new Rx for the glimepiride sent to the pharmacy. She states 1. She is out and 2. The instructions on the med are not clear.

## 2021-03-06 DIAGNOSIS — J06.9 UPPER RESPIRATORY TRACT INFECTION, UNSPECIFIED TYPE: ICD-10-CM

## 2021-03-07 RX ORDER — CARBINOXAMINE MALEATE 4 MG/1
TABLET ORAL
Qty: 60 TAB | Refills: 3 | Status: SHIPPED | OUTPATIENT
Start: 2021-03-07 | End: 2021-07-11

## 2021-03-14 RX ORDER — FOLIC ACID 1 MG/1
TABLET ORAL
Qty: 90 TAB | Refills: 0 | Status: SHIPPED | OUTPATIENT
Start: 2021-03-14 | End: 2021-06-13

## 2021-06-01 RX ORDER — AMLODIPINE BESYLATE 10 MG/1
TABLET ORAL
Qty: 90 TABLET | Refills: 0 | Status: SHIPPED | OUTPATIENT
Start: 2021-06-01 | End: 2021-08-29

## 2021-06-01 RX ORDER — ATORVASTATIN CALCIUM 40 MG/1
TABLET, FILM COATED ORAL
Qty: 60 TABLET | Refills: 4 | Status: SHIPPED | OUTPATIENT
Start: 2021-06-01 | End: 2021-07-19

## 2021-06-13 RX ORDER — FOLIC ACID 1 MG/1
TABLET ORAL
Qty: 90 TABLET | Refills: 0 | Status: SHIPPED | OUTPATIENT
Start: 2021-06-13 | End: 2021-09-06

## 2021-07-10 DIAGNOSIS — J06.9 UPPER RESPIRATORY TRACT INFECTION, UNSPECIFIED TYPE: ICD-10-CM

## 2021-07-11 RX ORDER — CARBINOXAMINE MALEATE 4 MG/1
TABLET ORAL
Qty: 60 TABLET | Refills: 2 | Status: SHIPPED | OUTPATIENT
Start: 2021-07-11 | End: 2021-10-06

## 2021-07-19 ENCOUNTER — OFFICE VISIT (OUTPATIENT)
Dept: CARDIOLOGY CLINIC | Age: 86
End: 2021-07-19
Payer: MEDICARE

## 2021-07-19 VITALS
DIASTOLIC BLOOD PRESSURE: 94 MMHG | SYSTOLIC BLOOD PRESSURE: 142 MMHG | OXYGEN SATURATION: 97 % | WEIGHT: 176 LBS | HEIGHT: 61 IN | BODY MASS INDEX: 33.23 KG/M2 | HEART RATE: 91 BPM

## 2021-07-19 DIAGNOSIS — R06.02 SOB (SHORTNESS OF BREATH) ON EXERTION: ICD-10-CM

## 2021-07-19 DIAGNOSIS — I10 ESSENTIAL HYPERTENSION: ICD-10-CM

## 2021-07-19 DIAGNOSIS — E78.2 MIXED HYPERLIPIDEMIA: ICD-10-CM

## 2021-07-19 DIAGNOSIS — Z82.49 FAMILY HISTORY OF EARLY CAD: ICD-10-CM

## 2021-07-19 DIAGNOSIS — R06.02 SOB (SHORTNESS OF BREATH): Primary | ICD-10-CM

## 2021-07-19 PROCEDURE — G8417 CALC BMI ABV UP PARAM F/U: HCPCS | Performed by: SPECIALIST

## 2021-07-19 PROCEDURE — G8399 PT W/DXA RESULTS DOCUMENT: HCPCS | Performed by: SPECIALIST

## 2021-07-19 PROCEDURE — G0463 HOSPITAL OUTPT CLINIC VISIT: HCPCS | Performed by: SPECIALIST

## 2021-07-19 PROCEDURE — G8427 DOCREV CUR MEDS BY ELIG CLIN: HCPCS | Performed by: SPECIALIST

## 2021-07-19 PROCEDURE — G8753 SYS BP > OR = 140: HCPCS | Performed by: SPECIALIST

## 2021-07-19 PROCEDURE — G8536 NO DOC ELDER MAL SCRN: HCPCS | Performed by: SPECIALIST

## 2021-07-19 PROCEDURE — G9717 DOC PT DX DEP/BP F/U NT REQ: HCPCS | Performed by: SPECIALIST

## 2021-07-19 PROCEDURE — 1090F PRES/ABSN URINE INCON ASSESS: CPT | Performed by: SPECIALIST

## 2021-07-19 PROCEDURE — 1101F PT FALLS ASSESS-DOCD LE1/YR: CPT | Performed by: SPECIALIST

## 2021-07-19 PROCEDURE — G8755 DIAS BP > OR = 90: HCPCS | Performed by: SPECIALIST

## 2021-07-19 PROCEDURE — 99214 OFFICE O/P EST MOD 30 MIN: CPT | Performed by: SPECIALIST

## 2021-07-19 RX ORDER — ROSUVASTATIN CALCIUM 40 MG/1
40 TABLET, COATED ORAL
Qty: 30 TABLET | Refills: 5 | Status: SHIPPED | OUTPATIENT
Start: 2021-07-19 | End: 2021-12-29

## 2021-07-19 RX ORDER — ASCORBIC ACID 1000 MG
TABLET ORAL
COMMUNITY

## 2021-07-19 NOTE — PROGRESS NOTES
ATTENTION:   This medical record was transcribed using an electronic medical records/speech recognition system. Although proofread, it may and can contain electronic, spelling and other errors. Corrections may be executed at a later time. Please feel free to contact us for any clarifications as needed. ICD-10-CM ICD-9-CM   1. SOB (shortness of breath)  R06.02 786.05   2. Essential hypertension  I10 401.9   3. Mixed hyperlipidemia  E78.2 272.2   4. SOB (shortness of breath) on exertion  R06.02 786.05   5. Family history of early CAD  Z80.55 V17.3            Danii Hutton is a 81 y.o. female with dyslipidemia and hypertension referred for 8 week follow up. Cardiac risk factors: post menopausal, dyslipidemia, hypertension. I have personally obtained the history from the patient. HISTORY OF PRESENTING ILLNESS      Overall she seems to be doing well. She does tell me that her sister-in-law recently passed away at [facility] for an acute myocardial infarction after bypass surgery. She is under a little stress regarding that. Otherwise she has been doing well does have baseline shortness of breath. She states it has not gotten worse and she has no PND orthopnea. ACTIVE PROBLEM LIST     Patient Active Problem List    Diagnosis Date Noted    Colon polyps 02/18/2020    Non-Hodgkin's lymphoma ([address] 08/14/2018    Severe obesity (BMI 35.0-39. 9) with comorbidity ([address] 05/21/2018    Hypercalcemia 05/15/2018    Mass of lung parenchyma 05/15/2018    Hypercholesteremia 28/11/7079    Diastolic dysfunction 56/01/4722    EDER (acute kidney injury) ([address] 05/15/2018    Hematoma of groin 04/30/2018    Type 2 diabetes mellitus with nephropathy ([address] 01/02/2018    Recurrent depression ([address] 01/02/2018    Type 2 diabetes mellitus without complication, without long-term current use of insulin ([address] 04/13/2017    Advanced care planning/counseling discussion 02/15/2017    DVT (deep venous thrombosis) ([facility] 02/29/2016    CRI (chronic renal insufficiency) 10/04/2011    Asymptomatic carotid artery stenosis without infarction 02/22/2011    Diverticulosis 06/09/2010    IBS (irritable bowel syndrome) 06/09/2010    Depressive disorder, not elsewhere classified 06/09/2010    Mixed hyperlipidemia 06/09/2010    DJD (degenerative joint disease) 06/09/2010    PUD (peptic ulcer disease) 06/09/2010    Allergic rhinitis due to other allergen 06/09/2010    Proteinuria 06/09/2010    RA (rheumatoid arthritis) ([facility] 06/09/2010    Essential hypertension 06/09/2010           PAST MEDICAL HISTORY     Past Medical History:   Diagnosis Date    Allergies     Asymptomatic carotid artery stenosis without infarction 2/22/2011    Closed traumatic compression fracture of lumbar vertebra ([facility] 2019    Depression     Diverticulosis     DJD (degenerative joint disease)     DVT (deep venous thrombosis) ([facility] 2016    DVT RLE.  GERD (gastroesophageal reflux disease)     HTN     Hypercholesterolemia     Mammography less than 12 months ago     NIDDM     Obesity (BMI 30.0-34. 9)     PE (pulmonary thromboembolism) ([facility] 2017    B/L.  Proteinuria            PAST SURGICAL HISTORY     Past Surgical History:   Procedure Laterality Date    ENDOSCOPY, COLON, DIAGNOSTIC  2008    normal    HX APPENDECTOMY      With JAYME.  HX HERNIA REPAIR      ? Umbilical hernia repair.  HX HYSTERECTOMY      HX KNEE REPLACEMENT Left     HX KNEE REPLACEMENT Right     HX SPLENECTOMY      Lap splenectomy.           ALLERGIES     Allergies   Allergen Reactions    Sulfa (Sulfonamide Antibiotics) Other (comments)          FAMILY HISTORY     Family History   Problem Relation Age of Onset    Hypertension Father     Cancer Sister         breast    Diabetes Brother     negative for cardiac disease       SOCIAL HISTORY     Social History     Socioeconomic History    Marital status:      Spouse name: Not on file    Number of children: Not on file    Years of education: Not on file    Highest education level: Not on file   Tobacco Use    Smoking status: Never Smoker    Smokeless tobacco: Never Used   Substance and Sexual Activity    Alcohol use: No    Drug use: No    Sexual activity: Yes     Partners: Male     Social Determinants of Health     Financial Resource Strain:     Difficulty of Paying Living Expenses:    Food Insecurity:     Worried About Running Out of Food in the Last Year:     [address] in the Last Year:    Transportation Needs:     Lack of Transportation (Medical):  Lack of Transportation (Non-Medical):    Physical Activity:     Days of Exercise per Week:     Minutes of Exercise per Session:    Stress:     Feeling of Stress :    Social Connections:     Frequency of Communication with Friends and Family:     Frequency of Social Gatherings with Friends and Family:     Attends [employer] Services:     Active Member of Clubs or Organizations:     Attends Club or Organization Meetings:     Marital Status:          MEDICATIONS     Current Outpatient Medications   Medication Sig    mv-min-folic-calcium carb-K1 (Women's 50 Plus Multivitamin) 400 mcg-500 mg calcium-20 mcg tab Take  by mouth.  carbinoxamine maleate 4 mg tab TAKE ONE TABLET BY MOUTH TWICE A DAY    folic acid (FOLVITE) 1 mg tablet Take 1 tablet by mouth once daily    amLODIPine (NORVASC) 10 mg tablet TAKE ONE TABLET BY MOUTH DAILY    atorvastatin (LIPITOR) 40 mg tablet TAKE ONE TABLET BY MOUTH ONCE NIGHTLY    glimepiride (AMARYL) 2 mg tablet 1 tab with breakfast and 2 tabs with dinner    insulin lispro (HUMALOG) 100 unit/mL injection 10 units once a day at night    warfarin (COUMADIN) 5 mg tablet Take 1 Tab by mouth daily. With 1/2 pill only on saturdays    docusate sodium (Colace) 100 mg capsule Take 100 mg by mouth two (2) times a day.     glucose blood VI test strips (True Metrix Glucose Test Strip) strip Three times a day monitoring before meals dx: E11.9    fluticasone propionate (FLONASE ALLERGY RELIEF) 50 mcg/actuation nasal spray 1 [place] by Both Nostrils route daily as needed for Rhinitis.  esomeprazole (NEXIUM) 20 mg capsule Take 20 mg by mouth daily. No current facility-administered medications for this visit. I have reviewed the nurses notes, vitals, problem list, allergy list, medical history, family, social history and medications. REVIEW OF SYMPTOMS      General: Pt denies excessive weight gain or loss. Pt is able to conduct ADL's  HEENT: Denies blurred vision, headaches, hearing loss, epistaxis and difficulty swallowing. Respiratory: Denies cough, congestion, shortness of breath, MERRITT, wheezing or stridor. Cardiovascular: Denies precordial pain, palpitations, edema or PND  Gastrointestinal: Denies poor appetite, indigestion, abdominal pain or blood in stool  Genitourinary: Denies hematuria, dysuria, increased urinary frequency  Musculoskeletal: Denies joint pain or swelling from muscles or joints  Neurologic: Denies tremor, paresthesias, headache, or sensory motor disturbance  Psychiatric: Denies confusion, insomnia, depression  Integumentray: Denies rash, itching or ulcers. Hematologic: Denies easy bruising, bleeding     PHYSICAL EXAMINATION      Vitals:    07/19/21 1022   BP: (!) 142/94   Pulse: 91   SpO2: 97%   Weight: 176 lb (79.8 kg)   Height: 5' 1\" (1.549 m)     General: Well developed, in no acute distress. HEENT: No jaundice, oral mucosa moist, no oral ulcers  Neck: Supple, no stiffness, no lymphadenopathy, supple  Heart: Regular rate and rhythm with a 2/6 systolic murmur right sternal border it appears midsystolic  Respiratory: Clear bilaterally x 4, no wheezing or rales  Musculoskeletal: No clubbing, no deformities  Neuro: A&Ox3, speech clear, gait stable but uses a cane, cooperative, no focal neurologic deficits  Skin: Skin color is normal. No rashes or lesions.  Non diaphoretic, moist.       DIAGNOSTIC DATA     1. Lipids  2/15/17 -, HDL 40, ,     2. Echo  17- EF 65%    3. Lexiscan  17- No ischemia         LABORATORY DATA            Lab Results   Component Value Date/Time    WBC 9.9 2021 09:57 AM    Hemoglobin (POC) 13.3 2016 08:35 PM    HGB 15.2 2021 09:57 AM    Hematocrit (POC) 39 2016 08:35 PM    HCT 46.7 2021 09:57 AM    PLATELET 695 1963 09:57 AM    MCV 93.6 2021 09:57 AM      Lab Results   Component Value Date/Time    Sodium 142 2021 09:57 AM    Potassium 4.4 2021 09:57 AM    Chloride 104 2021 09:57 AM    CO2 28 2021 09:57 AM    Anion gap 10 2021 09:57 AM    Glucose 208 (H) 2021 09:57 AM    BUN 17 2021 09:57 AM    Creatinine 1.16 (H) 2021 09:57 AM    BUN/Creatinine ratio 15 2021 09:57 AM    GFR est AA 54 (L) 2021 09:57 AM    GFR est non-AA 45 (L) 2021 09:57 AM    Calcium 10.3 (H) 2021 09:57 AM    Bilirubin, total 0.6 2021 09:57 AM    Alk. phosphatase 78 2021 09:57 AM    Protein, total 7.2 2021 09:57 AM    Albumin 4.5 2021 09:57 AM    Globulin 2.7 2021 09:57 AM    A-G Ratio 1.7 2021 09:57 AM    ALT (SGPT) 32 2021 09:57 AM           ASSESSMENT/RECOMMENDATIONS:.      1.  Mild aortic stenosis  -She has baseline shortness of breath which may be related to being deconditioned. She only notes that with exertional activity and it really has on not changed from the last time I saw her. She has no PND orthopnea. Her dimensionless index was 0.27     2. Dyslipidemia  -her last LDL went up again to 110 so I switch her to Crestor 40 mg a day and will recheck her cholesterol in 2 months. 3. Hypertension   -BP is elevated today but she is under some stress since her sister-in-law passed away from acute myocardial infarction and as she  after bypass surgery so she does feel little stressed.   In reviewing her blood pressures they have not been elevated in the past to this degree. -She is going to be seeing Nohemy Laurent NP in 1 week And will have her blood pressure checked at that time. 4.  Lymphedema    5. She never did wear the Holter monitor and she does still have occasional PVCs but they are asymptomatic.     5. Follow up in 6 months or PRN    Orders Placed This Encounter    mv-min-folic-calcium carb-K1 (Women's 50 Plus Multivitamin) 400 mcg-500 mg calcium-20 mcg tab     Sig: Take  by mouth. Follow-up and Dispositions  ·   Return in about 6 months (around 1/19/2022). I have discussed the diagnosis with  Marla Parks and the intended plan as seen in the above orders. Questions were answered concerning future plans. I have discussed medication side effects and warnings with the patient as well. Thank you,  Fabrizio Sofia NP for involving me in the care of  [address] [place] [address]. Please do not hesitate to contact me for further questions/concerns. Marquez Curtis MD, [address]      [facility], [address] [place] [address] [address]      [phone] / [phone] Fax

## 2021-07-19 NOTE — PATIENT INSTRUCTIONS
1) stop Lipitor (atorvastatin)    2) will start Crestor (rosuvastatin) at 40 mg in evening. If too expensive let me know    3) in 2 mo get fasting cholesterol at lab [facility]    4) call me in 3 weeks with some BP readings    5) return to see me in 6 mo with echo to look at the aortic valve.

## 2021-07-19 NOTE — PROGRESS NOTES
Morena Light is a 81 y.o. female    Visit Vitals  BP (!) 142/94 (BP 1 Location: Left upper arm, BP Patient Position: Sitting, BP Cuff Size: Adult)   Pulse 91   Ht 5' 1\" (1.549 m)   Wt 176 lb (79.8 kg)   SpO2 97%   BMI 33.25 kg/m²       Chief Complaint   Patient presents with    Cholesterol Problem    Hypertension       Chest pain NO  SOB YES  Dizziness NO  Swelling YES  Recent hospital visit NO  Refills NO

## 2021-07-27 ENCOUNTER — OFFICE VISIT (OUTPATIENT)
Dept: FAMILY MEDICINE CLINIC | Age: 86
End: 2021-07-27
Payer: MEDICARE

## 2021-07-27 VITALS
HEIGHT: 61 IN | WEIGHT: 177 LBS | RESPIRATION RATE: 16 BRPM | SYSTOLIC BLOOD PRESSURE: 127 MMHG | HEART RATE: 109 BPM | BODY MASS INDEX: 33.42 KG/M2 | DIASTOLIC BLOOD PRESSURE: 83 MMHG | OXYGEN SATURATION: 95 % | TEMPERATURE: 98.5 F

## 2021-07-27 DIAGNOSIS — E78.00 HYPERCHOLESTEREMIA: ICD-10-CM

## 2021-07-27 DIAGNOSIS — I10 ESSENTIAL HYPERTENSION: ICD-10-CM

## 2021-07-27 DIAGNOSIS — E11.21 TYPE 2 DIABETES MELLITUS WITH NEPHROPATHY (HCC): Primary | ICD-10-CM

## 2021-07-27 DIAGNOSIS — M05.742 RHEUMATOID ARTHRITIS INVOLVING BOTH HANDS WITH POSITIVE RHEUMATOID FACTOR (HCC): ICD-10-CM

## 2021-07-27 DIAGNOSIS — F33.9 RECURRENT DEPRESSION (HCC): ICD-10-CM

## 2021-07-27 DIAGNOSIS — C85.90 NON-HODGKIN'S LYMPHOMA, UNSPECIFIED BODY REGION, UNSPECIFIED NON-HODGKIN LYMPHOMA TYPE (HCC): ICD-10-CM

## 2021-07-27 DIAGNOSIS — M05.741 RHEUMATOID ARTHRITIS INVOLVING BOTH HANDS WITH POSITIVE RHEUMATOID FACTOR (HCC): ICD-10-CM

## 2021-07-27 PROCEDURE — G8427 DOCREV CUR MEDS BY ELIG CLIN: HCPCS | Performed by: NURSE PRACTITIONER

## 2021-07-27 PROCEDURE — G8754 DIAS BP LESS 90: HCPCS | Performed by: NURSE PRACTITIONER

## 2021-07-27 PROCEDURE — G9717 DOC PT DX DEP/BP F/U NT REQ: HCPCS | Performed by: NURSE PRACTITIONER

## 2021-07-27 PROCEDURE — G8417 CALC BMI ABV UP PARAM F/U: HCPCS | Performed by: NURSE PRACTITIONER

## 2021-07-27 PROCEDURE — G8536 NO DOC ELDER MAL SCRN: HCPCS | Performed by: NURSE PRACTITIONER

## 2021-07-27 PROCEDURE — G0463 HOSPITAL OUTPT CLINIC VISIT: HCPCS | Performed by: NURSE PRACTITIONER

## 2021-07-27 PROCEDURE — G8399 PT W/DXA RESULTS DOCUMENT: HCPCS | Performed by: NURSE PRACTITIONER

## 2021-07-27 PROCEDURE — 3051F HG A1C>EQUAL 7.0%<8.0%: CPT | Performed by: NURSE PRACTITIONER

## 2021-07-27 PROCEDURE — 99214 OFFICE O/P EST MOD 30 MIN: CPT | Performed by: NURSE PRACTITIONER

## 2021-07-27 PROCEDURE — 1090F PRES/ABSN URINE INCON ASSESS: CPT | Performed by: NURSE PRACTITIONER

## 2021-07-27 PROCEDURE — G8752 SYS BP LESS 140: HCPCS | Performed by: NURSE PRACTITIONER

## 2021-07-27 PROCEDURE — 1101F PT FALLS ASSESS-DOCD LE1/YR: CPT | Performed by: NURSE PRACTITIONER

## 2021-07-27 NOTE — PROGRESS NOTES
Chief Complaint   Patient presents with    Follow-up    Labs     Pt being seen for fuv  -labs    1. Have you been to the ER, urgent care clinic since your last visit? Hospitalized since your last visit? No    2. Have you seen or consulted any other health care providers outside of the [address] [place] [address] since your last visit? Include any pap smears or colon screening.  No     Pt has no other concerns

## 2021-07-27 NOTE — PROGRESS NOTES
HISTORY OF PRESENT ILLNESS  Thanh Perkins is a 81 y.o. female. HPI  Cardiovascular Review:  The patient has hypertension and hyperlipidemia. Diet and Lifestyle: generally follows a low fat low cholesterol diet, generally follows a low sodium diet, sedentary, nonsmoker  Home BP Monitoring: is not measured at home. Pertinent ROS: taking medications as instructed, no medication side effects noted, no TIA's, no chest pain on exertion, no dyspnea on exertion, no swelling of ankles. Diabetes Mellitus:  She has diabetes mellitus, and  hyperlipidemia. Diabetic ROS - medication compliance: compliant all of the time, diabetic diet compliance: compliant most of the time, home glucose monitoring: Entire glucose log was reviewed with patient at this visit. .   Lab review: orders written for new lab studies as appropriate; see orders. Cap Gaps Addressed:  Sees Onc for leukemia every month for follow up  She see Rheum for RA every 6 months and they do labs as well  Depression is currently stable on med regimen at this time    Patient Active Problem List    Diagnosis Date Noted    Colon polyps 02/18/2020    Non-Hodgkin's lymphoma ([facility] 08/14/2018    Severe obesity (BMI 35.0-39. 9) with comorbidity ([address] 05/21/2018    Hypercalcemia 05/15/2018    Mass of lung parenchyma 05/15/2018    Hypercholesteremia 81/53/4502    Diastolic dysfunction 40/24/7502    EDER (acute kidney injury) ([address] 05/15/2018    Hematoma of groin 04/30/2018    Type 2 diabetes mellitus with nephropathy ([address] 01/02/2018    Recurrent depression ([address] 01/02/2018    Type 2 diabetes mellitus without complication, without long-term current use of insulin ([address] 04/13/2017    Advanced care planning/counseling discussion 02/15/2017    CRI (chronic renal insufficiency) 10/04/2011    Asymptomatic carotid artery stenosis without infarction 02/22/2011    Diverticulosis 06/09/2010    IBS (irritable bowel syndrome) 06/09/2010    Depressive disorder, not elsewhere classified 06/09/2010    Mixed hyperlipidemia 06/09/2010    DJD (degenerative joint disease) 06/09/2010    PUD (peptic ulcer disease) 06/09/2010    Allergic rhinitis due to other allergen 06/09/2010    Proteinuria 06/09/2010    RA (rheumatoid arthritis) ([facility] 06/09/2010    Essential hypertension 06/09/2010     Current Outpatient Medications   Medication Sig Dispense Refill    mv-min-folic-calcium carb-K1 (Women's 50 Plus Multivitamin) 400 mcg-500 mg calcium-20 mcg tab Take  by mouth.  rosuvastatin (CRESTOR) 40 mg tablet Take 1 Tablet by mouth nightly. 30 Tablet 5    carbinoxamine maleate 4 mg tab TAKE ONE TABLET BY MOUTH TWICE A DAY 60 Tablet 2    folic acid (FOLVITE) 1 mg tablet Take 1 tablet by mouth once daily 90 Tablet 0    amLODIPine (NORVASC) 10 mg tablet TAKE ONE TABLET BY MOUTH DAILY 90 Tablet 0    glimepiride (AMARYL) 2 mg tablet 1 tab with breakfast and 2 tabs with dinner 270 Tab 3    insulin lispro (HUMALOG) 100 unit/mL injection 10 units once a day at night 1 Vial 2    warfarin (COUMADIN) 5 mg tablet Take 1 Tab by mouth daily. With 1/2 pill only on saturdays      docusate sodium (Colace) 100 mg capsule Take 100 mg by mouth two (2) times a day.  glucose blood VI test strips (True Metrix Glucose Test Strip) strip Three times a day monitoring before meals dx: E11.9 100 Strip 5    fluticasone propionate (FLONASE ALLERGY RELIEF) 50 mcg/actuation nasal spray 1 [place] by Both Nostrils route daily as needed for Rhinitis. 1 Bottle 3    esomeprazole (NEXIUM) 20 mg capsule Take 20 mg by mouth daily.        Family History   Problem Relation Age of Onset    Hypertension Father     Cancer Sister         breast    Diabetes Brother      Social History     Tobacco Use    Smoking status: Never Smoker    Smokeless tobacco: Never Used   Substance Use Topics    Alcohol use: No           ROS  A comprehensive review of system was obtained and negative except findings in the HPI    Visit Vitals  /83   Pulse (!) 109   Temp 98.5 °F (36.9 °C) (Oral)   Resp 16   Ht 5' 1\" (1.549 m)   Wt 177 lb (80.3 kg)   SpO2 95%   BMI 33.44 kg/m²     Physical Exam  Vitals and nursing note reviewed. Constitutional:       Appearance: She is well-developed. Comments:      Neck:      Vascular: No JVD. Cardiovascular:      Rate and Rhythm: Normal rate and regular rhythm. Heart sounds: No murmur heard. No friction rub. No gallop. Pulmonary:      Effort: Pulmonary effort is normal. No respiratory distress. Breath sounds: Normal breath sounds. No wheezing. Skin:     General: Skin is warm. Neurological:      Mental Status: She is alert and oriented to person, place, and time. ASSESSMENT and PLAN  Encounter Diagnoses   Name Primary?  Type 2 diabetes mellitus with nephropathy ([address] Yes    Essential hypertension     Hypercholesteremia     Non-Hodgkin's lymphoma, unspecified body region, unspecified non-Hodgkin lymphoma type ([address]     Rheumatoid arthritis involving both hands with positive rheumatoid factor ([address]     Recurrent depression ([address]      Orders Placed This Encounter    HEMOGLOBIN A1C WITH EAG    CBC WITH AUTOMATED DIFF    METABOLIC PANEL, COMPREHENSIVE    LIPID PANEL    MICROALBUMIN, UR, RAND W/ MICROALB/CREAT RATIO     Labs updated today  Recheck 6 mo if stable  I have discussed the diagnosis with the patient and the intended plan as seen in the above orders. The patient has received an after-visit summary and questions were answered concerning future plans. Patient conveyed understanding of the plan at the time of the visit.     Hosea Malone, MSN, ANP  7/27/2021

## 2021-07-28 LAB
ALBUMIN SERPL-MCNC: 4.7 G/DL (ref 3.6–4.6)
ALBUMIN/CREAT UR: 97 MG/G CREAT (ref 0–29)
ALBUMIN/GLOB SERPL: 3.1 {RATIO} (ref 1.2–2.2)
ALP SERPL-CCNC: 75 IU/L (ref 48–121)
ALT SERPL-CCNC: 22 IU/L (ref 0–32)
AST SERPL-CCNC: 23 IU/L (ref 0–40)
BASOPHILS # BLD AUTO: 0.1 X10E3/UL (ref 0–0.2)
BASOPHILS NFR BLD AUTO: 1 %
BILIRUB SERPL-MCNC: 0.4 MG/DL (ref 0–1.2)
BUN SERPL-MCNC: 9 MG/DL (ref 8–27)
BUN/CREAT SERPL: 9 (ref 12–28)
CALCIUM SERPL-MCNC: 9.6 MG/DL (ref 8.7–10.3)
CHLORIDE SERPL-SCNC: 101 MMOL/L (ref 96–106)
CHOLEST SERPL-MCNC: 169 MG/DL (ref 100–199)
CO2 SERPL-SCNC: 29 MMOL/L (ref 20–29)
CREAT SERPL-MCNC: 1.02 MG/DL (ref 0.57–1)
CREAT UR-MCNC: 149.5 MG/DL
EOSINOPHIL # BLD AUTO: 0.2 X10E3/UL (ref 0–0.4)
EOSINOPHIL NFR BLD AUTO: 2 %
ERYTHROCYTE [DISTWIDTH] IN BLOOD BY AUTOMATED COUNT: 12.7 % (ref 11.7–15.4)
EST. AVERAGE GLUCOSE BLD GHB EST-MCNC: 174 MG/DL
GLOBULIN SER CALC-MCNC: 1.5 G/DL (ref 1.5–4.5)
GLUCOSE SERPL-MCNC: 172 MG/DL (ref 65–99)
HBA1C MFR BLD: 7.7 % (ref 4.8–5.6)
HCT VFR BLD AUTO: 45.7 % (ref 34–46.6)
HDLC SERPL-MCNC: 55 MG/DL
HGB BLD-MCNC: 15 G/DL (ref 11.1–15.9)
IMM GRANULOCYTES # BLD AUTO: 0 X10E3/UL (ref 0–0.1)
IMM GRANULOCYTES NFR BLD AUTO: 1 %
IMP & REVIEW OF LAB RESULTS: NORMAL
INTERPRETATION: NORMAL
LDLC SERPL CALC-MCNC: 84 MG/DL (ref 0–99)
LYMPHOCYTES # BLD AUTO: 2.1 X10E3/UL (ref 0.7–3.1)
LYMPHOCYTES NFR BLD AUTO: 24 %
MCH RBC QN AUTO: 30.5 PG (ref 26.6–33)
MCHC RBC AUTO-ENTMCNC: 32.8 G/DL (ref 31.5–35.7)
MCV RBC AUTO: 93 FL (ref 79–97)
MICROALBUMIN UR-MCNC: 145.5 UG/ML
MONOCYTES # BLD AUTO: 1 X10E3/UL (ref 0.1–0.9)
MONOCYTES NFR BLD AUTO: 11 %
NEUTROPHILS # BLD AUTO: 5.4 X10E3/UL (ref 1.4–7)
NEUTROPHILS NFR BLD AUTO: 61 %
PLATELET # BLD AUTO: 283 X10E3/UL (ref 150–450)
POTASSIUM SERPL-SCNC: 3.9 MMOL/L (ref 3.5–5.2)
PROT SERPL-MCNC: 6.2 G/DL (ref 6–8.5)
RBC # BLD AUTO: 4.92 X10E6/UL (ref 3.77–5.28)
SODIUM SERPL-SCNC: 143 MMOL/L (ref 134–144)
TRIGL SERPL-MCNC: 174 MG/DL (ref 0–149)
VLDLC SERPL CALC-MCNC: 30 MG/DL (ref 5–40)
WBC # BLD AUTO: 8.8 X10E3/UL (ref 3.4–10.8)

## 2021-07-30 NOTE — PROGRESS NOTES
Hey there, overall your labs have really improved for cholesterol, kidney functions, blood count. Your sugar is still slightly too high so really watch the diet and recheck in 3 months.  Gurpreet Campos

## 2021-08-09 ENCOUNTER — TELEPHONE (OUTPATIENT)
Dept: CARDIOLOGY CLINIC | Age: 86
End: 2021-08-09

## 2021-08-09 NOTE — TELEPHONE ENCOUNTER
Patient called to give her 3 weeks BP Readings:  /91 7-19  /73 7-20  /82 7-21  /83 7-22  /93 7-24  /76 7-25  /83 7-27  /72 7-29  /95 7/31  /81 8-3  /84 8-4  /70 8-5  /88 8-8    Phone [phone]

## 2021-08-11 NOTE — TELEPHONE ENCOUNTER
Dr Bahman Figueroa pt was seen in office 7/19/21 and had a high BP. No med changes at that time - pt stressed about recent passing of sister in law. Pt takes Norvasc 10 mg daily. She was to report BP's at home for MD review.

## 2021-08-11 NOTE — TELEPHONE ENCOUNTER
Hm BP log with mild elevation but normotensive with PCP on 7/27/21. Given normotensive in past as well, will cont to monitor for another week or two, alfred given age. Advise pt to continue home log for another week BID. Make sure resting for 5-10min before checking BP. Please call back to review, if avg BP still running high at home, will start another med at that time. Thanks.      BP Readings from Last 3 Encounters:   07/27/21 127/83   07/19/21 (!) 142/94   01/28/21 122/63

## 2021-08-29 RX ORDER — AMLODIPINE BESYLATE 10 MG/1
TABLET ORAL
Qty: 90 TABLET | Refills: 0 | Status: SHIPPED | OUTPATIENT
Start: 2021-08-29 | End: 2021-11-28

## 2021-09-06 RX ORDER — FOLIC ACID 1 MG/1
TABLET ORAL
Qty: 90 TABLET | Refills: 0 | Status: SHIPPED | OUTPATIENT
Start: 2021-09-06 | End: 2021-12-06

## 2021-09-09 ENCOUNTER — TELEPHONE (OUTPATIENT)
Dept: CARDIOLOGY CLINIC | Age: 86
End: 2021-09-09

## 2021-09-09 NOTE — TELEPHONE ENCOUNTER
Overall her numbers look pretty good. She has readings of blood pressures are within normal limits but her pulse is just slightly elevated. As long she is not having any symptoms of dizziness or lightheadedness or just stay the course.

## 2021-09-09 NOTE — TELEPHONE ENCOUNTER
Patient states Dr. Stu Muhammad wanted her to report her BP after resting for 10 minutes.     BP and Pulse listed below:  110/67, 43  126/73, 72  121/72, 102  124/73, 85  113/63, 109  124/79, 91  122/75, 111  140/80, 91  122/73, 92  113/73, 46  109/64, 100  141/81, 93  140/82, 93  150/84, 93  110/62, 64  138/82, 75  140/50, 84  125/73, 103    PHONE: [phone]

## 2021-09-21 LAB
ALBUMIN SERPL-MCNC: 4.9 G/DL (ref 3.6–4.6)
ALP SERPL-CCNC: 70 IU/L (ref 44–121)
ALT SERPL-CCNC: 21 IU/L (ref 0–32)
AST SERPL-CCNC: 20 IU/L (ref 0–40)
BILIRUB DIRECT SERPL-MCNC: 0.16 MG/DL (ref 0–0.4)
BILIRUB SERPL-MCNC: 0.5 MG/DL (ref 0–1.2)
CHOLEST SERPL-MCNC: 154 MG/DL (ref 100–199)
HDLC SERPL-MCNC: 51 MG/DL
LDLC SERPL CALC-MCNC: 71 MG/DL (ref 0–99)
PROT SERPL-MCNC: 6.7 G/DL (ref 6–8.5)
TRIGL SERPL-MCNC: 193 MG/DL (ref 0–149)
VLDLC SERPL CALC-MCNC: 32 MG/DL (ref 5–40)

## 2021-09-29 DIAGNOSIS — E78.00 HYPERCHOLESTEREMIA: Primary | ICD-10-CM

## 2021-10-05 ENCOUNTER — VIRTUAL VISIT (OUTPATIENT)
Dept: FAMILY MEDICINE CLINIC | Age: 86
End: 2021-10-05
Payer: MEDICARE

## 2021-10-05 DIAGNOSIS — J40 BRONCHITIS: Primary | ICD-10-CM

## 2021-10-05 DIAGNOSIS — J06.9 ACUTE URI: ICD-10-CM

## 2021-10-05 PROCEDURE — G0463 HOSPITAL OUTPT CLINIC VISIT: HCPCS | Performed by: NURSE PRACTITIONER

## 2021-10-05 PROCEDURE — G8399 PT W/DXA RESULTS DOCUMENT: HCPCS | Performed by: NURSE PRACTITIONER

## 2021-10-05 PROCEDURE — G8417 CALC BMI ABV UP PARAM F/U: HCPCS | Performed by: NURSE PRACTITIONER

## 2021-10-05 PROCEDURE — G8756 NO BP MEASURE DOC: HCPCS | Performed by: NURSE PRACTITIONER

## 2021-10-05 PROCEDURE — 1090F PRES/ABSN URINE INCON ASSESS: CPT | Performed by: NURSE PRACTITIONER

## 2021-10-05 PROCEDURE — G8427 DOCREV CUR MEDS BY ELIG CLIN: HCPCS | Performed by: NURSE PRACTITIONER

## 2021-10-05 PROCEDURE — G9717 DOC PT DX DEP/BP F/U NT REQ: HCPCS | Performed by: NURSE PRACTITIONER

## 2021-10-05 PROCEDURE — 1101F PT FALLS ASSESS-DOCD LE1/YR: CPT | Performed by: NURSE PRACTITIONER

## 2021-10-05 PROCEDURE — G8536 NO DOC ELDER MAL SCRN: HCPCS | Performed by: NURSE PRACTITIONER

## 2021-10-05 PROCEDURE — 99213 OFFICE O/P EST LOW 20 MIN: CPT | Performed by: NURSE PRACTITIONER

## 2021-10-05 RX ORDER — CODEINE PHOSPHATE AND GUAIFENESIN 10; 100 MG/5ML; MG/5ML
5 SOLUTION ORAL
Qty: 180 ML | Refills: 0 | Status: SHIPPED | OUTPATIENT
Start: 2021-10-05 | End: 2021-10-12

## 2021-10-05 RX ORDER — AMOXICILLIN AND CLAVULANATE POTASSIUM 875; 125 MG/1; MG/1
1 TABLET, FILM COATED ORAL EVERY 12 HOURS
Qty: 20 TABLET | Refills: 0 | Status: SHIPPED | OUTPATIENT
Start: 2021-10-05 | End: 2021-10-15

## 2021-10-05 RX ORDER — SPIRONOLACTONE 25 MG/1
TABLET ORAL
COMMUNITY
Start: 2021-09-27

## 2021-10-05 NOTE — PROGRESS NOTES
Selma Guy is a 81 y.o. female who was seen by synchronous (real-time) audio-video technology on 10/5/2021 for Cough, Watery Eyes, and Fatigue        Assessment & Plan:   Diagnoses and all orders for this visit:    1. Bronchitis / 2. Acute URI  -     amoxicillin-clavulanate (AUGMENTIN) 875-125 mg per tablet; Take 1 Tablet by mouth every twelve (12) hours for 10 days.  -     guaiFENesin-codeine (guaiFENesin AC) 100-10 mg/5 mL solution; Take 5 mL by mouth three (3) times daily as needed for Cough or Congestion for up to 7 days. Max Daily Amount: 15 mL. Start and complete full course of augmentin. PRN robitussin AC for cough. Dwp ADRs/SEs of medication. Push fluids. Rest. Saline nasal spray for nasal congestion. OTC motrin/apap for fevers. RTC if sx persist or worsen. I spent at least 15 minutes on this visit with this established patient. 712  Subjective:     Chief Complaint   Patient presents with    Cough    Watery Eyes    Fatigue      Patient vv appt today for sx that began one wk ago. Cough is dry, eye secretions are clear. Croupy cough. Ears feel stopped up. Recalls similar sx last year and was a sinus infection. Sx are worse in the am and at night. Have been treating with mucinex with relief noted only at night. Also taking a daily allergy prescription without relief. Has PND that is triggering the cough. Sx worse at night and in the morning. Denies any sick contacts. Denies any fever. Denies any recent abx. Denies any SOB and dyspnea. Pt has previously tolerated augmentin. Prior to Admission medications    Medication Sig Start Date End Date Taking?  Authorizing Provider   spironolactone (ALDACTONE) 25 mg tablet  9/27/21  Yes Provider, Historical   folic acid (FOLVITE) 1 mg tablet Take 1 tablet by mouth once daily 9/6/21  Yes Macy SILVEIRA, NP   amLODIPine (NORVASC) 10 mg tablet TAKE ONE TABLET BY MOUTH DAILY 8/29/21  Yes Lennox Chiang NP   mv-min-folic-calcium carb-K1 (Women's 50 Plus Multivitamin) 400 mcg-500 mg calcium-20 mcg tab Take  by mouth. Yes Provider, Historical   rosuvastatin (CRESTOR) 40 mg tablet Take 1 Tablet by mouth nightly. 7/19/21  Yes Nahum Curtis MD   carbinoxamine maleate 4 mg tab TAKE ONE TABLET BY MOUTH TWICE A DAY 7/11/21  Yes Lluvia Lozoya NP   glimepiride (AMARYL) 2 mg tablet 1 tab with breakfast and 2 tabs with dinner 2/3/21  Yes Lluvia Lozoya NP   insulin lispro (HUMALOG) 100 unit/mL injection 10 units once a day at night 1/28/21  Yes Lluvia Lozoya NP   warfarin (COUMADIN) 5 mg tablet Take 1 Tab by mouth daily. With 1/2 pill only on saturdays 1/28/21  Yes Lluvia Lozoya NP   docusate sodium (Colace) 100 mg capsule Take 100 mg by mouth two (2) times a day. Yes Provider, Historical   glucose blood VI test strips (True Metrix Glucose Test Strip) strip Three times a day monitoring before meals dx: E11.9 10/1/20  Yes Lluvia Lozoya NP   fluticasone propionate (FLONASE ALLERGY RELIEF) 50 mcg/actuation nasal spray 1 [place] by Both Nostrils route daily as needed for Rhinitis. 11/11/19  Yes Erin ESQUIVEL NP   esomeprazole (NEXIUM) 20 mg capsule Take 20 mg by mouth daily. Yes Provider, Historical     Patient Active Problem List    Diagnosis Date Noted    Colon polyps 02/18/2020    Non-Hodgkin's lymphoma ([facility] 08/14/2018    Severe obesity (BMI 35.0-39. 9) with comorbidity ([facility] 05/21/2018    Hypercalcemia 05/15/2018    Mass of lung parenchyma 05/15/2018    Hypercholesteremia 99/91/9704    Diastolic dysfunction 65/77/5904    EDER (acute kidney injury) ([facility] 05/15/2018    Hematoma of groin 04/30/2018    Type 2 diabetes mellitus with nephropathy ([facility] 01/02/2018    Recurrent depression ([facility] 01/02/2018    Type 2 diabetes mellitus without complication, without long-term current use of insulin ([facility] 04/13/2017    Advanced care planning/counseling discussion 02/15/2017    CRI (chronic renal insufficiency) 10/04/2011    Asymptomatic carotid artery stenosis without infarction 02/22/2011    Diverticulosis 06/09/2010    IBS (irritable bowel syndrome) 06/09/2010    Depressive disorder, not elsewhere classified 06/09/2010    Mixed hyperlipidemia 06/09/2010    DJD (degenerative joint disease) 06/09/2010    PUD (peptic ulcer disease) 06/09/2010    Allergic rhinitis due to other allergen 06/09/2010    Proteinuria 06/09/2010    RA (rheumatoid arthritis) ([facility] 06/09/2010    Essential hypertension 06/09/2010     Current Outpatient Medications   Medication Sig Dispense Refill    spironolactone (ALDACTONE) 25 mg tablet       amoxicillin-clavulanate (AUGMENTIN) 875-125 mg per tablet Take 1 Tablet by mouth every twelve (12) hours for 10 days. 20 Tablet 0    guaiFENesin-codeine (guaiFENesin AC) 100-10 mg/5 mL solution Take 5 mL by mouth three (3) times daily as needed for Cough or Congestion for up to 7 days. Max Daily Amount: 15 mL. 224 mL 0    folic acid (FOLVITE) 1 mg tablet Take 1 tablet by mouth once daily 90 Tablet 0    amLODIPine (NORVASC) 10 mg tablet TAKE ONE TABLET BY MOUTH DAILY 90 Tablet 0    mv-min-folic-calcium carb-K1 (Women's 50 Plus Multivitamin) 400 mcg-500 mg calcium-20 mcg tab Take  by mouth.  rosuvastatin (CRESTOR) 40 mg tablet Take 1 Tablet by mouth nightly. 30 Tablet 5    carbinoxamine maleate 4 mg tab TAKE ONE TABLET BY MOUTH TWICE A DAY 60 Tablet 2    glimepiride (AMARYL) 2 mg tablet 1 tab with breakfast and 2 tabs with dinner 270 Tab 3    insulin lispro (HUMALOG) 100 unit/mL injection 10 units once a day at night 1 Vial 2    warfarin (COUMADIN) 5 mg tablet Take 1 Tab by mouth daily. With 1/2 pill only on saturdays      docusate sodium (Colace) 100 mg capsule Take 100 mg by mouth two (2) times a day.       glucose blood VI test strips (True Metrix Glucose Test Strip) strip Three times a day monitoring before meals dx: E11.9 100 Strip 5    fluticasone propionate (FLONASE ALLERGY RELIEF) 50 mcg/actuation nasal spray 1 [place] by Both Nostrils route daily as needed for Rhinitis. 1 Bottle 3    esomeprazole (NEXIUM) 20 mg capsule Take 20 mg by mouth daily. Allergies   Allergen Reactions    Sulfa (Sulfonamide Antibiotics) Other (comments)       ROS    Objective:   No flowsheet data found. General: alert, cooperative, no distress   Mental  status: normal mood, behavior, speech, dress, motor activity, and thought processes, able to follow commands   HENT: NCAT; audibly congested   Neck: no visualized mass   Resp: no respiratory distress, barking croupy cough                 Additional exam findings: We discussed the expected course, resolution and complications of the diagnosis(es) in detail. Medication risks, benefits, costs, interactions, and alternatives were discussed as indicated. I advised her to contact the office if her condition worsens, changes or fails to improve as anticipated. She expressed understanding with the diagnosis(es) and plan. Rossy Dickson, was evaluated through a synchronous (real-time) audio-video encounter. The patient (or guardian if applicable) is aware that this is a billable service. Verbal consent to proceed has been obtained within the past 12 months. The visit was conducted pursuant to the emergency declaration under the [facility], [address] [place] [address] authority and the [employer] General Act. Patient identification was verified, and a caregiver was present when appropriate. The patient was located in a state where the provider was credentialed to provide care.     Kareem Cardoso NP

## 2021-10-05 NOTE — PROGRESS NOTES
Chief Complaint   Patient presents with    Cough    Watery Eyes    Fatigue      Patient vv appt today for sx that began one wk ago. Cough is dry, eye secretions are clear. Sx are worse in the am and at night. Have been treating with mucinex with relief noted only at night. 1. Have you been to the ER, urgent care clinic since your last visit? Hospitalized since your last visit? No    2. Have you seen or consulted any other health care providers outside of the [address] [place] [address] since your last visit? Include any pap smears or colon screening.  No

## 2021-10-06 DIAGNOSIS — J06.9 UPPER RESPIRATORY TRACT INFECTION, UNSPECIFIED TYPE: ICD-10-CM

## 2021-10-06 RX ORDER — CARBINOXAMINE MALEATE 4 MG/1
TABLET ORAL
Qty: 60 TABLET | Refills: 2 | Status: SHIPPED | OUTPATIENT
Start: 2021-10-06 | End: 2022-01-03

## 2021-10-25 ENCOUNTER — TELEPHONE (OUTPATIENT)
Dept: FAMILY MEDICINE CLINIC | Age: 86
End: 2021-10-25

## 2021-10-25 NOTE — TELEPHONE ENCOUNTER
Spoke with pt, she states she had a vv with provider at the start of the month and was given an antibiotic that did not help. She wants to come in office. Informed pt due to her sx we can not see her in the office and that I could give her a vv or she could be seen at urgent care.  Pt verbalized understanding

## 2021-11-28 RX ORDER — AMLODIPINE BESYLATE 10 MG/1
TABLET ORAL
Qty: 90 TABLET | Refills: 0 | Status: SHIPPED | OUTPATIENT
Start: 2021-11-28 | End: 2022-03-06

## 2021-12-06 RX ORDER — FOLIC ACID 1 MG/1
TABLET ORAL
Qty: 90 TABLET | Refills: 0 | Status: SHIPPED | OUTPATIENT
Start: 2021-12-06 | End: 2022-03-14

## 2021-12-14 ENCOUNTER — TRANSCRIBE ORDER (OUTPATIENT)
Dept: SCHEDULING | Age: 86
End: 2021-12-14

## 2021-12-14 DIAGNOSIS — M54.16 LUMBAR RADICULOPATHY: Primary | ICD-10-CM

## 2021-12-14 DIAGNOSIS — Z98.890 PERSONAL HISTORY OF SURGERY TO HEART AND GREAT VESSELS, PRESENTING HAZARDS TO HEALTH: ICD-10-CM

## 2021-12-27 ENCOUNTER — HOSPITAL ENCOUNTER (OUTPATIENT)
Dept: MRI IMAGING | Age: 86
Discharge: HOME | End: 2021-12-27
Attending: ORTHOPAEDIC SURGERY
Payer: MEDICARE

## 2021-12-27 DIAGNOSIS — M54.16 LUMBAR RADICULOPATHY: ICD-10-CM

## 2021-12-27 DIAGNOSIS — Z98.890 PERSONAL HISTORY OF SURGERY TO HEART AND GREAT VESSELS, PRESENTING HAZARDS TO HEALTH: ICD-10-CM

## 2021-12-27 PROCEDURE — 72148 MRI LUMBAR SPINE W/O DYE: CPT

## 2021-12-29 RX ORDER — ROSUVASTATIN CALCIUM 40 MG/1
TABLET, COATED ORAL
Qty: 90 TABLET | Refills: 1 | Status: SHIPPED | OUTPATIENT
Start: 2021-12-29 | End: 2022-06-27

## 2022-01-03 DIAGNOSIS — J06.9 UPPER RESPIRATORY TRACT INFECTION, UNSPECIFIED TYPE: ICD-10-CM

## 2022-01-03 RX ORDER — CARBINOXAMINE MALEATE 4 MG/1
TABLET ORAL
Qty: 60 TABLET | Refills: 2 | Status: SHIPPED | OUTPATIENT
Start: 2022-01-03 | End: 2022-04-22

## 2022-01-25 RX ORDER — CALCIUM CITRATE/VITAMIN D3 200MG-6.25
TABLET ORAL
Qty: 100 STRIP | Refills: 5 | Status: SHIPPED | OUTPATIENT
Start: 2022-01-25

## 2022-02-09 ENCOUNTER — ANCILLARY PROCEDURE (OUTPATIENT)
Dept: CARDIOLOGY CLINIC | Age: 87
End: 2022-02-09
Payer: MEDICARE

## 2022-02-09 VITALS
HEIGHT: 61 IN | WEIGHT: 177 LBS | DIASTOLIC BLOOD PRESSURE: 84 MMHG | SYSTOLIC BLOOD PRESSURE: 136 MMHG | BODY MASS INDEX: 33.42 KG/M2

## 2022-02-09 DIAGNOSIS — I10 HYPERTENSION, UNSPECIFIED TYPE: ICD-10-CM

## 2022-02-09 DIAGNOSIS — R06.02 SOB (SHORTNESS OF BREATH): ICD-10-CM

## 2022-02-09 PROCEDURE — 93306 TTE W/DOPPLER COMPLETE: CPT | Performed by: SPECIALIST

## 2022-02-17 LAB
ECHO AV MEAN GRADIENT: 29 MMHG
ECHO AV MEAN VELOCITY: 2.6 M/S
ECHO AV PEAK GRADIENT: 46 MMHG
ECHO AV PEAK VELOCITY: 3.4 M/S
ECHO AV VELOCITY RATIO: 0.24
ECHO AV VTI: 61.5 CM
ECHO LA VOL 2C: 50 ML (ref 22–52)
ECHO LA VOL 4C: 60 ML (ref 22–52)
ECHO LA VOLUME AREA LENGTH: 59 ML
ECHO LA VOLUME INDEX A2C: 28 ML/M2 (ref 16–34)
ECHO LA VOLUME INDEX A4C: 34 ML/M2 (ref 16–34)
ECHO LA VOLUME INDEX AREA LENGTH: 33 ML/M2 (ref 16–34)
ECHO LV E' LATERAL VELOCITY: 4 CM/S
ECHO LV E' SEPTAL VELOCITY: 3 CM/S
ECHO LV EDV A2C: 61 ML
ECHO LV EDV A4C: 60 ML
ECHO LV EDV BP: 60 ML (ref 56–104)
ECHO LV EDV BP: 60 ML (ref 56–104)
ECHO LV EDV INDEX A4C: 34 ML/M2
ECHO LV EDV NDEX A2C: 34 ML/M2
ECHO LV EJECTION FRACTION A2C: 59 %
ECHO LV EJECTION FRACTION A4C: 57 %
ECHO LV EJECTION FRACTION BIPLANE: 57 % (ref 55–100)
ECHO LV ESV A2C: 25 ML
ECHO LV ESV A4C: 26 ML
ECHO LV ESV BP: 26 ML (ref 19–49)
ECHO LV ESV INDEX A2C: 14 ML/M2
ECHO LV ESV INDEX A4C: 15 ML/M2
ECHO LV ESV INDEX BP: 15 ML/M2
ECHO LV FRACTIONAL SHORTENING: 32 % (ref 28–44)
ECHO LV INTERNAL DIMENSION DIASTOLE INDEX: 1.56 CM/M2
ECHO LV INTERNAL DIMENSION DIASTOLIC: 2.8 CM (ref 3.9–5.3)
ECHO LV INTERNAL DIMENSION SYSTOLIC INDEX: 1.06 CM/M2
ECHO LV INTERNAL DIMENSION SYSTOLIC: 1.9 CM
ECHO LV IVSD: 0.9 CM (ref 0.6–0.9)
ECHO LV MASS 2D: 63.3 G (ref 67–162)
ECHO LV MASS INDEX 2D: 35.4 G/M2 (ref 43–95)
ECHO LV POSTERIOR WALL DIASTOLIC: 0.9 CM (ref 0.6–0.9)
ECHO LV RELATIVE WALL THICKNESS RATIO: 0.64
ECHO LVOT AV VTI INDEX: 0.22
ECHO LVOT MEAN GRADIENT: 2 MMHG
ECHO LVOT PEAK GRADIENT: 3 MMHG
ECHO LVOT PEAK VELOCITY: 0.8 M/S
ECHO LVOT VTI: 13.4 CM
ECHO MV A VELOCITY: 1.32 M/S
ECHO MV AREA PHT: 2.8 CM2
ECHO MV E DECELERATION TIME (DT): 266.8 MS
ECHO MV E VELOCITY: 0.69 M/S
ECHO MV E/A RATIO: 0.52
ECHO MV E/E' LATERAL: 17.25
ECHO MV E/E' RATIO (AVERAGED): 20.13
ECHO MV E/E' SEPTAL: 23
ECHO MV PRESSURE HALF TIME (PHT): 77.4 MS
ECHO RV FREE WALL PEAK S': 12 CM/S
ECHO RV INTERNAL DIMENSION: 2.8 CM
ECHO RV TAPSE: 1.7 CM (ref 1.5–2)

## 2022-02-17 PROCEDURE — 93306 TTE W/DOPPLER COMPLETE: CPT | Performed by: SPECIALIST

## 2022-02-28 ENCOUNTER — OFFICE VISIT (OUTPATIENT)
Dept: FAMILY MEDICINE CLINIC | Age: 87
End: 2022-02-28
Payer: MEDICARE

## 2022-02-28 VITALS
BODY MASS INDEX: 32.85 KG/M2 | HEART RATE: 93 BPM | DIASTOLIC BLOOD PRESSURE: 74 MMHG | RESPIRATION RATE: 20 BRPM | OXYGEN SATURATION: 97 % | HEIGHT: 61 IN | SYSTOLIC BLOOD PRESSURE: 110 MMHG | TEMPERATURE: 97.7 F | WEIGHT: 174 LBS

## 2022-02-28 DIAGNOSIS — Z00.00 WELL ADULT EXAM: Primary | ICD-10-CM

## 2022-02-28 DIAGNOSIS — E11.21 TYPE 2 DIABETES MELLITUS WITH NEPHROPATHY (HCC): ICD-10-CM

## 2022-02-28 DIAGNOSIS — I10 ESSENTIAL HYPERTENSION: ICD-10-CM

## 2022-02-28 DIAGNOSIS — M05.741 RHEUMATOID ARTHRITIS INVOLVING BOTH HANDS WITH POSITIVE RHEUMATOID FACTOR (HCC): ICD-10-CM

## 2022-02-28 DIAGNOSIS — E78.00 HYPERCHOLESTEREMIA: ICD-10-CM

## 2022-02-28 DIAGNOSIS — F33.9 RECURRENT DEPRESSION (HCC): ICD-10-CM

## 2022-02-28 DIAGNOSIS — M05.742 RHEUMATOID ARTHRITIS INVOLVING BOTH HANDS WITH POSITIVE RHEUMATOID FACTOR (HCC): ICD-10-CM

## 2022-02-28 DIAGNOSIS — Z23 NEEDS FLU SHOT: ICD-10-CM

## 2022-02-28 DIAGNOSIS — C85.90 NON-HODGKIN'S LYMPHOMA, UNSPECIFIED BODY REGION, UNSPECIFIED NON-HODGKIN LYMPHOMA TYPE (HCC): ICD-10-CM

## 2022-02-28 PROCEDURE — G8536 NO DOC ELDER MAL SCRN: HCPCS | Performed by: NURSE PRACTITIONER

## 2022-02-28 PROCEDURE — G8427 DOCREV CUR MEDS BY ELIG CLIN: HCPCS | Performed by: NURSE PRACTITIONER

## 2022-02-28 PROCEDURE — G0463 HOSPITAL OUTPT CLINIC VISIT: HCPCS | Performed by: NURSE PRACTITIONER

## 2022-02-28 PROCEDURE — G0439 PPPS, SUBSEQ VISIT: HCPCS | Performed by: NURSE PRACTITIONER

## 2022-02-28 PROCEDURE — G0008 ADMIN INFLUENZA VIRUS VAC: HCPCS | Performed by: NURSE PRACTITIONER

## 2022-02-28 PROCEDURE — 1090F PRES/ABSN URINE INCON ASSESS: CPT | Performed by: NURSE PRACTITIONER

## 2022-02-28 PROCEDURE — G9717 DOC PT DX DEP/BP F/U NT REQ: HCPCS | Performed by: NURSE PRACTITIONER

## 2022-02-28 PROCEDURE — G8417 CALC BMI ABV UP PARAM F/U: HCPCS | Performed by: NURSE PRACTITIONER

## 2022-02-28 PROCEDURE — 99214 OFFICE O/P EST MOD 30 MIN: CPT | Performed by: NURSE PRACTITIONER

## 2022-02-28 PROCEDURE — 1101F PT FALLS ASSESS-DOCD LE1/YR: CPT | Performed by: NURSE PRACTITIONER

## 2022-02-28 NOTE — PROGRESS NOTES
Chief Complaint   Patient presents with   [facility] Annual Wellness Visit    Labs    Injection     flu shot      Pt being seen for welness visit  -labs and flu shot    1. Have you been to the ER, urgent care clinic since your last visit? Hospitalized since your last visit? No    2. Have you seen or consulted any other health care providers outside of the [address] [place] [address] since your last visit? Include any pap smears or colon screening[facility] Vitals  Ht 5' 1\" (1.549 m)   Wt 174 lb (78.9 kg)   BMI 32.88 kg/m²       After obtaining Marla Parks's consent, and per orders of griselda, injection of high flu given by [employer] in (L) delt. Patient instructed to remain in clinic for 20 minutes afterwards, and to report any adverse reaction to me immediately. Patient did not display any adverse side effects. Patient was advsied to return in 15 month(s).      Pt has no other concerns

## 2022-02-28 NOTE — PATIENT INSTRUCTIONS
Medicare Wellness Visit, Female     The best way to live healthy is to have a lifestyle where you eat a well-balanced diet, exercise regularly, limit alcohol use, and quit all forms of tobacco/nicotine, if applicable. Regular preventive services are another way to keep healthy. Preventive services (vaccines, screening tests, monitoring & exams) can help personalize your care plan, which helps you manage your own care. Screening tests can find health problems at the earliest stages, when they are easiest to treat. [facility] follows the current, evidence-based guidelines published by the [facility] Herrera ([facility]) when recommending preventive services for our patients. Because we follow these guidelines, sometimes recommendations change over time as research supports it. (For example, mammograms used to be recommended annually. Even though Medicare will still pay for an annual mammogram, the newer guidelines recommend a mammogram every two years for women of average risk). Of course, you and your doctor may decide to screen more often for some diseases, based on your risk and your co-morbidities (chronic disease you are already diagnosed with). Preventive services for you include:  - Medicare offers their members a free annual wellness visit, which is time for you and your primary care provider to discuss and plan for your preventive service needs. Take advantage of this benefit every year!  -All adults over the age of 72 should receive the recommended pneumonia vaccines. Current USPSTF guidelines recommend a series of two vaccines for the best pneumonia protection.   -All adults should have a flu vaccine yearly and a tetanus vaccine every 10 years.   -All adults age 48 and older should receive the shingles vaccines (series of two vaccines).       -All adults age 38-68 who are overweight should have a diabetes screening test once every three years.   -All adults born between [birth year] and 1964 should be screened once for Hepatitis C.  -Other screening tests and preventive services for persons with diabetes include: an eye exam to screen for diabetic retinopathy, a kidney function test, a foot exam, and stricter control over your cholesterol.   -Cardiovascular screening for adults with routine risk involves an electrocardiogram (ECG) at intervals determined by your doctor.   -Colorectal cancer screenings should be done for adults age 54-65 with no increased risk factors for colorectal cancer. There are a number of acceptable methods of screening for this type of cancer. Each test has its own benefits and drawbacks. Discuss with your doctor what is most appropriate for you during your annual wellness visit. The different tests include: colonoscopy (considered the best screening method), a fecal occult blood test, a fecal DNA test, and sigmoidoscopy.    -A bone mass density test is recommended when a woman turns 65 to screen for osteoporosis. This test is only recommended one time, as a screening. Some providers will use this same test as a disease monitoring tool if you already have osteoporosis. -Breast cancer screenings are recommended every other year for women of normal risk, age 54-69.  -Cervical cancer screenings for women over age 72 are only recommended with certain risk factors. Here is a list of your current Health Maintenance items (your personalized list of preventive services) with a due date:  Health Maintenance Due   Topic Date Due    Meningococcal (1 of 4 - Increased Risk Bexsero 2-dose series) Never done    Depression Monitoring  Never done    Shingles Vaccine (1 of 2) Never done    Yearly Flu Vaccine (1) 09/01/2021    Annual Well Visit  09/30/2021         Vaccine Information Statement    Influenza (Flu) Vaccine (Inactivated or Recombinant): What You Need to Know    Many vaccine information statements are available in [place] and other languages. See www.immunize.org/vis. Hojas de información sobre vacunas están disponibles en español y en muchos otros idiomas. Visite www.immunize.org/vis. 1. Why get vaccinated? Influenza vaccine can prevent influenza (flu). Flu is a contagious disease that spreads around the [facility] every year, usually between October and May. Anyone can get the flu, but it is more dangerous for some people. Infants and young children, people 72 years and older, pregnant people, and people with certain health conditions or a weakened immune system are at greatest risk of flu complications. Pneumonia, bronchitis, sinus infections, and ear infections are examples of flu-related complications. If you have a medical condition, such as heart disease, cancer, or diabetes, flu can make it worse. Flu can cause fever and chills, sore throat, muscle aches, fatigue, cough, headache, and runny or stuffy nose. Some people may have vomiting and diarrhea, though this is more common in children than adults. In an average year, thousands of people in the [facility] die from flu, and many more are hospitalized. Flu vaccine prevents millions of illnesses and flu-related visits to the doctor each year. 2. Influenza vaccines     CDC recommends everyone 6 months and older get vaccinated every flu season. Children 6 months through 6years of age may need 2 doses during a single flu season. Everyone else needs only 1 dose each flu season. It takes about 2 weeks for protection to develop after vaccination. There are many flu viruses, and they are always changing. Each year a new flu vaccine is made to protect against the influenza viruses believed to be likely to cause disease in the upcoming flu season. Even when the vaccine doesnt exactly match these viruses, it may still provide some protection. Influenza vaccine does not cause flu. Influenza vaccine may be given at the same time as other vaccines.     3. Talk with your health care provider    Tell your vaccination provider if the person getting the vaccine:   Has had an allergic reaction after a previous dose of influenza vaccine, or has any severe, life-threatening allergies    Has ever had Guillain-Barré Syndrome (also called GBS)    In some cases, your health care provider may decide to postpone influenza vaccination until a future visit. Influenza vaccine can be administered at any time during pregnancy. People who are or will be pregnant during influenza season should receive inactivated influenza vaccine. People with minor illnesses, such as a cold, may be vaccinated. People who are moderately or severely ill should usually wait until they recover before getting influenza vaccine. Your health care provider can give you more information. 4. Risks of a vaccine reaction     Soreness, redness, and swelling where the shot is given, fever, muscle aches, and headache can happen after influenza vaccination.  There may be a very small increased risk of Guillain-Barré Syndrome (GBS) after inactivated influenza vaccine (the flu shot). Ardine Gum children who get the flu shot along with pneumococcal vaccine (PCV13) and/or DTaP vaccine at the same time might be slightly more likely to have a seizure caused by fever. Tell your health care provider if a child who is getting flu vaccine has ever had a seizure. People sometimes faint after medical procedures, including vaccination. Tell your provider if you feel dizzy or have vision changes or ringing in the ears. As with any medicine, there is a very remote chance of a vaccine causing a severe allergic reaction, other serious injury, or death. 5. What if there is a serious problem? An allergic reaction could occur after the vaccinated person leaves the clinic.  If you see signs of a severe allergic reaction (hives, swelling of the face and throat, difficulty breathing, a fast heartbeat, dizziness, or weakness), call 9-1-1 and get the person to the nearest hospital.    For other signs that concern you, call your health care provider. Adverse reactions should be reported to the Vaccine Adverse Event Reporting System (VAERS). Your health care provider will usually file this report, or you can do it yourself. Visit the VAERS website at www.vaers. [facility].gov or call 8-820.428.3461. VAERS is only for reporting reactions, and VAERS staff members do not give medical advice. 6. The National Vaccine Injury Compensation Program    The [facility] Vaccine Injury Compensation Program (VICP) is a federal program that was created to compensate people who may have been injured by certain vaccines. Claims regarding alleged injury or death due to vaccination have a time limit for filing, which may be as short as two years. Visit the VICP website at www.[facility].gov/vaccinecompensation or call 4-660.741.8953 to learn about the program and about filing a claim. 7. How can I learn more?  Ask your health care provider.  Call your local or state health department.  Visit the website of the Food and Drug Administration (FDA) for vaccine package inserts and additional information at www.fda.gov/vaccines-blood-biologics/vaccines.  Contact the Centers for Disease Control and Prevention (CDC):  - Call 2-248.133.1002 (1-800-CDC-INFO) or  - Visit CDCs influenza website at www.cdc.gov/flu. Vaccine Information Statement   Inactivated Influenza Vaccine   8/6/2021  42 OFE Umanarichmondvalentinlou Ybarraa 125FY-60   Department of Health and Human Services  Centers for Disease Control and Prevention    Office Use Only

## 2022-02-28 NOTE — PROGRESS NOTES
This is the Subsequent Medicare Annual Wellness Exam, performed 12 months or more after the Initial AWV or the last Subsequent AWV    I have reviewed the patient's medical history in detail and updated the computerized patient record. She is also here today for follow up fasting labs  Feeling good, no complaints  No refills needed at this time  Does do self monitoring for blood sugar, avg 120 or less    Assessment/Plan   Education and counseling provided:  Are appropriate based on today's review and evaluation    1. Type 2 diabetes mellitus with nephropathy (HCC)  -     HEMOGLOBIN A1C WITH EAG; Future  2. Essential hypertension  -     CBC WITH AUTOMATED DIFF; Future  -     METABOLIC PANEL, COMPREHENSIVE; Future  3. Hypercholesteremia  -     LIPID PANEL; Future  4. Non-Hodgkin's lymphoma, unspecified body region, unspecified non-Hodgkin lymphoma type [facility])  Assessment & Plan:   monitored by specialist. No acute findings meriting change in the plan - managed by [facility]  5. Rheumatoid arthritis involving both hands with positive rheumatoid factor ([facility]  Assessment & Plan:   well controlled, continue current medications  6. Recurrent depression ([facility]  Assessment & Plan:   well controlled, continue current medications       Depression Risk Factor Screening     3 most recent PHQ Screens 2/28/2022   PHQ Not Done -   Little interest or pleasure in doing things Not at all   Feeling down, depressed, irritable, or hopeless Not at all   Total Score PHQ 2 0       Alcohol & Drug Abuse Risk Screen    Do you average more than 1 drink per night or more than 7 drinks a week:  No    On any one occasion in the past three months have you have had more than 3 drinks containing alcohol:  No          Functional Ability and Level of Safety    Hearing: Hearing is good. Activities of Daily Living:   The home contains: grab bars and and shower stool  Patient does total self care      Ambulation: with no difficulty     Fall Risk:  Fall Risk Assessment, last 12 mths 2/28/2022   Able to walk? Yes   Fall in past 12 months? 0   Do you feel unsteady? 0   Are you worried about falling 0   Number of falls in past 12 months -   Fall with injury? -      Abuse Screen:  Patient is not abused       Cognitive Screening    Has your family/caregiver stated any concerns about your memory: no     Cognitive Screening: Normal - Mini Cog Test    Health Maintenance Due     Health Maintenance Due   Topic Date Due    MenB Meningococcal topic (1 of 4 - Increased Risk Bexsero 2-dose series) Never done    Depression Monitoring  Never done    Shingrix Vaccine Age 50> (1 of 2) Never done    Flu Vaccine (1) 09/01/2021    Medicare Yearly Exam  09/30/2021       Patient Care Team   Patient Care Team:  Jair Quinones NP as PCP - General (Family Medicine)  Jair Quinones NP as PCP [facility]  Cinthia Rose MD (Hematology and Oncology)  Anaid Boles MD (Surgery)  Nadine Agarwal MD (Internal Medicine)  Sukhjinder Howell MD (Gastroenterology)    History     Patient Active Problem List   Diagnosis Code    Diverticulosis K57.90    IBS (irritable bowel syndrome) K58.9    Depressive disorder, not elsewhere classified F32.9    Mixed hyperlipidemia E78.2    DJD (degenerative joint disease) M19.90    PUD (peptic ulcer disease) K27.9    Allergic rhinitis due to other allergen J30.89    Proteinuria R80.9    RA (rheumatoid arthritis) ([address] M06.9    Essential hypertension I10    Asymptomatic carotid artery stenosis without infarction I65.29    CRI (chronic renal insufficiency) N18.9    Advanced care planning/counseling discussion Z71.89    Type 2 diabetes mellitus without complication, without long-term current use of insulin ([address] E11.9    Type 2 diabetes mellitus with nephropathy ([address] E11.21    Recurrent depression ([address] F33.9    Hematoma of groin S30. 1XXA    Hypercalcemia E83.52    Mass of lung parenchyma R91.8    Hypercholesteremia O74.34    Diastolic dysfunction F96.13    EDER (acute kidney injury) ([facility] N17.9    Severe obesity (BMI 35.0-39. 9) with comorbidity ([place] [address] E66.01    Non-Hodgkin's lymphoma ([place] [address] C85.90    Colon polyps K63.5     Past Medical History:   Diagnosis Date    Allergies     Asymptomatic carotid artery stenosis without infarction 2/22/2011    Closed traumatic compression fracture of lumbar vertebra ([facility] 2019    Depression     Diverticulosis     DJD (degenerative joint disease)     DVT (deep venous thrombosis) ([facility] 2016    DVT RLE.  GERD (gastroesophageal reflux disease)     HTN     Hypercholesterolemia     Mammography less than 12 months ago     NIDDM     Obesity (BMI 30.0-34. 9)     PE (pulmonary thromboembolism) ([place] [address] 2017    B/L.  Proteinuria       Past Surgical History:   Procedure Laterality Date    ENDOSCOPY, COLON, DIAGNOSTIC  2008    normal    HX APPENDECTOMY      With JAYME.  HX HERNIA REPAIR      ? Umbilical hernia repair.  HX HYSTERECTOMY      HX KNEE REPLACEMENT Left     HX KNEE REPLACEMENT Right     HX SPLENECTOMY      Lap splenectomy. Current Outpatient Medications   Medication Sig Dispense Refill    glucose blood VI test strips (True Metrix Glucose Test Strip) strip USE ONE STRIP TO TEST THREE TIMES A DAY BEFORE A MEAL 100 Strip 5    carbinoxamine maleate 4 mg tab TAKE ONE TABLET BY MOUTH TWICE A DAY 60 Tablet 2    rosuvastatin (CRESTOR) 40 mg tablet TAKE ONE TABLET BY MOUTH ONCE NIGHTLY 90 Tablet 1    folic acid (FOLVITE) 1 mg tablet Take 1 tablet by mouth once daily 90 Tablet 0    amLODIPine (NORVASC) 10 mg tablet TAKE ONE TABLET BY MOUTH DAILY 90 Tablet 0    spironolactone (ALDACTONE) 25 mg tablet       mv-min-folic-calcium carb-K1 (Women's 50 Plus Multivitamin) 400 mcg-500 mg calcium-20 mcg tab Take  by mouth.       glimepiride (AMARYL) 2 mg tablet 1 tab with breakfast and 2 tabs with dinner 270 Tab 3    insulin lispro (HUMALOG) 100 unit/mL injection 10 units once a day at night 1 Vial 2    warfarin (COUMADIN) 5 mg tablet Take 1 Tab by mouth daily. With 1/2 pill only on saturdays      docusate sodium (Colace) 100 mg capsule Take 100 mg by mouth two (2) times a day.  fluticasone propionate (FLONASE ALLERGY RELIEF) 50 mcg/actuation nasal spray 1 [place] by Both Nostrils route daily as needed for Rhinitis. 1 Bottle 3    esomeprazole (NEXIUM) 20 mg capsule Take 20 mg by mouth daily.        Allergies   Allergen Reactions    Sulfa (Sulfonamide Antibiotics) Other (comments)       Family History   Problem Relation Age of Onset    Hypertension Father     Cancer Sister         breast    Diabetes Brother      Social History     Tobacco Use    Smoking status: Never Smoker    Smokeless tobacco: Never Used   Substance Use Topics    Alcohol use: No         Paige Lucero NP

## 2022-03-01 LAB
ALBUMIN SERPL-MCNC: 4.7 G/DL (ref 3.6–4.6)
ALBUMIN/GLOB SERPL: 2.2 {RATIO} (ref 1.2–2.2)
ALP SERPL-CCNC: 73 IU/L (ref 44–121)
ALT SERPL-CCNC: 20 IU/L (ref 0–32)
AST SERPL-CCNC: 18 IU/L (ref 0–40)
BASOPHILS # BLD AUTO: 0.1 X10E3/UL (ref 0–0.2)
BASOPHILS NFR BLD AUTO: 1 %
BILIRUB SERPL-MCNC: 0.3 MG/DL (ref 0–1.2)
BUN SERPL-MCNC: 23 MG/DL (ref 8–27)
BUN/CREAT SERPL: 19 (ref 12–28)
CALCIUM SERPL-MCNC: 10.7 MG/DL (ref 8.7–10.3)
CHLORIDE SERPL-SCNC: 98 MMOL/L (ref 96–106)
CHOLEST SERPL-MCNC: 161 MG/DL (ref 100–199)
CO2 SERPL-SCNC: 21 MMOL/L (ref 20–29)
CREAT SERPL-MCNC: 1.22 MG/DL (ref 0.57–1)
EGFR: 43 ML/MIN/1.73
EOSINOPHIL # BLD AUTO: 0.4 X10E3/UL (ref 0–0.4)
EOSINOPHIL NFR BLD AUTO: 3 %
ERYTHROCYTE [DISTWIDTH] IN BLOOD BY AUTOMATED COUNT: 12.7 % (ref 11.7–15.4)
EST. AVERAGE GLUCOSE BLD GHB EST-MCNC: 209 MG/DL
GLOBULIN SER CALC-MCNC: 2.1 G/DL (ref 1.5–4.5)
GLUCOSE SERPL-MCNC: 238 MG/DL (ref 65–99)
HBA1C MFR BLD: 8.9 % (ref 4.8–5.6)
HCT VFR BLD AUTO: 44.9 % (ref 34–46.6)
HDLC SERPL-MCNC: 53 MG/DL
HGB BLD-MCNC: 14.8 G/DL (ref 11.1–15.9)
IMM GRANULOCYTES # BLD AUTO: 0.1 X10E3/UL (ref 0–0.1)
IMM GRANULOCYTES NFR BLD AUTO: 1 %
IMP & REVIEW OF LAB RESULTS: NORMAL
LDLC SERPL CALC-MCNC: 78 MG/DL (ref 0–99)
LYMPHOCYTES # BLD AUTO: 2.9 X10E3/UL (ref 0.7–3.1)
LYMPHOCYTES NFR BLD AUTO: 27 %
MCH RBC QN AUTO: 30.2 PG (ref 26.6–33)
MCHC RBC AUTO-ENTMCNC: 33 G/DL (ref 31.5–35.7)
MCV RBC AUTO: 92 FL (ref 79–97)
MONOCYTES # BLD AUTO: 1.2 X10E3/UL (ref 0.1–0.9)
MONOCYTES NFR BLD AUTO: 11 %
NEUTROPHILS # BLD AUTO: 6.2 X10E3/UL (ref 1.4–7)
NEUTROPHILS NFR BLD AUTO: 57 %
PLATELET # BLD AUTO: 256 X10E3/UL (ref 150–450)
POTASSIUM SERPL-SCNC: 5 MMOL/L (ref 3.5–5.2)
PROT SERPL-MCNC: 6.8 G/DL (ref 6–8.5)
RBC # BLD AUTO: 4.9 X10E6/UL (ref 3.77–5.28)
SODIUM SERPL-SCNC: 139 MMOL/L (ref 134–144)
TRIGL SERPL-MCNC: 179 MG/DL (ref 0–149)
VLDLC SERPL CALC-MCNC: 30 MG/DL (ref 5–40)
WBC # BLD AUTO: 10.7 X10E3/UL (ref 3.4–10.8)

## 2022-03-01 PROCEDURE — 90694 VACC AIIV4 NO PRSRV 0.5ML IM: CPT | Performed by: NURSE PRACTITIONER

## 2022-03-06 RX ORDER — AMLODIPINE BESYLATE 10 MG/1
TABLET ORAL
Qty: 90 TABLET | Refills: 0 | Status: SHIPPED | OUTPATIENT
Start: 2022-03-06 | End: 2022-07-25 | Stop reason: SDUPTHER

## 2022-03-07 NOTE — PROGRESS NOTES
Hey there, your sugar has really brooklyn rocketted. How are your numbers at home? We may need to make some med adjustments.  Dread Torres

## 2022-03-14 RX ORDER — FOLIC ACID 1 MG/1
TABLET ORAL
Qty: 90 TABLET | Refills: 0 | Status: SHIPPED | OUTPATIENT
Start: 2022-03-14 | End: 2022-06-27

## 2022-03-18 PROBLEM — N17.9 AKI (ACUTE KIDNEY INJURY) (HCC): Status: ACTIVE | Noted: 2018-05-15

## 2022-03-18 PROBLEM — Z71.89 ADVANCED CARE PLANNING/COUNSELING DISCUSSION: Status: ACTIVE | Noted: 2017-02-15

## 2022-03-18 PROBLEM — E11.9 TYPE 2 DIABETES MELLITUS WITHOUT COMPLICATION, WITHOUT LONG-TERM CURRENT USE OF INSULIN (HCC): Status: ACTIVE | Noted: 2017-04-13

## 2022-03-18 PROBLEM — E66.01 SEVERE OBESITY (BMI 35.0-39.9) WITH COMORBIDITY (HCC): Status: ACTIVE | Noted: 2018-05-21

## 2022-03-19 PROBLEM — S30.1XXA HEMATOMA OF GROIN: Status: ACTIVE | Noted: 2018-04-30

## 2022-03-19 PROBLEM — E11.21 TYPE 2 DIABETES MELLITUS WITH NEPHROPATHY (HCC): Status: ACTIVE | Noted: 2018-01-02

## 2022-03-19 PROBLEM — K63.5 COLON POLYPS: Status: ACTIVE | Noted: 2020-02-18

## 2022-03-19 PROBLEM — F33.9 RECURRENT DEPRESSION (HCC): Status: ACTIVE | Noted: 2018-01-02

## 2022-03-19 PROBLEM — R91.8 MASS OF LUNG PARENCHYMA: Status: ACTIVE | Noted: 2018-05-15

## 2022-03-19 PROBLEM — E83.52 HYPERCALCEMIA: Status: ACTIVE | Noted: 2018-05-15

## 2022-03-19 PROBLEM — I51.89 DIASTOLIC DYSFUNCTION: Status: ACTIVE | Noted: 2018-05-15

## 2022-03-20 PROBLEM — C85.90 NON-HODGKIN'S LYMPHOMA (HCC): Status: ACTIVE | Noted: 2018-08-14

## 2022-03-20 PROBLEM — E78.00 HYPERCHOLESTEREMIA: Status: ACTIVE | Noted: 2018-05-15

## 2022-04-01 ENCOUNTER — TELEPHONE (OUTPATIENT)
Dept: FAMILY MEDICINE CLINIC | Age: 87
End: 2022-04-01

## 2022-04-01 NOTE — TELEPHONE ENCOUNTER
[facility] wants to know if you will sign orders for this patient    [phone]

## 2022-04-15 ENCOUNTER — DOCUMENTATION ONLY (OUTPATIENT)
Dept: FAMILY MEDICINE CLINIC | Age: 87
End: 2022-04-15

## 2022-04-15 NOTE — PROGRESS NOTES
[id number]  [facility] [address] [facility] order was put on LOUIS Meadows's desk to process

## 2022-04-22 DIAGNOSIS — J06.9 UPPER RESPIRATORY TRACT INFECTION, UNSPECIFIED TYPE: ICD-10-CM

## 2022-04-22 RX ORDER — CARBINOXAMINE MALEATE 4 MG/1
TABLET ORAL
Qty: 60 TABLET | Refills: 2 | Status: SHIPPED | OUTPATIENT
Start: 2022-04-22 | End: 2022-07-25

## 2022-04-26 ENCOUNTER — DOCUMENTATION ONLY (OUTPATIENT)
Dept: FAMILY MEDICINE CLINIC | Age: 87
End: 2022-04-26

## 2022-05-12 ENCOUNTER — DOCUMENTATION ONLY (OUTPATIENT)
Dept: FAMILY MEDICINE CLINIC | Age: 87
End: 2022-05-12

## 2022-05-22 RX ORDER — GLIMEPIRIDE 2 MG/1
TABLET ORAL
Qty: 270 TABLET | Refills: 3 | Status: SHIPPED | OUTPATIENT
Start: 2022-05-22

## 2022-05-31 ENCOUNTER — OFFICE VISIT (OUTPATIENT)
Dept: FAMILY MEDICINE CLINIC | Age: 87
End: 2022-05-31
Payer: MEDICARE

## 2022-05-31 VITALS
WEIGHT: 170 LBS | SYSTOLIC BLOOD PRESSURE: 106 MMHG | HEIGHT: 61 IN | OXYGEN SATURATION: 96 % | BODY MASS INDEX: 32.1 KG/M2 | RESPIRATION RATE: 114 BRPM | DIASTOLIC BLOOD PRESSURE: 65 MMHG

## 2022-05-31 DIAGNOSIS — Z79.4 TYPE 2 DIABETES MELLITUS WITH HYPERGLYCEMIA, WITH LONG-TERM CURRENT USE OF INSULIN (HCC): ICD-10-CM

## 2022-05-31 DIAGNOSIS — E11.9 TYPE 2 DIABETES MELLITUS WITHOUT COMPLICATION, WITHOUT LONG-TERM CURRENT USE OF INSULIN (HCC): ICD-10-CM

## 2022-05-31 DIAGNOSIS — J30.1 ALLERGIC RHINITIS DUE TO POLLEN, UNSPECIFIED SEASONALITY: Primary | ICD-10-CM

## 2022-05-31 DIAGNOSIS — E11.22 TYPE 2 DIABETES MELLITUS WITH STAGE 3A CHRONIC KIDNEY DISEASE, WITH LONG-TERM CURRENT USE OF INSULIN (HCC): ICD-10-CM

## 2022-05-31 DIAGNOSIS — H61.22 IMPACTED CERUMEN OF LEFT EAR: ICD-10-CM

## 2022-05-31 DIAGNOSIS — N18.31 TYPE 2 DIABETES MELLITUS WITH STAGE 3A CHRONIC KIDNEY DISEASE, WITH LONG-TERM CURRENT USE OF INSULIN (HCC): ICD-10-CM

## 2022-05-31 DIAGNOSIS — E11.65 TYPE 2 DIABETES MELLITUS WITH HYPERGLYCEMIA, WITH LONG-TERM CURRENT USE OF INSULIN (HCC): ICD-10-CM

## 2022-05-31 DIAGNOSIS — Z79.4 TYPE 2 DIABETES MELLITUS WITH STAGE 3A CHRONIC KIDNEY DISEASE, WITH LONG-TERM CURRENT USE OF INSULIN (HCC): ICD-10-CM

## 2022-05-31 PROCEDURE — 1123F ACP DISCUSS/DSCN MKR DOCD: CPT | Performed by: FAMILY MEDICINE

## 2022-05-31 PROCEDURE — G8536 NO DOC ELDER MAL SCRN: HCPCS | Performed by: FAMILY MEDICINE

## 2022-05-31 PROCEDURE — G0463 HOSPITAL OUTPT CLINIC VISIT: HCPCS | Performed by: FAMILY MEDICINE

## 2022-05-31 PROCEDURE — 1090F PRES/ABSN URINE INCON ASSESS: CPT | Performed by: FAMILY MEDICINE

## 2022-05-31 PROCEDURE — G8417 CALC BMI ABV UP PARAM F/U: HCPCS | Performed by: FAMILY MEDICINE

## 2022-05-31 PROCEDURE — G9717 DOC PT DX DEP/BP F/U NT REQ: HCPCS | Performed by: FAMILY MEDICINE

## 2022-05-31 PROCEDURE — 3052F HG A1C>EQUAL 8.0%<EQUAL 9.0%: CPT | Performed by: FAMILY MEDICINE

## 2022-05-31 PROCEDURE — G8427 DOCREV CUR MEDS BY ELIG CLIN: HCPCS | Performed by: FAMILY MEDICINE

## 2022-05-31 PROCEDURE — 1101F PT FALLS ASSESS-DOCD LE1/YR: CPT | Performed by: FAMILY MEDICINE

## 2022-05-31 PROCEDURE — 99213 OFFICE O/P EST LOW 20 MIN: CPT | Performed by: FAMILY MEDICINE

## 2022-05-31 NOTE — PROGRESS NOTES
Verified name and birth date for privacy precautions. Chart reviewed in preparation for today's visit. Chief Complaint   Patient presents with    Allergies     Pt c/o L ear popping and being \"juicy\"; c/o eyes being blurry. Taking Rx allergy medication BID. Health Maintenance Due   Topic    MenB Meningococcal topic (1 of 4 - Increased Risk Bexsero 2-dose series)    Shingrix Vaccine Age 50> (1 of 2)    COVID-19 Vaccine (3 - Anika risk 3-dose series)         Wt Readings from Last 3 Encounters:   05/31/22 170 lb (77.1 kg)   02/28/22 174 lb (78.9 kg)   02/09/22 177 lb (80.3 kg)     Temp Readings from Last 3 Encounters:   02/28/22 97.7 °F (36.5 °C) (Oral)   07/27/21 98.5 °F (36.9 °C) (Oral)   09/29/20 98.1 °F (36.7 °C) (Oral)     BP Readings from Last 3 Encounters:   05/31/22 106/65   02/28/22 110/74   02/09/22 136/84     Pulse Readings from Last 3 Encounters:   02/28/22 93   07/27/21 (!) 109   07/19/21 91         Learning Assessment:  :     Learning Assessment 1/2/2018 2/15/2017 6/13/2016 10/30/2015 1/6/2014   PRIMARY LEARNER Patient Patient Patient Patient Patient   HIGHEST LEVEL OF EDUCATION - PRIMARY LEARNER  GRADUATED HIGH SCHOOL OR GED GRADUATED HIGH SCHOOL OR GED GRADUATED HIGH SCHOOL OR GED GRADUATED HIGH SCHOOL OR GED GRADUATED HIGH SCHOOL OR GED   BARRIERS PRIMARY LEARNER - - NONE NONE NONE   CO-LEARNER CAREGIVER - - - No No   PRIMARY LANGUAGE ENGLISH ENGLISH ENGLISH ENGLISH ENGLISH   LEARNER PREFERENCE PRIMARY DEMONSTRATION DEMONSTRATION DEMONSTRATION DEMONSTRATION DEMONSTRATION   ANSWERED BY patient patient Patient pt patient   RELATIONSHIP SELF SELF SELF SELF SELF       Depression Screening:  :     3 most recent PHQ Screens 5/31/2022   PHQ Not Done -   Little interest or pleasure in doing things Not at all   Feeling down, depressed, irritable, or hopeless Not at all   Total Score PHQ 2 0       Fall Risk Assessment:  :     Fall Risk Assessment, last 12 mths 2/28/2022   Able to walk?  Yes Fall in past 12 months? 0   Do you feel unsteady? 0   Are you worried about falling 0   Number of falls in past 12 months -   Fall with injury? -       Abuse Screening:  :     Abuse Screening Questionnaire 2/28/2022 7/27/2021 1/28/2021 9/29/2020 5/30/2018 5/14/2018 1/2/2018   Do you ever feel afraid of your partner? N N N N N N N   Are you in a relationship with someone who physically or mentally threatens you? N N N N N N N   Is it safe for you to go home? Y Y Y Y Y Y Y       Coordination of Care Questionnaire:  :     1) Have you been to an emergency room, urgent care clinic since your last visit? no   Hospitalized since your last visit? no                  Chief Complaint   Patient presents with    Allergies     Pt c/o L ear popping and being \"juicy\"; c/o eyes being blurry. Taking Rx allergy medication BID. She is a 81 y.o. female who presents for evalution. Reviewed PmHx, RxHx, FmHx, SocHx, AllgHx and updated and dated in the chart. Patient Active Problem List    Diagnosis    Type 2 diabetes mellitus with chronic kidney disease (HCC)    Colon polyps    Non-Hodgkin's lymphoma ([address]    Severe obesity (BMI 35.0-39. 9) with comorbidity ([address]    Hypercalcemia    Mass of lung parenchyma    Hypercholesteremia    Diastolic dysfunction    EDER (acute kidney injury) ([address]    Hematoma of groin     Left.       Type 2 diabetes mellitus with nephropathy (HCC)    Recurrent depression ([address]    Type 2 diabetes mellitus without complication, without long-term current use of insulin ([address]    Advanced care planning/counseling discussion     Patient does have Will and POA, will brind documents for chart when she can find them      CRI (chronic renal insufficiency)    Asymptomatic carotid artery stenosis without infarction    Diverticulosis    IBS (irritable bowel syndrome)    Depressive disorder, not elsewhere classified    Mixed hyperlipidemia    DJD (degenerative joint disease)    PUD (peptic ulcer disease)    Allergic rhinitis due to other allergen    Proteinuria    RA (rheumatoid arthritis) ([facility]    Essential hypertension       Review of Systems - negative except as listed above in the HPI    Objective:     Vitals:    05/31/22 1031   BP: 106/65   Resp: (!) 114   SpO2: 96%   Weight: 170 lb (77.1 kg)   Height: 5' 1\" (1.549 m)         Assessment/ Plan:   Diagnoses and all orders for this visit:    1. Allergic rhinitis due to pollen, unspecified seasonality  -cont rx    2. Impacted cerumen of left ear  -irrigated     3. Type 2 diabetes mellitus without complication, without long-term current use of insulin ([facility]  4. Type 2 diabetes mellitus with hyperglycemia, with long-term current use of insulin ([facility])  5. Type 2 diabetes mellitus with stage 3a chronic kidney disease, with long-term current use of insulin ([facility])  Lab Results   Component Value Date/Time    Hemoglobin A1c 8.9 (H) 02/28/2022 12:00 AM    Hemoglobin A1c, External 6.9 08/06/2019 12:00 AM     -not at goal  -due for labs next OV           I have discussed the diagnosis with the patient and the intended plan as seen in the above orders. The patient understands and agrees with the plan. The patient has received an after-visit summary and questions were answered concerning future plans. Medication Side Effects and Warnings were discussed with patient  Patient Labs were reviewed and or requested:  Patient Past Records were reviewed and or requested    Calin Berg M.D. There are no Patient Instructions on file for this visit. 2) Have you seen or consulted any other health care providers outside of [address] [place] [address] since your last visit?  yes  (Include any pap smears or colon screenings in this section.)    3) Do you have an Advance Directive on file? no      Patient is currently accompanied by her  & I have received verbal consent from [address] [place] [address] to discuss any/all medical information while he/she is present in the room.     ------------------------------------------------

## 2022-06-27 ENCOUNTER — DOCUMENTATION ONLY (OUTPATIENT)
Dept: FAMILY MEDICINE CLINIC | Age: 87
End: 2022-06-27

## 2022-06-27 RX ORDER — FOLIC ACID 1 MG/1
TABLET ORAL
Qty: 90 TABLET | Refills: 0 | Status: SHIPPED | OUTPATIENT
Start: 2022-06-27 | End: 2022-09-26

## 2022-06-27 NOTE — PROGRESS NOTES
[id number]  [facility] request was put on [facility] desk to process.
no

## 2022-06-28 ENCOUNTER — DOCUMENTATION ONLY (OUTPATIENT)
Dept: FAMILY MEDICINE CLINIC | Age: 87
End: 2022-06-28

## 2022-06-28 NOTE — PROGRESS NOTES
[id number]  [facility] request was signed & faxed to [phone],FE MACKENZIE placed in scan folder to be scanned to chart.

## 2022-07-22 DIAGNOSIS — J06.9 UPPER RESPIRATORY TRACT INFECTION, UNSPECIFIED TYPE: ICD-10-CM

## 2022-07-25 RX ORDER — CARBINOXAMINE MALEATE 4 MG/1
TABLET ORAL
Qty: 60 TABLET | Refills: 2 | Status: SHIPPED | OUTPATIENT
Start: 2022-07-25 | End: 2022-10-22

## 2022-07-25 RX ORDER — AMLODIPINE BESYLATE 10 MG/1
10 TABLET ORAL DAILY
Qty: 90 TABLET | Refills: 0 | Status: SHIPPED | OUTPATIENT
Start: 2022-07-25 | End: 2022-10-17

## 2022-08-04 NOTE — PROGRESS NOTES
Applied 24 hour Holter. Instructions given and she verbalized understanding of its use. She will return after 1 pm on Monday 1/18/21. Interval History: Dieresis yesterday was suboptimal. Gave one time dose of IV lasix to increase output. Patient SpO2 >90% off NC with movement. Lungs are clear. Potential D/C tomorrow pending patient disposition.       Review of Systems   Constitutional:  Negative for chills, fatigue and fever.   HENT:  Negative for congestion and sore throat.    Respiratory:  Negative for cough, shortness of breath and wheezing.         Off NC    Cardiovascular:  Negative for chest pain, palpitations and leg swelling.   Gastrointestinal:  Negative for abdominal pain, nausea and vomiting.   Genitourinary:  Negative for dysuria and hematuria.   Musculoskeletal:  Negative for back pain and myalgias.   Neurological:  Negative for light-headedness and headaches.   Psychiatric/Behavioral:  Negative for agitation. The patient is not nervous/anxious.    Objective:     Vital Signs (Most Recent):  Temp: 98.3 °F (36.8 °C) (08/04/22 0757)  Pulse: 60 (08/04/22 0756)  Resp: 18 (08/04/22 0756)  BP: (!) 132/56 (08/04/22 0757)  SpO2: 96 % (08/04/22 0756)   Vital Signs (24h Range):  Temp:  [98.2 °F (36.8 °C)-98.6 °F (37 °C)] 98.3 °F (36.8 °C)  Pulse:  [60] 60  Resp:  [16-20] 18  SpO2:  [93 %-97 %] 96 %  BP: (132-170)/(56-69) 132/56     Weight: 90.3 kg (199 lb 1.2 oz)  Body mass index is 36.41 kg/m².    Intake/Output Summary (Last 24 hours) at 8/4/2022 1145  Last data filed at 8/4/2022 0600  Gross per 24 hour   Intake --   Output 200 ml   Net -200 ml      Physical Exam  Constitutional:       Appearance: Normal appearance.      Comments: Resting comfortably    HENT:      Head: Normocephalic and atraumatic.      Right Ear: External ear normal.      Left Ear: External ear normal.      Nose: Nose normal.      Mouth/Throat:      Mouth: Mucous membranes are moist.      Pharynx: Oropharynx is clear.   Eyes:      General: No scleral icterus.     Extraocular Movements: Extraocular movements intact.      Conjunctiva/sclera: Conjunctivae normal.    Cardiovascular:      Rate and Rhythm: Normal rate and regular rhythm.      Pulses: Normal pulses.      Heart sounds: Murmur heard.   Pulmonary:      Effort: Pulmonary effort is normal. No respiratory distress.      Breath sounds: No wheezing or rales.   Abdominal:      General: Abdomen is flat. Bowel sounds are normal. There is no distension.      Palpations: Abdomen is soft.      Tenderness: There is no abdominal tenderness.   Musculoskeletal:         General: Swelling present. No tenderness.      Cervical back: Normal range of motion.      Comments: Lymphedema of LUE. Edema reduced from yesterday.    Skin:     General: Skin is warm and dry.   Neurological:      Mental Status: She is alert and oriented to person, place, and time.   Psychiatric:         Mood and Affect: Mood normal.         Behavior: Behavior normal.         Thought Content: Thought content normal.         Judgment: Judgment normal.       Significant Labs: All pertinent labs within the past 24 hours have been reviewed.    Significant Imaging: I have reviewed all pertinent imaging results/findings within the past 24 hours.

## 2022-08-09 ENCOUNTER — DOCUMENTATION ONLY (OUTPATIENT)
Dept: FAMILY MEDICINE CLINIC | Age: 87
End: 2022-08-09

## 2022-08-29 ENCOUNTER — OFFICE VISIT (OUTPATIENT)
Dept: FAMILY MEDICINE CLINIC | Age: 87
End: 2022-08-29
Payer: MEDICARE

## 2022-08-29 ENCOUNTER — TELEPHONE (OUTPATIENT)
Dept: FAMILY MEDICINE CLINIC | Age: 87
End: 2022-08-29

## 2022-08-29 VITALS
HEART RATE: 109 BPM | TEMPERATURE: 97.8 F | WEIGHT: 167 LBS | DIASTOLIC BLOOD PRESSURE: 77 MMHG | HEIGHT: 61 IN | BODY MASS INDEX: 31.53 KG/M2 | SYSTOLIC BLOOD PRESSURE: 123 MMHG | RESPIRATION RATE: 14 BRPM | OXYGEN SATURATION: 97 %

## 2022-08-29 DIAGNOSIS — Z79.4 TYPE 2 DIABETES MELLITUS WITH HYPERGLYCEMIA, WITH LONG-TERM CURRENT USE OF INSULIN (HCC): Primary | ICD-10-CM

## 2022-08-29 DIAGNOSIS — E78.00 HYPERCHOLESTEREMIA: ICD-10-CM

## 2022-08-29 DIAGNOSIS — E11.65 TYPE 2 DIABETES MELLITUS WITH HYPERGLYCEMIA, WITH LONG-TERM CURRENT USE OF INSULIN (HCC): Primary | ICD-10-CM

## 2022-08-29 DIAGNOSIS — I10 ESSENTIAL HYPERTENSION: ICD-10-CM

## 2022-08-29 PROCEDURE — 1090F PRES/ABSN URINE INCON ASSESS: CPT | Performed by: NURSE PRACTITIONER

## 2022-08-29 PROCEDURE — G8536 NO DOC ELDER MAL SCRN: HCPCS | Performed by: NURSE PRACTITIONER

## 2022-08-29 PROCEDURE — G8417 CALC BMI ABV UP PARAM F/U: HCPCS | Performed by: NURSE PRACTITIONER

## 2022-08-29 PROCEDURE — 99214 OFFICE O/P EST MOD 30 MIN: CPT | Performed by: NURSE PRACTITIONER

## 2022-08-29 PROCEDURE — 1123F ACP DISCUSS/DSCN MKR DOCD: CPT | Performed by: NURSE PRACTITIONER

## 2022-08-29 PROCEDURE — G9717 DOC PT DX DEP/BP F/U NT REQ: HCPCS | Performed by: NURSE PRACTITIONER

## 2022-08-29 PROCEDURE — 1101F PT FALLS ASSESS-DOCD LE1/YR: CPT | Performed by: NURSE PRACTITIONER

## 2022-08-29 PROCEDURE — G0463 HOSPITAL OUTPT CLINIC VISIT: HCPCS | Performed by: NURSE PRACTITIONER

## 2022-08-29 PROCEDURE — 3052F HG A1C>EQUAL 8.0%<EQUAL 9.0%: CPT | Performed by: NURSE PRACTITIONER

## 2022-08-29 PROCEDURE — G8427 DOCREV CUR MEDS BY ELIG CLIN: HCPCS | Performed by: NURSE PRACTITIONER

## 2022-08-29 RX ORDER — BACLOFEN 10 MG/1
10 TABLET ORAL
Qty: 60 TABLET | Refills: 1 | Status: SHIPPED | OUTPATIENT
Start: 2022-08-29

## 2022-08-29 NOTE — PROGRESS NOTES
ATTENTION:   This medical record was transcribed using an electronic medical records/speech recognition system. Although proofread, it may and can contain electronic, spelling and other errors. Corrections may be executed at a later time. Please feel free to contact us for any clarifications as needed. ICD-10-CM ICD-9-CM   1. SOB (shortness of breath)  R06.02 786.05   2. Hypertension, unspecified type  I10 401.9   3. Mixed hyperlipidemia  E78.2 272.2   4. Family history of early CAD  Z80.55 V17.3            Makayla Michel is a 81 y.o. female with dyslipidemia and hypertension referred for 8 week follow up. Cardiac risk factors: post menopausal, dyslipidemia, hypertension. I have personally obtained the history from the patient. HISTORY OF PRESENTING ILLNESS      Overall she seems to be doing well. She does tell me that her sister-in-law recently passed away at [facility] for an acute myocardial infarction after bypass surgery. She is under a little stress regarding that. Otherwise she has been doing well does have baseline shortness of breath. She states it has not gotten worse and she has no PND orthopnea. ACTIVE PROBLEM LIST     Patient Active Problem List    Diagnosis Date Noted    Type 2 diabetes mellitus with chronic kidney disease ([address] 05/31/2022    Colon polyps 02/18/2020    Non-Hodgkin's lymphoma ([address] 08/14/2018    Severe obesity (BMI 35.0-39. 9) with comorbidity ([address] 05/21/2018    Hypercalcemia 05/15/2018    Mass of lung parenchyma 05/15/2018    Hypercholesteremia 33/99/8116    Diastolic dysfunction 40/95/2542    EDER (acute kidney injury) ([address] 05/15/2018    Hematoma of groin 04/30/2018    Type 2 diabetes mellitus with nephropathy ([address] 01/02/2018    Recurrent depression ([address] 01/02/2018    Type 2 diabetes mellitus without complication, without long-term current use of insulin ([address] 04/13/2017    Advanced care planning/counseling discussion 02/15/2017    CRI (chronic renal insufficiency) 10/04/2011    Asymptomatic carotid artery stenosis without infarction 02/22/2011    Diverticulosis 06/09/2010    IBS (irritable bowel syndrome) 06/09/2010    Depressive disorder, not elsewhere classified 06/09/2010    Mixed hyperlipidemia 06/09/2010    DJD (degenerative joint disease) 06/09/2010    PUD (peptic ulcer disease) 06/09/2010    Allergic rhinitis due to other allergen 06/09/2010    Proteinuria 06/09/2010    RA (rheumatoid arthritis) [facility] 06/09/2010    Essential hypertension 06/09/2010           PAST MEDICAL HISTORY     Past Medical History:   Diagnosis Date    Allergies     Asymptomatic carotid artery stenosis without infarction 2/22/2011    Closed traumatic compression fracture of lumbar vertebra ([facility] 2019    Depression     Diverticulosis     DJD (degenerative joint disease)     DVT (deep venous thrombosis) ([facility] 2016    DVT RLE. GERD (gastroesophageal reflux disease)     HTN     Hypercholesterolemia     Mammography less than 12 months ago     NIDDM     Obesity (BMI 30.0-34. 9)     PE (pulmonary thromboembolism) ([facility] 2017    B/L. Proteinuria            PAST SURGICAL HISTORY     Past Surgical History:   Procedure Laterality Date    ENDOSCOPY, COLON, DIAGNOSTIC  2008    normal    HX APPENDECTOMY      With JAYME. HX HERNIA REPAIR      ? Umbilical hernia repair. HX HYSTERECTOMY      HX KNEE REPLACEMENT Left     HX KNEE REPLACEMENT Right     HX SPLENECTOMY      Lap splenectomy. ALLERGIES     Allergies   Allergen Reactions    Sulfa (Sulfonamide Antibiotics) Other (comments)          FAMILY HISTORY     Family History   Problem Relation Age of Onset    Hypertension Father     Cancer Sister         breast    Diabetes Brother     negative for cardiac disease       SOCIAL HISTORY     Social History     Socioeconomic History    Marital status:    Tobacco Use    Smoking status: Never    Smokeless tobacco: Never   Substance and Sexual Activity    Alcohol use:  No Drug use: No    Sexual activity: Yes     Partners: Male         MEDICATIONS     Current Outpatient Medications   Medication Sig    amLODIPine (NORVASC) 10 mg tablet Take 1 Tablet by mouth in the morning. carbinoxamine maleate 4 mg tab TAKE ONE TABLET BY MOUTH TWICE A DAY    folic acid (FOLVITE) 1 mg tablet Take 1 tablet by mouth once daily    rosuvastatin (CRESTOR) 40 mg tablet Take 1 Tablet by mouth nightly. glimepiride (AMARYL) 2 mg tablet TAKE ONE TABLET BY MOUTH DAILY WITH BREAKFAST AND TAKE TWO TABLETS BY MOUTH DAILY WITH DINNER    glucose blood VI test strips (True Metrix Glucose Test Strip) strip USE ONE STRIP TO TEST THREE TIMES A DAY BEFORE A MEAL    spironolactone (ALDACTONE) 25 mg tablet     mv-min-folic-calcium carb-K1 (Women's 50 Plus Multivitamin) 400 mcg-500 mg calcium-20 mcg tab Take  by mouth. insulin lispro (HUMALOG) 100 unit/mL injection 10 units once a day at night (Patient not taking: Reported on 5/31/2022)    warfarin (COUMADIN) 5 mg tablet Take 5 mg by mouth daily. Alternating 5mg and 2.5mg QOD    docusate sodium (Colace) 100 mg capsule Take 100 mg by mouth two (2) times a day. fluticasone propionate (FLONASE ALLERGY RELIEF) 50 mcg/actuation nasal spray 1 [place] by Both Nostrils route daily as needed for Rhinitis.    esomeprazole (NEXIUM) 20 mg capsule Take 20 mg by mouth daily. No current facility-administered medications for this visit. I have reviewed the nurses notes, vitals, problem list, allergy list, medical history, family, social history and medications. REVIEW OF SYMPTOMS      General: Pt denies excessive weight gain or loss. Pt is able to conduct ADL's  HEENT: Denies blurred vision, headaches, hearing loss, epistaxis and difficulty swallowing. Respiratory: Denies cough, congestion, shortness of breath, MERRITT, wheezing or stridor.   Cardiovascular: Denies precordial pain, palpitations, edema or PND  Gastrointestinal: Denies poor appetite, indigestion, abdominal pain or blood in stool  Genitourinary: Denies hematuria, dysuria, increased urinary frequency  Musculoskeletal: Denies joint pain or swelling from muscles or joints  Neurologic: Denies tremor, paresthesias, headache, or sensory motor disturbance  Psychiatric: Denies confusion, insomnia, depression  Integumentray: Denies rash, itching or ulcers. Hematologic: Denies easy bruising, bleeding     PHYSICAL EXAMINATION      There were no vitals filed for this visit. General: Well developed, in no acute distress. HEENT: No jaundice, oral mucosa moist, no oral ulcers  Neck: Supple, no stiffness, no lymphadenopathy, supple  Heart: Regular rate and rhythm with a 2/6 systolic murmur right sternal border it appears midsystolic  Respiratory: Clear bilaterally x 4, no wheezing or rales  Musculoskeletal: No clubbing, no deformities  Neuro: A&Ox3, speech clear, gait stable but uses a cane, cooperative, no focal neurologic deficits  Skin: Skin color is normal. No rashes or lesions. Non diaphoretic, moist.       DIAGNOSTIC DATA     1. Lipids  2/15/17 -, HDL 40, ,     2. Echo  6/19/17- EF 65%    3.  Lexiscan  6/19/17- No ischemia         LABORATORY DATA            Lab Results   Component Value Date/Time    WBC 10.7 02/28/2022 12:00 AM    Hemoglobin (POC) 13.3 02/19/2016 08:35 PM    HGB 14.8 02/28/2022 12:00 AM    Hematocrit (POC) 39 02/19/2016 08:35 PM    HCT 44.9 02/28/2022 12:00 AM    PLATELET 002 59/18/0696 12:00 AM    MCV 92 02/28/2022 12:00 AM      Lab Results   Component Value Date/Time    Sodium 139 02/28/2022 12:00 AM    Potassium 5.0 02/28/2022 12:00 AM    Chloride 98 02/28/2022 12:00 AM    CO2 21 02/28/2022 12:00 AM    Anion gap 10 01/28/2021 09:57 AM    Glucose 238 (H) 02/28/2022 12:00 AM    BUN 23 02/28/2022 12:00 AM    Creatinine 1.22 (H) 02/28/2022 12:00 AM    BUN/Creatinine ratio 19 02/28/2022 12:00 AM    GFR est AA 58 (L) 07/27/2021 12:00 AM    GFR est non-AA 50 (L) 07/27/2021 12:00 AM Calcium 10.7 (H) 2022 12:00 AM    Bilirubin, total 0.3 2022 12:00 AM    Alk. phosphatase 73 2022 12:00 AM    Protein, total 6.8 2022 12:00 AM    Albumin 4.7 (H) 2022 12:00 AM    Globulin 2.7 2021 09:57 AM    A-G Ratio 2.2 2022 12:00 AM    ALT (SGPT) 20 2022 12:00 AM           ASSESSMENT/RECOMMENDATIONS:.      1.  Mild aortic stenosis  -She has baseline shortness of breath which may be related to being deconditioned. She only notes that with exertional activity and it really has on not changed from the last time I saw her. She has no PND orthopnea. Her dimensionless index was 0.27     2. Dyslipidemia  -her last LDL went up again to 110 so I switch her to Crestor 40 mg a day and will recheck her cholesterol in 2 months. 3. Hypertension   -BP is elevated today but she is under some stress since her sister-in-law passed away from acute myocardial infarction and as she  after bypass surgery so she does feel little stressed. In reviewing her blood pressures they have not been elevated in the past to this degree. -She is going to be seeing Israel Juarez NP in 1 week And will have her blood pressure checked at that time. 4.  Lymphedema    5. She never did wear the Holter monitor and she does still have occasional PVCs but they are asymptomatic. 5. Follow up in 6 months or PRN    No orders of the defined types were placed in this encounter. Follow-up and Dispositions    Return in about 6 months (around 2023). I have discussed the diagnosis with  Marla Parks and the intended plan as seen in the above orders. Questions were answered concerning future plans. I have discussed medication side effects and warnings with the patient as well. Thank you,  Nola Titus NP for involving me in the care of  [address] [place] [address]. Please do not hesitate to contact me for further questions/concerns. Marquez Curtis, MD, [address]      [facility], [address] [place] [address], [facility] [address]      [phone] / [phone] Fax

## 2022-08-29 NOTE — PATIENT INSTRUCTIONS

## 2022-08-29 NOTE — TELEPHONE ENCOUNTER
Pt called in to give name of med to call in for her. It's baclofen 10 mg one tablet 3 times a day. Thanks.

## 2022-08-29 NOTE — PROGRESS NOTES
HISTORY OF PRESENT ILLNESS  Melquiades Bender is a 81 y.o. female. HPI  Cardiovascular Review:  The patient has hypertension and hyperlipidemia. Diet and Lifestyle: generally follows a low fat low cholesterol diet, generally follows a low sodium diet, exercises sporadically, nonsmoker  Home BP Monitoring: is not measured at home. Pertinent ROS: taking medications as instructed, no medication side effects noted, no TIA's, no chest pain on exertion, no dyspnea on exertion, no swelling of ankles. Diabetes Mellitus:  She has diabetes mellitus, and  hyperlipidemia. Diabetic ROS - medication compliance: compliant all of the time, diabetic diet compliance: compliant most of the time, home glucose monitoring: is not performed. Lab review: orders written for new lab studies as appropriate; see orders. Patient Active Problem List    Diagnosis Date Noted    Type 2 diabetes mellitus with chronic kidney disease ([facility] 05/31/2022    Colon polyps 02/18/2020    Non-Hodgkin's lymphoma ([facility] 08/14/2018    Severe obesity (BMI 35.0-39. 9) with comorbidity ([address] 05/21/2018    Hypercalcemia 05/15/2018    Mass of lung parenchyma 05/15/2018    Hypercholesteremia 64/70/5073    Diastolic dysfunction 24/48/3910    EDER (acute kidney injury) ([address] 05/15/2018    Hematoma of groin 04/30/2018    Type 2 diabetes mellitus with nephropathy ([address] 01/02/2018    Recurrent depression ([address] 01/02/2018    Type 2 diabetes mellitus without complication, without long-term current use of insulin ([address] 04/13/2017    Advanced care planning/counseling discussion 02/15/2017    CRI (chronic renal insufficiency) 10/04/2011    Asymptomatic carotid artery stenosis without infarction 02/22/2011    Diverticulosis 06/09/2010    IBS (irritable bowel syndrome) 06/09/2010    Depressive disorder, not elsewhere classified 06/09/2010    Mixed hyperlipidemia 06/09/2010    DJD (degenerative joint disease) 06/09/2010    PUD (peptic ulcer disease) 06/09/2010    Allergic rhinitis due to other allergen 06/09/2010    Proteinuria 06/09/2010    RA (rheumatoid arthritis) ([facility] 06/09/2010    Essential hypertension 06/09/2010     Current Outpatient Medications   Medication Sig Dispense Refill    carbinoxamine maleate 4 mg tab TAKE ONE TABLET BY MOUTH TWICE A DAY 60 Tablet 2    amLODIPine (NORVASC) 10 mg tablet Take 1 Tablet by mouth in the morning. 90 Tablet 0    folic acid (FOLVITE) 1 mg tablet Take 1 tablet by mouth once daily 90 Tablet 0    rosuvastatin (CRESTOR) 40 mg tablet Take 1 Tablet by mouth nightly. 90 Tablet 0    glimepiride (AMARYL) 2 mg tablet TAKE ONE TABLET BY MOUTH DAILY WITH BREAKFAST AND TAKE TWO TABLETS BY MOUTH DAILY WITH DINNER 270 Tablet 3    glucose blood VI test strips (True Metrix Glucose Test Strip) strip USE ONE STRIP TO TEST THREE TIMES A DAY BEFORE A MEAL 100 Strip 5    spironolactone (ALDACTONE) 25 mg tablet       mv-min-folic-calcium carb-K1 (Women's 50 Plus Multivitamin) 400 mcg-500 mg calcium-20 mcg tab Take  by mouth.      warfarin (COUMADIN) 5 mg tablet Take 5 mg by mouth daily. Alternating 5mg and 2.5mg QOD      docusate sodium (COLACE) 100 mg capsule Take 100 mg by mouth two (2) times a day. fluticasone propionate (FLONASE ALLERGY RELIEF) 50 mcg/actuation nasal spray 1 [place] by Both Nostrils route daily as needed for Rhinitis. 1 Bottle 3    esomeprazole (NEXIUM) 20 mg capsule Take 20 mg by mouth daily.       insulin lispro (HUMALOG) 100 unit/mL injection 10 units once a day at night (Patient not taking: No sig reported) 1 Vial 2     Family History   Problem Relation Age of Onset    Hypertension Father     Cancer Sister         breast    Diabetes Brother      Social History     Tobacco Use    Smoking status: Never    Smokeless tobacco: Never   Substance Use Topics    Alcohol use: No           ROS  A comprehensive review of system was obtained and negative except findings in the HPI    Visit Vitals  /77 (BP 1 Location: Left upper arm, BP Patient Position: Sitting)   Pulse (!) 109   Temp 97.8 °F (36.6 °C) (Oral)   Resp 14   Ht 5' 1\" (1.549 m)   Wt 167 lb (75.8 kg)   SpO2 97%   BMI 31.55 kg/m²     Physical Exam  Vitals and nursing note reviewed. Constitutional:       Appearance: Normal appearance. She is well-developed. Comments:      Neck:      Vascular: No JVD. Cardiovascular:      Rate and Rhythm: Normal rate and regular rhythm. Heart sounds: Normal heart sounds. No murmur heard. No friction rub. No gallop. Pulmonary:      Effort: Pulmonary effort is normal. No respiratory distress. Breath sounds: Normal breath sounds. No wheezing. Skin:     General: Skin is warm. Neurological:      Mental Status: She is alert and oriented to person, place, and time. ASSESSMENT and PLAN  Encounter Diagnoses   Name Primary? Type 2 diabetes mellitus with hyperglycemia, with long-term current use of insulin (HCC) Yes    Essential hypertension     Hypercholesteremia      Orders Placed This Encounter    LIPID PANEL    METABOLIC PANEL, COMPREHENSIVE    CBC WITH AUTOMATED DIFF    HEMOGLOBIN A1C WITH EAG    MICROALBUMIN, UR, RAND W/ MICROALB/CREAT RATIO     Labs updated today  Will call back with name of muscle relaxer for refill  Follow-up and Dispositions    Return for well exam with labs due first week of March. I have discussed the diagnosis with the patient and the intended plan as seen in the above orders. The patient has received an after-visit summary and questions were answered concerning future plans. Patient conveyed understanding of the plan at the time of the visit.     Inocencia Clay, MSN, ANP  8/29/2022

## 2022-08-30 ENCOUNTER — OFFICE VISIT (OUTPATIENT)
Dept: CARDIOLOGY CLINIC | Age: 87
End: 2022-08-30

## 2022-08-30 DIAGNOSIS — Z82.49 FAMILY HISTORY OF EARLY CAD: ICD-10-CM

## 2022-08-30 DIAGNOSIS — R06.02 SOB (SHORTNESS OF BREATH): Primary | ICD-10-CM

## 2022-08-30 DIAGNOSIS — E78.2 MIXED HYPERLIPIDEMIA: ICD-10-CM

## 2022-08-30 DIAGNOSIS — I10 HYPERTENSION, UNSPECIFIED TYPE: ICD-10-CM

## 2022-08-30 LAB
ALBUMIN SERPL-MCNC: 4.2 G/DL (ref 3.5–5)
ALBUMIN/GLOB SERPL: 1.8 {RATIO} (ref 1.1–2.2)
ALP SERPL-CCNC: 79 U/L (ref 45–117)
ALT SERPL-CCNC: 31 U/L (ref 12–78)
ANION GAP SERPL CALC-SCNC: 7 MMOL/L (ref 5–15)
AST SERPL-CCNC: 20 U/L (ref 15–37)
BASOPHILS # BLD: 0.1 K/UL (ref 0–0.1)
BASOPHILS NFR BLD: 1 % (ref 0–1)
BILIRUB SERPL-MCNC: 0.5 MG/DL (ref 0.2–1)
BUN SERPL-MCNC: 31 MG/DL (ref 6–20)
BUN/CREAT SERPL: 27 (ref 12–20)
CALCIUM SERPL-MCNC: 10.5 MG/DL (ref 8.5–10.1)
CHLORIDE SERPL-SCNC: 105 MMOL/L (ref 97–108)
CHOLEST SERPL-MCNC: 144 MG/DL
CO2 SERPL-SCNC: 27 MMOL/L (ref 21–32)
CREAT SERPL-MCNC: 1.13 MG/DL (ref 0.55–1.02)
CREAT UR-MCNC: 117 MG/DL
DIFFERENTIAL METHOD BLD: ABNORMAL
EOSINOPHIL # BLD: 0.3 K/UL (ref 0–0.4)
EOSINOPHIL NFR BLD: 3 % (ref 0–7)
ERYTHROCYTE [DISTWIDTH] IN BLOOD BY AUTOMATED COUNT: 14.9 % (ref 11.5–14.5)
EST. AVERAGE GLUCOSE BLD GHB EST-MCNC: 151 MG/DL
GLOBULIN SER CALC-MCNC: 2.4 G/DL (ref 2–4)
GLUCOSE SERPL-MCNC: 169 MG/DL (ref 65–100)
HBA1C MFR BLD: 6.9 % (ref 4–5.6)
HCT VFR BLD AUTO: 47.1 % (ref 35–47)
HDLC SERPL-MCNC: 59 MG/DL
HDLC SERPL: 2.4 {RATIO} (ref 0–5)
HGB BLD-MCNC: 14.7 G/DL (ref 11.5–16)
IMM GRANULOCYTES # BLD AUTO: 0.1 K/UL (ref 0–0.04)
IMM GRANULOCYTES NFR BLD AUTO: 1 % (ref 0–0.5)
LDLC SERPL CALC-MCNC: 50.8 MG/DL (ref 0–100)
LYMPHOCYTES # BLD: 2.6 K/UL (ref 0.8–3.5)
LYMPHOCYTES NFR BLD: 25 % (ref 12–49)
MCH RBC QN AUTO: 30.2 PG (ref 26–34)
MCHC RBC AUTO-ENTMCNC: 31.2 G/DL (ref 30–36.5)
MCV RBC AUTO: 96.7 FL (ref 80–99)
MICROALBUMIN UR-MCNC: 8.58 MG/DL
MICROALBUMIN/CREAT UR-RTO: 73 MG/G (ref 0–30)
MONOCYTES # BLD: 0.9 K/UL (ref 0–1)
MONOCYTES NFR BLD: 9 % (ref 5–13)
NEUTS SEG # BLD: 6.7 K/UL (ref 1.8–8)
NEUTS SEG NFR BLD: 61 % (ref 32–75)
NRBC # BLD: 0 K/UL (ref 0–0.01)
NRBC BLD-RTO: 0 PER 100 WBC
PLATELET # BLD AUTO: 273 K/UL (ref 150–400)
PMV BLD AUTO: 11.5 FL (ref 8.9–12.9)
POTASSIUM SERPL-SCNC: 4.8 MMOL/L (ref 3.5–5.1)
PROT SERPL-MCNC: 6.6 G/DL (ref 6.4–8.2)
RBC # BLD AUTO: 4.87 M/UL (ref 3.8–5.2)
SODIUM SERPL-SCNC: 139 MMOL/L (ref 136–145)
TRIGL SERPL-MCNC: 171 MG/DL (ref ?–150)
VLDLC SERPL CALC-MCNC: 34.2 MG/DL
WBC # BLD AUTO: 10.7 K/UL (ref 3.6–11)

## 2022-08-30 NOTE — LETTER
9/1/2022    Patient: Ghanshyam Shepherd   YOB: 1935   Date of Visit: 8/30/2022     Laverne Castro NP  [address] [facility] [phone] [id number]  Via In Basket    Dear Laverne Castro NP,      Thank you for referring Ms. Radha Zamora to [address] for evaluation. My notes for this consultation are attached. If you have questions, please do not hesitate to call me. I look forward to following your patient along with you.       Sincerely,    Claudius Klinefelter, MD

## 2022-09-26 RX ORDER — FOLIC ACID 1 MG/1
TABLET ORAL
Qty: 90 TABLET | Refills: 0 | Status: SHIPPED | OUTPATIENT
Start: 2022-09-26

## 2022-10-03 ENCOUNTER — DOCUMENTATION ONLY (OUTPATIENT)
Dept: FAMILY MEDICINE CLINIC | Age: 87
End: 2022-10-03

## 2022-10-17 RX ORDER — AMLODIPINE BESYLATE 10 MG/1
TABLET ORAL
Qty: 90 TABLET | Refills: 0 | Status: SHIPPED | OUTPATIENT
Start: 2022-10-17

## 2022-10-21 DIAGNOSIS — J06.9 UPPER RESPIRATORY TRACT INFECTION, UNSPECIFIED TYPE: ICD-10-CM

## 2022-10-22 RX ORDER — CARBINOXAMINE MALEATE 4 MG/1
TABLET ORAL
Qty: 60 TABLET | Refills: 2 | Status: SHIPPED | OUTPATIENT
Start: 2022-10-22

## 2022-11-08 ENCOUNTER — OFFICE VISIT (OUTPATIENT)
Dept: FAMILY MEDICINE CLINIC | Age: 87
End: 2022-11-08
Payer: MEDICARE

## 2022-11-08 VITALS
SYSTOLIC BLOOD PRESSURE: 106 MMHG | TEMPERATURE: 98.4 F | OXYGEN SATURATION: 95 % | WEIGHT: 161 LBS | BODY MASS INDEX: 30.4 KG/M2 | RESPIRATION RATE: 14 BRPM | HEART RATE: 106 BPM | DIASTOLIC BLOOD PRESSURE: 69 MMHG | HEIGHT: 61 IN

## 2022-11-08 DIAGNOSIS — L08.9 SKIN INFECTION: Primary | ICD-10-CM

## 2022-11-08 PROCEDURE — 1090F PRES/ABSN URINE INCON ASSESS: CPT | Performed by: NURSE PRACTITIONER

## 2022-11-08 PROCEDURE — G8427 DOCREV CUR MEDS BY ELIG CLIN: HCPCS | Performed by: NURSE PRACTITIONER

## 2022-11-08 PROCEDURE — G9717 DOC PT DX DEP/BP F/U NT REQ: HCPCS | Performed by: NURSE PRACTITIONER

## 2022-11-08 PROCEDURE — 1101F PT FALLS ASSESS-DOCD LE1/YR: CPT | Performed by: NURSE PRACTITIONER

## 2022-11-08 PROCEDURE — G8536 NO DOC ELDER MAL SCRN: HCPCS | Performed by: NURSE PRACTITIONER

## 2022-11-08 PROCEDURE — G0463 HOSPITAL OUTPT CLINIC VISIT: HCPCS | Performed by: NURSE PRACTITIONER

## 2022-11-08 PROCEDURE — 1123F ACP DISCUSS/DSCN MKR DOCD: CPT | Performed by: NURSE PRACTITIONER

## 2022-11-08 PROCEDURE — G8417 CALC BMI ABV UP PARAM F/U: HCPCS | Performed by: NURSE PRACTITIONER

## 2022-11-08 PROCEDURE — 99213 OFFICE O/P EST LOW 20 MIN: CPT | Performed by: NURSE PRACTITIONER

## 2022-11-08 RX ORDER — CEPHALEXIN 500 MG/1
500 CAPSULE ORAL 2 TIMES DAILY
Qty: 20 CAPSULE | Refills: 0 | Status: SHIPPED | OUTPATIENT
Start: 2022-11-08 | End: 2022-11-18

## 2022-11-08 RX ORDER — MUPIROCIN 20 MG/G
OINTMENT TOPICAL 2 TIMES DAILY
Qty: 30 G | Refills: 0 | Status: SHIPPED | OUTPATIENT
Start: 2022-11-08

## 2022-11-08 NOTE — PROGRESS NOTES
HISTORY OF PRESENT ILLNESS  Melodie Cortes is a 81 y.o. female. HPI  pt states she now has an abnormal place on her face that has been there for 1mo  -pt states that it itches, hurts and stings  Using neosporin without improvement  Does not remember an injury or insect bite    ROS  A comprehensive review of system was obtained and negative except findings in the HPI    Visit Vitals  /69 (BP 1 Location: Right arm, BP Patient Position: Sitting)   Pulse (!) 106   Temp 98.4 °F (36.9 °C) (Oral)   Resp 14   Ht 5' 1\" (1.549 m)   Wt 161 lb (73 kg)   SpO2 95%   BMI 30.42 kg/m²     Physical Exam  Vitals and nursing note reviewed. Skin:     Comments: Left side of face in front of ear with raised boil like area, 1cm in size, center is indurated and red       ASSESSMENT and PLAN  Encounter Diagnoses   Name Primary? Skin infection Yes     Orders Placed This Encounter    cephALEXin (KEFLEX) 500 mg capsule    mupirocin (BACTROBAN) 2 % ointment     Will treat with keflex and bactroban  Will refer to plastics if not improved  I have discussed the diagnosis with the patient and the intended plan as seen in the above orders. The patient has received an after-visit summary and questions were answered concerning future plans. Patient conveyed understanding of the plan at the time of the visit.     Harley Limon, MSN, ANP  11/8/2022

## 2022-11-08 NOTE — PROGRESS NOTES
Chief Complaint   Patient presents with    Skin Problem     Under breast     Pt states that she had what she thinks was a yeast under her breast    pt states she now has an abnormal place on her face that has been there for 1mo  -pt states that it itches, hurts and stings    Pt also has concerns with fungus bilaterally on her feet   -pt has not treated with anything     1. Have you been to the ER, urgent care clinic since your last visit? Hospitalized since your last visit? No    2. Have you seen or consulted any other health care providers outside of the [address] [place] [address] since your last visit? Include any pap smears or colon screening.  No    Pt has no other concerns

## 2022-11-28 ENCOUNTER — OFFICE VISIT (OUTPATIENT)
Dept: CARDIOLOGY CLINIC | Age: 87
End: 2022-11-28
Payer: MEDICARE

## 2022-11-28 VITALS
BODY MASS INDEX: 30.02 KG/M2 | SYSTOLIC BLOOD PRESSURE: 136 MMHG | HEART RATE: 107 BPM | OXYGEN SATURATION: 98 % | WEIGHT: 159 LBS | DIASTOLIC BLOOD PRESSURE: 80 MMHG | HEIGHT: 61 IN

## 2022-11-28 DIAGNOSIS — E78.2 MIXED HYPERLIPIDEMIA: ICD-10-CM

## 2022-11-28 DIAGNOSIS — I10 HYPERTENSION, UNSPECIFIED TYPE: ICD-10-CM

## 2022-11-28 DIAGNOSIS — R06.02 SOB (SHORTNESS OF BREATH): Primary | ICD-10-CM

## 2022-11-28 PROCEDURE — G8417 CALC BMI ABV UP PARAM F/U: HCPCS | Performed by: SPECIALIST

## 2022-11-28 PROCEDURE — 93010 ELECTROCARDIOGRAM REPORT: CPT | Performed by: SPECIALIST

## 2022-11-28 PROCEDURE — G9717 DOC PT DX DEP/BP F/U NT REQ: HCPCS | Performed by: SPECIALIST

## 2022-11-28 PROCEDURE — 1101F PT FALLS ASSESS-DOCD LE1/YR: CPT | Performed by: SPECIALIST

## 2022-11-28 PROCEDURE — G8536 NO DOC ELDER MAL SCRN: HCPCS | Performed by: SPECIALIST

## 2022-11-28 PROCEDURE — 1123F ACP DISCUSS/DSCN MKR DOCD: CPT | Performed by: SPECIALIST

## 2022-11-28 PROCEDURE — G0463 HOSPITAL OUTPT CLINIC VISIT: HCPCS | Performed by: SPECIALIST

## 2022-11-28 PROCEDURE — 1090F PRES/ABSN URINE INCON ASSESS: CPT | Performed by: SPECIALIST

## 2022-11-28 PROCEDURE — 93005 ELECTROCARDIOGRAM TRACING: CPT | Performed by: SPECIALIST

## 2022-11-28 PROCEDURE — G8427 DOCREV CUR MEDS BY ELIG CLIN: HCPCS | Performed by: SPECIALIST

## 2022-11-28 PROCEDURE — 99214 OFFICE O/P EST MOD 30 MIN: CPT | Performed by: SPECIALIST

## 2022-11-28 NOTE — PATIENT INSTRUCTIONS

## 2022-11-28 NOTE — PROGRESS NOTES
America Matson is a 81 y.o. female    Visit Vitals  /80 (BP 1 Location: Left upper arm, BP Patient Position: Sitting, BP Cuff Size: Adult)   Pulse (!) 107   Ht 5' 1\" (1.549 m)   Wt 159 lb (72.1 kg)   SpO2 98%   BMI 30.04 kg/m²       Chief Complaint   Patient presents with    Cholesterol Problem    Hypertension    Shortness of Breath       Chest pain NO  SOB NO  Dizziness NO  Swelling NO  Recent hospital visit NO  Refills NO  COVID VACCINE STATUS YES  HAD COVID?  YES

## 2022-11-28 NOTE — PROGRESS NOTES
CARDIOLOGY OFFICE NOTE    Marquez Rincon MD, [address], [place], [address] [facility]  Phone [phone]; Fax [phone]  Mobile [phone]   Voice Mail [phone]         ATTENTION:   This medical record was transcribed using an electronic medical records/speech recognition system. Although proofread, it may and can contain electronic, spelling and other errors. Corrections may be executed at a later time. Please feel free to contact us for any clarifications as needed. ICD-10-CM ICD-9-CM   1. SOB (shortness of breath)  R06.02 786.05   2. Hypertension, unspecified type  I10 401.9   3. Mixed hyperlipidemia  E78.2 272.2            Jazmine Hurt is a 81 y.o. female with dyslipidemia and hypertension referred for 8 week follow up. Cardiac risk factors: post menopausal, dyslipidemia, hypertension. I have personally obtained the history from the patient. Jacoby Schultz      Ms. Parks states she is doing well overall but somewhat fatigued. She feels it is related to inactivity. Nothing is really changed lately she is overall about the same. She is using a rolling walker and has a lot of lower back pain. I did discuss with her today her EKG today demonstrates a new right bundle branch block and she has left anterior fascicular block. She denies any dizziness or lightheadedness. We also talked about her aortic stenosis and that she needs to be reevaluated in 6 months and what symptoms to look for regarding progression of disease. ACTIVE PROBLEM LIST     Patient Active Problem List    Diagnosis Date Noted    Type 2 diabetes mellitus with chronic kidney disease ([facility] 05/31/2022    Colon polyps 02/18/2020    Non-Hodgkin's lymphoma ([facility] 08/14/2018    Severe obesity (BMI 35.0-39. 9) with comorbidity ([facility] 05/21/2018    Hypercalcemia 05/15/2018    Mass of lung parenchyma 05/15/2018    Hypercholesteremia 05/15/2018 Diastolic dysfunction 53/86/0896    EDER (acute kidney injury) ([facility] 05/15/2018    Hematoma of groin 04/30/2018    Type 2 diabetes mellitus with nephropathy ([facility] 01/02/2018    Recurrent depression ([facility] 01/02/2018    Type 2 diabetes mellitus without complication, without long-term current use of insulin ([facility] 04/13/2017    Advanced care planning/counseling discussion 02/15/2017    CRI (chronic renal insufficiency) 10/04/2011    Asymptomatic carotid artery stenosis without infarction 02/22/2011    Diverticulosis 06/09/2010    IBS (irritable bowel syndrome) 06/09/2010    Depressive disorder, not elsewhere classified 06/09/2010    Mixed hyperlipidemia 06/09/2010    DJD (degenerative joint disease) 06/09/2010    PUD (peptic ulcer disease) 06/09/2010    Allergic rhinitis due to other allergen 06/09/2010    Proteinuria 06/09/2010    RA (rheumatoid arthritis) ([facility] 06/09/2010    Essential hypertension 06/09/2010           PAST MEDICAL HISTORY     Past Medical History:   Diagnosis Date    Allergies     Asymptomatic carotid artery stenosis without infarction 2/22/2011    Closed traumatic compression fracture of lumbar vertebra ([facility] 2019    Depression     Diverticulosis     DJD (degenerative joint disease)     DVT (deep venous thrombosis) ([facility] 2016    DVT RLE. GERD (gastroesophageal reflux disease)     HTN     Hypercholesterolemia     Mammography less than 12 months ago     NIDDM     Obesity (BMI 30.0-34. 9)     PE (pulmonary thromboembolism) ([facility] 2017    B/L. Proteinuria            PAST SURGICAL HISTORY     Past Surgical History:   Procedure Laterality Date    ENDOSCOPY, COLON, DIAGNOSTIC  2008    normal    HX APPENDECTOMY      With JAYME. HX HERNIA REPAIR      ? Umbilical hernia repair. HX HYSTERECTOMY      HX KNEE REPLACEMENT Left     HX KNEE REPLACEMENT Right     HX SPLENECTOMY      Lap splenectomy.           ALLERGIES     Allergies   Allergen Reactions    Sulfa (Sulfonamide Antibiotics) Other (comments) FAMILY HISTORY     Family History   Problem Relation Age of Onset    Hypertension Father     Cancer Sister         breast    Diabetes Brother     negative for cardiac disease       SOCIAL HISTORY     Social History     Socioeconomic History    Marital status:    Tobacco Use    Smoking status: Never    Smokeless tobacco: Never   Substance and Sexual Activity    Alcohol use: No    Drug use: No    Sexual activity: Yes     Partners: Male         MEDICATIONS     Current Outpatient Medications   Medication Sig    mupirocin (BACTROBAN) 2 % ointment Apply  to affected area two (2) times a day. rosuvastatin (CRESTOR) 40 mg tablet TAKE ONE TABLET BY MOUTH ONCE NIGHTLY    carbinoxamine maleate 4 mg tab TAKE ONE TABLET BY MOUTH TWICE A DAY    amLODIPine (NORVASC) 10 mg tablet TAKE ONE TABLET BY MOUTH EVERY MORNING    folic acid (FOLVITE) 1 mg tablet Take 1 tablet by mouth once daily    baclofen (LIORESAL) 10 mg tablet Take 1 Tablet by mouth three (3) times daily as needed for Muscle Spasm(s). glimepiride (AMARYL) 2 mg tablet TAKE ONE TABLET BY MOUTH DAILY WITH BREAKFAST AND TAKE TWO TABLETS BY MOUTH DAILY WITH DINNER    glucose blood VI test strips (True Metrix Glucose Test Strip) strip USE ONE STRIP TO TEST THREE TIMES A DAY BEFORE A MEAL    spironolactone (ALDACTONE) 25 mg tablet     mv-min-folic-calcium carb-K1 (Women's 50 Plus Multivitamin) 400 mcg-500 mg calcium-20 mcg tab Take  by mouth. insulin lispro (HUMALOG) 100 unit/mL injection 10 units once a day at night    warfarin (COUMADIN) 5 mg tablet Take 5 mg by mouth daily. Alternating 5mg and 2.5mg QOD    docusate sodium (COLACE) 100 mg capsule Take 100 mg by mouth two (2) times a day. fluticasone propionate (FLONASE ALLERGY RELIEF) 50 mcg/actuation nasal spray 1 Dillon by Both Nostrils route daily as needed for Rhinitis.    esomeprazole (NEXIUM) 20 mg capsule Take 20 mg by mouth daily.      No current facility-administered medications for this visit.       I have reviewed the nurses notes, vitals, problem list, allergy list, medical history, family, social history and medications. REVIEW OF SYMPTOMS      General: Pt denies excessive weight gain or loss. Pt is able to conduct ADL's  HEENT: Denies blurred vision, headaches, hearing loss, epistaxis and difficulty swallowing. Respiratory: Denies cough, congestion, shortness of breath, MERRITT, wheezing or stridor. Cardiovascular: Denies precordial pain, palpitations, edema or PND  Gastrointestinal: Denies poor appetite, indigestion, abdominal pain or blood in stool  Genitourinary: Denies hematuria, dysuria, increased urinary frequency  Musculoskeletal: Denies joint pain or swelling from muscles or joints  Neurologic: Denies tremor, paresthesias, headache, or sensory motor disturbance  Psychiatric: Denies confusion, insomnia, depression  Integumentray: Denies rash, itching or ulcers. Hematologic: Denies easy bruising, bleeding     PHYSICAL EXAMINATION      Vitals:    11/28/22 1057   BP: 136/80   Pulse: (!) 107   SpO2: 98%   Weight: 159 lb (72.1 kg)   Height: 5' 1\" (1.549 m)       General: Well developed, in no acute distress. Deconditioned  HEENT: No jaundice, oral mucosa moist, no oral ulcers  Neck: Supple, no stiffness, no lymphadenopathy, supple  Heart: Regular rate and rhythm with a 2/6 systolic murmur right sternal border it appears midsystolic unchanged  Respiratory: Clear bilaterally x 4, no wheezing or rales  Musculoskeletal: No clubbing, no deformities  Neuro: A&Ox3, speech clear, gait stable but uses a rolling walker cooperative, no focal neurologic deficits  Skin: Skin color is normal. No rashes or lesions. Non diaphoretic, moist.       DIAGNOSTIC DATA     1. Lipids  2/15/17 -, HDL 40, ,     2. Echo  6/19/17- EF 65%    3.  Lexiscan  6/19/17- No ischemia         LABORATORY DATA            Lab Results   Component Value Date/Time    WBC 10.7 08/29/2022 09:17 AM    Hemoglobin (POC) 13.3 02/19/2016 08:35 PM    HGB 14.7 08/29/2022 09:17 AM    Hematocrit (POC) 39 02/19/2016 08:35 PM    HCT 47.1 (H) 08/29/2022 09:17 AM    PLATELET 923 43/41/7695 09:17 AM    MCV 96.7 08/29/2022 09:17 AM      Lab Results   Component Value Date/Time    Sodium 139 08/29/2022 09:17 AM    Potassium 4.8 08/29/2022 09:17 AM    Chloride 105 08/29/2022 09:17 AM    CO2 27 08/29/2022 09:17 AM    Anion gap 7 08/29/2022 09:17 AM    Glucose 169 (H) 08/29/2022 09:17 AM    BUN 31 (H) 08/29/2022 09:17 AM    Creatinine 1.13 (H) 08/29/2022 09:17 AM    BUN/Creatinine ratio 27 (H) 08/29/2022 09:17 AM    GFR est AA 55 (L) 08/29/2022 09:17 AM    GFR est non-AA 46 (L) 08/29/2022 09:17 AM    Calcium 10.5 (H) 08/29/2022 09:17 AM    Bilirubin, total 0.5 08/29/2022 09:17 AM    Alk. phosphatase 79 08/29/2022 09:17 AM    Protein, total 6.6 08/29/2022 09:17 AM    Albumin 4.2 08/29/2022 09:17 AM    Globulin 2.4 08/29/2022 09:17 AM    A-G Ratio 1.8 08/29/2022 09:17 AM    ALT (SGPT) 31 08/29/2022 09:17 AM           ASSESSMENT/RECOMMENDATIONS:.      1.  Mild aortic stenosis  -She has baseline shortness of breath which may be related to being deconditioned. She only notes that with exertional activity and it really has on not changed from the last time I saw her. She has no PND orthopnea. Her dimensionless index was 0.27  -Recheck echo in 6 months     2. Dyslipidemia  -Placed her on Crestor and her LDL came down to 50  -Continue current medical regimen    3. Hypertension   -BP is is reasonable for her age    3. Lymphedema        5. Follow up in 6 months with echo or PRN    Orders Placed This Encounter    AMB POC EKG ROUTINE W/ 12 LEADS, INTER & REP     Order Specific Question:   Reason for Exam:     Answer:   HTN            Follow-up and Dispositions    Return in about 6 months (around 5/28/2023). I have discussed the diagnosis with  Marla Parks and the intended plan as seen in the above orders.   Questions were answered concerning future plans. I have discussed medication side effects and warnings with the patient as well. Thank you,  Ernie Burton NP for involving me in the care of  [address] [place] [address]. Please do not hesitate to contact me for further questions/concerns. Marquez Curtis MD, [facility] [address]      [facility], [address] [place] [address] Drive      [phone] / [phone] Fax

## 2022-11-28 NOTE — LETTER
11/28/2022    Patient: David Giordano   YOB: 1935   Date of Visit: 11/28/2022     Andres Santiago NP  [address] [id number]  [address] [facility] [address]    Dear Andres Santiago NP,      Thank you for referring Ms. Georgia Koenig to [address] for evaluation. My notes for this consultation are attached. If you have questions, please do not hesitate to call me. I look forward to following your patient along with you.       Sincerely,    Deanna Cheatham MD

## 2022-11-30 RX ORDER — ROSUVASTATIN CALCIUM 40 MG/1
TABLET, COATED ORAL
Qty: 30 TABLET | Refills: 5 | Status: SHIPPED | OUTPATIENT
Start: 2022-11-30

## 2023-01-03 RX ORDER — FOLIC ACID 1 MG/1
TABLET ORAL
Qty: 90 TABLET | Refills: 0 | Status: SHIPPED | OUTPATIENT
Start: 2023-01-03

## 2023-01-20 DIAGNOSIS — J06.9 UPPER RESPIRATORY TRACT INFECTION, UNSPECIFIED TYPE: ICD-10-CM

## 2023-01-22 RX ORDER — CARBINOXAMINE MALEATE 4 MG/1
TABLET ORAL
Qty: 60 TABLET | Refills: 2 | Status: SHIPPED | OUTPATIENT
Start: 2023-01-22

## 2023-01-23 RX ORDER — AMLODIPINE BESYLATE 10 MG/1
TABLET ORAL
Qty: 90 TABLET | Refills: 0 | Status: SHIPPED | OUTPATIENT
Start: 2023-01-23

## 2023-03-05 RX ORDER — CALCIUM CITRATE/VITAMIN D3 200MG-6.25
TABLET ORAL
Qty: 100 STRIP | Refills: 5 | Status: SHIPPED | OUTPATIENT
Start: 2023-03-05

## 2023-03-21 ENCOUNTER — OFFICE VISIT (OUTPATIENT)
Dept: FAMILY MEDICINE CLINIC | Age: 88
End: 2023-03-21
Payer: MEDICARE

## 2023-03-21 VITALS
BODY MASS INDEX: 31.15 KG/M2 | HEART RATE: 102 BPM | TEMPERATURE: 97.9 F | WEIGHT: 165 LBS | OXYGEN SATURATION: 99 % | RESPIRATION RATE: 18 BRPM | HEIGHT: 61 IN | DIASTOLIC BLOOD PRESSURE: 70 MMHG | SYSTOLIC BLOOD PRESSURE: 112 MMHG

## 2023-03-21 DIAGNOSIS — Z79.4 TYPE 2 DIABETES MELLITUS WITH STAGE 3A CHRONIC KIDNEY DISEASE, WITH LONG-TERM CURRENT USE OF INSULIN (HCC): ICD-10-CM

## 2023-03-21 DIAGNOSIS — Z79.4 TYPE 2 DIABETES MELLITUS WITH HYPERGLYCEMIA, WITH LONG-TERM CURRENT USE OF INSULIN (HCC): ICD-10-CM

## 2023-03-21 DIAGNOSIS — E11.65 TYPE 2 DIABETES MELLITUS WITH HYPERGLYCEMIA, WITH LONG-TERM CURRENT USE OF INSULIN (HCC): ICD-10-CM

## 2023-03-21 DIAGNOSIS — N18.31 STAGE 3A CHRONIC KIDNEY DISEASE (HCC): ICD-10-CM

## 2023-03-21 DIAGNOSIS — R53.83 FATIGUE, UNSPECIFIED TYPE: ICD-10-CM

## 2023-03-21 DIAGNOSIS — C85.90 NON-HODGKIN'S LYMPHOMA, UNSPECIFIED BODY REGION, UNSPECIFIED NON-HODGKIN LYMPHOMA TYPE (HCC): ICD-10-CM

## 2023-03-21 DIAGNOSIS — F33.9 RECURRENT DEPRESSION (HCC): ICD-10-CM

## 2023-03-21 DIAGNOSIS — E11.22 TYPE 2 DIABETES MELLITUS WITH STAGE 3A CHRONIC KIDNEY DISEASE, WITH LONG-TERM CURRENT USE OF INSULIN (HCC): ICD-10-CM

## 2023-03-21 DIAGNOSIS — N18.31 TYPE 2 DIABETES MELLITUS WITH STAGE 3A CHRONIC KIDNEY DISEASE, WITH LONG-TERM CURRENT USE OF INSULIN (HCC): ICD-10-CM

## 2023-03-21 DIAGNOSIS — M05.742 RHEUMATOID ARTHRITIS INVOLVING BOTH HANDS WITH POSITIVE RHEUMATOID FACTOR (HCC): ICD-10-CM

## 2023-03-21 DIAGNOSIS — M05.741 RHEUMATOID ARTHRITIS INVOLVING BOTH HANDS WITH POSITIVE RHEUMATOID FACTOR (HCC): ICD-10-CM

## 2023-03-21 DIAGNOSIS — Z00.01 ENCOUNTER FOR ROUTINE ADULT HEALTH EXAMINATION WITH ABNORMAL FINDINGS: Primary | ICD-10-CM

## 2023-03-21 DIAGNOSIS — J06.9 ACUTE UPPER RESPIRATORY INFECTION: ICD-10-CM

## 2023-03-21 PROBLEM — N18.30 CHRONIC RENAL DISEASE, STAGE III (HCC): Status: ACTIVE | Noted: 2023-03-21

## 2023-03-21 PROCEDURE — G8536 NO DOC ELDER MAL SCRN: HCPCS | Performed by: FAMILY MEDICINE

## 2023-03-21 PROCEDURE — G0463 HOSPITAL OUTPT CLINIC VISIT: HCPCS | Performed by: FAMILY MEDICINE

## 2023-03-21 PROCEDURE — G0439 PPPS, SUBSEQ VISIT: HCPCS | Performed by: FAMILY MEDICINE

## 2023-03-21 PROCEDURE — 99214 OFFICE O/P EST MOD 30 MIN: CPT | Performed by: FAMILY MEDICINE

## 2023-03-21 PROCEDURE — 1101F PT FALLS ASSESS-DOCD LE1/YR: CPT | Performed by: FAMILY MEDICINE

## 2023-03-21 PROCEDURE — 1123F ACP DISCUSS/DSCN MKR DOCD: CPT | Performed by: FAMILY MEDICINE

## 2023-03-21 PROCEDURE — G8427 DOCREV CUR MEDS BY ELIG CLIN: HCPCS | Performed by: FAMILY MEDICINE

## 2023-03-21 PROCEDURE — G9717 DOC PT DX DEP/BP F/U NT REQ: HCPCS | Performed by: FAMILY MEDICINE

## 2023-03-21 PROCEDURE — G8417 CALC BMI ABV UP PARAM F/U: HCPCS | Performed by: FAMILY MEDICINE

## 2023-03-21 PROCEDURE — 1090F PRES/ABSN URINE INCON ASSESS: CPT | Performed by: FAMILY MEDICINE

## 2023-03-21 RX ORDER — CETIRIZINE HYDROCHLORIDE 10 MG/1
10 TABLET ORAL DAILY
Qty: 30 TABLET | Refills: 1 | Status: SHIPPED | OUTPATIENT
Start: 2023-03-21

## 2023-03-21 RX ORDER — CEFDINIR 300 MG/1
300 CAPSULE ORAL 2 TIMES DAILY
Qty: 20 CAPSULE | Refills: 0 | Status: SHIPPED | OUTPATIENT
Start: 2023-03-21 | End: 2023-03-31

## 2023-03-21 NOTE — PROGRESS NOTES
Chief Complaint   Patient presents with    Cough     X 5 days: Home Covid Test : Neg    Diabetes     BS this am:95    Fatigue    Labs     Fasting today    Anticoagulation     Dr Mindi Peter at [facility]     1. Have you been to the ER, urgent care clinic since your last visit? Hospitalized since your last visit? No    2. Have you seen or consulted any other health care providers outside of the [address] [place] [address] since your last visit? Include any pap smears or colon screening.    Dr Mindi Peter at [facility]

## 2023-03-21 NOTE — PROGRESS NOTES
Chief Complaint   Patient presents with    Cough     X 5 days: Home Covid Test : Neg    Diabetes     BS this am:95    Fatigue    Labs     Fasting today    Anticoagulation     Dr Dinesh Herrera at [facility]     1. Have you been to the ER, urgent care clinic since your last visit? Hospitalized since your last visit? No    2. Have you seen or consulted any other health care providers outside of the [address] [place] [address] since your last visit? Include any pap smears or colon screening. Dr Dinesh Herrera at [facility]      Medicare Wellness Exam:    Chief Complaint   Patient presents with    Cough     X 5 days: Home Covid Test : Neg    Diabetes     BS this am:95    Fatigue    Labs     Fasting today    Anticoagulation     Dr Dinesh Herrera at [facility]     she is a 81y.o. year old female who presents for evaluation for their Medicare Wellness Visit. [place] is completed and assessed=yes  Depression Screen is completed and assessed=yes  Medication list reviewed and adjusted for accuracy=yes  Immunizations reviewed and updated=yes  Health/Preventative Screenings reviewed and updated=yes  ADL Functions reviewed=yes    Patient Active Problem List    Diagnosis    Chronic renal disease, stage III    Type 2 diabetes mellitus with chronic kidney disease (HCC)    Colon polyps    Non-Hodgkin's lymphoma ([address]    Severe obesity (BMI 35.0-39. 9) with comorbidity ([address]    Hypercalcemia    Mass of lung parenchyma    Hypercholesteremia    Diastolic dysfunction    DEER (acute kidney injury) ([address]    Hematoma of groin     Left.       Type 2 diabetes mellitus with nephropathy (HCC)    Recurrent depression (HCC)    Type 2 diabetes mellitus without complication, without long-term current use of insulin ([address]    Advanced care planning/counseling discussion     Patient does have Will and POA, will brind documents for chart when she can find them      CRI (chronic renal insufficiency)    Asymptomatic carotid artery stenosis without infarction    Diverticulosis    IBS (irritable bowel syndrome)    Depressive disorder, not elsewhere classified    Mixed hyperlipidemia    DJD (degenerative joint disease)    PUD (peptic ulcer disease)    Allergic rhinitis due to other allergen    Proteinuria    RA (rheumatoid arthritis) ([facility]    Essential hypertension       Reviewed PmHx, RxHx, FmHx, SocHx, AllgHx and updated and dated in the chart. Review of Systems - negative except as listed above in the HPI    Objective:     Vitals:    03/21/23 0937   BP: 112/70   Pulse: (!) 102   Resp: 18   Temp: 97.9 °F (36.6 °C)   TempSrc: Oral   SpO2: 99%   Weight: 165 lb (74.8 kg)   Height: 5' 1\" (1.549 m)       Assessment/ Plan:   Diagnoses and all orders for this visit:    1. Encounter for routine adult health examination with abnormal findings  -     HEMOGLOBIN A1C WITH EAG; Future  -     LIPID PANEL; Future  -     METABOLIC PANEL, COMPREHENSIVE; Future  -     CBC WITH AUTOMATED DIFF; Future  -     TSH 3RD GENERATION; Future  Has living will, see below  2. Stage 3a chronic kidney disease (HCC)  -     METABOLIC PANEL, COMPREHENSIVE; Future  -     CBC WITH AUTOMATED DIFF; Future    3. Non-Hodgkin's lymphoma, unspecified body region, unspecified non-Hodgkin lymphoma type ([facility]  -     CBC WITH AUTOMATED DIFF; Future  Seeing specialist  4. Rheumatoid arthritis involving both hands with positive rheumatoid factor (HCC)  Stable and advanced  5. Recurrent depression (HCC)  Stable  6. Type 2 diabetes mellitus with hyperglycemia, with long-term current use of insulin (HCC)  -     HEMOGLOBIN A1C WITH EAG; Future  -     LIPID PANEL; Future  -     METABOLIC PANEL, COMPREHENSIVE; Future  -     CBC WITH AUTOMATED DIFF; Future  -     TSH 3RD GENERATION; Future  Labs at goal  7. Type 2 diabetes mellitus with stage 3a chronic kidney disease, with long-term current use of insulin (HCC)  -     HEMOGLOBIN A1C WITH EAG; Future  -     LIPID PANEL;  Future  -     METABOLIC PANEL, COMPREHENSIVE; Future  -     CBC WITH AUTOMATED DIFF; Future  -     TSH 3RD GENERATION; Future    8. Acute upper respiratory infection  -     cefdinir (OMNICEF) 300 mg capsule; Take 1 Capsule by mouth two (2) times a day for 10 days. -     cetirizine (ZYRTEC) 10 mg tablet; Take 1 Tablet by mouth daily. Patient has increased cough and runny nose and some mild mental status changes according to . Lungs reveal scattered rhonchi no rales or wheezing. Good air exchange. We will add medicine as above and follow-up as necessary. Pulm time was greater than 30 minutes on all items 2 through 9.  9. Fatigue, unspecified type  Chronic related to multiple medical problems.    -Pain evaluation performed in office  -Cognitive Screen performed in office  -Depression Screen, Fall risks (by up and go test)  and ADL functionality were addressed  -Medication list updated and reviewed for any changes   -A comprehensive review of medical issues and a plan was formulated  -End of life planning was addressed with pt   -Health Screenings for preventions were addressed and a plan was formulated  -Shingles Vaccine was recommended  -Discussed with patient cancer risk factors and appropriate screenings for age  -Patient evaluated for colonoscopy and referred if needed per screeing criteria  -Labs from previous visits were discussed with patient   -Discussed with patient diet and exercise and formulated a plan as needed  -An Advanced care plan was developed with the patient.  -Alcohol screening performed and was negative    -    I have discussed the diagnosis with the patient and the intended plan as seen in the above orders. The patient understands and agrees with the plan. The patient has received an after-visit summary and questions were answered concerning future plans.      Medication Side Effects and Warnings were discussed with patien  Patient Labs were reviewed and or requested  Patient Past Records were reviewed and or requested    There are no Patient Instructions on file for this visit.       Carlos Alberto Diaz M.D.

## 2023-03-22 LAB
ALBUMIN SERPL-MCNC: 3.5 G/DL (ref 3.5–5)
ALBUMIN/GLOB SERPL: 1.1 (ref 1.1–2.2)
ALP SERPL-CCNC: 66 U/L (ref 45–117)
ALT SERPL-CCNC: 30 U/L (ref 12–78)
ANION GAP SERPL CALC-SCNC: 6 MMOL/L (ref 5–15)
AST SERPL-CCNC: 30 U/L (ref 15–37)
BASOPHILS # BLD: 0 K/UL (ref 0–0.1)
BASOPHILS NFR BLD: 0 % (ref 0–1)
BILIRUB SERPL-MCNC: 0.3 MG/DL (ref 0.2–1)
BUN SERPL-MCNC: 18 MG/DL (ref 6–20)
BUN/CREAT SERPL: 19 (ref 12–20)
CALCIUM SERPL-MCNC: 9.6 MG/DL (ref 8.5–10.1)
CHLORIDE SERPL-SCNC: 102 MMOL/L (ref 97–108)
CHOLEST SERPL-MCNC: 145 MG/DL
CO2 SERPL-SCNC: 27 MMOL/L (ref 21–32)
CREAT SERPL-MCNC: 0.96 MG/DL (ref 0.55–1.02)
DIFFERENTIAL METHOD BLD: ABNORMAL
EOSINOPHIL # BLD: 0 K/UL (ref 0–0.4)
EOSINOPHIL NFR BLD: 0 % (ref 0–7)
ERYTHROCYTE [DISTWIDTH] IN BLOOD BY AUTOMATED COUNT: 13.5 % (ref 11.5–14.5)
EST. AVERAGE GLUCOSE BLD GHB EST-MCNC: 148 MG/DL
GLOBULIN SER CALC-MCNC: 3.1 G/DL (ref 2–4)
GLUCOSE SERPL-MCNC: 59 MG/DL (ref 65–100)
HBA1C MFR BLD: 6.8 % (ref 4–5.6)
HCT VFR BLD AUTO: 44.8 % (ref 35–47)
HDLC SERPL-MCNC: 87 MG/DL
HDLC SERPL: 1.7 (ref 0–5)
HGB BLD-MCNC: 14.4 G/DL (ref 11.5–16)
IMM GRANULOCYTES # BLD AUTO: 0.1 K/UL (ref 0–0.04)
IMM GRANULOCYTES NFR BLD AUTO: 1 % (ref 0–0.5)
LDLC SERPL CALC-MCNC: 44.2 MG/DL (ref 0–100)
LYMPHOCYTES # BLD: 1 K/UL (ref 0.8–3.5)
LYMPHOCYTES NFR BLD: 12 % (ref 12–49)
MCH RBC QN AUTO: 29.6 PG (ref 26–34)
MCHC RBC AUTO-ENTMCNC: 32.1 G/DL (ref 30–36.5)
MCV RBC AUTO: 92.2 FL (ref 80–99)
MONOCYTES # BLD: 1 K/UL (ref 0–1)
MONOCYTES NFR BLD: 11 % (ref 5–13)
NEUTS SEG # BLD: 6.9 K/UL (ref 1.8–8)
NEUTS SEG NFR BLD: 76 % (ref 32–75)
NRBC # BLD: 0 K/UL (ref 0–0.01)
NRBC BLD-RTO: 0 PER 100 WBC
PLATELET # BLD AUTO: 257 K/UL (ref 150–400)
PMV BLD AUTO: 11.5 FL (ref 8.9–12.9)
POTASSIUM SERPL-SCNC: 4.2 MMOL/L (ref 3.5–5.1)
PROT SERPL-MCNC: 6.6 G/DL (ref 6.4–8.2)
RBC # BLD AUTO: 4.86 M/UL (ref 3.8–5.2)
SODIUM SERPL-SCNC: 135 MMOL/L (ref 136–145)
TRIGL SERPL-MCNC: 69 MG/DL (ref ?–150)
TSH SERPL DL<=0.05 MIU/L-ACNC: 0.88 UIU/ML (ref 0.36–3.74)
VLDLC SERPL CALC-MCNC: 13.8 MG/DL
WBC # BLD AUTO: 9 K/UL (ref 3.6–11)

## 2023-04-02 RX ORDER — FOLIC ACID 1 MG/1
TABLET ORAL
Qty: 90 TABLET | Refills: 0 | Status: SHIPPED | OUTPATIENT
Start: 2023-04-02

## 2023-04-17 RX ORDER — AMLODIPINE BESYLATE 10 MG/1
TABLET ORAL
Qty: 90 TABLET | Refills: 0 | Status: SHIPPED | OUTPATIENT
Start: 2023-04-17

## 2023-04-21 DIAGNOSIS — J06.9 UPPER RESPIRATORY TRACT INFECTION, UNSPECIFIED TYPE: ICD-10-CM

## 2023-04-23 RX ORDER — CARBINOXAMINE MALEATE 4 MG/1
TABLET ORAL
Qty: 60 TABLET | Refills: 2 | Status: SHIPPED | OUTPATIENT
Start: 2023-04-23

## 2023-05-15 RX ORDER — CETIRIZINE HYDROCHLORIDE 10 MG/1
TABLET ORAL
Qty: 90 TABLET | Refills: 3 | Status: SHIPPED | OUTPATIENT
Start: 2023-05-15

## 2023-05-30 RX ORDER — GLIMEPIRIDE 2 MG/1
TABLET ORAL
Qty: 270 TABLET | Refills: 1 | Status: SHIPPED | OUTPATIENT
Start: 2023-05-30

## 2023-06-01 ENCOUNTER — OFFICE VISIT (OUTPATIENT)
Age: 88
End: 2023-06-01
Payer: MEDICARE

## 2023-06-01 VITALS
OXYGEN SATURATION: 95 % | TEMPERATURE: 98.3 F | HEART RATE: 102 BPM | RESPIRATION RATE: 14 BRPM | BODY MASS INDEX: 30.4 KG/M2 | HEIGHT: 61 IN | SYSTOLIC BLOOD PRESSURE: 100 MMHG | WEIGHT: 161 LBS | DIASTOLIC BLOOD PRESSURE: 68 MMHG

## 2023-06-01 DIAGNOSIS — Z00.01 ENCOUNTER FOR GENERAL ADULT MEDICAL EXAMINATION WITH ABNORMAL FINDINGS: Primary | ICD-10-CM

## 2023-06-01 DIAGNOSIS — E11.65 TYPE 2 DIABETES MELLITUS WITH HYPERGLYCEMIA, WITHOUT LONG-TERM CURRENT USE OF INSULIN (HCC): ICD-10-CM

## 2023-06-01 DIAGNOSIS — I10 ESSENTIAL (PRIMARY) HYPERTENSION: ICD-10-CM

## 2023-06-01 DIAGNOSIS — N18.31 CHRONIC KIDNEY DISEASE, STAGE 3A (HCC): ICD-10-CM

## 2023-06-01 PROCEDURE — 1036F TOBACCO NON-USER: CPT | Performed by: NURSE PRACTITIONER

## 2023-06-01 PROCEDURE — G0439 PPPS, SUBSEQ VISIT: HCPCS | Performed by: NURSE PRACTITIONER

## 2023-06-01 PROCEDURE — 1090F PRES/ABSN URINE INCON ASSESS: CPT | Performed by: NURSE PRACTITIONER

## 2023-06-01 PROCEDURE — G8417 CALC BMI ABV UP PARAM F/U: HCPCS | Performed by: NURSE PRACTITIONER

## 2023-06-01 PROCEDURE — 1123F ACP DISCUSS/DSCN MKR DOCD: CPT | Performed by: NURSE PRACTITIONER

## 2023-06-01 PROCEDURE — 99214 OFFICE O/P EST MOD 30 MIN: CPT | Performed by: NURSE PRACTITIONER

## 2023-06-01 PROCEDURE — 3044F HG A1C LEVEL LT 7.0%: CPT | Performed by: NURSE PRACTITIONER

## 2023-06-01 PROCEDURE — G8427 DOCREV CUR MEDS BY ELIG CLIN: HCPCS | Performed by: NURSE PRACTITIONER

## 2023-06-01 RX ORDER — SODIUM BICARBONATE
POWDER (GRAM) MISCELLANEOUS
COMMUNITY

## 2023-06-01 SDOH — ECONOMIC STABILITY: INCOME INSECURITY: HOW HARD IS IT FOR YOU TO PAY FOR THE VERY BASICS LIKE FOOD, HOUSING, MEDICAL CARE, AND HEATING?: NOT HARD AT ALL

## 2023-06-01 SDOH — ECONOMIC STABILITY: FOOD INSECURITY: WITHIN THE PAST 12 MONTHS, THE FOOD YOU BOUGHT JUST DIDN'T LAST AND YOU DIDN'T HAVE MONEY TO GET MORE.: NEVER TRUE

## 2023-06-01 SDOH — ECONOMIC STABILITY: FOOD INSECURITY: WITHIN THE PAST 12 MONTHS, YOU WORRIED THAT YOUR FOOD WOULD RUN OUT BEFORE YOU GOT MONEY TO BUY MORE.: NEVER TRUE

## 2023-06-01 SDOH — ECONOMIC STABILITY: HOUSING INSECURITY
IN THE LAST 12 MONTHS, WAS THERE A TIME WHEN YOU DID NOT HAVE A STEADY PLACE TO SLEEP OR SLEPT IN A SHELTER (INCLUDING NOW)?: NO

## 2023-06-01 ASSESSMENT — PATIENT HEALTH QUESTIONNAIRE - PHQ9
9. THOUGHTS THAT YOU WOULD BE BETTER OFF DEAD, OR OF HURTING YOURSELF: 0
SUM OF ALL RESPONSES TO PHQ QUESTIONS 1-9: 0
7. TROUBLE CONCENTRATING ON THINGS, SUCH AS READING THE NEWSPAPER OR WATCHING TELEVISION: 0
SUM OF ALL RESPONSES TO PHQ QUESTIONS 1-9: 0
2. FEELING DOWN, DEPRESSED OR HOPELESS: 0
4. FEELING TIRED OR HAVING LITTLE ENERGY: 0
SUM OF ALL RESPONSES TO PHQ QUESTIONS 1-9: 0
8. MOVING OR SPEAKING SO SLOWLY THAT OTHER PEOPLE COULD HAVE NOTICED. OR THE OPPOSITE, BEING SO FIGETY OR RESTLESS THAT YOU HAVE BEEN MOVING AROUND A LOT MORE THAN USUAL: 0
6. FEELING BAD ABOUT YOURSELF - OR THAT YOU ARE A FAILURE OR HAVE LET YOURSELF OR YOUR FAMILY DOWN: 0
SUM OF ALL RESPONSES TO PHQ9 QUESTIONS 1 & 2: 0
SUM OF ALL RESPONSES TO PHQ QUESTIONS 1-9: 0
1. LITTLE INTEREST OR PLEASURE IN DOING THINGS: 0
3. TROUBLE FALLING OR STAYING ASLEEP: 0
5. POOR APPETITE OR OVEREATING: 0

## 2023-06-01 ASSESSMENT — LIFESTYLE VARIABLES
HOW MANY STANDARD DRINKS CONTAINING ALCOHOL DO YOU HAVE ON A TYPICAL DAY: PATIENT DOES NOT DRINK
HOW OFTEN DO YOU HAVE A DRINK CONTAINING ALCOHOL: NEVER
HOW MANY STANDARD DRINKS CONTAINING ALCOHOL DO YOU HAVE ON A TYPICAL DAY: PATIENT DOES NOT DRINK
HOW OFTEN DO YOU HAVE A DRINK CONTAINING ALCOHOL: NEVER

## 2023-06-01 ASSESSMENT — ANXIETY QUESTIONNAIRES
GAD7 TOTAL SCORE: 0
6. BECOMING EASILY ANNOYED OR IRRITABLE: 0
7. FEELING AFRAID AS IF SOMETHING AWFUL MIGHT HAPPEN: 0
3. WORRYING TOO MUCH ABOUT DIFFERENT THINGS: 0
1. FEELING NERVOUS, ANXIOUS, OR ON EDGE: 0
5. BEING SO RESTLESS THAT IT IS HARD TO SIT STILL: 0
2. NOT BEING ABLE TO STOP OR CONTROL WORRYING: 0
4. TROUBLE RELAXING: 0

## 2023-06-05 RX ORDER — ROSUVASTATIN CALCIUM 40 MG/1
TABLET, COATED ORAL
Qty: 90 TABLET | Refills: 0 | Status: SHIPPED | OUTPATIENT
Start: 2023-06-05

## 2023-06-06 ENCOUNTER — TELEPHONE (OUTPATIENT)
Age: 88
End: 2023-06-06

## 2023-06-06 ENCOUNTER — ANCILLARY PROCEDURE (OUTPATIENT)
Age: 88
End: 2023-06-06
Payer: MEDICARE

## 2023-06-06 ENCOUNTER — OFFICE VISIT (OUTPATIENT)
Age: 88
End: 2023-06-06
Payer: MEDICARE

## 2023-06-06 VITALS
HEART RATE: 88 BPM | SYSTOLIC BLOOD PRESSURE: 118 MMHG | BODY MASS INDEX: 30.4 KG/M2 | HEIGHT: 61 IN | WEIGHT: 161 LBS | DIASTOLIC BLOOD PRESSURE: 78 MMHG

## 2023-06-06 VITALS
BODY MASS INDEX: 30.4 KG/M2 | DIASTOLIC BLOOD PRESSURE: 78 MMHG | WEIGHT: 161 LBS | HEART RATE: 62 BPM | OXYGEN SATURATION: 92 % | HEIGHT: 61 IN | SYSTOLIC BLOOD PRESSURE: 118 MMHG

## 2023-06-06 DIAGNOSIS — Z82.49 FAMILY HISTORY OF ISCHEMIC HEART DISEASE AND OTHER DISEASES OF THE CIRCULATORY SYSTEM: ICD-10-CM

## 2023-06-06 DIAGNOSIS — R06.02 SHORTNESS OF BREATH: ICD-10-CM

## 2023-06-06 DIAGNOSIS — I10 HTN (HYPERTENSION): ICD-10-CM

## 2023-06-06 DIAGNOSIS — I10 ESSENTIAL (PRIMARY) HYPERTENSION: Primary | ICD-10-CM

## 2023-06-06 DIAGNOSIS — I35.0 NONRHEUMATIC AORTIC VALVE STENOSIS: ICD-10-CM

## 2023-06-06 DIAGNOSIS — E78.2 MIXED HYPERLIPIDEMIA: ICD-10-CM

## 2023-06-06 LAB
ECHO AV MEAN GRADIENT: 45 MMHG
ECHO AV MEAN VELOCITY: 3.2 M/S
ECHO AV PEAK GRADIENT: 69 MMHG
ECHO AV PEAK VELOCITY: 4.2 M/S
ECHO AV VELOCITY RATIO: 0.21
ECHO AV VTI: 91 CM
ECHO BSA: 1.77 M2
ECHO LA DIAMETER INDEX: 2.21 CM/M2
ECHO LA DIAMETER: 3.8 CM
ECHO LA VOL 2C: 68 ML (ref 22–52)
ECHO LA VOL 4C: 46 ML (ref 22–52)
ECHO LA VOL BP: 54 ML (ref 22–52)
ECHO LA VOL/BSA BIPLANE: 31 ML/M2 (ref 16–34)
ECHO LA VOLUME AREA LENGTH: 57 ML
ECHO LA VOLUME INDEX A2C: 40 ML/M2 (ref 16–34)
ECHO LA VOLUME INDEX A4C: 27 ML/M2 (ref 16–34)
ECHO LA VOLUME INDEX AREA LENGTH: 33 ML/M2 (ref 16–34)
ECHO LV E' LATERAL VELOCITY: 7 CM/S
ECHO LV E' SEPTAL VELOCITY: 7 CM/S
ECHO LV FRACTIONAL SHORTENING: 30 % (ref 28–44)
ECHO LV INTERNAL DIMENSION DIASTOLE INDEX: 1.92 CM/M2
ECHO LV INTERNAL DIMENSION DIASTOLIC: 3.3 CM (ref 3.9–5.3)
ECHO LV INTERNAL DIMENSION SYSTOLIC INDEX: 1.34 CM/M2
ECHO LV INTERNAL DIMENSION SYSTOLIC: 2.3 CM
ECHO LV IVSD: 1 CM (ref 0.6–0.9)
ECHO LV MASS 2D: 87.7 G (ref 67–162)
ECHO LV MASS INDEX 2D: 51 G/M2 (ref 43–95)
ECHO LV POSTERIOR WALL DIASTOLIC: 0.9 CM (ref 0.6–0.9)
ECHO LV RELATIVE WALL THICKNESS RATIO: 0.55
ECHO LVOT AV VTI INDEX: 0.17
ECHO LVOT MEAN GRADIENT: 2 MMHG
ECHO LVOT PEAK GRADIENT: 3 MMHG
ECHO LVOT PEAK VELOCITY: 0.9 M/S
ECHO LVOT VTI: 15.7 CM
ECHO MV A VELOCITY: 1.43 M/S
ECHO MV AREA PHT: 4.1 CM2
ECHO MV E DECELERATION TIME (DT): 183.7 MS
ECHO MV E VELOCITY: 0.74 M/S
ECHO MV E/A RATIO: 0.52
ECHO MV E/E' LATERAL: 10.57
ECHO MV E/E' RATIO (AVERAGED): 10.57
ECHO MV E/E' SEPTAL: 10.57
ECHO MV PRESSURE HALF TIME (PHT): 53.3 MS
ECHO RV FREE WALL PEAK S': 13 CM/S
ECHO RV TAPSE: 1.9 CM (ref 1.7–?)

## 2023-06-06 PROCEDURE — 93306 TTE W/DOPPLER COMPLETE: CPT

## 2023-06-06 PROCEDURE — 1036F TOBACCO NON-USER: CPT

## 2023-06-06 PROCEDURE — 99214 OFFICE O/P EST MOD 30 MIN: CPT

## 2023-06-06 PROCEDURE — 1090F PRES/ABSN URINE INCON ASSESS: CPT

## 2023-06-06 PROCEDURE — 93306 TTE W/DOPPLER COMPLETE: CPT | Performed by: SPECIALIST

## 2023-06-06 PROCEDURE — G8427 DOCREV CUR MEDS BY ELIG CLIN: HCPCS

## 2023-06-06 PROCEDURE — 1123F ACP DISCUSS/DSCN MKR DOCD: CPT

## 2023-06-06 PROCEDURE — G8417 CALC BMI ABV UP PARAM F/U: HCPCS

## 2023-06-06 NOTE — PROGRESS NOTES
Medicare Annual Wellness Visit    Jose Blanton is here for Medicare AW    Assessment & Plan   Encounter for general adult medical examination with abnormal findings  Chronic kidney disease, stage 3a ([facility]  Type 2 diabetes mellitus with hyperglycemia, without long-term current use of insulin ([facility]  Essential (primary) hypertension    No labs due at this time  No follow-up provider specified. Recommendations for Preventive Services Due: see orders and patient instructions/AVS.  Recommended screening schedule for the next 5-10 years is provided to the patient in written form: see Patient Instructions/AVS.     Return for mid Sept fasting labs. Subjective   The following acute and/or chronic problems were also addressed today:  She is also followed for htn, bp and dm  Sugar is below 130 am fasting basically every day      Patient's complete Health Risk Assessment and screening values have been reviewed and are found in Flowsheets. The following problems were reviewed today and where indicated follow up appointments were made and/or referrals ordered. Positive Risk Factor Screenings with Interventions:                 Weight and Activity:  Physical Activity: Inactive    Days of Exercise per Week: 0 days    Minutes of Exercise per Session: 0 min     On average, how many days per week do you engage in moderate to strenuous exercise (like a brisk walk)?: 0 days  Have you lost any weight without trying in the past 3 months?: No  Body mass index is 30.42 kg/m².  (!) Abnormal      Inactivity Interventions:  Patient declined any further interventions or treatment  Obesity Interventions:  Patient declines any further evaluation or treatment        Dentist Screen:  Have you seen the dentist within the past year?: (!) No    Intervention:  Patient declines any further evaluation or treatment      Safety:  Do you have either shower bars, grab bars, non-slip mats or non-slip surfaces in your shower or bathtub?: (!)

## 2023-06-06 NOTE — PATIENT INSTRUCTIONS
diabetes, high blood pressure, and high cholesterol. If you think you may have a problem with alcohol or drug use, talk to your doctor. Medicines    Take your medicines exactly as prescribed. Call your doctor if you think you are having a problem with your medicine.     If your doctor recommends aspirin, take the amount directed each day. Make sure you take aspirin and not another kind of pain reliever, such as acetaminophen (Tylenol). When should you call for help? Call 911 if you have symptoms of a heart attack. These may include:    Chest pain or pressure, or a strange feeling in the chest.     Sweating.     Shortness of breath.     Pain, pressure, or a strange feeling in the back, neck, jaw, or upper belly or in one or both shoulders or arms.     Lightheadedness or sudden weakness.     A fast or irregular heartbeat. After you call 911, the  may tell you to chew 1 adult-strength or 2 to 4 low-dose aspirin. Wait for an ambulance. Do not try to drive yourself. Watch closely for changes in your health, and be sure to contact your doctor if you have any problems. Where can you learn more? Go to http://www.carr.com/ and enter F075 to learn more about \"A Healthy Heart: Care Instructions. \"  Current as of: September 7, 2022               Content Version: 13.6  © 2006-2023 Healthwise[employer] Inc[employer]. Care instructions adapted under license by [facility] . If you have questions about a medical condition or this instruction, always ask your healthcare professional. [place] [address] any warranty or liability for your use of this information. Personalized Preventive Plan for [facility] - 6/1/2023  Medicare offers a range of preventive health benefits. Some of the tests and screenings are paid in full while other may be subject to a deductible, co-insurance, and/or copay.     Some of these benefits include a comprehensive review of your medical history including

## 2023-06-06 NOTE — PROGRESS NOTES
Chief Complaint   Patient presents with    Follow-up    Shortness of Breath    Hypertension    Hyperlipidemia     Patient had echo today . Chest Pain- no    Palpitations- no    SOB- yes     Dizziness- no    Swelling- no    Last Lipids- 3/21/2023    Refills- no    /78 (Site: Left Upper Arm, Position: Sitting)   Pulse 62   Ht 5' 1\" (1.549 m)   Wt 161 lb (73 kg)   SpO2 92%   BMI 30.42 kg/m²       Have you been to the ER, urgent care clinic since your last visit? no  Hospitalized since your last visit? NO    2.  Have you seen or consulted with any other health care providers outside of the [address] [place] [address] since your last visit?  no

## 2023-06-07 ENCOUNTER — TELEPHONE (OUTPATIENT)
Age: 88
End: 2023-06-07

## 2023-06-07 NOTE — TELEPHONE ENCOUNTER
Angela with [facility] called in and states they need a dx code for True Metrix Glucose Test Strip strip please. Call back number is [phone]. Thanks.

## 2023-06-25 RX ORDER — AMLODIPINE BESYLATE 10 MG/1
TABLET ORAL
Qty: 90 TABLET | Refills: 3 | Status: SHIPPED | OUTPATIENT
Start: 2023-06-25

## 2023-06-26 RX ORDER — FOLIC ACID 1 MG/1
TABLET ORAL
Qty: 90 TABLET | Refills: 0 | Status: SHIPPED | OUTPATIENT
Start: 2023-06-26

## 2023-07-21 ENCOUNTER — TELEPHONE (OUTPATIENT)
Age: 88
End: 2023-07-21

## 2023-07-21 ENCOUNTER — OFFICE VISIT (OUTPATIENT)
Age: 88
End: 2023-07-21
Payer: MEDICARE

## 2023-07-21 VITALS
OXYGEN SATURATION: 96 % | SYSTOLIC BLOOD PRESSURE: 130 MMHG | HEART RATE: 74 BPM | DIASTOLIC BLOOD PRESSURE: 74 MMHG | HEIGHT: 61 IN | BODY MASS INDEX: 30.58 KG/M2 | WEIGHT: 162 LBS

## 2023-07-21 DIAGNOSIS — I35.0 NONRHEUMATIC AORTIC VALVE STENOSIS: Primary | ICD-10-CM

## 2023-07-21 DIAGNOSIS — I35.0 AORTIC VALVE STENOSIS, ETIOLOGY OF CARDIAC VALVE DISEASE UNSPECIFIED: Primary | ICD-10-CM

## 2023-07-21 PROCEDURE — 99205 OFFICE O/P NEW HI 60 MIN: CPT | Performed by: INTERNAL MEDICINE

## 2023-07-21 PROCEDURE — 93005 ELECTROCARDIOGRAM TRACING: CPT | Performed by: INTERNAL MEDICINE

## 2023-07-21 ASSESSMENT — PATIENT HEALTH QUESTIONNAIRE - PHQ9
SUM OF ALL RESPONSES TO PHQ QUESTIONS 1-9: 0
SUM OF ALL RESPONSES TO PHQ QUESTIONS 1-9: 0
1. LITTLE INTEREST OR PLEASURE IN DOING THINGS: 0
SUM OF ALL RESPONSES TO PHQ QUESTIONS 1-9: 0
SUM OF ALL RESPONSES TO PHQ QUESTIONS 1-9: 0
2. FEELING DOWN, DEPRESSED OR HOPELESS: 0
SUM OF ALL RESPONSES TO PHQ9 QUESTIONS 1 & 2: 0

## 2023-07-21 NOTE — PATIENT INSTRUCTIONS
You will be scheduled for a CTA study and Yessi Ott NP with the valve clinic will reach out to you. Our  will reach out to you and schedule your heart catheterization. Patient Education        Left Heart Catheterization: About This Test  What is it? Left heart catheterization is a test to check the left side of your heart. Your doctor might check the structure of your heart, the motion of your heart, or the blood pressure inside the chambers. Why is this test done? This test gives information about how your heart is working. It can:  Check blood flow and blood pressure in the chambers of the heart. Check the pumping action of the heart. Find out if a heart defect is present and how severe it is. Find out how well the heart valves work. How is the test done? You may get medicine to help you relax. You will get a shot to numb the skin where the catheter goes in. A thin tube called a catheter is put into a blood vessel in your groin or wrist. The doctor moves the catheter through the blood vessel into your heart. Dye may be injected into your heart. Your doctor can watch on special monitors as the dye moves in your heart. The dye helps your doctor see blood flow in your heart. If a heart defect is found, cardiac catheterization sometimes is used to repair it during the test.  After the procedure, pressure may be applied for a short time to the area where the catheter was put into your blood vessel. This will help prevent bleeding. A small device may also be used to close the blood vessel. You may have a bandage or compression device on the catheter site. You will stay in a room for at least a few hours to make sure the catheter site starts to heal.  If the catheter was placed in your groin, you may lie in bed for up to a few hours. If the catheter was put in your wrist, you will need to keep your arm still for at least 1 hour. How long does it take? The test may take about 1 hour.  But you

## 2023-07-21 NOTE — TELEPHONE ENCOUNTER
----- Message from SRINIVAS Barreto CNP sent at 7/21/2023  2:10 PM EDT -----  Hi --    Severe AS. We are setting up for [facility]. Leaving TAVR CT order and Valve clinic FU to you. Thanks!     Sukhwinder Ceballos

## 2023-07-21 NOTE — PROGRESS NOTES
03/21/2023 10:30 AM    CALCIUM 9.6 03/21/2023 10:30 AM    LABGLOM 43 02/28/2022 12:00 AM      Lab Results   Component Value Date    WBC 9.0 03/21/2023    HGB 14.4 03/21/2023    HCT 44.8 03/21/2023    MCV 92.2 03/21/2023     03/21/2023           ATTENTION:   This medical record was transcribed using an electronic medical records/speech recognition system. Although proofread, it may and can contain electronic, spelling and other errors. Corrections may be executed at a later time. Please feel free to contact us for any clarifications as needed.     [facility] heart and Vascular [place]  [facility],  [place], [place]. [phone]

## 2023-07-24 ENCOUNTER — TELEPHONE (OUTPATIENT)
Age: 88
End: 2023-07-24

## 2023-07-24 NOTE — TELEPHONE ENCOUNTER
Spoke with Ms. Lacy to give her information regarding scheduling her CTA for TAVR workup. She will call central scheduling to set up the appointment.

## 2023-07-25 DIAGNOSIS — Z01.812 PRE-PROCEDURE LAB EXAM: ICD-10-CM

## 2023-07-25 DIAGNOSIS — I35.0 AORTIC VALVE STENOSIS, ETIOLOGY OF CARDIAC VALVE DISEASE UNSPECIFIED: Primary | ICD-10-CM

## 2023-07-25 RX ORDER — CARBINOXAMINE MALEATE 4 MG/1
TABLET ORAL
Qty: 60 TABLET | Refills: 1 | Status: SHIPPED | OUTPATIENT
Start: 2023-07-25

## 2023-07-26 ENCOUNTER — TELEPHONE (OUTPATIENT)
Age: 88
End: 2023-07-26

## 2023-07-27 ENCOUNTER — TELEPHONE (OUTPATIENT)
Age: 88
End: 2023-07-27

## 2023-07-27 NOTE — TELEPHONE ENCOUNTER
Spoke with Pt of /[facility] schd. For 8/8/23 At 9:15am arrive at 7:45am Pt aware that they need a  NPO from [address] the night before. Check in at the second floor Outpt. Reg. Desk.  Pt is to have Labs done between 7/17/23-8/2/23 at [facility].   Medications:  Hold coumadin 3 days prior to procedure  Hold Glimepiride day of procedure  Hold Spironolactone day of procedure     VO by //nurse Kizzy LANDRUM/[facility]

## 2023-07-28 ENCOUNTER — TELEPHONE (OUTPATIENT)
Age: 88
End: 2023-07-28

## 2023-07-28 ENCOUNTER — HOSPITAL ENCOUNTER (OUTPATIENT)
Facility: HOSPITAL | Age: 88
End: 2023-07-28
Payer: MEDICARE

## 2023-07-28 ENCOUNTER — APPOINTMENT (OUTPATIENT)
Facility: HOSPITAL | Age: 88
End: 2023-07-28
Payer: MEDICARE

## 2023-07-28 DIAGNOSIS — I35.0 NONRHEUMATIC AORTIC VALVE STENOSIS: ICD-10-CM

## 2023-07-28 LAB — CREAT BLD-MCNC: 1.4 MG/DL (ref 0.6–1.3)

## 2023-07-28 PROCEDURE — 82565 ASSAY OF CREATININE: CPT

## 2023-07-28 PROCEDURE — 74174 CTA ABD&PLVS W/CONTRAST: CPT

## 2023-07-28 PROCEDURE — 6360000002 HC RX W HCPCS: Performed by: NURSE PRACTITIONER

## 2023-07-28 PROCEDURE — 6360000004 HC RX CONTRAST MEDICATION: Performed by: NURSE PRACTITIONER

## 2023-07-28 RX ORDER — HEPARIN SODIUM (PORCINE) LOCK FLUSH IV SOLN 100 UNIT/ML 100 UNIT/ML
500 SOLUTION INTRAVENOUS
Status: COMPLETED | OUTPATIENT
Start: 2023-07-28 | End: 2023-07-28

## 2023-07-28 RX ORDER — HEPARIN 100 UNIT/ML
SYRINGE INTRAVENOUS
Status: DISPENSED
Start: 2023-07-28 | End: 2023-07-29

## 2023-07-28 RX ADMIN — HEPARIN 500 UNITS: 100 SYRINGE at 12:59

## 2023-07-28 RX ADMIN — IOPAMIDOL 100 ML: 755 INJECTION, SOLUTION INTRAVENOUS at 12:45

## 2023-07-31 ENCOUNTER — TELEPHONE (OUTPATIENT)
Age: 88
End: 2023-07-31

## 2023-08-07 ENCOUNTER — DIRECT ADMIT ORDERS (OUTPATIENT)
Facility: HOSPITAL | Age: 88
End: 2023-08-07

## 2023-08-07 RX ORDER — SODIUM CHLORIDE 0.9 % (FLUSH) 0.9 %
5-40 SYRINGE (ML) INJECTION PRN
Status: CANCELLED | OUTPATIENT
Start: 2023-08-08

## 2023-08-07 RX ORDER — SODIUM CHLORIDE 9 MG/ML
INJECTION, SOLUTION INTRAVENOUS CONTINUOUS
Status: CANCELLED | OUTPATIENT
Start: 2023-08-08

## 2023-08-07 RX ORDER — SODIUM CHLORIDE 0.9 % (FLUSH) 0.9 %
5-40 SYRINGE (ML) INJECTION EVERY 12 HOURS SCHEDULED
Status: CANCELLED | OUTPATIENT
Start: 2023-08-08

## 2023-08-07 NOTE — TELEPHONE ENCOUNTER
Spoke to pt to come in at 6;45 for IV Hydration called and spoke to Kj Irving in the Cath Lab and Let him Know

## 2023-08-08 ENCOUNTER — HOSPITAL ENCOUNTER (OUTPATIENT)
Facility: HOSPITAL | Age: 88
Setting detail: OUTPATIENT SURGERY
Discharge: HOME | End: 2023-08-08
Attending: INTERNAL MEDICINE | Admitting: INTERNAL MEDICINE
Payer: MEDICARE

## 2023-08-08 VITALS
SYSTOLIC BLOOD PRESSURE: 117 MMHG | BODY MASS INDEX: 30.57 KG/M2 | HEIGHT: 61 IN | OXYGEN SATURATION: 92 % | TEMPERATURE: 97.5 F | HEART RATE: 70 BPM | RESPIRATION RATE: 16 BRPM | WEIGHT: 161.9 LBS | DIASTOLIC BLOOD PRESSURE: 54 MMHG

## 2023-08-08 DIAGNOSIS — I35.0 AORTIC STENOSIS: ICD-10-CM

## 2023-08-08 LAB
ECHO BSA: 1.78 M2
GLUCOSE BLD STRIP.AUTO-MCNC: 173 MG/DL (ref 65–117)
INR BLD: 1.1
SERVICE CMNT-IMP: ABNORMAL

## 2023-08-08 PROCEDURE — 75630 X-RAY AORTA LEG ARTERIES: CPT | Performed by: INTERNAL MEDICINE

## 2023-08-08 PROCEDURE — 93454 CORONARY ARTERY ANGIO S&I: CPT | Performed by: INTERNAL MEDICINE

## 2023-08-08 PROCEDURE — 82962 GLUCOSE BLOOD TEST: CPT

## 2023-08-08 PROCEDURE — 6360000002 HC RX W HCPCS: Performed by: INTERNAL MEDICINE

## 2023-08-08 PROCEDURE — 6360000004 HC RX CONTRAST MEDICATION: Performed by: INTERNAL MEDICINE

## 2023-08-08 PROCEDURE — 99152 MOD SED SAME PHYS/QHP 5/>YRS: CPT | Performed by: INTERNAL MEDICINE

## 2023-08-08 PROCEDURE — C1894 INTRO/SHEATH, NON-LASER: HCPCS | Performed by: INTERNAL MEDICINE

## 2023-08-08 PROCEDURE — 2709999900 HC NON-CHARGEABLE SUPPLY: Performed by: INTERNAL MEDICINE

## 2023-08-08 PROCEDURE — 99153 MOD SED SAME PHYS/QHP EA: CPT | Performed by: INTERNAL MEDICINE

## 2023-08-08 PROCEDURE — C1887 CATHETER, GUIDING: HCPCS | Performed by: INTERNAL MEDICINE

## 2023-08-08 PROCEDURE — 85610 PROTHROMBIN TIME: CPT

## 2023-08-08 PROCEDURE — C1769 GUIDE WIRE: HCPCS | Performed by: INTERNAL MEDICINE

## 2023-08-08 PROCEDURE — 2500000003 HC RX 250 WO HCPCS: Performed by: INTERNAL MEDICINE

## 2023-08-08 PROCEDURE — C1725 CATH, TRANSLUMIN NON-LASER: HCPCS | Performed by: INTERNAL MEDICINE

## 2023-08-08 RX ORDER — SODIUM CHLORIDE 9 MG/ML
INJECTION, SOLUTION INTRAVENOUS PRN
Status: DISCONTINUED | OUTPATIENT
Start: 2023-08-08 | End: 2023-08-08 | Stop reason: HOSPADM

## 2023-08-08 RX ORDER — SODIUM CHLORIDE 0.9 % (FLUSH) 0.9 %
5-40 SYRINGE (ML) INJECTION PRN
Status: DISCONTINUED | OUTPATIENT
Start: 2023-08-08 | End: 2023-08-08 | Stop reason: HOSPADM

## 2023-08-08 RX ORDER — MIDAZOLAM HYDROCHLORIDE 1 MG/ML
INJECTION INTRAMUSCULAR; INTRAVENOUS PRN
Status: DISCONTINUED | OUTPATIENT
Start: 2023-08-08 | End: 2023-08-08 | Stop reason: HOSPADM

## 2023-08-08 RX ORDER — HEPARIN SODIUM 200 [USP'U]/100ML
INJECTION, SOLUTION INTRAVENOUS PRN
Status: DISCONTINUED | OUTPATIENT
Start: 2023-08-08 | End: 2023-08-08 | Stop reason: HOSPADM

## 2023-08-08 RX ORDER — SODIUM CHLORIDE 9 MG/ML
INJECTION, SOLUTION INTRAVENOUS CONTINUOUS
Status: DISCONTINUED | OUTPATIENT
Start: 2023-08-08 | End: 2023-08-08 | Stop reason: HOSPADM

## 2023-08-08 RX ORDER — LIDOCAINE HYDROCHLORIDE 10 MG/ML
INJECTION, SOLUTION INFILTRATION; PERINEURAL PRN
Status: DISCONTINUED | OUTPATIENT
Start: 2023-08-08 | End: 2023-08-08 | Stop reason: HOSPADM

## 2023-08-08 RX ORDER — SODIUM CHLORIDE 0.9 % (FLUSH) 0.9 %
5-40 SYRINGE (ML) INJECTION EVERY 12 HOURS SCHEDULED
Status: DISCONTINUED | OUTPATIENT
Start: 2023-08-08 | End: 2023-08-08 | Stop reason: HOSPADM

## 2023-08-08 RX ORDER — ACETAMINOPHEN 325 MG/1
650 TABLET ORAL EVERY 4 HOURS PRN
Status: DISCONTINUED | OUTPATIENT
Start: 2023-08-08 | End: 2023-08-08 | Stop reason: HOSPADM

## 2023-08-08 RX ORDER — FENTANYL CITRATE 50 UG/ML
INJECTION, SOLUTION INTRAMUSCULAR; INTRAVENOUS PRN
Status: DISCONTINUED | OUTPATIENT
Start: 2023-08-08 | End: 2023-08-08 | Stop reason: HOSPADM

## 2023-08-08 RX ORDER — ATORVASTATIN CALCIUM 40 MG/1
40 TABLET, FILM COATED ORAL DAILY
COMMUNITY

## 2023-08-08 NOTE — DISCHARGE INSTRUCTIONS
Cardiac Catheterization/Angiography Discharge Instructions    *Check the puncture site frequently for swelling or bleeding. If you see any bleeding, lie down and apply pressure over the area and call 911. Notify your doctor for any redness, swelling, drainage or oozing from the puncture site. Notify your doctor for any fever or chills. *If the leg with the puncture becomes cold, numb or painful, call your cardiologist.    *Activity should be limited for the next 48 hours. Climb stairs as little as possible and avoid any stooping, bending or strenuous activity for 48 hours. No heavy lifting (anything over 10 pounds) for three days. *Do not drive for 24 hours. *You may resume your usual diet. Drink more fluids than usual.    *Have a responsible person drive you home and stay with you for at least 24 hours after your heart catheterization/angiography. *You may remove the bandage from your groin in 24 hours. You may shower in 24 hours. No tub baths, hot tubs or swimming for one week. Do not place any lotions, creams, powders, ointments over the puncture site for one week. You may place a clean band-aid over the puncture site each day for 5 days. Change this daily.

## 2023-08-08 NOTE — PROGRESS NOTES
1115  Blood aspirated from sheath. 6 Fr sheath pulled from r Groin. Dwyane Sprain applied. Manual pressure held by RONNELL oliva. Hematoma developed at 10 minute kailey. Manual pressure held by this nurse. 1125   Pressure release. Manually pressure held again with new quit clot. 1132  Pressure released. Tegaderm applied. Small sandbag applied. Groin nice and soft. 1330  Pt ambulated to bathroom. Post ambulation groin check, CDI.    1400  Discharge instructions reviewed with patient and . 80  Pt discharged via wheelchair with . Personal belongings with patient upon discharge.

## 2023-08-08 NOTE — INTERVAL H&P NOTE
Update History & Physical    The patient's History and Physical of  7/27/23,  was reviewed with the patient and I examined the patient. There was no change. Plan: The risks, benefits, expected outcome, and alternative to the recommended procedure have been discussed with the patient. Patient understands and wants to proceed with the procedure.      Electronically signed by Stella Ortiz MD on 8/8/2023 at 9:05 AM

## 2023-08-14 ENCOUNTER — TELEPHONE (OUTPATIENT)
Age: 88
End: 2023-08-14

## 2023-08-14 NOTE — TELEPHONE ENCOUNTER
Catheterization insertion site. Not hot to touch. Took 2 tylenol d/t pain. Painful with movement. Pain started on Saturday. No pain previously. Per Dr. Arielle Davis, apply warm compresses to site. If pain/swelling does not improve have doppler study done. No scheduling availability in office. Recommend ER evaluation if does not improve or worsens.

## 2023-08-16 ENCOUNTER — HOSPITAL ENCOUNTER (EMERGENCY)
Facility: HOSPITAL | Age: 88
Discharge: HOME | End: 2023-08-16
Attending: EMERGENCY MEDICINE
Payer: MEDICARE

## 2023-08-16 ENCOUNTER — APPOINTMENT (OUTPATIENT)
Dept: VASCULAR SURGERY | Facility: HOSPITAL | Age: 88
End: 2023-08-16
Attending: EMERGENCY MEDICINE
Payer: MEDICARE

## 2023-08-16 VITALS
HEIGHT: 61 IN | DIASTOLIC BLOOD PRESSURE: 76 MMHG | WEIGHT: 163 LBS | OXYGEN SATURATION: 96 % | SYSTOLIC BLOOD PRESSURE: 137 MMHG | HEART RATE: 76 BPM | RESPIRATION RATE: 18 BRPM | TEMPERATURE: 98 F | BODY MASS INDEX: 30.78 KG/M2

## 2023-08-16 DIAGNOSIS — I82.411 DEEP VEIN THROMBOSIS (DVT) OF FEMORAL VEIN OF RIGHT LOWER EXTREMITY, UNSPECIFIED CHRONICITY (HCC): Primary | ICD-10-CM

## 2023-08-16 DIAGNOSIS — E86.0 DEHYDRATION: ICD-10-CM

## 2023-08-16 LAB
ALBUMIN SERPL-MCNC: 3.6 G/DL (ref 3.5–5)
ALBUMIN/GLOB SERPL: 1.1 (ref 1.1–2.2)
ALP SERPL-CCNC: 58 U/L (ref 45–117)
ALT SERPL-CCNC: 45 U/L (ref 12–78)
ANION GAP SERPL CALC-SCNC: 6 MMOL/L (ref 5–15)
AST SERPL-CCNC: 29 U/L (ref 15–37)
BASOPHILS # BLD: 0.1 K/UL (ref 0–0.1)
BASOPHILS NFR BLD: 0 % (ref 0–1)
BILIRUB SERPL-MCNC: 0.4 MG/DL (ref 0.2–1)
BUN SERPL-MCNC: 22 MG/DL (ref 6–20)
BUN/CREAT SERPL: 14 (ref 12–20)
CALCIUM SERPL-MCNC: 9.5 MG/DL (ref 8.5–10.1)
CHLORIDE SERPL-SCNC: 110 MMOL/L (ref 97–108)
CO2 SERPL-SCNC: 25 MMOL/L (ref 21–32)
COMMENT:: NORMAL
CREAT SERPL-MCNC: 1.54 MG/DL (ref 0.55–1.02)
DIFFERENTIAL METHOD BLD: ABNORMAL
ECHO BSA: 1.78 M2
EOSINOPHIL # BLD: 0.2 K/UL (ref 0–0.4)
EOSINOPHIL NFR BLD: 1 % (ref 0–7)
ERYTHROCYTE [DISTWIDTH] IN BLOOD BY AUTOMATED COUNT: 13.5 % (ref 11.5–14.5)
GLOBULIN SER CALC-MCNC: 3.3 G/DL (ref 2–4)
GLUCOSE SERPL-MCNC: 231 MG/DL (ref 65–100)
HCT VFR BLD AUTO: 39.9 % (ref 35–47)
HGB BLD-MCNC: 13.1 G/DL (ref 11.5–16)
IMM GRANULOCYTES # BLD AUTO: 0.1 K/UL (ref 0–0.04)
IMM GRANULOCYTES NFR BLD AUTO: 1 % (ref 0–0.5)
INR PPP: 2.9 (ref 0.9–1.1)
LYMPHOCYTES # BLD: 3.4 K/UL (ref 0.8–3.5)
LYMPHOCYTES NFR BLD: 28 % (ref 12–49)
MCH RBC QN AUTO: 29.6 PG (ref 26–34)
MCHC RBC AUTO-ENTMCNC: 32.8 G/DL (ref 30–36.5)
MCV RBC AUTO: 90.3 FL (ref 80–99)
MONOCYTES # BLD: 1 K/UL (ref 0–1)
MONOCYTES NFR BLD: 8 % (ref 5–13)
NEUTS SEG # BLD: 7.5 K/UL (ref 1.8–8)
NEUTS SEG NFR BLD: 62 % (ref 32–75)
NRBC # BLD: 0 K/UL (ref 0–0.01)
NRBC BLD-RTO: 0 PER 100 WBC
PLATELET # BLD AUTO: 258 K/UL (ref 150–400)
PMV BLD AUTO: 11.3 FL (ref 8.9–12.9)
POTASSIUM SERPL-SCNC: 4 MMOL/L (ref 3.5–5.1)
PROT SERPL-MCNC: 6.9 G/DL (ref 6.4–8.2)
PROTHROMBIN TIME: 27.7 SEC (ref 9–11.1)
RBC # BLD AUTO: 4.42 M/UL (ref 3.8–5.2)
SODIUM SERPL-SCNC: 141 MMOL/L (ref 136–145)
SPECIMEN HOLD: NORMAL
WBC # BLD AUTO: 12.2 K/UL (ref 3.6–11)

## 2023-08-16 PROCEDURE — 80053 COMPREHEN METABOLIC PANEL: CPT

## 2023-08-16 PROCEDURE — 2580000003 HC RX 258: Performed by: EMERGENCY MEDICINE

## 2023-08-16 PROCEDURE — 93971 EXTREMITY STUDY: CPT

## 2023-08-16 PROCEDURE — 85610 PROTHROMBIN TIME: CPT

## 2023-08-16 PROCEDURE — 99284 EMERGENCY DEPT VISIT MOD MDM: CPT

## 2023-08-16 PROCEDURE — 85025 COMPLETE CBC W/AUTO DIFF WBC: CPT

## 2023-08-16 PROCEDURE — 36415 COLL VENOUS BLD VENIPUNCTURE: CPT

## 2023-08-16 PROCEDURE — 96360 HYDRATION IV INFUSION INIT: CPT

## 2023-08-16 RX ORDER — 0.9 % SODIUM CHLORIDE 0.9 %
1000 INTRAVENOUS SOLUTION INTRAVENOUS ONCE
Status: COMPLETED | OUTPATIENT
Start: 2023-08-16 | End: 2023-08-16

## 2023-08-16 RX ADMIN — SODIUM CHLORIDE 1000 ML: 9 INJECTION, SOLUTION INTRAVENOUS at 15:58

## 2023-08-16 ASSESSMENT — PAIN SCALES - GENERAL: PAINLEVEL_OUTOF10: 10

## 2023-08-16 ASSESSMENT — PAIN - FUNCTIONAL ASSESSMENT: PAIN_FUNCTIONAL_ASSESSMENT: 0-10

## 2023-08-16 ASSESSMENT — PAIN DESCRIPTION - ORIENTATION: ORIENTATION: RIGHT

## 2023-08-16 NOTE — ED TRIAGE NOTES
Pt arrives to the ER for complaints of right thigh pain post cardiac catheterization. Pt states on 8/8 she had the catheterization with no complaints. On 8/12, she began to have pain and swelling to the insertion site of the catheter. Denies any calf pain, fevers, chest pain, shortness of breath.

## 2023-08-16 NOTE — ED PROVIDER NOTES
Medications    AMLODIPINE (NORVASC) 10 MG TABLET    TAKE ONE TABLET BY MOUTH EVERY MORNING    ATORVASTATIN (LIPITOR) 40 MG TABLET    Take 1 tablet by mouth daily    BLOOD GLUCOSE TEST STRIPS (ASCENSIA AUTODISC VI;ONE TOUCH ULTRA TEST VI) STRIP    Blood sugar checks twice daily before meals dx: E11.9    CARBINOXAMINE MALEATE 4 MG TABS    TAKE ONE TABLET BY MOUTH TWICE A DAY    DOCUSATE (COLACE, DULCOLAX) 100 MG CAPS    Take by mouth daily    FOLIC ACID (FOLVITE) 1 MG TABLET    Take 1 tablet by mouth once daily    GLIMEPIRIDE (AMARYL) 2 MG TABLET    TAKE ONE TABLET BY MOUTH EVERY MORNING WITH BREAKFAST AND TAKE TWO TABLETS BY MOUTH DAILY WITH DINNER    SODIUM BICARBONATE POWD    by Does not apply route    SPIRONOLACTONE (ALDACTONE) 25 MG TABLET    ceived the following from [facility]: Outside name: spironolactone (ALDACTONE) 25 mg tablet    WARFARIN (COUMADIN) 5 MG TABLET    Take by mouth daily 2.5 mg Monday and Wednesday and 5 mg other days       ALLERGIES     Sulfa antibiotics    FAMILY HISTORY       Family History   Problem Relation Age of Onset    Hypertension Father     Cancer Sister         breast    Diabetes Brother           SOCIAL HISTORY       Social History     Socioeconomic History    Marital status:    Tobacco Use    Smoking status: Never    Smokeless tobacco: Never   Substance and Sexual Activity    Alcohol use: No    Drug use: No    Sexual activity: Not Currently     Social Determinants of Health     Financial Resource Strain: Low Risk     Difficulty of Paying Living Expenses: Not hard at all   Food Insecurity: No Food Insecurity    Worried About Running Out of Food in the Last Year: Never true    Ran Out of Food in the Last Year: Never true   Transportation Needs: Unknown    Lack of Transportation (Non-Medical):  No   Physical Activity: Inactive    Days of Exercise per Week: 0 days    Minutes of Exercise per Session: 0 min   Housing Stability: Unknown    Unstable Housing in the

## 2023-08-16 NOTE — DISCHARGE INSTRUCTIONS
Please follow-up with your primary care doctor for dehydration. Please follow-up with your hematologist for DVT.

## 2023-08-16 NOTE — ED NOTES
Reviewed pt's discharge paperwork with pt including follow up care.      Shanda Rios RN  08/16/23 [id number]

## 2023-08-17 ENCOUNTER — INITIAL CONSULT (OUTPATIENT)
Age: 88
End: 2023-08-17

## 2023-08-17 ENCOUNTER — INITIAL CONSULT (OUTPATIENT)
Age: 88
End: 2023-08-17
Payer: MEDICARE

## 2023-08-17 VITALS
HEART RATE: 95 BPM | OXYGEN SATURATION: 97 % | TEMPERATURE: 97.7 F | SYSTOLIC BLOOD PRESSURE: 101 MMHG | DIASTOLIC BLOOD PRESSURE: 69 MMHG

## 2023-08-17 VITALS — BODY MASS INDEX: 30.8 KG/M2 | WEIGHT: 163 LBS

## 2023-08-17 DIAGNOSIS — I35.0 NONRHEUMATIC AORTIC VALVE STENOSIS: Primary | ICD-10-CM

## 2023-08-17 DIAGNOSIS — I35.0 AORTIC VALVE STENOSIS, ETIOLOGY OF CARDIAC VALVE DISEASE UNSPECIFIED: Primary | ICD-10-CM

## 2023-08-17 DIAGNOSIS — E78.2 MIXED HYPERLIPIDEMIA: ICD-10-CM

## 2023-08-17 PROCEDURE — 1090F PRES/ABSN URINE INCON ASSESS: CPT | Performed by: INTERNAL MEDICINE

## 2023-08-17 PROCEDURE — G8428 CUR MEDS NOT DOCUMENT: HCPCS | Performed by: INTERNAL MEDICINE

## 2023-08-17 PROCEDURE — 99215 OFFICE O/P EST HI 40 MIN: CPT | Performed by: INTERNAL MEDICINE

## 2023-08-17 PROCEDURE — G8417 CALC BMI ABV UP PARAM F/U: HCPCS | Performed by: INTERNAL MEDICINE

## 2023-08-17 PROCEDURE — 1123F ACP DISCUSS/DSCN MKR DOCD: CPT | Performed by: INTERNAL MEDICINE

## 2023-08-17 PROCEDURE — 1036F TOBACCO NON-USER: CPT | Performed by: INTERNAL MEDICINE

## 2023-08-17 RX ORDER — ROSUVASTATIN CALCIUM 40 MG/1
40 TABLET, COATED ORAL EVERY EVENING
COMMUNITY
End: 2023-08-28

## 2023-08-17 RX ORDER — OMEPRAZOLE 20 MG/1
40 CAPSULE, DELAYED RELEASE ORAL DAILY
COMMUNITY

## 2023-08-17 NOTE — PROGRESS NOTES
Unable to obtain 5 meter walk due to patient being wheelchair bound for a blood clot in her leg.
recently for Cardiac Catheterization. She had been bridged with Lovenox  Autoimmune Hemolytic Anemia (1998)    The patient was evaluated by Rafa Isaacs and Candace who discussed conventional aortic valve replacement and TAVR, as well as the risks and benefits of both versus continuing medical therapy with the patient and her . All questions were answered. Rafa Isaacs and Candace felt that medical therapy is a better option for this patient, given age, frailty, and co-morbidities. With all the options available, the patient has agreed to proceed with medical therapy for the treatment of her aortic stenosis.

## 2023-08-28 RX ORDER — ROSUVASTATIN CALCIUM 40 MG/1
TABLET, COATED ORAL
Qty: 90 TABLET | Refills: 1 | Status: SHIPPED | OUTPATIENT
Start: 2023-08-28

## 2023-08-31 NOTE — PROGRESS NOTES
78 02/28/2022     Lab Results   Component Value Date    LABVLDL 13.8 03/21/2023    LABVLDL 34.2 08/29/2022    LABVLDL 43 01/28/2021    VLDL 30 02/28/2022    VLDL 32 09/20/2021    VLDL 30 07/27/2021     Lab Results   Component Value Date    CHOLHDLRATIO 1.7 03/21/2023    CHOLHDLRATIO 2.4 08/29/2022    CHOLHDLRATIO 3.4 01/28/2021     Lab Results   Component Value Date/Time     08/16/2023 02:38 PM    K 4.0 08/16/2023 02:38 PM     08/16/2023 02:38 PM    CO2 25 08/16/2023 02:38 PM    BUN 22 08/16/2023 02:38 PM    CREATININE 1.54 08/16/2023 02:38 PM    GLUCOSE 231 08/16/2023 02:38 PM    CALCIUM 9.5 08/16/2023 02:38 PM    LABGLOM 32 08/16/2023 02:38 PM    LABGLOM 43 02/28/2022 12:00 AM      Lab Results   Component Value Date    WBC 12.2 (H) 08/16/2023    HGB 13.1 08/16/2023    HCT 39.9 08/16/2023    MCV 90.3 08/16/2023     08/16/2023           ATTENTION:   This medical record was transcribed using an electronic medical records/speech recognition system. Although proofread, it may and can contain electronic, spelling and other errors. Corrections may be executed at a later time. Please feel free to contact us for any clarifications as needed.     [facility] heart and Vascular [place]

## 2023-09-14 ENCOUNTER — OFFICE VISIT (OUTPATIENT)
Age: 88
End: 2023-09-14
Payer: MEDICARE

## 2023-09-14 VITALS
TEMPERATURE: 97.3 F | HEART RATE: 108 BPM | DIASTOLIC BLOOD PRESSURE: 83 MMHG | OXYGEN SATURATION: 97 % | BODY MASS INDEX: 30.43 KG/M2 | SYSTOLIC BLOOD PRESSURE: 122 MMHG | HEIGHT: 61 IN | WEIGHT: 161.2 LBS | RESPIRATION RATE: 18 BRPM

## 2023-09-14 DIAGNOSIS — E78.00 PURE HYPERCHOLESTEROLEMIA, UNSPECIFIED: ICD-10-CM

## 2023-09-14 DIAGNOSIS — E11.65 TYPE 2 DIABETES MELLITUS WITH HYPERGLYCEMIA, WITHOUT LONG-TERM CURRENT USE OF INSULIN (HCC): Primary | ICD-10-CM

## 2023-09-14 LAB
EST. AVERAGE GLUCOSE BLD GHB EST-MCNC: 131 MG/DL
HBA1C MFR BLD: 6.2 % (ref 4–5.6)

## 2023-09-14 PROCEDURE — 99213 OFFICE O/P EST LOW 20 MIN: CPT | Performed by: NURSE PRACTITIONER

## 2023-09-14 PROCEDURE — 3044F HG A1C LEVEL LT 7.0%: CPT | Performed by: NURSE PRACTITIONER

## 2023-09-14 PROCEDURE — 1123F ACP DISCUSS/DSCN MKR DOCD: CPT | Performed by: NURSE PRACTITIONER

## 2023-09-14 PROCEDURE — 1036F TOBACCO NON-USER: CPT | Performed by: NURSE PRACTITIONER

## 2023-09-14 PROCEDURE — 1090F PRES/ABSN URINE INCON ASSESS: CPT | Performed by: NURSE PRACTITIONER

## 2023-09-14 PROCEDURE — G8417 CALC BMI ABV UP PARAM F/U: HCPCS | Performed by: NURSE PRACTITIONER

## 2023-09-14 PROCEDURE — G8427 DOCREV CUR MEDS BY ELIG CLIN: HCPCS | Performed by: NURSE PRACTITIONER

## 2023-09-14 RX ORDER — OMEPRAZOLE 40 MG/1
CAPSULE, DELAYED RELEASE ORAL
COMMUNITY
Start: 2023-08-22

## 2023-09-15 LAB
CHOLEST SERPL-MCNC: 146 MG/DL
HDLC SERPL-MCNC: 65 MG/DL
HDLC SERPL: 2.2 (ref 0–5)
LDLC SERPL CALC-MCNC: 47.8 MG/DL (ref 0–100)
TRIGL SERPL-MCNC: 166 MG/DL
VLDLC SERPL CALC-MCNC: 33.2 MG/DL

## 2023-09-26 RX ORDER — FOLIC ACID 1 MG/1
TABLET ORAL
Qty: 90 TABLET | Refills: 0 | Status: SHIPPED | OUTPATIENT
Start: 2023-09-26

## 2023-09-26 RX ORDER — CARBINOXAMINE MALEATE 4 MG/1
1 TABLET ORAL 2 TIMES DAILY
Qty: 60 TABLET | Refills: 1 | Status: SHIPPED | OUTPATIENT
Start: 2023-09-26

## 2023-11-25 RX ORDER — GLIMEPIRIDE 2 MG/1
TABLET ORAL
Qty: 270 TABLET | Refills: 1 | Status: SHIPPED | OUTPATIENT
Start: 2023-11-25

## 2023-12-03 RX ORDER — CARBINOXAMINE MALEATE 4 MG/1
1 TABLET ORAL 2 TIMES DAILY
Qty: 60 TABLET | Refills: 1 | Status: SHIPPED | OUTPATIENT
Start: 2023-12-03

## 2023-12-26 ENCOUNTER — TELEPHONE (OUTPATIENT)
Age: 88
End: 2023-12-26

## 2023-12-26 RX ORDER — FOLIC ACID 1 MG/1
TABLET ORAL
Qty: 90 TABLET | Refills: 0 | Status: SHIPPED | OUTPATIENT
Start: 2023-12-26

## 2023-12-26 NOTE — TELEPHONE ENCOUNTER
Returned call to patient-   No Issues regarding the lower dose of amlodipine. Feeling well. If she is to continue on the 5 mg of the Amlodipine she would like a new rx sent into her local pharmacy.  Advised that Jaydon Langford will be back in the office tomorrow (12/27/23)

## 2023-12-26 NOTE — TELEPHONE ENCOUNTER
Pt is calling in her BP readings.     12/19/23 /63 morning P 114  12/20/23 /66 P 94  12/21/23 /73 P 110  12/22/23 /67 P 112  12/23/23 /79 P 113  12/24/23 /76 P 93  12/25/23 /68 P 108      [phone]

## 2023-12-29 RX ORDER — AMLODIPINE BESYLATE 5 MG/1
5 TABLET ORAL EVERY MORNING
Qty: 90 TABLET | Refills: 2 | Status: SHIPPED | OUTPATIENT
Start: 2023-12-29

## 2024-01-08 ENCOUNTER — OFFICE VISIT (OUTPATIENT)
Age: 89
End: 2024-01-08
Payer: MEDICARE

## 2024-01-08 VITALS
HEIGHT: 61 IN | HEART RATE: 57 BPM | RESPIRATION RATE: 16 BRPM | TEMPERATURE: 97.4 F | DIASTOLIC BLOOD PRESSURE: 78 MMHG | OXYGEN SATURATION: 97 % | WEIGHT: 165 LBS | BODY MASS INDEX: 31.15 KG/M2 | SYSTOLIC BLOOD PRESSURE: 117 MMHG

## 2024-01-08 DIAGNOSIS — E78.00 PURE HYPERCHOLESTEROLEMIA, UNSPECIFIED: ICD-10-CM

## 2024-01-08 DIAGNOSIS — N18.31 CHRONIC KIDNEY DISEASE, STAGE 3A (HCC): ICD-10-CM

## 2024-01-08 DIAGNOSIS — E11.65 TYPE 2 DIABETES MELLITUS WITH HYPERGLYCEMIA, WITHOUT LONG-TERM CURRENT USE OF INSULIN (HCC): Primary | ICD-10-CM

## 2024-01-08 DIAGNOSIS — I10 ESSENTIAL (PRIMARY) HYPERTENSION: ICD-10-CM

## 2024-01-08 PROCEDURE — 1090F PRES/ABSN URINE INCON ASSESS: CPT | Performed by: NURSE PRACTITIONER

## 2024-01-08 PROCEDURE — 1123F ACP DISCUSS/DSCN MKR DOCD: CPT | Performed by: NURSE PRACTITIONER

## 2024-01-08 PROCEDURE — 1036F TOBACCO NON-USER: CPT | Performed by: NURSE PRACTITIONER

## 2024-01-08 PROCEDURE — 99214 OFFICE O/P EST MOD 30 MIN: CPT | Performed by: NURSE PRACTITIONER

## 2024-01-08 PROCEDURE — G8484 FLU IMMUNIZE NO ADMIN: HCPCS | Performed by: NURSE PRACTITIONER

## 2024-01-08 PROCEDURE — G8428 CUR MEDS NOT DOCUMENT: HCPCS | Performed by: NURSE PRACTITIONER

## 2024-01-08 PROCEDURE — G8417 CALC BMI ABV UP PARAM F/U: HCPCS | Performed by: NURSE PRACTITIONER

## 2024-01-08 ASSESSMENT — PATIENT HEALTH QUESTIONNAIRE - PHQ9
SUM OF ALL RESPONSES TO PHQ QUESTIONS 1-9: 0
7. TROUBLE CONCENTRATING ON THINGS, SUCH AS READING THE NEWSPAPER OR WATCHING TELEVISION: 0
2. FEELING DOWN, DEPRESSED OR HOPELESS: 0
SUM OF ALL RESPONSES TO PHQ QUESTIONS 1-9: 0
SUM OF ALL RESPONSES TO PHQ QUESTIONS 1-9: 0
1. LITTLE INTEREST OR PLEASURE IN DOING THINGS: 0
6. FEELING BAD ABOUT YOURSELF - OR THAT YOU ARE A FAILURE OR HAVE LET YOURSELF OR YOUR FAMILY DOWN: 0
3. TROUBLE FALLING OR STAYING ASLEEP: 0
10. IF YOU CHECKED OFF ANY PROBLEMS, HOW DIFFICULT HAVE THESE PROBLEMS MADE IT FOR YOU TO DO YOUR WORK, TAKE CARE OF THINGS AT HOME, OR GET ALONG WITH OTHER PEOPLE: 0
5. POOR APPETITE OR OVEREATING: 0
9. THOUGHTS THAT YOU WOULD BE BETTER OFF DEAD, OR OF HURTING YOURSELF: 0
SUM OF ALL RESPONSES TO PHQ9 QUESTIONS 1 & 2: 0
8. MOVING OR SPEAKING SO SLOWLY THAT OTHER PEOPLE COULD HAVE NOTICED. OR THE OPPOSITE, BEING SO FIGETY OR RESTLESS THAT YOU HAVE BEEN MOVING AROUND A LOT MORE THAN USUAL: 0
4. FEELING TIRED OR HAVING LITTLE ENERGY: 0
SUM OF ALL RESPONSES TO PHQ QUESTIONS 1-9: 0

## 2024-01-08 NOTE — PROGRESS NOTES
Chief Complaint   Patient presents with    Diabetes     Patient presents in office today for diabetes check.  No concerns.      1. \"Have you been to the ER, urgent care clinic since your last visit?  Hospitalized since your last visit?\" No    2. \"Have you seen or consulted any other health care providers outside of the [facility] since your last visit?\" No     3. For patients aged 45-75: Has the patient had a colonoscopy / FIT/ Cologuard? NA - based on age      If the patient is female:    4. For patients aged 40-74: Has the patient had a mammogram within the past 2 years? NA - based on age or sex      5. For patients aged 21-65: Has the patient had a pap smear? NA - based on age or sex  
visit.    Luna Garcia, MSN, ANP  1/8/2024

## 2024-01-09 LAB
ALBUMIN SERPL-MCNC: 3.9 G/DL (ref 3.5–5)
ALBUMIN/GLOB SERPL: 1.4 (ref 1.1–2.2)
ALP SERPL-CCNC: 64 U/L (ref 45–117)
ALT SERPL-CCNC: 37 U/L (ref 12–78)
ANION GAP SERPL CALC-SCNC: 8 MMOL/L (ref 5–15)
AST SERPL-CCNC: 21 U/L (ref 15–37)
BILIRUB SERPL-MCNC: 0.6 MG/DL (ref 0.2–1)
BUN SERPL-MCNC: 23 MG/DL (ref 6–20)
BUN/CREAT SERPL: 18 (ref 12–20)
CALCIUM SERPL-MCNC: 9.7 MG/DL (ref 8.5–10.1)
CHLORIDE SERPL-SCNC: 106 MMOL/L (ref 97–108)
CHOLEST SERPL-MCNC: 146 MG/DL
CO2 SERPL-SCNC: 27 MMOL/L (ref 21–32)
CREAT SERPL-MCNC: 1.31 MG/DL (ref 0.55–1.02)
EST. AVERAGE GLUCOSE BLD GHB EST-MCNC: 160 MG/DL
GLOBULIN SER CALC-MCNC: 2.7 G/DL (ref 2–4)
GLUCOSE SERPL-MCNC: 203 MG/DL (ref 65–100)
HBA1C MFR BLD: 7.2 % (ref 4–5.6)
HDLC SERPL-MCNC: 63 MG/DL
HDLC SERPL: 2.3 (ref 0–5)
LDLC SERPL CALC-MCNC: 51.2 MG/DL (ref 0–100)
POTASSIUM SERPL-SCNC: 4.8 MMOL/L (ref 3.5–5.1)
PROT SERPL-MCNC: 6.6 G/DL (ref 6.4–8.2)
SODIUM SERPL-SCNC: 141 MMOL/L (ref 136–145)
TRIGL SERPL-MCNC: 159 MG/DL
VLDLC SERPL CALC-MCNC: 31.8 MG/DL

## 2024-01-28 RX ORDER — CARBINOXAMINE MALEATE 4 MG/1
1 TABLET ORAL 2 TIMES DAILY
Qty: 60 TABLET | Refills: 1 | Status: SHIPPED | OUTPATIENT
Start: 2024-01-28

## 2024-02-13 ENCOUNTER — OFFICE VISIT (OUTPATIENT)
Age: 89
End: 2024-02-13
Payer: MEDICARE

## 2024-02-13 VITALS
BODY MASS INDEX: 31.18 KG/M2 | DIASTOLIC BLOOD PRESSURE: 65 MMHG | TEMPERATURE: 98.7 F | OXYGEN SATURATION: 98 % | SYSTOLIC BLOOD PRESSURE: 111 MMHG | RESPIRATION RATE: 20 BRPM | HEART RATE: 95 BPM | HEIGHT: 61 IN

## 2024-02-13 DIAGNOSIS — R05.1 ACUTE COUGH: Primary | ICD-10-CM

## 2024-02-13 DIAGNOSIS — R53.83 FATIGUE, UNSPECIFIED TYPE: ICD-10-CM

## 2024-02-13 LAB
EXP DATE SOLUTION: NORMAL
EXP DATE SWAB: NORMAL
EXPIRATION DATE: NORMAL
INFLUENZA A ANTIGEN, POC: NEGATIVE
INFLUENZA B ANTIGEN, POC: NEGATIVE
LOT NUMBER POC: NORMAL
LOT NUMBER SOLUTION: NORMAL
LOT NUMBER SWAB: NORMAL
SARS-COV-2 RNA, POC: NEGATIVE
VALID INTERNAL CONTROL, POC: NORMAL

## 2024-02-13 PROCEDURE — G8417 CALC BMI ABV UP PARAM F/U: HCPCS | Performed by: PHYSICIAN ASSISTANT

## 2024-02-13 PROCEDURE — PBSHW AMB POC INFLUENZA A  AND B REAL-TIME RT-PCR: Performed by: PHYSICIAN ASSISTANT

## 2024-02-13 PROCEDURE — 1123F ACP DISCUSS/DSCN MKR DOCD: CPT | Performed by: PHYSICIAN ASSISTANT

## 2024-02-13 PROCEDURE — 99214 OFFICE O/P EST MOD 30 MIN: CPT | Performed by: PHYSICIAN ASSISTANT

## 2024-02-13 PROCEDURE — 1036F TOBACCO NON-USER: CPT | Performed by: PHYSICIAN ASSISTANT

## 2024-02-13 PROCEDURE — PBSHW AMB POC COVID-19 COV: Performed by: PHYSICIAN ASSISTANT

## 2024-02-13 PROCEDURE — 87502 INFLUENZA DNA AMP PROBE: CPT | Performed by: PHYSICIAN ASSISTANT

## 2024-02-13 PROCEDURE — 87635 SARS-COV-2 COVID-19 AMP PRB: CPT | Performed by: PHYSICIAN ASSISTANT

## 2024-02-13 PROCEDURE — G8427 DOCREV CUR MEDS BY ELIG CLIN: HCPCS | Performed by: PHYSICIAN ASSISTANT

## 2024-02-13 PROCEDURE — G8484 FLU IMMUNIZE NO ADMIN: HCPCS | Performed by: PHYSICIAN ASSISTANT

## 2024-02-13 PROCEDURE — 1090F PRES/ABSN URINE INCON ASSESS: CPT | Performed by: PHYSICIAN ASSISTANT

## 2024-02-13 RX ORDER — CETIRIZINE HYDROCHLORIDE 10 MG/1
10 TABLET ORAL DAILY
Qty: 30 TABLET | Refills: 0 | Status: SHIPPED | OUTPATIENT
Start: 2024-02-13 | End: 2024-03-14

## 2024-02-13 RX ORDER — BENZONATATE 100 MG/1
100 CAPSULE ORAL 3 TIMES DAILY PRN
Qty: 30 CAPSULE | Refills: 0 | Status: SHIPPED | OUTPATIENT
Start: 2024-02-13 | End: 2024-02-23

## 2024-02-13 NOTE — PROGRESS NOTES
Aby Lacy is a 89 y.o. female , id x 2(name and ). Reviewed record, history, and  medications.      Chief Complaint   Patient presents with    URI     Patient c/o cough, eye watery, fatigue, head congestion, runny nose, sick since Friday, Covid test negative on , patient has gone through a bottle and 2 doses of Mucinex Max.          Vitals:    24 1555   Height: 1.549 m (5' 1\")       Coordination of Care Questionnaire:   1. Have you been to the ER, urgent care clinic since your last visit?  Hospitalized since your last visit?No    2. Have you seen or consulted any other health care providers outside of the [facility] since your last visit?  Include any pap smears or colon screening. No          2024     9:49 AM   PHQ-9    Little interest or pleasure in doing things 0   Feeling down, depressed, or hopeless 0   Trouble falling or staying asleep, or sleeping too much 0   Feeling tired or having little energy 0   Poor appetite or overeating 0   Feeling bad about yourself - or that you are a failure or have let yourself or your family down 0   Trouble concentrating on things, such as reading the newspaper or watching television 0   Moving or speaking so slowly that other people could have noticed. Or the opposite - being so fidgety or restless that you have been moving around a lot more than usual 0   Thoughts that you would be better off dead, or of hurting yourself in some way 0   PHQ-2 Score 0   PHQ-9 Total Score 0   If you checked off any problems, how difficult have these problems made it for you to do your work, take care of things at home, or get along with other people? 0           2023     2:15 PM   MAGGI-7 SCREENING   Feeling nervous, anxious, or on edge Not at all   Not being able to stop or control worrying Not at all   Worrying too much about different things Not at all   Trouble relaxing Not at all   Being so restless that it is hard to sit still Not at all 
at all   Worrying too much about different things Not at all   Trouble relaxing Not at all   Being so restless that it is hard to sit still Not at all   Becoming easily annoyed or irritable Not at all   Feeling afraid as if something awful might happen Not at all   MAGGI-7 Total Score 0       Social Determinants of Health     Tobacco Use: Low Risk  (2/13/2024)    Patient History     Smoking Tobacco Use: Never     Smokeless Tobacco Use: Never     Passive Exposure: Not on file   Alcohol Use: Not At Risk (6/1/2023)    AUDIT-C     Frequency of Alcohol Consumption: Never     Average Number of Drinks: Patient does not drink     Frequency of Binge Drinking: Never   Financial Resource Strain: Low Risk  (6/1/2023)    Overall Financial Resource Strain (CARDIA)     Difficulty of Paying Living Expenses: Not hard at all   Food Insecurity: Not on file (6/1/2023)   Transportation Needs: Unknown (6/1/2023)    PRAPARE - Transportation     Lack of Transportation (Medical): Not on file     Lack of Transportation (Non-Medical): No   Physical Activity: Inactive (6/5/2023)    Exercise Vital Sign     Days of Exercise per Week: 0 days     Minutes of Exercise per Session: 0 min   Stress: Not on file   Social Connections: Not on file   Intimate Partner Violence: Not on file   Depression: Not at risk (1/8/2024)    PHQ-2     PHQ-2 Score: 0   Housing Stability: Unknown (6/1/2023)    Housing Stability Vital Sign     Unable to Pay for Housing in the Last Year: Not on file     Number of Places Lived in the Last Year: Not on file     Unstable Housing in the Last Year: No   Interpersonal Safety: Not on file   Utilities: Not on file     Chief Complaint   Patient presents with    URI     Patient c/o cough, eye watery, fatigue, head congestion, runny nose, sick since Friday, Covid test negative on Sunday, patient has gone through a bottle and 2 doses of Mucinex Max.      she is a 89 y.o. year old female who presents for evaluation.    Reviewed and agree

## 2024-02-26 NOTE — PROGRESS NOTES
CARDIOLOGY OFFICE NOTE    [address], [place] [id number]  Phone [phone]; Fax [phone]    Primary Cardiologist: Jose Reyes MD, [facility]  Last Office Visit: 12/19/23    Primary care: Luna Garcia APRN - NP       ATTENTION:   This medical record was transcribed using an electronic medical records/speech recognition system.  Although proofread, it may and can contain electronic, spelling and other errors.  Corrections may be executed at a later time.  Please feel free to contact us for any clarifications as needed.             Aby Lacy is a 89 y.o. female with  referred for  dyslipidemia and hypertension        Cardiac risk factors: post menopausal, dyslipidemia, hypertension.   I have personally obtained the history from the patient.          HISTORY OF PRESENTING ILLNESS    Ms./Mr. Aby Lacy  89 y.o. is very sweet and smart lady who insulin for aortic stenosis dyslipidemia hypertension  Has shortness of breath, not any worse. Has allergies .  Denies chest pain, dizziness, edema.     From previous OV \"She has a right bundle branch block and left anterior fascicular block I explained to her that this may be part of the calcification of the annular ring of her aortic valve.  She was seen by valve clinic and she was not interested in valve replacement and understands consequences of worsening shortness of breath.  She did reiterate that her main issue with walking is her back pain and loss of muscle shortness of breath.\"          ACTIVE PROBLEM LIST     Patient Active Problem List    Diagnosis Date Noted    Aortic stenosis 07/21/2023    Chronic renal disease, stage III ([facility]) 03/21/2023    Type 2 diabetes mellitus with chronic kidney disease (HCC)     Colon polyps 02/18/2020    Non-Hodgkin's lymphoma ([facility]) 08/14/2018    Severe obesity (BMI 35.0-39.9) with comorbidity ([facility]) 05/21/2018    SADAF (acute kidney injury) ([facility]) 05/15/2018

## 2024-02-28 RX ORDER — ROSUVASTATIN CALCIUM 40 MG/1
TABLET, COATED ORAL
Qty: 90 TABLET | Refills: 1 | Status: SHIPPED | OUTPATIENT
Start: 2024-02-28

## 2024-02-29 ENCOUNTER — OFFICE VISIT (OUTPATIENT)
Age: 89
End: 2024-02-29
Payer: MEDICARE

## 2024-02-29 VITALS
HEIGHT: 61 IN | BODY MASS INDEX: 30.81 KG/M2 | SYSTOLIC BLOOD PRESSURE: 128 MMHG | RESPIRATION RATE: 17 BRPM | WEIGHT: 163.2 LBS | HEART RATE: 94 BPM | DIASTOLIC BLOOD PRESSURE: 82 MMHG | OXYGEN SATURATION: 97 %

## 2024-02-29 DIAGNOSIS — I49.3 PVC (PREMATURE VENTRICULAR CONTRACTION): ICD-10-CM

## 2024-02-29 DIAGNOSIS — I10 ESSENTIAL HYPERTENSION: Primary | ICD-10-CM

## 2024-02-29 DIAGNOSIS — I35.0 SEVERE AORTIC STENOSIS: ICD-10-CM

## 2024-02-29 DIAGNOSIS — Z86.718 HISTORY OF DVT (DEEP VEIN THROMBOSIS): ICD-10-CM

## 2024-02-29 PROCEDURE — 1036F TOBACCO NON-USER: CPT

## 2024-02-29 PROCEDURE — 93010 ELECTROCARDIOGRAM REPORT: CPT

## 2024-02-29 PROCEDURE — G8417 CALC BMI ABV UP PARAM F/U: HCPCS

## 2024-02-29 PROCEDURE — 99214 OFFICE O/P EST MOD 30 MIN: CPT

## 2024-02-29 PROCEDURE — 93005 ELECTROCARDIOGRAM TRACING: CPT

## 2024-02-29 PROCEDURE — G8428 CUR MEDS NOT DOCUMENT: HCPCS

## 2024-02-29 PROCEDURE — 1123F ACP DISCUSS/DSCN MKR DOCD: CPT

## 2024-02-29 PROCEDURE — G8484 FLU IMMUNIZE NO ADMIN: HCPCS

## 2024-02-29 PROCEDURE — 1090F PRES/ABSN URINE INCON ASSESS: CPT

## 2024-02-29 NOTE — PROGRESS NOTES
Chief Complaint   Patient presents with    Follow-up     4 week- BP check    Shortness of Breath    Hypertension    Hyperlipidemia     Vitals:    02/29/24 1121   BP: 128/82   Site: Left Upper Arm   Position: Sitting   Cuff Size: Medium Adult   Pulse: 94   Resp: 17   SpO2: 97%   Weight: 74 kg (163 lb 3.2 oz)   Height: 1.549 m (5' 1\")     No active chest pain  No recent hospitalizations/urgent care visits  No refills needed

## 2024-02-29 NOTE — LETTER
6/4/2018 9:28 AM 
 
Ms. Radha Wagner [address] [facility] [facility] [id number] Dear Kin Parks: Please find your most recent results below. Resulted Orders METABOLIC PANEL, COMPREHENSIVE Result Value Ref Range Glucose 163 (H) 65 - 99 mg/dL BUN 21 8 - 27 mg/dL Creatinine 1.37 (H) 0.57 - 1.00 mg/dL GFR est non-AA 36 (L) >59 mL/min/1.73 GFR est AA 41 (L) >59 mL/min/1.73  
 BUN/Creatinine ratio 15 12 - 28 Sodium 141 134 - 144 mmol/L Potassium 4.3 3.5 - 5.2 mmol/L Chloride 102 96 - 106 mmol/L  
 CO2 23 18 - 29 mmol/L Comment: **Effective June 11, 2018 Carbon Dioxide, Total** 
  reference interval will be changing to: Age                  Male          Female 
     0 days   - 30 days         16 - 34        16 - 34 
    31 days   -  1 year         15 - 22        15 - 25 
     2 years  -  5 years        16 - 34        14 - 32 
     6 years  - 15 years        23 - 32        22 - 32 
               >12 years        21 - 32        18 - 34 Calcium 10.9 (H) 8.7 - 10.3 mg/dL Protein, total 6.4 6.0 - 8.5 g/dL Albumin 4.1 3.5 - 4.7 g/dL GLOBULIN, TOTAL 2.3 1.5 - 4.5 g/dL A-G Ratio 1.8 1.2 - 2.2 Bilirubin, total 0.4 0.0 - 1.2 mg/dL Alk. phosphatase 63 39 - 117 IU/L  
 AST (SGOT) 21 0 - 40 IU/L  
 ALT (SGPT) 30 0 - 32 IU/L Narrative Performed at:  [address]  [id number] : Cookie Penaloza MD, Phone:  [phone] CALCIUM, IONIZED Result Value Ref Range Calcium, ionized 5.7 (H) 4.5 - 5.6 mg/dL Narrative Performed at:  [address]  [id number] : Cookie Penaloza MD, Phone:  [phone] CKD REPORT Result Value Ref Range Interpretation Note Comment:  
   Supplemental report is available. Narrative Performed at:  [facility] Nutrition Services: Cardiac Education Consult   Day 8 of admit. Carine Dyer Shelia Christopher is a 75 y.o. female with admitting DX of Chest pain [R07.9]  Unstable angina (HCC) [I20.0]    RD/intern able to visit pt at bedside to provide cardiac diet education. RD/intern discussed the role of sodium and fat in heart health and discussed how to identify foods high in these nutrients. Also discussed strategies for substituting healthier alternatives to high-sodium/high-fat foods. Provided cardiac diet education handout reinforcing topics discussed.     Pt demonstrated strong readiness and evidence of learning. RD/intern able to answer all questions to patient's satisfaction.     No other education needs identified at this time. Consider referral to outpatient nutrition services for continuation of education as indicated or per pt preferences.     Please re-consult RD as indicated.     [address] [place], [place]  [id number] : Yarely Drake MD, Phone:  [phone] Looks much better! Please call me if you have any questions: [phone] Sincerely, Clarissa Tripathi MD

## 2024-03-08 ENCOUNTER — ANCILLARY PROCEDURE (OUTPATIENT)
Age: 89
End: 2024-03-08
Payer: MEDICARE

## 2024-03-08 DIAGNOSIS — I49.3 PVC (PREMATURE VENTRICULAR CONTRACTION): ICD-10-CM

## 2024-03-08 PROCEDURE — 93225 XTRNL ECG REC<48 HRS REC: CPT | Performed by: SPECIALIST

## 2024-03-14 NOTE — RESULT ENCOUNTER NOTE
Your Holter monitor/loop recorder shows no abnormal heart rhythms of any significance and this is great news. I hope all is well.      No significant arrhythmias    All the best,    Jose

## 2024-03-25 ENCOUNTER — TELEPHONE (OUTPATIENT)
Age: 89
End: 2024-03-25

## 2024-03-25 RX ORDER — DEXTROSE 3.75 G
TABLET,CHEWABLE ORAL
Qty: 100 EACH | Refills: 3 | Status: SHIPPED | OUTPATIENT
Start: 2024-03-25

## 2024-03-25 RX ORDER — BLOOD-GLUCOSE METER
EACH MISCELLANEOUS
Qty: 1 EACH | Refills: 0 | Status: SHIPPED | OUTPATIENT
Start: 2024-03-25

## 2024-03-25 RX ORDER — FOLIC ACID 1 MG/1
TABLET ORAL
Qty: 90 TABLET | Refills: 0 | Status: SHIPPED | OUTPATIENT
Start: 2024-03-25 | End: 2024-03-27

## 2024-03-27 RX ORDER — FOLIC ACID 1 MG/1
TABLET ORAL
Qty: 90 TABLET | Refills: 0 | Status: SHIPPED | OUTPATIENT
Start: 2024-03-27

## 2024-03-30 RX ORDER — CARBINOXAMINE MALEATE 4 MG/1
1 TABLET ORAL 2 TIMES DAILY
Qty: 60 TABLET | Refills: 1 | Status: SHIPPED | OUTPATIENT
Start: 2024-03-30

## 2024-04-08 RX ORDER — CALCIUM CITRATE/VITAMIN D3 200MG-6.25
TABLET ORAL
Qty: 100 STRIP | Refills: 1 | Status: SHIPPED | OUTPATIENT
Start: 2024-04-08

## 2024-04-23 ENCOUNTER — TELEPHONE (OUTPATIENT)
Age: 89
End: 2024-04-23

## 2024-04-23 NOTE — TELEPHONE ENCOUNTER
Pts granddaughter called in and wanted us to know she passed away Saturday April 20th, card has been started and Anum notified.
